# Patient Record
Sex: MALE | Race: WHITE | NOT HISPANIC OR LATINO | Employment: FULL TIME | URBAN - METROPOLITAN AREA
[De-identification: names, ages, dates, MRNs, and addresses within clinical notes are randomized per-mention and may not be internally consistent; named-entity substitution may affect disease eponyms.]

---

## 2017-12-26 ENCOUNTER — HOSPITAL ENCOUNTER (INPATIENT)
Facility: HOSPITAL | Age: 57
LOS: 2 days | Discharge: HOME | DRG: 247 | End: 2017-12-28
Attending: EMERGENCY MEDICINE | Admitting: INTERNAL MEDICINE
Payer: COMMERCIAL

## 2017-12-26 ENCOUNTER — APPOINTMENT (INPATIENT)
Dept: NON INVASIVE DIAGNOSTICS | Facility: HOSPITAL | Age: 57
DRG: 247 | End: 2017-12-26
Attending: INTERNAL MEDICINE
Payer: COMMERCIAL

## 2017-12-26 ENCOUNTER — APPOINTMENT (EMERGENCY)
Dept: RADIOLOGY | Facility: HOSPITAL | Age: 57
DRG: 247 | End: 2017-12-26
Payer: COMMERCIAL

## 2017-12-26 DIAGNOSIS — R73.9 HYPERGLYCEMIA: ICD-10-CM

## 2017-12-26 DIAGNOSIS — I21.4 NSTEMI (NON-ST ELEVATED MYOCARDIAL INFARCTION) (HCC): Primary | ICD-10-CM

## 2017-12-26 DIAGNOSIS — E11.9 NEW ONSET TYPE 2 DIABETES MELLITUS (HCC): ICD-10-CM

## 2017-12-26 PROBLEM — E66.01 MORBID OBESITY (HCC): Status: ACTIVE | Noted: 2017-12-26

## 2017-12-26 PROBLEM — R07.9 CHEST PAIN: Status: ACTIVE | Noted: 2017-12-26

## 2017-12-26 LAB
ALBUMIN SERPL BCP-MCNC: 3.4 G/DL (ref 3.5–5)
ALP SERPL-CCNC: 163 U/L (ref 46–116)
ALT SERPL W P-5'-P-CCNC: 36 U/L (ref 12–78)
ANION GAP SERPL CALCULATED.3IONS-SCNC: 8 MMOL/L (ref 4–13)
APTT PPP: 50 SECONDS (ref 23–35)
AST SERPL W P-5'-P-CCNC: 21 U/L (ref 5–45)
ATRIAL RATE: 87 BPM
ATRIAL RATE: 90 BPM
BASOPHILS # BLD AUTO: 0.05 THOUSANDS/ΜL (ref 0–0.1)
BASOPHILS NFR BLD AUTO: 1 % (ref 0–1)
BILIRUB SERPL-MCNC: 0.2 MG/DL (ref 0.2–1)
BUN SERPL-MCNC: 15 MG/DL (ref 5–25)
CALCIUM SERPL-MCNC: 8.3 MG/DL (ref 8.3–10.1)
CHLORIDE SERPL-SCNC: 107 MMOL/L (ref 100–108)
CO2 SERPL-SCNC: 29 MMOL/L (ref 21–32)
CREAT SERPL-MCNC: 0.93 MG/DL (ref 0.6–1.3)
EOSINOPHIL # BLD AUTO: 0.38 THOUSAND/ΜL (ref 0–0.61)
EOSINOPHIL NFR BLD AUTO: 4 % (ref 0–6)
ERYTHROCYTE [DISTWIDTH] IN BLOOD BY AUTOMATED COUNT: 13 % (ref 11.6–15.1)
EST. AVERAGE GLUCOSE BLD GHB EST-MCNC: 220 MG/DL
GFR SERPL CREATININE-BSD FRML MDRD: 91 ML/MIN/1.73SQ M
GLUCOSE SERPL-MCNC: 165 MG/DL (ref 65–140)
GLUCOSE SERPL-MCNC: 175 MG/DL (ref 65–140)
GLUCOSE SERPL-MCNC: 195 MG/DL (ref 65–140)
GLUCOSE SERPL-MCNC: 297 MG/DL (ref 65–140)
GLUCOSE SERPL-MCNC: 306 MG/DL (ref 65–140)
HBA1C MFR BLD: 9.3 % (ref 4.2–6.3)
HCT VFR BLD AUTO: 44.4 % (ref 36.5–49.3)
HGB BLD-MCNC: 14.7 G/DL (ref 12–17)
KCT BLD-ACNC: 189 SEC (ref 89–137)
LYMPHOCYTES # BLD AUTO: 2.01 THOUSANDS/ΜL (ref 0.6–4.47)
LYMPHOCYTES NFR BLD AUTO: 21 % (ref 14–44)
MCH RBC QN AUTO: 28.2 PG (ref 26.8–34.3)
MCHC RBC AUTO-ENTMCNC: 33.1 G/DL (ref 31.4–37.4)
MCV RBC AUTO: 85 FL (ref 82–98)
MONOCYTES # BLD AUTO: 0.91 THOUSAND/ΜL (ref 0.17–1.22)
MONOCYTES NFR BLD AUTO: 10 % (ref 4–12)
NEUTROPHILS # BLD AUTO: 6.03 THOUSANDS/ΜL (ref 1.85–7.62)
NEUTS SEG NFR BLD AUTO: 64 % (ref 43–75)
NT-PROBNP SERPL-MCNC: 624 PG/ML
P AXIS: 64 DEGREES
P AXIS: 88 DEGREES
PLATELET # BLD AUTO: 283 THOUSANDS/UL (ref 149–390)
PMV BLD AUTO: 9.8 FL (ref 8.9–12.7)
POTASSIUM SERPL-SCNC: 4.4 MMOL/L (ref 3.5–5.3)
PR INTERVAL: 186 MS
PR INTERVAL: 190 MS
PROT SERPL-MCNC: 7.5 G/DL (ref 6.4–8.2)
QRS AXIS: 21 DEGREES
QRS AXIS: 31 DEGREES
QRSD INTERVAL: 106 MS
QRSD INTERVAL: 108 MS
QT INTERVAL: 380 MS
QT INTERVAL: 392 MS
QTC INTERVAL: 464 MS
QTC INTERVAL: 471 MS
RBC # BLD AUTO: 5.22 MILLION/UL (ref 3.88–5.62)
SODIUM SERPL-SCNC: 144 MMOL/L (ref 136–145)
SPECIMEN SOURCE: ABNORMAL
T WAVE AXIS: 69 DEGREES
T WAVE AXIS: 77 DEGREES
TROPONIN I SERPL-MCNC: 0.29 NG/ML
TROPONIN I SERPL-MCNC: 10.17 NG/ML
VENTRICULAR RATE: 87 BPM
VENTRICULAR RATE: 90 BPM
WBC # BLD AUTO: 9.38 THOUSAND/UL (ref 4.31–10.16)

## 2017-12-26 PROCEDURE — 80053 COMPREHEN METABOLIC PANEL: CPT | Performed by: EMERGENCY MEDICINE

## 2017-12-26 PROCEDURE — C1894 INTRO/SHEATH, NON-LASER: HCPCS | Performed by: INTERNAL MEDICINE

## 2017-12-26 PROCEDURE — C1725 CATH, TRANSLUMIN NON-LASER: HCPCS | Performed by: INTERNAL MEDICINE

## 2017-12-26 PROCEDURE — 027034Z DILATION OF CORONARY ARTERY, ONE ARTERY WITH DRUG-ELUTING INTRALUMINAL DEVICE, PERCUTANEOUS APPROACH: ICD-10-PCS | Performed by: INTERNAL MEDICINE

## 2017-12-26 PROCEDURE — 84484 ASSAY OF TROPONIN QUANT: CPT | Performed by: EMERGENCY MEDICINE

## 2017-12-26 PROCEDURE — 93454 CORONARY ARTERY ANGIO S&I: CPT | Performed by: INTERNAL MEDICINE

## 2017-12-26 PROCEDURE — B2111ZZ FLUOROSCOPY OF MULTIPLE CORONARY ARTERIES USING LOW OSMOLAR CONTRAST: ICD-10-PCS | Performed by: INTERNAL MEDICINE

## 2017-12-26 PROCEDURE — 82948 REAGENT STRIP/BLOOD GLUCOSE: CPT

## 2017-12-26 PROCEDURE — 99153 MOD SED SAME PHYS/QHP EA: CPT | Performed by: INTERNAL MEDICINE

## 2017-12-26 PROCEDURE — 84484 ASSAY OF TROPONIN QUANT: CPT | Performed by: INTERNAL MEDICINE

## 2017-12-26 PROCEDURE — 85025 COMPLETE CBC W/AUTO DIFF WBC: CPT | Performed by: EMERGENCY MEDICINE

## 2017-12-26 PROCEDURE — 71020 HB CHEST X-RAY 2VW FRONTAL&LATL: CPT

## 2017-12-26 PROCEDURE — C9600 PERC DRUG-EL COR STENT SING: HCPCS | Performed by: INTERNAL MEDICINE

## 2017-12-26 PROCEDURE — C1874 STENT, COATED/COV W/DEL SYS: HCPCS

## 2017-12-26 PROCEDURE — 93005 ELECTROCARDIOGRAM TRACING: CPT | Performed by: EMERGENCY MEDICINE

## 2017-12-26 PROCEDURE — 36415 COLL VENOUS BLD VENIPUNCTURE: CPT

## 2017-12-26 PROCEDURE — C1769 GUIDE WIRE: HCPCS | Performed by: INTERNAL MEDICINE

## 2017-12-26 PROCEDURE — C1887 CATHETER, GUIDING: HCPCS | Performed by: INTERNAL MEDICINE

## 2017-12-26 PROCEDURE — 93005 ELECTROCARDIOGRAM TRACING: CPT

## 2017-12-26 PROCEDURE — 85347 COAGULATION TIME ACTIVATED: CPT

## 2017-12-26 PROCEDURE — 99285 EMERGENCY DEPT VISIT HI MDM: CPT

## 2017-12-26 PROCEDURE — 83880 ASSAY OF NATRIURETIC PEPTIDE: CPT | Performed by: INTERNAL MEDICINE

## 2017-12-26 PROCEDURE — 85730 THROMBOPLASTIN TIME PARTIAL: CPT | Performed by: EMERGENCY MEDICINE

## 2017-12-26 PROCEDURE — 83036 HEMOGLOBIN GLYCOSYLATED A1C: CPT | Performed by: INTERNAL MEDICINE

## 2017-12-26 PROCEDURE — 99152 MOD SED SAME PHYS/QHP 5/>YRS: CPT | Performed by: INTERNAL MEDICINE

## 2017-12-26 RX ORDER — HEPARIN SODIUM 1000 [USP'U]/ML
4000 INJECTION, SOLUTION INTRAVENOUS; SUBCUTANEOUS ONCE
Status: COMPLETED | OUTPATIENT
Start: 2017-12-26 | End: 2017-12-26

## 2017-12-26 RX ORDER — ATORVASTATIN CALCIUM 40 MG/1
40 TABLET, FILM COATED ORAL EVERY EVENING
Status: DISCONTINUED | OUTPATIENT
Start: 2017-12-26 | End: 2017-12-28 | Stop reason: HOSPADM

## 2017-12-26 RX ORDER — ONDANSETRON 2 MG/ML
4 INJECTION INTRAMUSCULAR; INTRAVENOUS EVERY 6 HOURS PRN
Status: DISCONTINUED | OUTPATIENT
Start: 2017-12-26 | End: 2017-12-28 | Stop reason: HOSPADM

## 2017-12-26 RX ORDER — HEPARIN SODIUM 1000 [USP'U]/ML
4000 INJECTION, SOLUTION INTRAVENOUS; SUBCUTANEOUS AS NEEDED
Status: DISCONTINUED | OUTPATIENT
Start: 2017-12-26 | End: 2017-12-27

## 2017-12-26 RX ORDER — MORPHINE SULFATE 4 MG/ML
4 INJECTION, SOLUTION INTRAMUSCULAR; INTRAVENOUS EVERY 4 HOURS PRN
Status: DISCONTINUED | OUTPATIENT
Start: 2017-12-26 | End: 2017-12-28 | Stop reason: HOSPADM

## 2017-12-26 RX ORDER — METOPROLOL TARTRATE 5 MG/5ML
INJECTION INTRAVENOUS CODE/TRAUMA/SEDATION MEDICATION
Status: COMPLETED | OUTPATIENT
Start: 2017-12-26 | End: 2017-12-26

## 2017-12-26 RX ORDER — CLOPIDOGREL BISULFATE 75 MG/1
600 TABLET ORAL ONCE
Status: COMPLETED | OUTPATIENT
Start: 2017-12-26 | End: 2017-12-26

## 2017-12-26 RX ORDER — SODIUM CHLORIDE 9 MG/ML
100 INJECTION, SOLUTION INTRAVENOUS CONTINUOUS
Status: DISPENSED | OUTPATIENT
Start: 2017-12-26 | End: 2017-12-27

## 2017-12-26 RX ORDER — ASPIRIN 81 MG/1
324 TABLET, CHEWABLE ORAL DAILY
Status: DISCONTINUED | OUTPATIENT
Start: 2017-12-27 | End: 2017-12-28 | Stop reason: HOSPADM

## 2017-12-26 RX ORDER — CLOPIDOGREL BISULFATE 75 MG/1
75 TABLET ORAL DAILY
Status: DISCONTINUED | OUTPATIENT
Start: 2017-12-27 | End: 2017-12-28 | Stop reason: HOSPADM

## 2017-12-26 RX ORDER — ASPIRIN 81 MG/1
162 TABLET, CHEWABLE ORAL DAILY
Status: DISCONTINUED | OUTPATIENT
Start: 2017-12-26 | End: 2017-12-26

## 2017-12-26 RX ORDER — LIDOCAINE HYDROCHLORIDE 10 MG/ML
INJECTION, SOLUTION INFILTRATION; PERINEURAL CODE/TRAUMA/SEDATION MEDICATION
Status: COMPLETED | OUTPATIENT
Start: 2017-12-26 | End: 2017-12-26

## 2017-12-26 RX ORDER — DIPHENOXYLATE HYDROCHLORIDE AND ATROPINE SULFATE 2.5; .025 MG/1; MG/1
1 TABLET ORAL DAILY
COMMUNITY
End: 2018-04-04 | Stop reason: ALTCHOICE

## 2017-12-26 RX ORDER — HEPARIN SODIUM 1000 [USP'U]/ML
INJECTION, SOLUTION INTRAVENOUS; SUBCUTANEOUS CODE/TRAUMA/SEDATION MEDICATION
Status: COMPLETED | OUTPATIENT
Start: 2017-12-26 | End: 2017-12-26

## 2017-12-26 RX ORDER — VERAPAMIL HYDROCHLORIDE 2.5 MG/ML
INJECTION, SOLUTION INTRAVENOUS CODE/TRAUMA/SEDATION MEDICATION
Status: COMPLETED | OUTPATIENT
Start: 2017-12-26 | End: 2017-12-26

## 2017-12-26 RX ORDER — NITROGLYCERIN 20 MG/100ML
INJECTION INTRAVENOUS CODE/TRAUMA/SEDATION MEDICATION
Status: COMPLETED | OUTPATIENT
Start: 2017-12-26 | End: 2017-12-26

## 2017-12-26 RX ORDER — HEPARIN SODIUM 10000 [USP'U]/100ML
3-20 INJECTION, SOLUTION INTRAVENOUS
Status: DISCONTINUED | OUTPATIENT
Start: 2017-12-26 | End: 2017-12-27

## 2017-12-26 RX ORDER — ACETAMINOPHEN 325 MG/1
650 TABLET ORAL EVERY 4 HOURS PRN
Status: DISCONTINUED | OUTPATIENT
Start: 2017-12-26 | End: 2017-12-28 | Stop reason: HOSPADM

## 2017-12-26 RX ORDER — HEPARIN SODIUM 1000 [USP'U]/ML
2000 INJECTION, SOLUTION INTRAVENOUS; SUBCUTANEOUS AS NEEDED
Status: DISCONTINUED | OUTPATIENT
Start: 2017-12-26 | End: 2017-12-27

## 2017-12-26 RX ORDER — MIDAZOLAM HYDROCHLORIDE 1 MG/ML
INJECTION INTRAMUSCULAR; INTRAVENOUS CODE/TRAUMA/SEDATION MEDICATION
Status: COMPLETED | OUTPATIENT
Start: 2017-12-26 | End: 2017-12-26

## 2017-12-26 RX ORDER — FENTANYL CITRATE 50 UG/ML
INJECTION, SOLUTION INTRAMUSCULAR; INTRAVENOUS CODE/TRAUMA/SEDATION MEDICATION
Status: COMPLETED | OUTPATIENT
Start: 2017-12-26 | End: 2017-12-26

## 2017-12-26 RX ADMIN — IOHEXOL 150 ML: 350 INJECTION, SOLUTION INTRAVENOUS at 13:42

## 2017-12-26 RX ADMIN — HEPARIN SODIUM 5000 UNITS: 1000 INJECTION INTRAVENOUS; SUBCUTANEOUS at 13:30

## 2017-12-26 RX ADMIN — SODIUM CHLORIDE 500 ML: 0.9 INJECTION, SOLUTION INTRAVENOUS at 09:07

## 2017-12-26 RX ADMIN — HEPARIN SODIUM AND DEXTROSE 11.1 UNITS/KG/HR: 10000; 5 INJECTION INTRAVENOUS at 08:59

## 2017-12-26 RX ADMIN — NITROGLYCERIN 1 INCH: 20 OINTMENT TOPICAL at 07:28

## 2017-12-26 RX ADMIN — INSULIN LISPRO 4 UNITS: 100 INJECTION, SOLUTION INTRAVENOUS; SUBCUTANEOUS at 17:37

## 2017-12-26 RX ADMIN — MIDAZOLAM HYDROCHLORIDE 2 MG: 1 INJECTION, SOLUTION INTRAMUSCULAR; INTRAVENOUS at 13:02

## 2017-12-26 RX ADMIN — ATORVASTATIN CALCIUM 40 MG: 40 TABLET, FILM COATED ORAL at 17:34

## 2017-12-26 RX ADMIN — NITROGLYCERIN 200 MCG: 20 INJECTION INTRAVENOUS at 13:07

## 2017-12-26 RX ADMIN — INSULIN LISPRO 1 UNITS: 100 INJECTION, SOLUTION INTRAVENOUS; SUBCUTANEOUS at 21:35

## 2017-12-26 RX ADMIN — ACETAMINOPHEN 650 MG: 325 TABLET, FILM COATED ORAL at 18:06

## 2017-12-26 RX ADMIN — HEPARIN SODIUM 4000 UNITS: 1000 INJECTION, SOLUTION INTRAVENOUS; SUBCUTANEOUS at 08:59

## 2017-12-26 RX ADMIN — METOPROLOL TARTRATE 5 MG: 1 INJECTION, SOLUTION INTRAVENOUS at 13:10

## 2017-12-26 RX ADMIN — CLOPIDOGREL BISULFATE 600 MG: 75 TABLET ORAL at 11:51

## 2017-12-26 RX ADMIN — LIDOCAINE HYDROCHLORIDE 1 ML: 10 INJECTION, SOLUTION INFILTRATION; PERINEURAL at 13:05

## 2017-12-26 RX ADMIN — FENTANYL CITRATE 50 MCG: 50 INJECTION INTRAMUSCULAR; INTRAVENOUS at 13:02

## 2017-12-26 RX ADMIN — VERAPAMIL HYDROCHLORIDE 2.5 MG: 2.5 INJECTION, SOLUTION INTRAVENOUS at 13:07

## 2017-12-26 RX ADMIN — HEPARIN SODIUM 2000 UNITS: 1000 INJECTION, SOLUTION INTRAVENOUS; SUBCUTANEOUS at 11:53

## 2017-12-26 RX ADMIN — METOPROLOL TARTRATE 25 MG: 25 TABLET ORAL at 07:28

## 2017-12-26 RX ADMIN — HEPARIN SODIUM 4000 UNITS: 1000 INJECTION INTRAVENOUS; SUBCUTANEOUS at 13:07

## 2017-12-26 RX ADMIN — NITROGLYCERIN 200 MCG: 20 INJECTION INTRAVENOUS at 13:40

## 2017-12-26 RX ADMIN — SODIUM CHLORIDE 100 ML/HR: 0.9 INJECTION, SOLUTION INTRAVENOUS at 14:09

## 2017-12-26 RX ADMIN — ASPIRIN 81 MG 162 MG: 81 TABLET ORAL at 07:38

## 2017-12-26 RX ADMIN — METOPROLOL TARTRATE 25 MG: 25 TABLET ORAL at 20:08

## 2017-12-26 NOTE — H&P
History and Physical - [facility] Internal Medicine    Patient Information: Denisa Griffin 62 y o  male MRN: [id number]  Unit/Bed#: -01 Encounter: [id number]  Admitting Physician: Sergei Landaverde MD  PCP: No primary care provider on file  Date of Admission:  12/26/17    Assessment/Plan:    Hospital Problem List:     Principal Problem:    NSTEMI (non-ST elevated myocardial infarction) [facility])  Active Problems:    Chest pain    New onset type 2 diabetes mellitus ([facility] )    Morbid obesity ([facility] )      Plan for the Primary Problem(s):  · Non ST elevation MI likely type 1  Agree with admission on monitored bed  Serial cardiac enzymes will be obtained  Will get echocardiogram   Cardiology evaluation will be done  Patient will be maintained on aspirin Plavix beta-blocker high-intensity statin therapy  Heparin GTT will be continued  Further ischemic workup per Cardiology  · New onset type 2 diabetes  Follow up on hemoglobin A1c  Continue with sliding scale insulin  Nutritional consultation will be obtained  Diabetic teaching will be done  ·   Morbid obesity  Lifestyle modification  ·       VTE Prophylaxis: Heparin Drip  / reason for no mechanical VTE prophylaxis foot ulcers   Code Status: FULL CODE  POLST: POLST form is not discussed and not completed at this time  Anticipated Length of Stay:  Patient will be admitted on an Inpatient basis with an anticipated length of stay of  > 2 midnights  Justification for Hospital Stay:  Cardiology evaluation and follow-up    Total Time for Visit, including Counseling / Coordination of Care: 45 minutes  Greater than 50% of this total time spent on direct patient counseling and coordination of care  Chief Complaint:   Chest pain    History of Present Illness:    Denisa Griffin is a 62 y o  male who presents with chest pain was started at 5:00 a m  and woke the patient from sleep    Patient describes the pain as substernal radiating to his jaw a moderate to severe intensity  It was not associated with diaphoresis, palpitations, lightheadedness or shortness of breath  He denies any similar symptoms in the past   He took a few baby aspirins and the pain had improved by the time he drove himself to the emergency room  The patient has a strong family history of coronary artery disease and his father had his 1st MI in the 45s and his grandfather in the 46s  He however has not seen a primary care physician for many years  He was also found to have elevated blood sugars in the emergency room  The patient states that he has ulceration on the left foot however denies any tingling or numbness of bilateral lower extremity  Review of Systems:    Review of Systems   Constitutional: Negative  HENT: Negative  Cardiovascular: Positive for chest pain  Negative for palpitations  Gastrointestinal: Negative  Endocrine: Negative  Genitourinary: Negative  Musculoskeletal: Negative  Allergic/Immunologic: Negative  Neurological: Negative  Hematological: Negative  Psychiatric/Behavioral: Negative  Past Medical and Surgical History:     History reviewed  No pertinent past medical history  Past Surgical History:   Procedure Laterality Date    EYE SURGERY      WISDOM TOOTH EXTRACTION         Meds/Allergies:    Prior to Admission medications    Medication Sig Start Date End Date Taking? Authorizing Provider   multivitamin (THERAGRAN) TABS Take 1 tablet by mouth daily   Yes Historical Provider, MD     I have reviewed home medications with patient personally      Allergies: No Known Allergies    Social History:     Marital Status: /Civil Union     Substance Use History:   History   Alcohol Use No     History   Smoking Status    Never Smoker   Smokeless Tobacco    Never Used     History   Drug use: Unknown       Family History:    non-contributory    Physical Exam:     Vitals:   Blood Pressure: 148/83 (12/26/17 1031)  Pulse: 71 (12/26/17 1031)  Temperature: 97 7 °F (36 5 °C) (12/26/17 1031)  Temp Source: Oral (12/26/17 1031)  Respirations: 18 (12/26/17 1031)  Height: 5' 11" (180 3 cm) (12/26/17 1031)  Weight - Scale: (!) 151 kg (333 lb 1 8 oz) (12/26/17 1031)  SpO2: 95 % (12/26/17 1031)    Physical Exam   Constitutional: He is oriented to person, place, and time  No distress  HENT:   Head: Normocephalic and atraumatic  Eyes: Conjunctivae are normal  Pupils are equal, round, and reactive to light  Neck: Normal range of motion  No JVD present  Cardiovascular: Normal rate and regular rhythm  Pulmonary/Chest: Effort normal and breath sounds normal    Abdominal: Soft  Bowel sounds are normal    Musculoskeletal: He exhibits edema  He exhibits no deformity  Neurological: He is alert and oriented to person, place, and time  Skin: Skin is warm  He is not diaphoretic  Ulceration medial aspect of left foot  Palpabledorsalis pedis pulses bilaterally   Psychiatric: He has a normal mood and affect  Vitals reviewed  Additional Data:     Lab Results: I have personally reviewed pertinent reports  Results from last 7 days  Lab Units 12/26/17  0640   WBC Thousand/uL 9 38   HEMOGLOBIN g/dL 14 7   HEMATOCRIT % 44 4   PLATELETS Thousands/uL 283   NEUTROS PCT % 64   LYMPHS PCT % 21   MONOS PCT % 10   EOS PCT % 4       Results from last 7 days  Lab Units 12/26/17  0640   SODIUM mmol/L 144   POTASSIUM mmol/L 4 4   CHLORIDE mmol/L 107   CO2 mmol/L 29   BUN mg/dL 15   CREATININE mg/dL 0 93   CALCIUM mg/dL 8 3   TOTAL PROTEIN g/dL 7 5   BILIRUBIN TOTAL mg/dL 0 20   ALK PHOS U/L 163*   ALT U/L 36   AST U/L 21   GLUCOSE RANDOM mg/dL 297*           Imaging: I have personally reviewed pertinent reports  X-ray Chest 2 Views    Result Date: 12/26/2017  Narrative: CHEST INDICATION:  Chest pain COMPARISON:  None VIEWS:  Frontal and lateral projections IMAGES:  2 FINDINGS:     Cardiomediastinal silhouette appears unremarkable   The lungs are clear   No pneumothorax or pleural effusion  Visualized osseous structures appear within normal limits for the patient's age  Impression: No active pulmonary disease  Workstation performed: [id number]       EKG, Pathology, and Other Studies Reviewed on Admission:   · EKG:Reviewed    Allscripts / Epic Records Reviewed: Yes     ** Please Note: This note has been constructed using a voice recognition system   **

## 2017-12-26 NOTE — ED PROCEDURE NOTE
PROCEDURE  CriticalCare Time  Performed by: Rina Springer  Authorized by: Rina Springer     Critical care provider statement:     Critical care time (minutes):  35    Critical care start time:  12/26/2017 8:15 AM    Critical care end time:  12/26/2017 8:50 AM    Critical care time was exclusive of:  Separately billable procedures and treating other patients and teaching time    Critical care was time spent personally by me on the following activities:  Examination of patient, evaluation of patient's response to treatment, ordering and review of laboratory studies, ordering and review of radiographic studies and re-evaluation of patient's condition    I assumed direction of critical care for this patient from another provider in my specialty: no

## 2017-12-26 NOTE — ED PROCEDURE NOTE
PROCEDURE  ECG 12 Lead Documentation  Date/Time: 12/26/2017 8:34 AM  Performed by: Cleopatra Garcia  Authorized by: Margaret ATKINSON     Indications / Diagnosis:  Cp  ECG reviewed by me, the ED Provider: yes    Patient location:  ED and bedside  Previous ECG:     Previous ECG:  Unavailable  Interpretation:     Interpretation: non-specific    Rate:     ECG rate:  87    ECG rate assessment: normal    Rhythm:     Rhythm: sinus rhythm    Ectopy:     Ectopy: none    QRS:     QRS axis:  Normal    QRS intervals:   Wide  Conduction:     Conduction: abnormal      Abnormal conduction: non-specific intraventricular conduction delay    ST segments:     ST segments:  Normal  T waves:     T waves: flattening      Flattening:  I and aVL  Q waves:     Q waves:  V1 and V2  Comments:      No ecg signs of ischemia/ injury / r heart strain

## 2017-12-26 NOTE — PROGRESS NOTES
Cardiac cath performed via the R radial artery    Chronic mid RCA stenosis with R to R collaterals    Acute critical mid L Cx stenosis 95%, culprit lesion    Successful PCI with placement of a 4 0 x 18 mm THALIA

## 2017-12-26 NOTE — PLAN OF CARE
CARDIOVASCULAR - ADULT     Maintains optimal cardiac output and hemodynamic stability Progressing     Absence of cardiac dysrhythmias or at baseline rhythm Progressing        METABOLIC, FLUID AND ELECTROLYTES - ADULT     Electrolytes maintained within normal limits Progressing     Fluid balance maintained Progressing     Glucose maintained within target range Progressing        SKIN/TISSUE INTEGRITY - ADULT     Skin integrity remains intact Progressing     Incision(s), wounds(s) or drain site(s) healing without S/S of infection Progressing     Oral mucous membranes remain intact Progressing

## 2017-12-26 NOTE — ED PROVIDER NOTES
History  Chief Complaint   Patient presents with    Chest Pain     Pt c/o mid sternal chest pain that radiates towards his jaw  Pt states that the pain started around 5am  Pt denies pain/numbness down extremities  62 YR MALE HAS NOT SEEN A DOCTOR IN SEVERAL YRS-- C/O 2 EPISODES OF ANTERIRO GRADUAL ONSET OF CHEST PRESSURE- FIRST ONE 2 DAYS AGO  WHIL;E PUSHING CART AT LOWES- L;AST ONE AT REST AT 5 AM TODAY - AGAIN GRADUAL ONSET ANTERIRO CHEST PRESSURE- NON RADIATING -- WITH NO OTHER SYMPOTMS-- NO ABRUPT ONSET OF RIPPING/TEARING TYEP PAIN -- NO OTHER COMPS- CURRENLTY CP 1/10         History provided by:  Patient   used: No        Prior to Admission Medications   Prescriptions Last Dose Informant Patient Reported? Taking?   multivitamin (THERAGRAN) TABS 12/25/2017 at Unknown time  Yes Yes   Sig: Take 1 tablet by mouth daily      Facility-Administered Medications: None       History reviewed  No pertinent past medical history  Past Surgical History:   Procedure Laterality Date    EYE SURGERY      WISDOM TOOTH EXTRACTION         History reviewed  No pertinent family history  I have reviewed and agree with the history as documented  Social History   Substance Use Topics    Smoking status: Never Smoker    Smokeless tobacco: Never Used    Alcohol use No        Review of Systems   Constitutional: Negative  HENT: Negative  Eyes: Negative  Respiratory: Negative  Cardiovascular: Positive for chest pain and leg swelling  Negative for palpitations  Gastrointestinal: Negative  Endocrine: Negative  Genitourinary: Negative  Musculoskeletal: Negative  Skin: Negative  Allergic/Immunologic: Negative  Neurological: Negative  Hematological: Negative  Psychiatric/Behavioral: Negative          Physical Exam  ED Triage Vitals   Temperature Pulse Respirations Blood Pressure SpO2   12/26/17 0732 12/26/17 0622 12/26/17 0622 12/26/17 0622 12/26/17 0622   98 9 °F (37 2 °C) 89 20 (!) 199/113 97 %      Temp src Heart Rate Source Patient Position - Orthostatic VS BP Location FiO2 (%)   -- -- 12/26/17 0622 12/26/17 0622 --     Lying Right arm       Pain Score       12/26/17 0622       3           Orthostatic Vital Signs  Vitals:    12/26/17 0622 12/26/17 0730   BP: (!) 199/113    Pulse: 89 82   Patient Position - Orthostatic VS: Lying        Physical Exam   Constitutional: He is oriented to person, place, and time  He appears well-developed and well-nourished  No distress  AVSS- HTNSIVE- BUT DECREASING - PULSE OX 97 % ON RA- INTEPRETATION IS NORMAL- NO INTERVENTION    HENT:   Head: Normocephalic and atraumatic  Eyes: Conjunctivae and EOM are normal  Pupils are equal, round, and reactive to light  Right eye exhibits no discharge  Left eye exhibits no discharge  No scleral icterus  MM PINK   Neck: Normal range of motion  Neck supple  No JVD present  No tracheal deviation present  No thyromegaly present  Cardiovascular: Normal rate, regular rhythm, normal heart sounds and intact distal pulses  Exam reveals no gallop and no friction rub  No murmur heard  Pulmonary/Chest: Effort normal and breath sounds normal  No stridor  No respiratory distress  He has no wheezes  He has no rales  He exhibits no tenderness  Abdominal: Soft  Bowel sounds are normal  He exhibits no distension and no mass  There is no tenderness  There is no rebound and no guarding  No hernia  Musculoskeletal: He exhibits edema  He exhibits no tenderness or deformity  1 PLUS BLE PRETIBIAL EDEMA- NO ASSYM/ TENDENRESS/ ERYTHEMA/- EQUAL BILATERAL RADIAL/DP    Lymphadenopathy:     He has no cervical adenopathy  Neurological: He is alert and oriented to person, place, and time  No cranial nerve deficit or sensory deficit  He exhibits normal muscle tone  Coordination normal    Skin: Skin is warm  Capillary refill takes less than 2 seconds  No rash noted  He is not diaphoretic  No erythema  No pallor  Psychiatric: He has a normal mood and affect  His behavior is normal  Judgment and thought content normal    Nursing note and vitals reviewed  ED Medications  Medications   aspirin chewable tablet 162 mg (162 mg Oral Given 12/26/17 0738)   nitroglycerin (NITRO-BID) 2 % TD ointment 1 inch (1 inch Topical Given 12/26/17 0728)   metoprolol tartrate (LOPRESSOR) tablet 25 mg (25 mg Oral Given 12/26/17 0728)       Diagnostic Studies  Results Reviewed     Procedure Component Value Units Date/Time    Troponin I [[id number]]  (Abnormal) Collected:  12/26/17 0640    Lab Status:  Final result Specimen:  Blood from Arm, Left Updated:  12/26/17 0707     Troponin I 0 29 (H) ng/mL     Narrative:         [employer] Chemistry analyzer 99% cutoff is > 0 04 ng/mL in network labs    o cTnI 99% cutoff is useful only when applied to patients in the clinical setting of myocardial ischemia  o cTnI 99% cutoff should be interpreted in the context of clinical history, ECG findings and possibly cardiac imaging to establish correct diagnosis  o cTnI 99% cutoff may be suggestive but clearly not indicative of a coronary event without the clinical setting of myocardial ischemia  Comprehensive metabolic panel [[id number]]  (Abnormal) Collected:  12/26/17 0640    Lab Status:  Final result Specimen:  Blood from Arm, Left Updated:  12/26/17 0706     Sodium 144 mmol/L      Potassium 4 4 mmol/L      Chloride 107 mmol/L      CO2 29 mmol/L      Anion Gap 8 mmol/L      BUN 15 mg/dL      Creatinine 0 93 mg/dL      Glucose 297 (H) mg/dL      Calcium 8 3 mg/dL      AST 21 U/L      ALT 36 U/L      Alkaline Phosphatase 163 (H) U/L      Total Protein 7 5 g/dL      Albumin 3 4 (L) g/dL      Total Bilirubin 0 20 mg/dL      eGFR 91 ml/min/1 73sq m     Narrative:         National Kidney Disease Education Program recommendations are as follows:  GFR calculation is accurate only with a steady state creatinine  Chronic Kidney disease less than 60 ml/min/1 73 sq  meters  Kidney failure less than 15 ml/min/1 73 sq  meters  CBC and differential [[id number]]  (Normal) Collected:  12/26/17 0640    Lab Status:  Final result Specimen:  Blood from Arm, Left Updated:  12/26/17 0648     WBC 9 38 Thousand/uL      RBC 5 22 Million/uL      Hemoglobin 14 7 g/dL      Hematocrit 44 4 %      MCV 85 fL      MCH 28 2 pg      MCHC 33 1 g/dL      RDW 13 0 %      MPV 9 8 fL      Platelets 455 Thousands/uL      Neutrophils Relative 64 %      Lymphocytes Relative 21 %      Monocytes Relative 10 %      Eosinophils Relative 4 %      Basophils Relative 1 %      Neutrophils Absolute 6 03 Thousands/µL      Lymphocytes Absolute 2 01 Thousands/µL      Monocytes Absolute 0 91 Thousand/µL      Eosinophils Absolute 0 38 Thousand/µL      Basophils Absolute 0 05 Thousands/µL                  X-ray chest 2 views    (Results Pending)              Procedures  Procedures       Phone Contacts  ED Phone Contact    ED Course  ED Course as of Dec 26 0911 Tue Dec 26, 2017   5602 Cxr pa/lat- normal mediastinum- no evidence of free/sq air - no infiltrate/ ptx/ pulm edema/ pleural effusions    0910 - er md note- pt - re-evaluated- pain free spb down -- aware of pos trop/ elevated bs - and need to admit--                                 [facility]  CritCare Time    Disposition  Final diagnoses:   None     ED Disposition     None      Follow-up Information    None       Patient's Medications   Discharge Prescriptions    No medications on file     No discharge procedures on file      ED Provider  Electronically Signed by           Yana Foley MD  12/27/17 0520

## 2017-12-26 NOTE — CONSULTS
Consultation - Cardiology   Rachna Lawler 62 y o  male MRN: [id number]  Unit/Bed#: -01 Encounter: [id number]    Consult to cardiology  Consult performed by: Natasha Huizar ordered by: Elif Lee          History of Present Illness   Physician Requesting Consult: Verito Beatty MD  Reason for Consult / Principal Problem: Chest pain    HPI: Rachna Lawler is a 62y o  year old male with no significant past medical history presented to [facility] with chest pain  Patient woke up this morning with 5/10 CP radiating to jaw  Took 2 ASA with relief of symptoms after 20 minutes  Came to [facility] ED - troponin slightly elevated, ECG with non-specific changes  Never had cardiac symptoms in the past, but has had increase in dyspnea recently  Has a strong family history of CAD in his parents at a young age  Review of Systems   Constitution: Negative for chills, diaphoresis, fever and malaise/fatigue  HENT: Negative  Eyes: Negative  Cardiovascular: Positive for chest pain  Negative for dyspnea on exertion, leg swelling and palpitations  Respiratory: Negative for cough, shortness of breath, snoring and wheezing  Endocrine: Negative  Hematologic/Lymphatic: Negative  Skin: Negative for rash  Musculoskeletal: Negative  Gastrointestinal: Negative for abdominal pain, constipation and diarrhea  Genitourinary: Negative for bladder incontinence, dysuria and frequency  Neurological: Negative for dizziness and light-headedness  Psychiatric/Behavioral: Negative  All other systems reviewed and are negative  Historical Information   History reviewed  No pertinent past medical history    Past Surgical History:   Procedure Laterality Date    EYE SURGERY      WISDOM TOOTH EXTRACTION       History   Alcohol Use No     History   Drug use: Unknown     History   Smoking Status    Never Smoker   Smokeless Tobacco    Never Used     Family History: Strong family history of CAD  Meds/Allergies     [START ON 12/27/2017] aspirin 324 mg Oral Daily   atorvastatin 40 mg Oral QPM   clopidogrel 600 mg Oral Once   And      [START ON 12/27/2017] clopidogrel 75 mg Oral Daily   insulin lispro 1-6 Units Subcutaneous TID AC   insulin lispro 1-6 Units Subcutaneous HS   metoprolol tartrate 25 mg Oral Q12H [facility]       heparin (porcine) 3-20 Units/kg/hr (Order-Specific) Last Rate: 11 1 Units/kg/hr (12/26/17 0859)     Prescriptions Prior to Admission   Medication    multivitamin (THERAGRAN) TABS       No Known Allergies    Objective   Vitals: Blood pressure 148/83, pulse 71, temperature 97 7 °F (36 5 °C), temperature source Oral, resp  rate 18, height 5' 11" (1 803 m), weight (!) 151 kg (333 lb 1 8 oz), SpO2 95 %  , Body mass index is 46 46 kg/m² , Orthostatic Blood Pressures    Flowsheet Row Most Recent Value   Blood Pressure  148/83 filed at 12/26/2017 1031   Patient Position - Orthostatic VS  Lying filed at 12/26/2017 5398        Systolic (82TUS), NTE:113 , Min:136 , ADP:976     Diastolic (70LLX), OUT:62, Min:73, Max:113    No intake or output data in the 24 hours ending 12/26/17 1111    Weight (last 2 days)     Date/Time   Weight    12/26/17 1031  (!)  151 (333 12)    12/26/17 0622  (!)  154 (338 41)              Invasive Devices     Peripheral Intravenous Line            Peripheral IV 12/26/17 Left Antecubital less than 1 day                  Physical Exam   Constitutional: He is oriented to person, place, and time  He appears well-developed and well-nourished  HENT:   Head: Normocephalic and atraumatic  Neck: No JVD present  Cardiovascular: Normal rate, regular rhythm and normal heart sounds  Exam reveals no gallop and no friction rub  No murmur heard  Pulmonary/Chest: Effort normal and breath sounds normal  He has no wheezes  He has no rales  Abdominal: Soft  Bowel sounds are normal  He exhibits no distension  There is no tenderness     Musculoskeletal: He exhibits no edema  Neurological: He is alert and oriented to person, place, and time  He has normal reflexes  Skin: Skin is warm and dry  No rash noted  No erythema  Psychiatric: He has a normal mood and affect  Laboratory Results:    Results from last 7 days  Lab Units 12/26/17  0640   TROPONIN I ng/mL 0 29*       CBC with diff:   Results from last 7 days  Lab Units 12/26/17  0640   WBC Thousand/uL 9 38   HEMOGLOBIN g/dL 14 7   HEMATOCRIT % 44 4   MCV fL 85   PLATELETS Thousands/uL 283   MCH pg 28 2   MCHC g/dL 33 1   RDW % 13 0   MPV fL 9 8         CMP:  Results from last 7 days  Lab Units 12/26/17  0640   SODIUM mmol/L 144   POTASSIUM mmol/L 4 4   CHLORIDE mmol/L 107   CO2 mmol/L 29   ANION GAP mmol/L 8   BUN mg/dL 15   CREATININE mg/dL 0 93   GLUCOSE RANDOM mg/dL 297*   CALCIUM mg/dL 8 3   AST U/L 21   ALT U/L 36   ALK PHOS U/L 163*   TOTAL PROTEIN g/dL 7 5   ALBUMIN g/dL 3 4*   BILIRUBIN TOTAL mg/dL 0 20   EGFR ml/min/1 73sq m 91         BMP:  Results from last 7 days  Lab Units 12/26/17  0640   SODIUM mmol/L 144   POTASSIUM mmol/L 4 4   CHLORIDE mmol/L 107   CO2 mmol/L 29   BUN mg/dL 15   CREATININE mg/dL 0 93   GLUCOSE RANDOM mg/dL 297*   CALCIUM mg/dL 8 3       Imaging:   X-ray Chest 2 Views  Result Date: 12/26/2017  Impression: No active pulmonary disease  EKG reviewed personally: [id number]  Telemetry reviewed personally: NSR    Assessment:  Principal Problem:    NSTEMI (non-ST elevated myocardial infarction) [facility])  Active Problems:    Chest pain    New onset type 2 diabetes mellitus ([facility] )    Morbid obesity (HCC)      Impression:  1  NSTEMI - given troponin elevation, ECG abnormalities, good clinical story, and strong family history, will proceed with coronary angiography  2  ? DM type II   3  HTN    Plan:  1  Continue ASA, b-blockers, statin, heparin gtt  2  Plan on cath later today

## 2017-12-27 ENCOUNTER — APPOINTMENT (INPATIENT)
Dept: NON INVASIVE DIAGNOSTICS | Facility: HOSPITAL | Age: 57
DRG: 247 | End: 2017-12-27
Payer: COMMERCIAL

## 2017-12-27 PROBLEM — I25.10 CAD (CORONARY ARTERY DISEASE): Status: ACTIVE | Noted: 2017-12-27

## 2017-12-27 PROBLEM — R07.9 CHEST PAIN: Status: RESOLVED | Noted: 2017-12-26 | Resolved: 2017-12-27

## 2017-12-27 LAB
ANION GAP SERPL CALCULATED.3IONS-SCNC: 9 MMOL/L (ref 4–13)
BUN SERPL-MCNC: 16 MG/DL (ref 5–25)
CALCIUM SERPL-MCNC: 8.5 MG/DL (ref 8.3–10.1)
CHLORIDE SERPL-SCNC: 105 MMOL/L (ref 100–108)
CHOLEST SERPL-MCNC: 164 MG/DL (ref 50–200)
CO2 SERPL-SCNC: 25 MMOL/L (ref 21–32)
CREAT SERPL-MCNC: 0.79 MG/DL (ref 0.6–1.3)
ERYTHROCYTE [DISTWIDTH] IN BLOOD BY AUTOMATED COUNT: 13.4 % (ref 11.6–15.1)
GFR SERPL CREATININE-BSD FRML MDRD: 100 ML/MIN/1.73SQ M
GLUCOSE SERPL-MCNC: 177 MG/DL (ref 65–140)
GLUCOSE SERPL-MCNC: 182 MG/DL (ref 65–140)
GLUCOSE SERPL-MCNC: 192 MG/DL (ref 65–140)
GLUCOSE SERPL-MCNC: 193 MG/DL (ref 65–140)
GLUCOSE SERPL-MCNC: 232 MG/DL (ref 65–140)
HCT VFR BLD AUTO: 40.9 % (ref 36.5–49.3)
HDLC SERPL-MCNC: 35 MG/DL (ref 40–60)
HGB BLD-MCNC: 13.2 G/DL (ref 12–17)
LDLC SERPL CALC-MCNC: 100 MG/DL (ref 0–100)
MCH RBC QN AUTO: 27.8 PG (ref 26.8–34.3)
MCHC RBC AUTO-ENTMCNC: 32.3 G/DL (ref 31.4–37.4)
MCV RBC AUTO: 86 FL (ref 82–98)
PLATELET # BLD AUTO: 257 THOUSANDS/UL (ref 149–390)
PMV BLD AUTO: 9.8 FL (ref 8.9–12.7)
POTASSIUM SERPL-SCNC: 3.6 MMOL/L (ref 3.5–5.3)
RBC # BLD AUTO: 4.74 MILLION/UL (ref 3.88–5.62)
SODIUM SERPL-SCNC: 139 MMOL/L (ref 136–145)
TRIGL SERPL-MCNC: 144 MG/DL
WBC # BLD AUTO: 9.83 THOUSAND/UL (ref 4.31–10.16)

## 2017-12-27 PROCEDURE — 80048 BASIC METABOLIC PNL TOTAL CA: CPT | Performed by: INTERNAL MEDICINE

## 2017-12-27 PROCEDURE — 85027 COMPLETE CBC AUTOMATED: CPT | Performed by: INTERNAL MEDICINE

## 2017-12-27 PROCEDURE — 93306 TTE W/DOPPLER COMPLETE: CPT

## 2017-12-27 PROCEDURE — 80061 LIPID PANEL: CPT | Performed by: INTERNAL MEDICINE

## 2017-12-27 PROCEDURE — 82948 REAGENT STRIP/BLOOD GLUCOSE: CPT

## 2017-12-27 RX ORDER — POTASSIUM CHLORIDE 20 MEQ/1
20 TABLET, EXTENDED RELEASE ORAL ONCE
Status: COMPLETED | OUTPATIENT
Start: 2017-12-27 | End: 2017-12-27

## 2017-12-27 RX ORDER — INSULIN GLARGINE 100 [IU]/ML
20 INJECTION, SOLUTION SUBCUTANEOUS
Status: DISCONTINUED | OUTPATIENT
Start: 2017-12-27 | End: 2017-12-28 | Stop reason: HOSPADM

## 2017-12-27 RX ORDER — LISINOPRIL 5 MG/1
5 TABLET ORAL DAILY
Status: DISCONTINUED | OUTPATIENT
Start: 2017-12-28 | End: 2017-12-28 | Stop reason: HOSPADM

## 2017-12-27 RX ORDER — METOPROLOL SUCCINATE 50 MG/1
50 TABLET, EXTENDED RELEASE ORAL DAILY
Status: DISCONTINUED | OUTPATIENT
Start: 2017-12-28 | End: 2017-12-28 | Stop reason: HOSPADM

## 2017-12-27 RX ADMIN — INSULIN LISPRO 1 UNITS: 100 INJECTION, SOLUTION INTRAVENOUS; SUBCUTANEOUS at 21:49

## 2017-12-27 RX ADMIN — ASPIRIN 81 MG 324 MG: 81 TABLET ORAL at 09:39

## 2017-12-27 RX ADMIN — INSULIN LISPRO 2 UNITS: 100 INJECTION, SOLUTION INTRAVENOUS; SUBCUTANEOUS at 09:42

## 2017-12-27 RX ADMIN — INSULIN LISPRO 1 UNITS: 100 INJECTION, SOLUTION INTRAVENOUS; SUBCUTANEOUS at 17:23

## 2017-12-27 RX ADMIN — ATORVASTATIN CALCIUM 40 MG: 40 TABLET, FILM COATED ORAL at 17:22

## 2017-12-27 RX ADMIN — METOPROLOL TARTRATE 25 MG: 25 TABLET ORAL at 21:48

## 2017-12-27 RX ADMIN — INSULIN LISPRO 2 UNITS: 100 INJECTION, SOLUTION INTRAVENOUS; SUBCUTANEOUS at 12:29

## 2017-12-27 RX ADMIN — INSULIN GLARGINE 20 UNITS: 100 INJECTION, SOLUTION SUBCUTANEOUS at 21:49

## 2017-12-27 RX ADMIN — CLOPIDOGREL BISULFATE 75 MG: 75 TABLET ORAL at 09:39

## 2017-12-27 RX ADMIN — POTASSIUM CHLORIDE 20 MEQ: 1500 TABLET, EXTENDED RELEASE ORAL at 14:36

## 2017-12-27 RX ADMIN — METOPROLOL TARTRATE 25 MG: 25 TABLET ORAL at 09:39

## 2017-12-27 RX ADMIN — PERFLUTREN 0.8 ML/MIN: 6.52 INJECTION, SUSPENSION INTRAVENOUS at 10:43

## 2017-12-27 NOTE — PROGRESS NOTES
Progress Note - Emily Park 1960, 62 y o  male MRN: [id number]  Unit/Bed#: -01 Encounter: [id number]  Primary Care Provider: No primary care provider on file  Date and time admitted to hospital: 12/26/2017  6:14 AM    * NSTEMI (non-ST elevated myocardial infarction) [facility])   Assessment & Plan    · Tylpe I secondary to lesion   · Catheterization and successful PCI with placement of a 4 0 x 18 mm THALIA (mid left circumflex)  · No events on telemetry, continue to monitor  · ECHO revealed an EF of 45% and no significant valvular disease  · Continue ASA, Plavix, statin  · Metoprolol tartrate changed to metoprolol succinate 50 mg daily per Cardiology  · Zestril 5 mg daily, first dose 12/28, per Cardiology  CAD (coronary artery disease)   Assessment & Plan    · NSTEMI type I secondary to lesion  · Catheterization on 12/26/2017 revealed chronic mid RCA stenosis with R to R collaterals, acute critical mid L circumflex stenosis 95%  · Successful PCI with placement of a 4 0 x 18 mm THALIA (mid left circumflex)  · Started on ASA, Plavix, beta blocker, ACE, statin (see above)  · Cariology follow up 1 week   · Schedule appointment with PCP  [facility]   Assessment & Plan    · Patient to be seen by nutrition  · A1C 9 3 on admission  · Blood glucose 192 this morning  · Continue sliding scale insulin  · Add Lantus 20 units at bedtime  Morbid obesity ([facility] )   Assessment & Plan    · Continue dietary and lifestyle modifications  VTE Pharmacologic Prophylaxis:   Pharmacologic: Enoxaparin (Lovenox)  Mechanical: Mechanical VTE prophylaxis in place  Patient Centered Rounds: I have performed bedside rounds with nursing staff today  Discussions with Specialists or Other Care Team Provider: Cardiology  Education and Discussions with Family / Patient: Patient's questions were addressed and answered  Time Spent for Care: 30 minutes    More than 50% of total time spent on counseling and coordination of care as described above  Current Length of Stay: 1 day(s)  Current Patient Status: Inpatient   Certification Statement: The patient will continue to require additional inpatient hospital stay due to continued telemetry monitoring  Discharge Plan: Monitor on telemetry for one more night, plan for discharge tomorrow  Code Status: Level 1 - Full Code    Subjective:   Patient has had no episodes of chest pain or SOB since yesterday, he denies any lightheadedness, dizziness, or palpitations  Right radial cath site has healed well, patient denies pain or bruising  After a healing ulcer was noticed on exam, patient reports the wound has been there for months  He denies any numbness or tingling in his lower extremities  Objective:   Vitals:   Temp (24hrs), Av °F (36 7 °C), Min:97 6 °F (36 4 °C), Max:98 6 °F (37 °C)    HR:  [61-77] 77  Resp:  [18] 18  BP: (119-161)/(70-88) 161/86  SpO2:  [96 %-97 %] 96 %  Body mass index is 46 46 kg/m²  Input and Output Summary (last 24 hours): Intake/Output Summary (Last 24 hours) at 17 1427  Last data filed at 17 2300   Gross per 24 hour   Intake                0 ml   Output                0 ml   Net                0 ml       Physical Exam:     Physical Exam   Constitutional: He appears well-developed and well-nourished  He is active  No distress  Cardiovascular: Normal rate, regular rhythm, normal heart sounds and intact distal pulses  No murmur heard  Pulmonary/Chest: Effort normal and breath sounds normal  No respiratory distress  Abdominal: Soft  Bowel sounds are normal    Neurological: No sensory deficit  No sensory deficit noted on exam   Denies peripheral neuropathy  Skin: Skin is warm and dry  He is not diaphoretic  No pallor  Healing ulcer noted above left medial malleolus  Nursing note and vitals reviewed        Additional Data:   Labs:    Results from last 7 days  Lab Units 12/27/17  0606 12/26/17  0640   WBC Thousand/uL 9 83 9 38   HEMOGLOBIN g/dL 13 2 14 7   HEMATOCRIT % 40 9 44 4   PLATELETS Thousands/uL 257 283   NEUTROS PCT %  --  64   LYMPHS PCT %  --  21   MONOS PCT %  --  10   EOS PCT %  --  4       Results from last 7 days  Lab Units 12/27/17  0606 12/26/17  0640   SODIUM mmol/L 139 144   POTASSIUM mmol/L 3 6 4 4   CHLORIDE mmol/L 105 107   CO2 mmol/L 25 29   BUN mg/dL 16 15   CREATININE mg/dL 0 79 0 93   CALCIUM mg/dL 8 5 8 3   TOTAL PROTEIN g/dL  --  7 5   BILIRUBIN TOTAL mg/dL  --  0 20   ALK PHOS U/L  --  163*   ALT U/L  --  36   AST U/L  --  21   GLUCOSE RANDOM mg/dL 232* 297*           * I Have Reviewed All Lab Data Listed Above  * Additional Pertinent Lab Tests Reviewed: Gui Alcantara Admission Reviewed    Imaging:    Imaging Reports Reviewed Today Include: Echo SUMMARY:  Technically difficult study      LEFT VENTRICLE:  Systolic function was mildly reduced  Ejection fraction was estimated to be 45 %  There was severe hypokinesis of the basal inferoseptal and basal-mid inferior wall(s)  Doppler parameters were consistent with abnormal left ventricular relaxation (grade 1 diastolic dysfunction)      MITRAL VALVE:  There was mild annular calcification      TRICUSPID VALVE:  There was trace regurgitation      HISTORY: PRIOR HISTORY: Diabetes, Obesity     PROCEDURE: The procedure was performed at the bedside  This was a routine study  The transthoracic approach was used  The study included complete 2D imaging, M-mode, complete spectral Doppler, and color Doppler  The heart rate was 73 bpm,  at the start of the study  Images were obtained from the parasternal, apical, subcostal, and suprasternal notch acoustic windows  Intravenous contrast ( 0 8 mL Definity in NSS) was administered to opacify the left ventricle  Echocardiographic views were limited due to poor acoustic window availability and decreased penetration   This was a technically difficult study      LEFT VENTRICLE: Size was normal  Systolic function was mildly reduced  Ejection fraction was estimated to be 45 %  There was severe hypokinesis of the basal inferoseptal and basal-mid inferior wall(s)  Wall thickness was normal  DOPPLER:  Doppler parameters were consistent with abnormal left ventricular relaxation (grade 1 diastolic dysfunction)      RIGHT VENTRICLE: The size was normal  Systolic function was normal  Wall thickness was normal      LEFT ATRIUM: Size was normal      RIGHT ATRIUM: Size was normal      MITRAL VALVE: There was mild annular calcification  Valve structure was normal  There was normal leaflet separation  DOPPLER: The transmitral velocity was within the normal range  There was no evidence for stenosis  There was no  regurgitation      AORTIC VALVE: The valve was probably trileaflet  Leaflets exhibited mildly increased thickness and normal cuspal separation  DOPPLER: Transaortic velocity was within the normal range  There was no evidence for stenosis  There was no  regurgitation      TRICUSPID VALVE: The valve structure was normal  There was normal leaflet separation  DOPPLER: The transtricuspid velocity was within the normal range  There was no evidence for stenosis  There was trace regurgitation      PULMONIC VALVE: Leaflets exhibited normal thickness, no calcification, and normal cuspal separation  DOPPLER: The transpulmonic velocity was within the normal range  There was no regurgitation      PERICARDIUM: There was no pericardial effusion   The pericardium was normal in appearance      AORTA: The root exhibited normal size      SYSTEMIC VEINS: IVC: The inferior vena cava was not well visualized    Cultures:   Blood Culture: No results found for: BLOODCX  Urine Culture: No results found for: URINECX  Sputum Culture: No components found for: SPUTUMCX  Wound Culture: No results found for: WOUNDCULT    Last 24 Hours Medication List:     aspirin 324 mg Oral Daily   atorvastatin 40 mg Oral QPM   clopidogrel 75 mg Oral Daily   insulin glargine 20 Units Subcutaneous HS   insulin lispro 1-6 Units Subcutaneous TID AC   insulin lispro 1-6 Units Subcutaneous HS   [START ON 12/28/2017] lisinopril 5 mg Oral Daily   [START ON 12/28/2017] metoprolol succinate 50 mg Oral Daily   metoprolol tartrate 25 mg Oral Once   potassium chloride 20 mEq Oral Once        Today, Patient Was Seen By: Kalpesh Quinteros PA-C    ** Please Note: Dragon 360 Dictation voice to text software may have been used in the creation of this document   **

## 2017-12-27 NOTE — PROGRESS NOTES
[facility] 62 [facility]  [facility] MRN: [id number]    Unit/Bed#: -01 Encounter: [id number]    Assessment and plan  1  Non ST-elevation myocardial infarction type 1  2  Coronary artery disease, THALIA to circ,  RCA  3  Ischemic cardiomyopathy ejection fraction 45%  4  Hyperlipidemia  5  Diabetes  6  Morbid obesity    Recommendations:  Overall he is doing well following his left heart catheterization  Anginal symptoms have completely resolved  He has been out of bed walking in the flower with no difficulty  No events on telemetry  Continue aspirin, statin, Plavix, change metoprolol to long-acting 50 mg daily, add low-dose ACE inhibitor for LV dysfunction  Watch 1 more night on telemetry  Plan discharge tomorrow      Subjective:    No significant events overnight  Denies chest pain or shortness of breath, right radial cath site has healed well no hematoma    ROS    Objective:   Vitals: Blood pressure 161/86, pulse 77, temperature 98 6 °F (37 °C), temperature source Oral, resp  rate 18, height 5' 11" (1 803 m), weight (!) 151 kg (333 lb 1 8 oz), SpO2 96 %  , Body mass index is 46 46 kg/m² , Orthostatic Blood Pressures    Flowsheet Row Most Recent Value   Blood Pressure  161/86 filed at 12/27/2017 0759   Patient Position - Orthostatic VS  Sitting filed at 12/27/2017 9173         Systolic (67ZGE), JNA:184 , Min:119 , VHA:235     Diastolic (32CZK), UKM:00, Min:70, Max:89      Intake/Output Summary (Last 24 hours) at 12/27/17 1348  Last data filed at 12/26/17 2300   Gross per 24 hour   Intake                0 ml   Output                0 ml   Net                0 ml     Weight (last 2 days)     Date/Time   Weight    12/26/17 1031  (!)  151 (333 12)    12/26/17 0622  (!)  154 (338 41)                Telemetry Review: No significant arrhythmias seen on telemetry review  EKG personally reviewed by Kya Nuñez DO       Physical Exam   Constitutional: He is oriented to person, place, and time  He appears well-nourished  No distress  HENT:   Head: Normocephalic and atraumatic  Eyes: Conjunctivae are normal  Pupils are equal, round, and reactive to light  Neck: Neck supple  Cardiovascular: Normal rate and regular rhythm  No murmur heard  Pulmonary/Chest: Effort normal and breath sounds normal  No respiratory distress  He has no wheezes  He has no rales  Abdominal: Soft  Bowel sounds are normal  He exhibits no distension  There is no tenderness  There is no rebound  Musculoskeletal: Normal range of motion  He exhibits no edema  Neurological: He is alert and oriented to person, place, and time  No cranial nerve deficit  Skin: Skin is warm and dry  No erythema           Laboratory Results:    Results from last 7 days  Lab Units 12/26/17  1037 12/26/17  0640   TROPONIN I ng/mL 10 17* 0 29*       CBC with diff:   Results from last 7 days  Lab Units 12/27/17  0606 12/26/17  0640   WBC Thousand/uL 9 83 9 38   HEMOGLOBIN g/dL 13 2 14 7   HEMATOCRIT % 40 9 44 4   MCV fL 86 85   PLATELETS Thousands/uL 257 283   MCH pg 27 8 28 2   MCHC g/dL 32 3 33 1   RDW % 13 4 13 0   MPV fL 9 8 9 8         CMP:  Results from last 7 days  Lab Units 12/27/17  0606 12/26/17  0640   SODIUM mmol/L 139 144   POTASSIUM mmol/L 3 6 4 4   CHLORIDE mmol/L 105 107   CO2 mmol/L 25 29   ANION GAP mmol/L 9 8   BUN mg/dL 16 15   CREATININE mg/dL 0 79 0 93   GLUCOSE RANDOM mg/dL 232* 297*   CALCIUM mg/dL 8 5 8 3   AST U/L  --  21   ALT U/L  --  36   ALK PHOS U/L  --  163*   TOTAL PROTEIN g/dL  --  7 5   ALBUMIN g/dL  --  3 4*   BILIRUBIN TOTAL mg/dL  --  0 20   EGFR ml/min/1 73sq m 100 91         BMP:  Results from last 7 days  Lab Units 12/27/17  0606 12/26/17  0640   SODIUM mmol/L 139 144   POTASSIUM mmol/L 3 6 4 4   CHLORIDE mmol/L 105 107   CO2 mmol/L 25 29   BUN mg/dL 16 15   CREATININE mg/dL 0 79 0 93   GLUCOSE RANDOM mg/dL 232* 297*   CALCIUM mg/dL 8 5 8 3       BNP: No results for input(s): BNP in the last 72 hours  Magnesium:       Coags:   Results from last 7 days  Lab Units 17  1037   PTT seconds 50*       TSH:       Invalid input(s): TSH     Hemoglobin A1C   Results from last 7 days  Lab Units 17  1141   HEMOGLOBIN A1C % 9 3*       Lipid Profile:   Results from last 7 days  Lab Units 17  0606   CHOLESTEROL mg/dL 164   TRIGLYCERIDES mg/dL 144   HDL mg/dL 35*       Cardiac testing:   Results for orders placed during the hospital encounter of 17   Echo complete with contrast if indicated    Narrative [address] [place] [address], [address] [place] [address]  [phone]    Transthoracic Echocardiogram  2D, M-mode, Doppler, and Color Doppler    Study date:  27-Dec-2017    Patient: Nathan Tai  MR number: [id number]  Account number: [de-identified]  : 1960  Age: 62 years  Gender: Male  Status: Inpatient  Location: Bedside  Height: 71 in  Weight: 333 lb  BP: 119/ 71 mmHg    Indications: MI    Diagnoses: I40 9 - Acute myocarditis, unspecified    Sonographer:  DIVYA Benavides  Referring Physician:  Javy Moss MD  Group:  [facility]  Interpreting Physician:  Annette Denton DO    SUMMARY    SUMMARY:  Technically difficult study  LEFT VENTRICLE:  Systolic function was mildly reduced  Ejection fraction was estimated to be 45 %  There was severe hypokinesis of the basal inferoseptal and basal-mid inferior wall(s)  Doppler parameters were consistent with abnormal left ventricular relaxation (grade 1 diastolic dysfunction)  MITRAL VALVE:  There was mild annular calcification  TRICUSPID VALVE:  There was trace regurgitation  HISTORY: PRIOR HISTORY: Diabetes, Obesity    PROCEDURE: The procedure was performed at the bedside  This was a routine study  The transthoracic approach was used  The study included complete 2D imaging, M-mode, complete spectral Doppler, and color Doppler  The heart rate was 73 bpm,  at the start of the study  Images were obtained from the parasternal, apical, subcostal, and suprasternal notch acoustic windows  Intravenous contrast ( 0 8 mL Definity in NSS) was administered to opacify the left ventricle  Echocardiographic views were limited due to poor acoustic window availability and decreased penetration  This was a technically difficult study  LEFT VENTRICLE: Size was normal  Systolic function was mildly reduced  Ejection fraction was estimated to be 45 %  There was severe hypokinesis of the basal inferoseptal and basal-mid inferior wall(s)  Wall thickness was normal  DOPPLER:  Doppler parameters were consistent with abnormal left ventricular relaxation (grade 1 diastolic dysfunction)  RIGHT VENTRICLE: The size was normal  Systolic function was normal  Wall thickness was normal     LEFT ATRIUM: Size was normal     RIGHT ATRIUM: Size was normal     MITRAL VALVE: There was mild annular calcification  Valve structure was normal  There was normal leaflet separation  DOPPLER: The transmitral velocity was within the normal range  There was no evidence for stenosis  There was no  regurgitation  AORTIC VALVE: The valve was probably trileaflet  Leaflets exhibited mildly increased thickness and normal cuspal separation  DOPPLER: Transaortic velocity was within the normal range  There was no evidence for stenosis  There was no  regurgitation  TRICUSPID VALVE: The valve structure was normal  There was normal leaflet separation  DOPPLER: The transtricuspid velocity was within the normal range  There was no evidence for stenosis  There was trace regurgitation  PULMONIC VALVE: Leaflets exhibited normal thickness, no calcification, and normal cuspal separation  DOPPLER: The transpulmonic velocity was within the normal range  There was no regurgitation  PERICARDIUM: There was no pericardial effusion  The pericardium was normal in appearance  AORTA: The root exhibited normal size      SYSTEMIC VEINS: IVC: The inferior vena cava was not well visualized  SYSTEM MEASUREMENT TABLES    2D  %FS: 30 15 %  AV Diam: 3 9 cm  EDV(Teich): 131 24 ml  EF(Teich): 57 05 %  ESV(Teich): 56 37 ml  IVSd: 1 2 cm  LA Area: 14 47 cm2  LA Diam: 4 85 cm  LVEDV MOD A4C: 159 75 ml  LVEF MOD A4C: 35 13 %  LVESV MOD A4C: 103 62 ml  LVIDd: 5 23 cm  LVIDs: 3 65 cm  LVLd A4C: 9 36 cm  LVLs A4C: 8 54 cm  LVPWd: 1 08 cm  RA Area: 14 93 cm2  RVIDd: 3 4 cm  SV MOD A4C: 56 12 ml  SV(Teich): 74 87 ml    MM  TAPSE: 2 48 cm    PW  E': 0 06 m/s  E/E': 16 23  MV A Sulaiman: 0 79 m/s  MV Dec Upton: 5 08 m/s2  MV DecT: 186 23 ms  MV E Sulaiman: 0 95 m/s  MV E/A Ratio: 1 2  MV PHT: 54 01 ms  MVA By PHT: 4 07 cm2    [facility]redited [facility]    Prepared and electronically signed by    Anastasia Torrez DO  Signed 27-Dec-2017 13:12:45       No results found for this or any previous visit  No results found for this or any previous visit  No results found for this or any previous visit      Meds/Allergies   all current active meds have been reviewed  Prescriptions Prior to Admission   Medication    multivitamin (THERAGRAN) TABS         heparin (porcine) 3-20 Units/kg/hr (Order-Specific) Last Rate: Stopped (12/26/17 1250)     Assessment:  Principal Problem:    NSTEMI (non-ST elevated myocardial infarction) [facility])  Active Problems:    New onset type 2 diabetes mellitus ([facility] )    Morbid obesity ([facility] )    CAD (coronary artery disease)

## 2017-12-27 NOTE — ASSESSMENT & PLAN NOTE
· Tylpe I secondary to lesion   · Catheterization and successful PCI with placement of a 4 0 x 18 mm THALIA (mid left circumflex)  · No events on telemetry, continue to monitor  · ECHO revealed an EF of 45% and no significant valvular disease  · Continue ASA, Plavix, statin  · Metoprolol tartrate changed to metoprolol succinate 50 mg daily per Cardiology  · Zestril 5 mg daily, first dose 12/28, per Cardiology

## 2017-12-27 NOTE — CASE MANAGEMENT
Initial Clinical Review    Admission: Date/Time/Statement: 12/26/17 @ 0839     Orders Placed This Encounter   Procedures    Inpatient Admission (expected length of stay for this patient is greater than two midnights)     Standing Status:   Standing     Number of Occurrences:   1     Order Specific Question:   Admitting Physician     Answer:   Collette Yun [[id number]]     Order Specific Question:   Level of Care     Answer:   Med Surg [[id number]]     Order Specific Question:   Estimated length of stay     Answer:   More than 2 Midnights     Order Specific Question:   Certification     Answer:   I certify that inpatient services are medically necessary for this patient for a duration of greater than two midnights  See H&P and MD Progress Notes for additional information about the patient's course of treatment  ED: Date/Time/Mode of Arrival:   ED Arrival Information     Expected Arrival Acuity Means of Arrival Escorted By Service Admission Type    - 12/26/2017 06:05 Urgent Walk-In Family Member General Medicine Urgent    Arrival Complaint    Chest pain,jaw pain          Chief Complaint:   Chief Complaint   Patient presents with    Chest Pain     Pt c/o mid sternal chest pain that radiates towards his jaw  Pt states that the pain started around 5am  Pt denies pain/numbness down extremities  History of Illness: 62 y o  male who presents with chest pain was started at 5:00 a m  and woke the patient from sleep  Patient describes the pain as substernal radiating to his jaw a moderate to severe intensity  It was not associated with diaphoresis, palpitations, lightheadedness or shortness of breath  He denies any similar symptoms in the past   He took a few baby aspirins and the pain had improved by the time he drove himself to the emergency room  The patient has a strong family history of coronary artery disease and his father had his 1st MI in the 45s and his grandfather in the 46s    He however has not seen a primary care physician for many years  He was also found to have elevated blood sugars in the emergency room  The patient states that he has ulceration on the left foot however denies any tingling or numbness of bilateral lower extremity  ED Vital Signs:   ED Triage Vitals   Temperature Pulse Respirations Blood Pressure SpO2   12/26/17 0732 12/26/17 0622 12/26/17 0622 12/26/17 0622 12/26/17 0622   98 9 °F (37 2 °C) 89 20 (!) 199/113 97 %      Temp Source Heart Rate Source Patient Position - Orthostatic VS BP Location FiO2 (%)   12/26/17 1031 -- 12/26/17 0622 12/26/17 0622 --   Oral  Lying Right arm       Pain Score       12/26/17 0622       3        Wt Readings from Last 1 Encounters:   12/26/17 (!) 151 kg (333 lb 1 8 oz)       Abnormal Labs/Diagnostic Test Results: Alk Phos 163, Glucose 297, Troponin 0 29    EKG: Normal sinus rhythm, Incomplete right bundle branch block    ED Treatment:   Medication Administration from 12/26/2017 0605 to 12/26/2017 8992       Date/Time Order Dose Route Action Action by Comments     12/26/2017 0728 nitroglycerin (NITRO-BID) 2 % TD ointment 1 inch 1 inch Topical Given Katie Rubin RN      12/26/2017 0728 metoprolol tartrate (LOPRESSOR) tablet 25 mg 25 mg Oral Given Katie Rubin RN      12/26/2017 6298 aspirin chewable tablet 162 mg 162 mg Oral Given Katie Rubin RN      12/26/2017 2846 heparin (ACS LOW)   Intravenous Not Given Katie Rubin RN      12/26/2017 9224 sodium chloride 0 9 % bolus 500 mL 500 mL Intravenous [place] [id number] Katie Rubin[address]      12/26/2017 0859 heparin (porcine) injection 4,000 Units 4,000 Units Intravenous Given Katie Rubin RN      12/26/2017 0859 heparin (porcine) 25,000 units in 250 mL infusion (premix) 11 1 Units/kg/hr Intravenous New Bag Katie Rubin RN         Physical Exam   Constitutional: He is oriented to person, place, and time  No distress  Cardiovascular: Normal rate and regular rhythm      Pulmonary/Chest: Effort normal and breath sounds normal    Musculoskeletal: He exhibits edema  He exhibits no deformity  Skin: Skin is warm  He is not diaphoretic  Ulceration medial aspect of left foot  Palpabledorsalis pedis pulses bilaterally     Past Medical/Surgical History: Active Ambulatory Problems     Diagnosis Date Noted    No Active Ambulatory Problems     Resolved Ambulatory Problems     Diagnosis Date Noted    No Resolved Ambulatory Problems     No Additional Past Medical History       Admitting Diagnosis: Chest pain [R07 9]  Hyperglycemia [R73 9]  NSTEMI (non-ST elevated myocardial infarction) ([place] [address] ) [I21 4]    Age/Sex: 62 y o  male    Assessment/Plan:     Hospital Problem List:      Principal Problem:    NSTEMI (non-ST elevated myocardial infarction) ([place] [address] )  Active Problems:    Chest pain    New onset type 2 diabetes mellitus ([place] [address] )    Morbid obesity ([place] [address] )        Plan for the Primary Problem(s):  · Non ST elevation MI likely type 1  Agree with admission on monitored bed  Serial cardiac enzymes will be obtained  Will get echocardiogram   Cardiology evaluation will be done  Patient will be maintained on aspirin Plavix beta-blocker high-intensity statin therapy  Heparin GTT will be continued  Further ischemic workup per Cardiology  · New onset type 2 diabetes  Follow up on hemoglobin A1c  Continue with sliding scale insulin  Nutritional consultation will be obtained  Diabetic teaching will be done  ·   Morbid obesity  Lifestyle modification  ·        VTE Prophylaxis: Heparin Drip  / reason for no mechanical VTE prophylaxis foot ulcers   Code Status: FULL CODE  POLST: POLST form is not discussed and not completed at this time      Anticipated Length of Stay:  Patient will be admitted on an Inpatient basis with an anticipated length of stay of  > 2 midnights     Justification for Hospital Stay:  Cardiology evaluation and follow-up    Nicki Crenshaw MD Physician Signed Cardiology  Progress Notes Date of Service: 12/26/2017  1:54 PM         []Hide copied text  []Ronnver for attribution information  Cardiac cath performed via the R radial artery     Chronic mid RCA stenosis with R to R collaterals     Acute critical mid L Cx stenosis 95%, culprit lesion     Successful PCI with placement of a 4 0 x 18 mm THALIA              Admission Orders:   Inpatient/Tele  Continuous Cardiac Monitoring  Serial Cardiac Enzymes q6h x 3  Consult Cardiology r/e NSTEMI   Bilateral Sequential Compression Device  Post Cardiac Cath Care per Protocol   SSI  NSS @ 100  IV Heparin Drip    Scheduled Meds:   [START ON 12/27/2017] aspirin 324 mg Oral Daily   atorvastatin 40 mg Oral QPM   [START ON 12/27/2017] clopidogrel 75 mg Oral Daily   insulin lispro 1-6 Units Subcutaneous TID AC   insulin lispro 1-6 Units Subcutaneous HS   metoprolol tartrate 25 mg Oral Q12H [facility]

## 2017-12-27 NOTE — NUTRITION
12/27/17 1527   Assessment   Timepoint Initial  (Consult: "new DM", BMI 46 46, A1C 9 3)   Labs   List Completed Labs Other (Comment)  (POC BG x 24 165-306, , A1C 9 3; meds include atorvastatin, insulin)   Feeding Route   PO Independent   Adequacy of Intake   Nutrition Modality PO  (Cardiac TLC 2 3 g Na)   Intake Meals %   Estimated Calorie Intake %   Estimated Protein Intake  %   Estimated Fluid Intake %   Estimated calorie intake compared to estimated need Pt reports good appetite and po intake  Predict he is meeting estimated needs for wt loss  Nutrition Prognosis   Nutrition Concerns Blood sugar control   Comorbid Concerns Other (Comment)  (NSTEMI, new onset T2DM, morbid obesity)   Nutrition Precautions None   Nutrition Considerations Other (Comment)  (T2DM diet education provided  Discussed dietary sources of carbs,serving sizes,appropriate diabetic meal plans,simple vs  refined carbs,importance of fiber/protein w/ meals  Also briefly discussed heart healthy education  Handouts provided )   PES Statement   Problem Behavioral-Environmental   Knowledge and Beliefs (1) Food- and nutrition-related knowledge deficit NB-1 1   Related to Other (comment)  (lack of previous education r/t new onset DM and NSTEMI)   As evidenced by: Per patient interview   Patient Nutrition Goals   Goal comprehend education   Goal Status initiated   Timeframe to complete goal by next f/u   Recommendations/Interventions   Summary Consulted for new onset DM  T2DM diet education provided along w/ brief review of Heart Healthy diet concepts  Pt was very receptive and asked appropriate questions  T2DM, Heart Healthy handouts provided along w/ OP MNT brochure  Encouraged pt to request for RD to return should any ?s on education arise prior to d/c      Interventions Education initiated/completed   Nutrition Recommendations Other (specify)  (Consider consistent carbohydrate level 3 diet to facilitate better BG control during stay )   Nutrition Complexity Risk   Nutrition complexity level Low risk   Nutrition review: 12/29/17  (?s on DM/heart healthy diet education)   Follow up date 01/06/18

## 2017-12-27 NOTE — ASSESSMENT & PLAN NOTE
· NSTEMI type I secondary to lesion  · Catheterization on 12/26/2017 revealed chronic mid RCA stenosis with R to R collaterals, acute critical mid L circumflex stenosis 95%  · Successful PCI with placement of a 4 0 x 18 mm THALIA (mid left circumflex)  · Started on ASA, Plavix, beta blocker, ACE, statin (see above)  · Cariology follow up 1 week   · Schedule appointment with PCP

## 2017-12-27 NOTE — ASSESSMENT & PLAN NOTE
· Patient to be seen by nutrition  · A1C 9 3 on admission  · Blood glucose 192 this morning  · Continue sliding scale insulin  · Add Lantus 20 units at bedtime

## 2017-12-28 VITALS
WEIGHT: 315 LBS | OXYGEN SATURATION: 97 % | RESPIRATION RATE: 18 BRPM | HEART RATE: 63 BPM | HEIGHT: 71 IN | SYSTOLIC BLOOD PRESSURE: 130 MMHG | BODY MASS INDEX: 44.1 KG/M2 | DIASTOLIC BLOOD PRESSURE: 80 MMHG | TEMPERATURE: 98.1 F

## 2017-12-28 PROBLEM — I21.4 NSTEMI (NON-ST ELEVATED MYOCARDIAL INFARCTION) (HCC): Status: RESOLVED | Noted: 2017-12-26 | Resolved: 2017-12-28

## 2017-12-28 LAB
ANION GAP SERPL CALCULATED.3IONS-SCNC: 8 MMOL/L (ref 4–13)
BUN SERPL-MCNC: 17 MG/DL (ref 5–25)
CALCIUM SERPL-MCNC: 8.5 MG/DL (ref 8.3–10.1)
CHLORIDE SERPL-SCNC: 105 MMOL/L (ref 100–108)
CO2 SERPL-SCNC: 27 MMOL/L (ref 21–32)
CREAT SERPL-MCNC: 0.79 MG/DL (ref 0.6–1.3)
GFR SERPL CREATININE-BSD FRML MDRD: 100 ML/MIN/1.73SQ M
GLUCOSE SERPL-MCNC: 156 MG/DL (ref 65–140)
GLUCOSE SERPL-MCNC: 195 MG/DL (ref 65–140)
GLUCOSE SERPL-MCNC: 201 MG/DL (ref 65–140)
GLUCOSE SERPL-MCNC: 209 MG/DL (ref 65–140)
GLUCOSE SERPL-MCNC: 212 MG/DL (ref 65–140)
POTASSIUM SERPL-SCNC: 3.7 MMOL/L (ref 3.5–5.3)
SODIUM SERPL-SCNC: 140 MMOL/L (ref 136–145)

## 2017-12-28 PROCEDURE — 90686 IIV4 VACC NO PRSV 0.5 ML IM: CPT | Performed by: INTERNAL MEDICINE

## 2017-12-28 PROCEDURE — 82948 REAGENT STRIP/BLOOD GLUCOSE: CPT

## 2017-12-28 PROCEDURE — 80048 BASIC METABOLIC PNL TOTAL CA: CPT | Performed by: INTERNAL MEDICINE

## 2017-12-28 RX ORDER — METFORMIN HYDROCHLORIDE EXTENDED-RELEASE TABLETS 1000 MG/1
1000 TABLET, FILM COATED, EXTENDED RELEASE ORAL
Qty: 30 TABLET | Refills: 0 | Status: CANCELLED | OUTPATIENT
Start: 2017-12-28

## 2017-12-28 RX ORDER — LISINOPRIL 5 MG/1
5 TABLET ORAL DAILY
Qty: 30 TABLET | Refills: 0 | Status: SHIPPED | OUTPATIENT
Start: 2017-12-29 | End: 2018-04-04 | Stop reason: SDUPTHER

## 2017-12-28 RX ORDER — METOPROLOL SUCCINATE 50 MG/1
50 TABLET, EXTENDED RELEASE ORAL DAILY
Qty: 30 TABLET | Refills: 0 | Status: SHIPPED | OUTPATIENT
Start: 2017-12-29 | End: 2019-01-04 | Stop reason: SDUPTHER

## 2017-12-28 RX ORDER — CLOPIDOGREL BISULFATE 75 MG/1
75 TABLET ORAL DAILY
Qty: 30 TABLET | Refills: 0 | Status: SHIPPED | OUTPATIENT
Start: 2017-12-29 | End: 2019-01-04 | Stop reason: SDUPTHER

## 2017-12-28 RX ORDER — ATORVASTATIN CALCIUM 40 MG/1
40 TABLET, FILM COATED ORAL EVERY EVENING
Qty: 30 TABLET | Refills: 0 | Status: SHIPPED | OUTPATIENT
Start: 2017-12-28 | End: 2019-01-04 | Stop reason: SDUPTHER

## 2017-12-28 RX ADMIN — ASPIRIN 81 MG 324 MG: 81 TABLET ORAL at 08:29

## 2017-12-28 RX ADMIN — INSULIN LISPRO 2 UNITS: 100 INJECTION, SOLUTION INTRAVENOUS; SUBCUTANEOUS at 08:25

## 2017-12-28 RX ADMIN — CLOPIDOGREL BISULFATE 75 MG: 75 TABLET ORAL at 08:29

## 2017-12-28 RX ADMIN — LISINOPRIL 5 MG: 5 TABLET ORAL at 08:29

## 2017-12-28 RX ADMIN — ENOXAPARIN SODIUM 40 MG: 40 INJECTION SUBCUTANEOUS at 08:29

## 2017-12-28 RX ADMIN — METOPROLOL SUCCINATE 50 MG: 50 TABLET, EXTENDED RELEASE ORAL at 08:29

## 2017-12-28 RX ADMIN — ATORVASTATIN CALCIUM 40 MG: 40 TABLET, FILM COATED ORAL at 16:28

## 2017-12-28 RX ADMIN — INSULIN LISPRO 2 UNITS: 100 INJECTION, SOLUTION INTRAVENOUS; SUBCUTANEOUS at 13:23

## 2017-12-28 RX ADMIN — INFLUENZA VIRUS VACCINE 0.5 ML: 15; 15; 15; 15 SUSPENSION INTRAMUSCULAR at 16:26

## 2017-12-28 NOTE — DISCHARGE SUMMARY
Discharge- Willa Cordero 1960, 62 y o  male MRN: [id number]  Unit/Bed#: -01 Encounter: [id number]  Primary Care Provider: No primary care provider on file  Date and time admitted to hospital: 12/26/2017  6:14 AM    * NSTEMI (non-ST elevated myocardial infarction) [facility])   Assessment & Plan    · Tylpe I secondary to lesion   · Catheterization and successful PCI with placement of a 4 0 x 18 mm THALIA (mid left circumflex)  · Telemetry reviewed, no events   · ECHO revealed an EF of 45% and no significant valvular disease  · ASA 81 mg daily   · Plavix 75 mg daily   · Lipitor 40 mg daily  · Metoprolol succinate 50 mg daily   · lisinopril 5 mg daily  · Patient instructed he is not to stop this medications unless told by a physician  · Schedule an appointment with PCP         CAD (coronary artery disease)   Assessment & Plan    · NSTEMI type I secondary to lesion  · Catheterization on 12/26/2017 revealed chronic mid RCA stenosis with R to R collaterals, acute critical mid L circumflex stenosis 95%  · Successful PCI with placement of a 4 0 x 18 mm THALIA (mid left circumflex)  · Started on ASA, Plavix, beta blocker, ACE, statin (see above)  · Cariology follow up 1 week  · Schedule appointment with PCP  New onset type 2 diabetes mellitus ([facility] [address] )   Assessment & Plan    · A1C 9 3 on admission  · Metformin 500 mg PO BID x 1 week (to start 48 hours after cath), then 1,000 mg PO BID thereafter  · Patient educated about potential side effects of this medication  · Given script for glucometer, 120 test strips and 120 lancets  · Instructed to check is blood sugar twice daily and create a log to bring to his PCP  · Patient seen by Nutrition, educated about diabetic and heart healthy diet  · Stressed the importance of diet and lifestyle modifications and that without these changes he is likely to require additional medications to better control his blood sugars  · Schedule appointment with PCP          Morbid obesity ([address] )   Assessment & Plan    · Diet and lifestyle modifications  Consultations During Hospital Stay:  · Cardiology, nutrition    Procedures Performed:   · Cardiac catheterization SUMMARY  CORONARY CIRCULATION:  Left main: Normal   LAD: The vessel was medium to large sized  Angiography showed mild atherosclerosis  Circumflex: The vessel was very large sized  Mid circumflex: There was a discrete 90 % stenosis  The lesion was irregularly contoured, hazy, and without evidence of thrombus  There was TIMUR grade 3 flow through the vessel (brisk flow)  This is a likely culprit for the patient's  clinical presentation  It appears amenable to percutaneous intervention  RCA: The vessel was medium sized  The mid RCA occlusion appears to be chronic as there is R to R and L to R collaterals seen  Mid RCA: There was a 100 % stenosis  Distal RCA: There was a discrete 80 % stenosis  1ST LESION INTERVENTIONS:  A successful stent procedure was performed on the 90 % lesion in the mid circumflex  Following intervention there was an excellent angiographic appearance with a 0 % residual stenosis  This was not a bifurcation lesion  This was an ACC/AHA type A "low risk" lesion for intervention  There was no evidence of the transient no-reflow phenomenon  The residual lesion was discrete and demonstrated no evidence of thrombus  There  was TIMUR 3 flow before the procedure and TIMUR 3 flow after the procedure  A Resolute Integrity Rx 4 0 x 18 drug-eluting stent was placed across the lesion and deployed at a maximum inflation pressure of 12 courtney  Significant Findings / Test Results:   Cardiac echo:  LEFT VENTRICLE: Systolic function was mildly reduced  Ejection fraction was estimated to be 45 %  Doppler parameters were consistent with abnormal left ventricular relaxation (grade 1 diastolic dysfunction)  MITRAL VALVE: Mild annular calcification      CXR: no active disease    · Hemoglobin A1C 9 3     Incidental Findings:   · Elevated blood glucose   · A1C 9 3     Test Results Pending at Discharge (will require follow up): · None  Outpatient Tests Requested:  · None  Complications:  None  Reason for Admission: NSTEMI workup, cardiac catheterization  Hospital Course:     Toshia Serrano is a 62 y o  male patient who originally presented to the hospital on 12/26/2017 due to chest pain that woke him from sleep around 5 am  There were no associated symptoms such as SOB, diaphoresis, dizziness or palpitations  ECG performed in ED showed non-specific changes  Patient was started on heparin gtt, aspirin, Plavix, beta-blocker and statin  Cardiology was consulted  Serial cardiac enzymes were obtained, he was found to have elevated troponin levels  Noted elevated blood glucose and A1C of 9 3, started on sliding scale insulin  Cardiac catheterization was performed the same day, interventions include successful placement of a THALIA in the left circumflex secondary to 95% stenosis  Recommended to see Cardiology, if patient experiences anginal symptoms, for nuclear stress test given RCA appeared to be chronically occluded  Patient did well following cardiac cath and was continued on aspirin, Plavix, beta-blocker and statin  Cardiac echo revealed EF of 45%, low-dose ACE inhibitor for LV dysfunction was initiated  Patient was continuously monitored on telemetry throughout hospital course, no events were noted  Blood sugars continued to be elevated throughout course, Lantus was added while inpatient  He is aware he will be sent home on new prescription for Metformin  He was advised to make diet and lifestyle modifications to better control his blood sugar  He was instructed to check blood sugars twice daily and follow up with PCP for further diabetic medication adjustments  May require other oral agents in the future  Patient was seen by Nutrition for diabetic education       Please see above list of diagnoses and related plan for additional information  Condition at Discharge: good     Discharge Day Visit / Exam:     Subjective:  Patient feeling well  Denies chest pain, SOB, dizziness or palpitations  Vitals: Blood Pressure: 130/80 (12/28/17 0742)  Pulse: 63 (12/28/17 0742)  Temperature: 98 1 °F (36 7 °C) (12/28/17 0742)  Temp Source: Oral (12/28/17 0742)  Respirations: 18 (12/28/17 0742)  Height: 5' 11" (180 3 cm) (12/27/17 1525)  Weight - Scale: (!) 151 kg (333 lb 1 8 oz) (12/26/17 1031)  SpO2: 97 % (12/28/17 0742)  Exam:   Physical Exam   Constitutional: Vital signs are normal  He appears well-developed and well-nourished  He is active  No distress  Cardiovascular: Normal rate, regular rhythm, normal heart sounds and intact distal pulses  Pulmonary/Chest: Effort normal and breath sounds normal    Abdominal: Soft  Bowel sounds are normal    Skin: Skin is warm and dry  He is not diaphoretic  Dressing over radial cath site dry and intact  Healing ulcer noted above left medial malleolus  Vitals reviewed  Discussion with Family: Phone conversation with patient's wife  Discharge instructions/Information to patient and family:   See after visit summary for information provided to patient and family  Provisions for Follow-Up Care:  See after visit summary for information related to follow-up care and any pertinent home health orders  Disposition:     Home    For Discharges to [facility] SNF:   · Not Applicable to this Patient - Not Applicable to this Patient    Planned Readmission: No      Discharge Statement:  I spent 45 minutes discharging the patient  This time was spent on the day of discharge  I had direct contact with the patient on the day of discharge  Greater than 50% of the total time was spent examining patient, answering all patient questions, arranging and discussing plan of care with patient as well as directly providing post-discharge instructions    Additional time then spent on discharge activities  Discharge Medications:  See after visit summary for reconciled discharge medications provided to patient and family        ** Please Note: This note has been constructed using a voice recognition system **

## 2017-12-28 NOTE — ASSESSMENT & PLAN NOTE
· Tylpe I secondary to lesion   · Catheterization and successful PCI with placement of a 4 0 x 18 mm THALIA (mid left circumflex)  · Telemetry reviewed, no events   · ECHO revealed an EF of 45% and no significant valvular disease     · ASA 81 mg daily   · Plavix 75 mg daily   · Lipitor 20 mg daily  · Metoprolol succinate 50 mg daily   · Zestril 5 mg daily  · Patient instructed he is not to stop this medications unless told by a medical professional  · Schedule an appointment with PCP

## 2017-12-28 NOTE — PROGRESS NOTES
General Cardiology   Progress Note -  Team One   Conrad Tate 62 y o  male MRN: [id number]    Unit/Bed#: -01 Encounter: [id number]      Subjective: pt seen for follow up, no events overnight  S/p THALIA to L cx on 12/26  He feels well today  He has been ambulating the halls without chest pain or SOB  tolerating PO meds  Anxious to go home  Review of Systems   Constitution: Negative for decreased appetite, fever and weakness  HENT: Negative for congestion  Tolerating PO diet   Cardiovascular: Negative for chest pain, dyspnea on exertion, irregular heartbeat, leg swelling, near-syncope, orthopnea and syncope  Respiratory: Negative for cough, shortness of breath and wheezing  Musculoskeletal: Negative for back pain  Gastrointestinal: Negative for abdominal pain, nausea and vomiting  Genitourinary: Negative for dysuria  Neurological: Negative for dizziness and light-headedness  Psychiatric/Behavioral: Negative for altered mental status  All other systems reviewed and are negative  Objective:   Vitals: Blood pressure 130/80, pulse 63, temperature 98 1 °F (36 7 °C), temperature source Oral, resp  rate 18, height 5' 11" (1 803 m), weight (!) 151 kg (333 lb 1 8 oz), SpO2 97 %  ,     Body mass index is 46 46 kg/m²  ,     Systolic (05LGQ), VFB:253 , Min:130 , UKN:476     Diastolic (86TVM), MHH:36, Min:74, Max:88    Vitals:    12/27/17 1500 12/27/17 1900 12/27/17 2237 12/28/17 0742   BP: 142/88 149/77 154/74 130/80   Pulse: 72 74 79 63   Patient Position - Orthostatic VS: Sitting Lying Lying Lying       Intake/Output Summary (Last 24 hours) at 12/28/17 1204  Last data filed at 12/28/17 1159   Gross per 24 hour   Intake             1200 ml   Output                0 ml   Net             1200 ml     Weight (last 2 days)     Date/Time   Weight    12/26/17 1031  (!)  151 (333 12)    12/26/17 0622  (!)  154 (338 41)            Telemetry Review: No significant arrhythmias seen on telemetry review  Sinus rhythm, no significant events    Physical Exam   Constitutional: He is oriented to person, place, and time  No distress  Pt sitting up in bed in NAD   HENT:   Head: Normocephalic and atraumatic  Neck: No JVD present  Cardiovascular: Normal rate, regular rhythm, S1 normal and S2 normal     No murmur heard  Pulses:       Radial pulses are 2+ on the right side  No LE edema  Right radial cath site WNL   Pulmonary/Chest: Effort normal and breath sounds normal  No respiratory distress  He has no wheezes  He has no rales  LS CTA; on RA   Abdominal: Soft  obese   Musculoskeletal: He exhibits no edema  Neurological: He is alert and oriented to person, place, and time  Skin: Skin is warm and dry  He is not diaphoretic  Psychiatric: He has a normal mood and affect  His behavior is normal    Nursing note and vitals reviewed        LABORATORY RESULTS    Results from last 7 days  Lab Units 12/26/17  1037 12/26/17  0640   TROPONIN I ng/mL 10 17* 0 29*     CBC with diff:   Results from last 7 days  Lab Units 12/27/17  0606 12/26/17  0640   WBC Thousand/uL 9 83 9 38   HEMOGLOBIN g/dL 13 2 14 7   HEMATOCRIT % 40 9 44 4   MCV fL 86 85   PLATELETS Thousands/uL 257 283   MCH pg 27 8 28 2   MCHC g/dL 32 3 33 1   RDW % 13 4 13 0   MPV fL 9 8 9 8     CMP:  Results from last 7 days  Lab Units 12/28/17  0535 12/27/17  0606 12/26/17  0640   SODIUM mmol/L 140 139 144   POTASSIUM mmol/L 3 7 3 6 4 4   CHLORIDE mmol/L 105 105 107   CO2 mmol/L 27 25 29   ANION GAP mmol/L 8 9 8   BUN mg/dL 17 16 15   CREATININE mg/dL 0 79 0 79 0 93   GLUCOSE RANDOM mg/dL 201* 232* 297*   CALCIUM mg/dL 8 5 8 5 8 3   AST U/L  --   --  21   ALT U/L  --   --  36   ALK PHOS U/L  --   --  163*   TOTAL PROTEIN g/dL  --   --  7 5   ALBUMIN g/dL  --   --  3 4*   BILIRUBIN TOTAL mg/dL  --   --  0 20   EGFR ml/min/1 73sq m 100 100 91       BMP:  Results from last 7 days  Lab Units 12/28/17  0535 12/27/17  0606 12/26/17  0640   SODIUM mmol/L 140 139 144   POTASSIUM mmol/L 3 7 3 6 4 4   CHLORIDE mmol/L 105 105 107   CO2 mmol/L 27 25 29   BUN mg/dL 17 16 15   CREATININE mg/dL 0 79 0 79 0 93   GLUCOSE RANDOM mg/dL 201* 232* 297*   CALCIUM mg/dL 8 5 8 5 8 3       Results from last 7 days  Lab Units 17  1141   NT-PRO BNP pg/mL 624*         Results from last 7 days  Lab Units 17  1141   HEMOGLOBIN A1C % 9 3*     Lipid Profile:   Lab Results   Component Value Date    CHOL 164 2017     Lab Results   Component Value Date    HDL 35 (L) 2017     Lab Results   Component Value Date    LDLCALC 100 2017     Lab Results   Component Value Date    TRIG 144 2017     Cardiac testing:   Results for orders placed during the hospital encounter of 17   Echo complete with contrast if indicated    Narrative [facility], [address] [place] [address]  [phone]    Transthoracic Echocardiogram  2D, M-mode, Doppler, and Color Doppler    Study date:  27-Dec-2017    Patient: Socrates Davis [facility]  MR number: [id number]  Account number: [de-identified]  : 1960  Age: 62 years  Gender: Male  Status: Inpatient  Location: Bedside  Height: 71 in  Weight: 333 lb  BP: 119/ 71 mmHg    Indications: MI    Diagnoses: I40 9 - Acute myocarditis, unspecified    Sonographer:  DIVYA Leong  Referring Physician:  Jannet Melendez MD  Group:  [facility]  Interpreting Physician:  Noe Walker DO    SUMMARY    SUMMARY:  Technically difficult study  LEFT VENTRICLE:  Systolic function was mildly reduced  Ejection fraction was estimated to be 45 %  There was severe hypokinesis of the basal inferoseptal and basal-mid inferior wall(s)  Doppler parameters were consistent with abnormal left ventricular relaxation (grade 1 diastolic dysfunction)  MITRAL VALVE:  There was mild annular calcification  TRICUSPID VALVE:  There was trace regurgitation      HISTORY: PRIOR HISTORY: Diabetes, Obesity    PROCEDURE: The procedure was performed at the bedside  This was a routine study  The transthoracic approach was used  The study included complete 2D imaging, M-mode, complete spectral Doppler, and color Doppler  The heart rate was 73 bpm,  at the start of the study  Images were obtained from the parasternal, apical, subcostal, and suprasternal notch acoustic windows  Intravenous contrast ( 0 8 mL Definity in NSS) was administered to opacify the left ventricle  Echocardiographic views were limited due to poor acoustic window availability and decreased penetration  This was a technically difficult study  LEFT VENTRICLE: Size was normal  Systolic function was mildly reduced  Ejection fraction was estimated to be 45 %  There was severe hypokinesis of the basal inferoseptal and basal-mid inferior wall(s)  Wall thickness was normal  DOPPLER:  Doppler parameters were consistent with abnormal left ventricular relaxation (grade 1 diastolic dysfunction)  RIGHT VENTRICLE: The size was normal  Systolic function was normal  Wall thickness was normal     LEFT ATRIUM: Size was normal     RIGHT ATRIUM: Size was normal     MITRAL VALVE: There was mild annular calcification  Valve structure was normal  There was normal leaflet separation  DOPPLER: The transmitral velocity was within the normal range  There was no evidence for stenosis  There was no  regurgitation  AORTIC VALVE: The valve was probably trileaflet  Leaflets exhibited mildly increased thickness and normal cuspal separation  DOPPLER: Transaortic velocity was within the normal range  There was no evidence for stenosis  There was no  regurgitation  TRICUSPID VALVE: The valve structure was normal  There was normal leaflet separation  DOPPLER: The transtricuspid velocity was within the normal range  There was no evidence for stenosis  There was trace regurgitation      PULMONIC VALVE: Leaflets exhibited normal thickness, no calcification, and normal cuspal separation  DOPPLER: The transpulmonic velocity was within the normal range  There was no regurgitation  PERICARDIUM: There was no pericardial effusion  The pericardium was normal in appearance  AORTA: The root exhibited normal size  SYSTEMIC VEINS: IVC: The inferior vena cava was not well visualized      SYSTEM MEASUREMENT TABLES    2D  %FS: 30 15 %  AV Diam: 3 9 cm  EDV(Teich): 131 24 ml  EF(Teich): 57 05 %  ESV(Teich): 56 37 ml  IVSd: 1 2 cm  LA Area: 14 47 cm2  LA Diam: 4 85 cm  LVEDV MOD A4C: 159 75 ml  LVEF MOD A4C: 35 13 %  LVESV MOD A4C: 103 62 ml  LVIDd: 5 23 cm  LVIDs: 3 65 cm  LVLd A4C: 9 36 cm  LVLs A4C: 8 54 cm  LVPWd: 1 08 cm  RA Area: 14 93 cm2  RVIDd: 3 4 cm  SV MOD A4C: 56 12 ml  SV(Teich): 74 87 ml    MM  TAPSE: 2 48 cm    PW  E': 0 06 m/s  E/E': 16 23  MV A Sulaiman: 0 79 m/s  MV Dec [place]: 5 08 m/s2  MV DecT: 186 23 ms  MV E Sulaiman: 0 95 m/s  MV E/A Ratio: 1 2  MV PHT: 54 01 ms  MVA By PHT: 4 07 cm2    [employer] Accredited Echocardiography Laboratory    Prepared and electronically signed by    Arabella Franklin DO  Signed 27-Dec-2017 13:12:45       Meds/Allergies   current meds:   Current Facility-Administered Medications   Medication Dose Route Frequency    acetaminophen (TYLENOL) tablet 650 mg  650 mg Oral Q4H PRN    aspirin chewable tablet 324 mg  324 mg Oral Daily    atorvastatin (LIPITOR) tablet 40 mg  40 mg Oral QPM    clopidogrel (PLAVIX) tablet 75 mg  75 mg Oral Daily    enoxaparin (LOVENOX) subcutaneous injection 40 mg  40 mg Subcutaneous Q24H ISABELLE    insulin glargine (LANTUS) subcutaneous injection 20 Units  20 Units Subcutaneous HS    insulin lispro (HumaLOG) 100 units/mL subcutaneous injection 1-6 Units  1-6 Units Subcutaneous TID AC    insulin lispro (HumaLOG) 100 units/mL subcutaneous injection 1-6 Units  1-6 Units Subcutaneous HS    lisinopril (ZESTRIL) tablet 5 mg  5 mg Oral Daily    metoprolol succinate (TOPROL-XL) 24 hr tablet 50 mg  50 mg Oral Daily    morphine (PF) 4 mg/mL injection 4 mg  4 mg Intravenous Q4H PRN    ondansetron (ZOFRAN) injection 4 mg  4 mg Intravenous Q6H PRN     Prescriptions Prior to Admission   Medication    multivitamin (THERAGRAN) TABS     Assessment:  Principal Problem:    NSTEMI (non-ST elevated myocardial infarction) [facility])  Active Problems:    New onset type 2 diabetes mellitus ([address] )    Morbid obesity (HCC)    CAD (coronary artery disease)    Assessment/ Plan    1  Non ST-elevation myocardial infarction type 1  2  Coronary artery disease, THALIA to circ,  RCA  3  Ischemic cardiomyopathy ejection fraction 45%  4  Hyperlipidemia  5  Diabetes  6  Morbid obesity    Plan:   Pt feeling well today; no anginal symptoms, able to ambulate halls  No events on telemetry  Continue his ASA/Plavix, metoprolol succinate 50mg daily  Tolerating low dose ACE for his ischemic cardiomyopathy, LVEF 45% on echo  He remains euvolemic on exam  Right radial cath site without complication  Stable from cardiac standpoint for discharge home today  Reviewed medications and instructions with patient  He will follow up with Dr Veronica Page on discharge  Counseling / Coordination of Care  Total floor / unit time spent today 25 minutes  Greater than 50% of total time was spent with the patient and / or family counseling and / or coordination of care  ** Please Note: Dragon 360 Dictation voice to text software may have been used in the creation of this document   **

## 2017-12-28 NOTE — DISCHARGE INSTRUCTIONS
1  Please see the post cardiac catheterization/ angioseal closure device instructions  No heavy lifting, greater than 10 lbs  or strenuous  activity for 48 hrs  See the post cardiac catheterization/ angioseal closure device instructions  2 Remove band aid tomorrow  Shower and wash area- wrist gently with soap and water- beginning tomorrow  Rinse and pat dry  Apply new water seal band aid  Repeat this process for 5 days  No powders, creams lotions or antibiotic ointments  for 5 days  No tub baths, hot tubs or swimming for 5 days  3 No driving for 3 days    4  Do not stop aspirin or plavix (clopidigrel) for any reason without a cardiologists consent, or the stent could block up and cause a heart attack

## 2017-12-28 NOTE — ASSESSMENT & PLAN NOTE
· A1C 9 3 on admission  · Metformin 500 mg PO daily x 1 week (to start 48 hours after cath), then 1,000 mg PO daily thereafter  · Patient educated about potential side effects of this medication  · Given script for glucometer, 120 test strips and 120 lancets  · Instructed to check is blood sugar twice daily and create a log to bring to his PCP  · Patient seen by Nutrition, educated about diabetic and heart healthy diet  · Stressed the importance of diet and lifestyle modifications and that without these changes he is likely to require additional medications to better control his blood sugars  · Schedule appointment with PCP

## 2017-12-29 NOTE — CASE MANAGEMENT
Notification of Discharge  This is a Notification of Discharge from our facility [address]  Please be advised that this patient has been discharge from our facility  Below you will find the admission and discharge date and time including the patients disposition  PRESENTATION DATE: 12/26/2017  6:14 AM  IP ADMISSION DATE: 12/26/17 0839  DISCHARGE DATE: 12/28/2017  5:13 PM  DISPOSITION: [address] [place] [address] in the [facility] by [facility] for 2017  Network Utilization Review Department  Phone: [phone]; Fax [phone]  ATTENTION: The Network Utilization Review Department is now centralized for our 7 Facilities  Make a note that we have a new phone and fax numbers for our Department  Please call with any questions or concerns to [phone] and carefully follow the prompts so that you are directed to the right person  All voicemails are confidential  Fax any determinations, approvals, denials, and requests for initial or continue stay review clinical to [phone]  Due to HIGH CALL volume, it would be easier if you could please send faxed requests to expedite your requests and in part, help us provide discharge notifications faster

## 2018-01-04 ENCOUNTER — ALLSCRIPTS OFFICE VISIT (OUTPATIENT)
Dept: OTHER | Facility: OTHER | Age: 58
End: 2018-01-04

## 2018-01-04 DIAGNOSIS — R35.1 NOCTURIA: ICD-10-CM

## 2018-01-22 VITALS
DIASTOLIC BLOOD PRESSURE: 82 MMHG | HEART RATE: 84 BPM | HEIGHT: 71 IN | BODY MASS INDEX: 44.1 KG/M2 | TEMPERATURE: 98 F | WEIGHT: 315 LBS | SYSTOLIC BLOOD PRESSURE: 140 MMHG

## 2018-01-23 ENCOUNTER — ALLSCRIPTS OFFICE VISIT (OUTPATIENT)
Dept: OTHER | Facility: OTHER | Age: 58
End: 2018-01-23
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
## 2018-01-24 NOTE — PROGRESS NOTES
Assessment    1  NSTEMI, initial episode of care (410 71) (I21 4)   2  Diabetes mellitus type 2 with complications (121 87) (N69 2)   3  Benign hypertension (401 1) (I10)   4  Hyperlipidemia (272 4) (E78 5)    Plan  Benign hypertension    · (1) COMPREHENSIVE METABOLIC PANEL; Status:Active; Requested for:26Mar2018; Perform:[facility]; FVB:[id number];PEAHGDU; For:Benign hypertension; Ordered By:Ede Rene;   · (1) MICROALBUMIN CREATININE RATIO, RANDOM URINE; Status:Active; Requested  for:26Mar2018; Perform:[facility]; FDI:[id number];LQRZJHQ; For:Benign hypertension; Ordered By:Oriana Rene;  CAD (coronary artery disease), NSTEMI, initial episode of care    · From  Lisinopril 5 MG Oral Tablet take one tablet by mouth daily To Lisinopril  10 MG Oral Tablet Take 1 tablet daily   Rx By: Marci Live; Dispense: 0 Days ; #:1 X 90 Tablet Bottle; Refill: 3; For: CAD (coronary artery disease), NSTEMI, initial episode of care; JAMIE = N; Record  Diabetes mellitus type 2 with complications    · Jardiance 10 MG Oral Tablet; Take one tablet by mouth in am daily   Rx By: Marci Live; Dispense: 90 Days ; #:90 Tablet; Refill: 1; For: Diabetes mellitus type 2 with complications; JAMIE = N; Record   · (1) HEMOGLOBIN A1C; Status:Active; Requested for:26Mar2018; Perform:[facility]; NUO:[id number];QXMNKSV; For:Diabetes mellitus type 2 with complications; Ordered By:Ede Rene;   · *1 - [facility] DIABETES SELF MANAGEMENT TRAINING OUTPATIENT Co-Management  *   Status: Hold For - Scheduling  Requested for: 21KGF4725   Ordered;  For: Diabetes mellitus type 2 with complications; Ordered By: Marci Live Performed:  Due: 49XXF2477  requires instruction : Yes  Needs requiring Individual DSMT? : No  Self-Management Education/Trainng : Living Well with Diabetes Education      Program  Care Summary provided  : Yes   · *1 - [facility] MEDICAL NUTRITION THERAPY FOR DIABETES Co-Management  * Status:  Need Information - Financial Authorization  Requested for: 23OPG7520   Ordered; For: Diabetes mellitus type 2 with complications; Ordered By: Gena Organ Performed:  Due: 63PMN2555  Care Summary provided  : Yes   · Follow-up visit in 3 months Evaluation and Treatment  Follow-up  Status: Complete   Done: 34NKY4843   Ordered; For: Diabetes mellitus type 2 with complications; Ordered By: Gena Organ Performed:  Due: 29CWD0626; Last Updated By: Allyson Avery; 1/23/2018 8:37:05 AM    Discussion/Summary  Discussion Summary:   1  Type 2 diabetes: This is a new diagnosis of diabetes since hospitalization  His blood sugars appear to be improving as his diet and exercise improves  Discussed the benefits, risks and side effects of adding Jardiance to his diabetic medication regimen in addition to metformin  He will start Jardiance 10 mg daily and continue checking his blood sugars twice daily at alternating times  He will for the record to the office in 2 weeks for review and adjustment, if necessary  Referred to diabetic education and medical nutrition therapy to learn more about his diabetes and diet  Reviewed recognition and proper treatment of hypoglycemic episodes  Check hemoglobin A1c prior to next visit  2  Hypertension: His blood pressure is elevated in the office today  I have asked him to increase his lisinopril from 5 mg to 10 mg  He will check his blood pressure a few times during the week and send in a record with his blood sugars  Check comprehensive metabolic panel and urine microalbumin prior to next visit  3  Hyperlipidemia: Continue atorvastatin  Counseling Documentation With Imm: The patient was counseled regarding diagnostic results, instructions for management, risk factor reductions, patient and family education, impressions, risks and benefits of treatment options, importance of compliance with treatment     Medication SE Review and Pt Understands Tx: Possible side effects of new medications were reviewed with the patient/guardian today  The treatment plan was reviewed with the patient/guardian  The patient/guardian understands and agrees with the treatment plan      Chief Complaint  Chief Complaint Free Text Note Form: HFU      History of Present Illness  HPI: 59-year-old male presents in the office today for follow-up of type 2 diabetes, hypertension and hyperlipidemia following hospitalization for NSTEMI and stent placement in December 2017  He states he has been diagnosed with type 2 diabetes since his hospitalization  He is currently checking his blood sugars 2 times daily  He denies any recent episodes of hypoglycemia, polydipsia, polyuria or polyphagia  His most recent hemoglobin A1c from December 26, 2017 was 9 3  He is currently taking metformin 1000 mg twice daily  By his report, he obtained a diabetic eye exam approximately 10 days ago  He denies any history of retinopathy  He has no complaints about his feet and does not follow podiatry for regular diabetic foot care  For his hypertension, he is taking lisinopril 5 mg daily and metoprolol ER 50 mg daily  His hyperlipidemia is treated with atorvastatin 40 mg daily  Review of Systems  Endo Adult ROS Male Established v2 - [facility]:   Constitutional/General: no recent weight gain, no recent weight loss, no poor energy/fatigue, no increased energy level, no insomnia/sleep problems, no fever and no feeling weak  Heart: high blood pressure, chest pain/tightness and Poor Circulation and swelling of Ankles, but no rapid/racing heart rate and no palpitations  Genitourinary - Urinary no frequent urination, no excess urination and no urinating during the night  Eyes: no blurred vision, no double vision, no bulging eyes, no gritty/scratchy eyes and no excessive tearing  Mouth / Throat: no hoarseness and no difficulty swallowing  Neck: no lumps, no swollen glands, no neck pain, no neck stiffness and no enlarged thyroid  Respiratory: no wheezing, no asthma and no persistent cough  Musculoskeletal: no muscle aches/pain, no joint aches/pain and no muscle weakness  Skin & Hair: no dry skin, no acne, the hair texture was not oily, no hair loss, no excessive hair growth and Sores that wont heal    Gastrointestinal: no constipation, no diarrhea, no waking at night to drink and no stomach ache  Neurological: no blackouts, no weakness and no tremors  Genital: no testicular pain and no testicular lumps/bumps/mass  Endocrine: no feeling hot frequently, no feeling cold frequently, no shifts between feeling hot and cold, no cold hands or feet, no excessive sweating, no thyroid problems, no blood sugar problems, no excessive thirst, no excessive hunger, no change in shoe size, no nausea or vomiting and no shaky hands  ROS Reviewed:   ROS reviewed  Active Problems    1  CAD (coronary artery disease) (414 00) (I25 10)   2  Diabetes mellitus type 2 with complications (127 07) (K27 1)   3  Nocturia (788 43) (R35 1)   4  NSTEMI, initial episode of care (410 71) (I21 4)    Past Medical History  Active Problems And Past Medical History Reviewed: The active problems and past medical history were reviewed and updated today  Surgical History  Surgical History Reviewed: The surgical history was reviewed and updated today  Family History  Mother    1  Family history of diabetes mellitus (V18 0) (Z83 3)  Father    2  Family history of cardiac disorder (V17 49) (Z82 49)  Family History Reviewed: The family history was reviewed and updated today  Social History    · Never smoker  Social History Reviewed: The social history was reviewed and updated today  Current Meds   1  Aspirin 81 MG TABS; Take one tablet daily; Therapy: (Recorded:02Jan2018) to Recorded   2  Atorvastatin Calcium 40 MG Oral Tablet; Take one tablet by mouth every evening    Requested for: 32YEJ6411; Last Rx:18Jan2018 Ordered   3   Clopidogrel Bisulfate 75 MG Oral Tablet; take one tablet by mouth daily  Requested for:   15APF5946; Last Rx:18Jan2018 Ordered   4  Lisinopril 5 MG Oral Tablet; take one tablet by mouth daily  Requested for: 39UTO7592; Last Rx:18Jan2018 Ordered   5  MetFORMIN HCl - 1000 MG Oral Tablet; TAKE ONE TABLET BY MOUTH 2 TIMES DAILY    Requested for: 98YNR0208; Last Rx:18Jan2018 Ordered   6  Metoprolol Succinate ER 50 MG Oral Tablet Extended Release 24 Hour; take one tablet   by mouth daily  Requested for: 12SGU4875; Last Rx:18Jan2018 Ordered   7  Multi-Vitamins Oral Tablet; Therapy: (([phone]) to Recorded   8  OneTouch UltraSoft Lancets Miscellaneous; Test blood sugars twice daily or as directed   Dx Code E11 9; Therapy: 63LRB4324 to (Last Rx:18Jan2018)  Requested for: 60NSA7032 Ordered  Medication List Reviewed: The medication list was reviewed and updated today  Allergies    1  No Known Drug Allergies    Vitals  Vital Signs    Recorded: 30FGT2371 07:51AM   Heart Rate 78   Systolic 433   Diastolic 90   Height 5 ft 11 in   Weight 320 lb 0 96 oz   BMI Calculated 44 64   BSA Calculated 2 57     Physical Exam    Constitutional   General appearance: No acute distress, well appearing and well nourished  Obese  Eyes   Conjunctiva and lids: No swelling, erythema, or discharge  Pupils: Equal, round and reactive to light  The sclera are anicteric  Extraocular movements are intact  Ears, Nose, Mouth, and Throat   External inspection of ears, nose and lips: Normal     Oropharynx: Normal with no erythema, edema, exudate or lesions  Exam of Head: The head is atraumatic and normocephalic  Neck: The neck is supple  The thyroid is normal in size with no palpable nodules  Pulmonary   Respiratory effort: No increased work of breathing or signs of respiratory distress  Auscultation of lungs: Clear to auscultation bilaterally with normal chest expansion      Cardiovascular   Auscultation of heart: Normal rate and rhythm with no murmurs, gallops or rubs  Examination of extremities for edema and/or varicosities: Abnormal   Trace pedal edema  Examination of pulses: Dorsalis pedal pulses are +2 and equal bilaterally  Examination of Carotids: No bruits  Abdomen   Abdomen: Abdomen is soft, non-tender with normal bowel sounds  Liver and spleen: No hepatomegaly or splenomegaly  Lymphatic   Palpation of lymph nodes: No supraclavicular or suboccipital lymphadenopathy  Musculoskeletal   Gait and station: Normal     Digits and nails: Normal without clubbing or cyanosis  Inspection/palpation of joints, bones, and muscles: Muscle bulk and tone is normal     Skin   Skin and subcutaneous tissue: Abnormal   Dry Skin/small discoloration with healing wound to medial left ankle  Neurologic   Cranial nerves: Cranial nerves 2-12 intact  Reflexes: 2+ and symmetric  Sensation: No sensory loss  Motor Strength: Strength is 5/5 bilaterally  Psychiatric   Orientation to person, place and time: Normal     Mood and affect: Affect and attention span are normal       Socks and shoes removed, Right Foot Findings: normal foot, no swelling, no erythema  The right toes were normal and had full range of motion  The sensory exam showed normal vibratory sensation at the level of the toes on the right  Normal tactile sensation with monofilament testing throughout the right foot  Socks and shoes removed, Left Foot Findings: normal foot, no swelling, no erythema  The left toes were normal and had full range of motion  The sensory exam showed normal vibratory sensation at the level of the toes on the left  Normal tactile sensation with monofilament testing throughout the left foot  Pulses:   2+ in the dorsalis pedis on the right  Pulses:   2+ in the dorsalis pedis on the left  Results/Data  Diagnostic Studies Reviewed:   Diagnostic Review Progress notes and lab work in [employer]        Future Appointments    Date/Time Provider Specialty Site   02/07/2018 03:20 PM BOGDAN Jones  Cardiology [facility] CARDIOLOGY [facility]   66/72/6410 35:01 AM BOGDAN Aviles   Family Medicine [address] [place] [address]     Signatures   Electronically signed by : Yuni Bowen [facility]; Jan 23 2018  8:39AM EST                       (Author)    Electronically signed by : BOGDAN Berumen ; Jan 23 2018 10:42AM EST

## 2018-01-30 ENCOUNTER — OFFICE VISIT (OUTPATIENT)
Dept: DIABETES SERVICES | Facility: CLINIC | Age: 58
End: 2018-01-30
Payer: COMMERCIAL

## 2018-01-30 DIAGNOSIS — E11.8 TYPE 2 DIABETES MELLITUS WITH COMPLICATION, WITHOUT LONG-TERM CURRENT USE OF INSULIN (HCC): Primary | ICD-10-CM

## 2018-01-30 PROCEDURE — 98960 EDU&TRN PT SELF-MGMT NQHP 1: CPT | Performed by: DIETITIAN, REGISTERED

## 2018-01-30 RX ORDER — BLOOD-GLUCOSE METER
EACH MISCELLANEOUS
Refills: 0 | COMMUNITY
Start: 2017-12-28

## 2018-01-30 RX ORDER — LANCETS
EACH MISCELLANEOUS
COMMUNITY
Start: 2018-01-18

## 2018-01-30 NOTE — PROGRESS NOTES
Type 2 Diabetes Class Assessment    HPI: Met with Cisco Aguilera for DSME Initial visit  Ilene Nguyen has Type 2 Diabetes  Diabetes Assessment  Visit Type: Initial visit  Present at Session: patient   Medical Diagnosis/ICD 10: U74 6 T3RL with Complications  Special Learning Needs: No  Barriers to Learning: no barriers    How do you learn best? Reading, video, interactive  What are you most interested in learning about regards to your diabetes? Healthy eating, reducing the risk of complications, BG monitoring  How do you feel about making lifestyle changes at this time? Already started to making changes  How would you rate your current knowledge of diabetes? good  How confident are you that will be able to take better control of your diabetes?: excellent    How long have you had diabetes? 1 month  Have you had diabetes education in the past?: No  Do you have any family members with diabetes?: Yes - Mother  Do you monitor your blood sugar? yes  Type of blood sugar monitor: One Touch Ultra 2  How old is your meter?: New  How often do you test your blood sugars?:2 times a day, staggering them at different times  Do you keep a written record of your blood sugars? Yes   Blood sugar log with patient today and reviewed by educator?: Yes   Blood Sugar ranges:    Fastin-166   Before meals: 111-143   2 hours after meals: n/a  Any financial concerns pertaining to your diabetes supplies, medication or care?: No  Have you ever experienced hypoglycemia?:  No  Have you ever been hospitalized or gone to the ER due to your blood sugars?: No  How do you treat low blood sugars?: 1/ cup OJ and repeat if needed   How do you treat high blood sugars?  None   Do you wear a Diabetes I D ?: no  Where do you dispose of your sharps (needles,lancetes)?: Clorox container     Ht Readings from Last 1 Encounters:   18 5' 11" (1 803 m)     Wt Readings from Last 1 Encounters:   18 (!) 147 kg (324 lb)     Weight Change: Yes 25 pound planned weight loss in the past month (following 1200 calorie diet)    Diet Assessment    Do you follow any special diet presently?: Yes - 1200 calorie diet  Who cooks at home?:  patient and spouse  Who does the grocery shopping?: patient and spouse   How frequently do you eat out?: Rarely    Activity Assessment    Exercise: Walking 2 miles most days of the week (started in September 2017)  Lifestyle/Social Assessment    Racial/ethnic group:                                       Primary Language: English  Marital Status:   Education Level: Some College (No Degree)  Work status: Full Time  Type of job and hours: 7:00AM-5:00PM  Who lives in your household?: spouse and children  Who is you primary support person(s): spouse   Describe your quality of life currently?: excellent  Any concerns for your safety?: No  Any Yarsani or cultural practices that may affect your diabetes care: No  Do you have a decrease or loss of hearing?: No  Do you have a decrease or loss of vision?: No  When was the last time you had an ophthalmology exam?: 1/13/2018  When was the last time you had dental exam?: scheduled for 2/9/2018  Do you check your feet for cracks, sores, debris?: Yes  When was the last time you had podiatry or foot exam?: 1/23/2018  Last flu shot?: 12/28/2018  Pneumonia shot?: No      Lab Results   Component Value Date    HGBA1C 9 3 (H) 12/26/2017     Lab Results   Component Value Date    CHOL 164 12/27/2017    HDL 35 (L) 12/27/2017    LDLCALC 100 12/27/2017    TRIG 144 12/27/2017     No results found for: Chance Honorio      The patient's history was reviewed and updated as appropriate: allergies, current medications  Intervention    Diabetes Overview :   Ciara Raya was instructed on basic concepts of diabetes, including identifying role of diabetes self management, A1c and blood sugar targets  Ciara Raya has good understanding of material covered  Taking Medications:  Instructed patient on action, side effects, efficacy, prescribed dosage and appropriate timing and frequency of administration of his diabetes medication  Candi Haider has good understanding of material covered  Monitoring Blood Sugars  Instructions for Meter Teaching- Patient instructed in the following:  Site selection and skin preparation, Loading strips and lancet device, meter activation, obtaining blood sample, test strip and lancet disposal and recording log book entries  Patient has good understanding of material covered and was able to test their own blood sugar in office today  Comments: Patient has been using a One Touch meter (obtained through his insurance company) and demonstrated use, blood sugar in office 110 mg/dl  at  5:30PM         Testing frequency: Candi Haider is testing his BG 2 times a day and is stagger the times (i e  Breakfast and lunch or dinner and bedtime) per MD request     Goal Blood Sugars:   Premeal , even better <110  2hr after a meal <180, even better <140  A1C <7%, even better <6 5%  Hypoglycemia: Instructed patient on definition/risk of hypoglycemia, treatment, causes/symptoms, when to notify provider of lows, prevention of hypoglycemia  Comments: Rachel Hugo understanding of hypoglycemia concepts      Physical Activity: Discussed benefits of physical activity to optimize blood glucose control, encouraged activity at patient is physically able  Always consult a physician prior to starting an exercise program   Comments: Rachel Hugo understanding of hypoglycemia concepts        Diabetes Education Record  Candi Haider received the following handouts: 15/15 Rule, Symptoms of hyper/hypoglycemia, BG goal numbers       Patient response to instruction    Comprehensionexcellent  Motivationexcellent  Expected Complianceexcellent  Response to Teachback: 100%, demonstrated understanding    Begin Time: 4:30PM  End Time: 5:40PM  Referring Provider: SABINA Rene    Thank you for referring your patient to [place], it was a pleasure working with them today  Please feel free to call with any questions or concerns      Leena Cortés  [address] [facility], [address] [facility] [address]

## 2018-01-31 PROBLEM — E11.8 TYPE 2 DIABETES MELLITUS WITH COMPLICATION, WITHOUT LONG-TERM CURRENT USE OF INSULIN (HCC): Status: ACTIVE | Noted: 2018-01-31

## 2018-01-31 NOTE — PATIENT INSTRUCTIONS
Class Assessment AVS    You are scheduled to attend Living Well with Diabetes Classes starting: February at [facility]  Please bring a copy of your blood sugar log and pen with you to class  Testing frequency: Check BG 2 times a day and stagger the times daily per your MD request   Submit BG log to MD in two weeks  Goal Blood Sugars:   Premeal , even better <110  2hr after a meal <180, even better <140  A1C <7%, even better <6 5%  Thank you for coming to the [facility] for education today  Please feel free to call with any questions or concerns      [facility]  [address] [place] [address],  [address] [facility] [phone]

## 2018-02-05 ENCOUNTER — OFFICE VISIT (OUTPATIENT)
Dept: DIABETES SERVICES | Facility: CLINIC | Age: 58
End: 2018-02-05
Payer: COMMERCIAL

## 2018-02-05 ENCOUNTER — TELEPHONE (OUTPATIENT)
Dept: FAMILY MEDICINE CLINIC | Facility: CLINIC | Age: 58
End: 2018-02-05

## 2018-02-05 DIAGNOSIS — E11.8 TYPE 2 DIABETES MELLITUS WITH COMPLICATION, WITHOUT LONG-TERM CURRENT USE OF INSULIN (HCC): Primary | ICD-10-CM

## 2018-02-05 PROCEDURE — 98961 EDU&TRN PT SLF-MGMT NQHP 2-4: CPT | Performed by: DIETITIAN, REGISTERED

## 2018-02-05 RX ORDER — ZINC GLUCONATE 13.3 MG
1 LOZENGE ORAL DAILY
COMMUNITY
End: 2018-02-07 | Stop reason: SDUPTHER

## 2018-02-05 NOTE — TELEPHONE ENCOUNTER
Wife called stating that patient was given the wrong lancets  He needs Delica test strips  He was given one touch ultra which is not what his machine is  Can a news script be sent to [facility]?   Please call and advise

## 2018-02-06 NOTE — PROGRESS NOTES
Living Well with Diabetes Group Class #1    Quinton Muñoz attended the Living Well with Diabetes Group Class #1  Topics Covered in class today include: What is diabetes; Types of Diabetes; How Diabetes is diagnosed; Management skills; the role of exercise in blood sugar managements, Home glucose monitoring, and target ranges  The patient's history was reviewed and updated as appropriate: allergies, current medications  Lab Results   Component Value Date    HGBA1C 9 3 (H) 12/26/2017       Diabetes Education Record    Angel Hirsch was provided Living Well with Diabetes Class #1 book, BG log book and A1C magnet      Patient response to instruction    Comprehensiongood  Motivationgood  Expected Compliancegood    Begin Time: 6:00 PM  End Time: 7:30 PM   Referring Provider: SABINA Rene    Thank you for referring your patient to [facility], it was a pleasure working with them today  Please feel free to call with any questions or concerns      Ibis Bolden  [address] [place] [address],  [address] [facility] [id number]

## 2018-02-07 ENCOUNTER — TELEPHONE (OUTPATIENT)
Dept: DIABETES SERVICES | Facility: CLINIC | Age: 58
End: 2018-02-07

## 2018-02-07 ENCOUNTER — OFFICE VISIT (OUTPATIENT)
Dept: CARDIOLOGY CLINIC | Facility: CLINIC | Age: 58
End: 2018-02-07
Payer: COMMERCIAL

## 2018-02-07 VITALS
BODY MASS INDEX: 44.1 KG/M2 | WEIGHT: 315 LBS | HEART RATE: 74 BPM | HEIGHT: 71 IN | SYSTOLIC BLOOD PRESSURE: 140 MMHG | OXYGEN SATURATION: 97 % | DIASTOLIC BLOOD PRESSURE: 80 MMHG

## 2018-02-07 DIAGNOSIS — I25.10 CORONARY ARTERY DISEASE INVOLVING NATIVE CORONARY ARTERY OF NATIVE HEART WITHOUT ANGINA PECTORIS: Primary | ICD-10-CM

## 2018-02-07 PROCEDURE — 99214 OFFICE O/P EST MOD 30 MIN: CPT | Performed by: INTERNAL MEDICINE

## 2018-02-07 NOTE — PROGRESS NOTES
Cardiology   [facility] Petsch 62 y o  male MRN: [id number]        Impression:  1  CAD s/p NSTEMI 12/17 - Had PCI of LCx, has residual  of RCA  Currently exercising and has met with a dietician  2  Hypertension - borderline control  3  Dylipidemia - on statin  Recommendations:  1  Continue current medications  2  Follow up in 6 months  HPI: Maikel Peace is a 62y o  year old male admitted to [place] [address] with NSTEMI Type I - cath demonstrated 95% LCx and chronic mid RCA stenosis - Underwent PCI of LCx  Since discharge, has been feeling well  No chest pain, shortness of breath, or palpitations  Review of Systems   Constitutional: Negative  HENT: Negative  Eyes: Negative  Respiratory: Negative for chest tightness and shortness of breath  Cardiovascular: Negative for chest pain, palpitations and leg swelling  Gastrointestinal: Negative  Endocrine: Negative  Genitourinary: Negative  Musculoskeletal: Negative  Skin: Negative  Allergic/Immunologic: Negative  Neurological: Negative  Hematological: Negative  Psychiatric/Behavioral: Negative  All other systems reviewed and are negative  No past medical history on file    Past Surgical History:   Procedure Laterality Date    EYE SURGERY      WISDOM TOOTH EXTRACTION       History   Alcohol Use No     Comment: 1-2 drinks a year     History   Drug Use No     History   Smoking Status    Never Smoker   Smokeless Tobacco    Never Used     Family History   Problem Relation Age of Onset    Diabetes Mother     Heart disease Father     Heart disease Paternal Grandfather        Allergies:  No Known Allergies    Medications:     Current Outpatient Prescriptions:     aspirin 81 MG tablet, Take 1 tablet by mouth daily, Disp: , Rfl: 0    atorvastatin (LIPITOR) 40 mg tablet, Take 1 tablet by mouth every evening, Disp: 30 tablet, Rfl: 0    Blood Glucose Monitoring Suppl (ONE TOUCH ULTRA 2) w/Device KIT, Use as directed, Disp: , Rfl: 0    clopidogrel (PLAVIX) 75 mg tablet, Take 1 tablet by mouth daily, Disp: 30 tablet, Rfl: 0    Empagliflozin (JARDIANCE) 10 MG TABS, Take by mouth, Disp: , Rfl:     Lancets (ONETOUCH ULTRASOFT) lancets, , Disp: , Rfl:     lisinopril (ZESTRIL) 5 mg tablet, Take 1 tablet by mouth daily (Patient taking differently: Take 5 mg by mouth 2 (two) times a day  ), Disp: 30 tablet, Rfl: 0    metFORMIN (GLUCOPHAGE) 1000 MG tablet, Take 0 5 tablets by mouth 2 (two) times a day with meals x 1 week, then 1 tablet by mouth two times daily thereafter  (Patient taking differently: Take 1,000 mg by mouth 2 (two) times a day with meals x 1 week, then 1 tablet by mouth two times daily thereafter  ), Disp: 60 tablet, Rfl: 0    metoprolol succinate (TOPROL-XL) 50 mg 24 hr tablet, Take 1 tablet by mouth daily, Disp: 30 tablet, Rfl: 0    multivitamin (THERAGRAN) TABS, Take 1 tablet by mouth daily, Disp: , Rfl:     ONE TOUCH ULTRA TEST test strip, TEST SUGAR BEFORE BREAKFAST AND DINNER, Disp: , Rfl: 0      Vitals:    02/07/18 1524   BP: 140/80   Pulse: 74   SpO2: 97%     Weight (last 2 days)     Date/Time   Weight    02/07/18 1524  (!)  143 (315 2)            Physical Exam   Constitutional: He is oriented to person, place, and time  He appears well-developed  HENT:   Head: Normocephalic  Eyes: EOM are normal    Neck: Normal range of motion  Cardiovascular: Normal rate, regular rhythm and normal heart sounds  Exam reveals no gallop and no friction rub  No murmur heard  Pulmonary/Chest: Effort normal and breath sounds normal  No respiratory distress  He has no wheezes  He has no rales  Abdominal: Soft  Musculoskeletal: Normal range of motion  Neurological: He is alert and oriented to person, place, and time  Skin: Skin is warm and dry  Psychiatric: He has a normal mood and affect           Laboratory Studies:  CMP:  Lab Results   Component Value Date     12/28/2017    K 3 7 12/28/2017  12/28/2017    CO2 27 12/28/2017    ANIONGAP 8 12/28/2017    BUN 17 12/28/2017    CREATININE 0 79 12/28/2017    GLUCOSE 201 (H) 12/28/2017    AST 21 12/26/2017    ALT 36 12/26/2017    BILITOT 0 20 12/26/2017    EGFR 100 12/28/2017       Lipid Profile:   Lab Results   Component Value Date    CHOL 164 12/27/2017     Lab Results   Component Value Date    HDL 35 (L) 12/27/2017     Lab Results   Component Value Date    LDLCALC 100 12/27/2017     Lab Results   Component Value Date    TRIG 144 12/27/2017       Cardiac testing:     Results for orders placed during the hospital encounter of 12/26/17   Echo complete with contrast if indicated       Study date:  27-Dec-2017    SUMMARY    SUMMARY:  Technically difficult study  LEFT VENTRICLE:  Systolic function was mildly reduced  Ejection fraction was estimated to be 45 %  There was severe hypokinesis of the basal inferoseptal and basal-mid inferior wall(s)  Doppler parameters were consistent with abnormal left ventricular relaxation (grade 1 diastolic dysfunction)  MITRAL VALVE:  There was mild annular calcification  TRICUSPID VALVE:  There was trace regurgitation  Results for orders placed during the hospital encounter of 12/26/17   Cardiac catheterization         SUMMARY    CORONARY CIRCULATION:  Left main: Normal   LAD: The vessel was medium to large sized  Angiography showed mild atherosclerosis  Circumflex: The vessel was very large sized  Mid circumflex: There was a discrete 90 % stenosis  The lesion was irregularly contoured, hazy, and without evidence of thrombus  There was TIMUR grade 3 flow through the vessel (brisk flow)  This is a likely culprit for the patient's  clinical presentation  It appears amenable to percutaneous intervention  RCA: The vessel was medium sized  The mid RCA occlusion appears to be chronic as there is R to R and L to R collaterals seen  Mid RCA: There was a 100 % stenosis    Distal RCA: There was a discrete 80 % stenosis  1ST LESION INTERVENTIONS:  A successful stent procedure was performed on the 90 % lesion in the mid circumflex  Following intervention there was an excellent angiographic appearance with a 0 % residual stenosis  This was not a bifurcation lesion  This was an ACC/AHA type A "low risk" lesion for intervention  There was no evidence of the transient no-reflow phenomenon  The residual lesion was discrete and demonstrated no evidence of thrombus  There  was TIMUR 3 flow before the procedure and TIMUR 3 flow after the procedure  A Resolute Integrity Rx 4 0 x 18 drug-eluting stent was placed across the lesion and deployed at a maximum inflation pressure of 12 courtney

## 2018-02-08 ENCOUNTER — TELEPHONE (OUTPATIENT)
Dept: INTERNAL MEDICINE CLINIC | Facility: CLINIC | Age: 58
End: 2018-02-08

## 2018-02-08 NOTE — TELEPHONE ENCOUNTER
Could you please call patient and tell him he should take medications as prescribed, and don't worry about the multivitamin  It must have been a mistake  Thanks

## 2018-02-08 NOTE — TELEPHONE ENCOUNTER
----- Message from Jaime Wolf sent at 2/7/2018  5:00 PM EST -----  Regarding: Visit Follow-Up Question  Contact: [phone]  Dr Fatimah Bailey,  I see on my after visit summary you have asked me to stop taking Theragran-M Advanced 50  Plus tabs  I do not recall ever taking these unless they go by another name  Can you recheck  (new system glitch) I will continue with meds as directed until I hear from you    Thank you,  Keke De Jesus

## 2018-02-09 NOTE — TELEPHONE ENCOUNTER
Called and spoke with patient wife regard the meds, explain that theragan are the multivatimins that he must to continue take his medicines according to the Dr marie

## 2018-02-12 ENCOUNTER — OFFICE VISIT (OUTPATIENT)
Dept: DIABETES SERVICES | Facility: CLINIC | Age: 58
End: 2018-02-12
Payer: COMMERCIAL

## 2018-02-12 DIAGNOSIS — E11.8 TYPE 2 DIABETES MELLITUS WITH COMPLICATION, WITHOUT LONG-TERM CURRENT USE OF INSULIN (HCC): Primary | ICD-10-CM

## 2018-02-12 PROCEDURE — 98962 EDU&TRN PT SLF-MGMT NQHP 5-8: CPT | Performed by: DIETITIAN, REGISTERED

## 2018-02-12 NOTE — Clinical Note
Patient submitted BG logs on 2/6/2018 for you to review  I faxed them to you on 2/6 and gave a copy to Chad Soto to hand to you (2/13)  FYI: pt is trying to communicate with you through My Chart

## 2018-02-13 NOTE — PROGRESS NOTES
Living Well with Diabetes Group Class #3    Quinton Muñoz attended the Living Well with Diabetes Group Class #3  During class, Gray Mehran was instructed on the following topics: Oral and injectable medications, short term complications of diabetes, long term complications of diabetes, prevention of complications, foot care, sick day management, stress management, and traveling with diabetes  Gray Laurent will follow up with class #4  Will call with any questions or concerns prior to next session  The patient's history was reviewed and updated as appropriate: allergies, current medications  Lab Results   Component Value Date    HGBA1C 9 3 (H) 12/26/2017       Diabetes Education Record  Gray Mehran was provided Living Well with Diabetes Class #3 book      Patient response to instruction    Comprehensiongood  Motivationgood  Expected Compliancegood    Begin Time: 6:00PM   End Time: 7:30PM  Referring Provider: SABINA Rene    Thank you for referring your patient to [facility], it was a pleasure working with them today  Please feel free to call with any questions or concerns      Nigel Double  [address] [facility],  [address] [address]

## 2018-02-14 ENCOUNTER — OFFICE VISIT (OUTPATIENT)
Dept: DIABETES SERVICES | Facility: CLINIC | Age: 58
End: 2018-02-14
Payer: COMMERCIAL

## 2018-02-14 DIAGNOSIS — E11.8 TYPE 2 DIABETES MELLITUS WITH COMPLICATION, WITHOUT LONG-TERM CURRENT USE OF INSULIN (HCC): Primary | ICD-10-CM

## 2018-02-14 PROCEDURE — 98961 EDU&TRN PT SLF-MGMT NQHP 2-4: CPT | Performed by: DIETITIAN, REGISTERED

## 2018-02-14 NOTE — PROGRESS NOTES
Jesus Welch! I hope all is well! I looked over Mr  Staci Mckeon blood sugar log today and he appears to be relatively well controlled  If he is trying to get in touch with me via My Chart, I don't believe I've seen it   though I will admit I don't have the best handle on it yet    Thanks for your help!     Jenny Adan

## 2018-02-15 DIAGNOSIS — E11.8 TYPE 2 DIABETES MELLITUS WITH COMPLICATION, UNSPECIFIED LONG TERM INSULIN USE STATUS: Primary | ICD-10-CM

## 2018-02-15 NOTE — PROGRESS NOTES
Living Well with Diabetes Group Class #2    Quinton Muñoz attended the Living Well with Diabetes Group Class #2  During class, Mac Phalen was instructed on the following topics: Macronutrients, Carbohydrate sources, What one serving of carbohydrate equals, effects of diet on blood glucose levels, effect of carbohydrates on blood glucose levels, basics of meal planning: balance, portions, meal times, measurements, reading food labels to determine carbohydrates  Aldo Anaismonica participated in group activities of reading labels together and completing the meal planning activity  Mac Phalen will follow up with class #3  Will call with any questions or concerns prior to next session  The patient's history was reviewed and updated as appropriate: allergies, current medications  Lab Results   Component Value Date    HGBA1C 9 3 (H) 12/26/2017       Diabetes Education Record  Aldo Phalen was provided Living Well with Diabetes Class #2 book      Patient response to instruction    Comprehensionvery good  Motivationgood  Expected Compliancegood    Begin Time: 6pm   End Time: 8pm  Referring Provider: BARTOLOME    Thank you for referring your patient to [facility], it was a pleasure working with them today  Please feel free to call with any questions or concerns      Oleg Caban  [address] [facility] [facility],  [address] [facility] [address]

## 2018-02-16 NOTE — TELEPHONE ENCOUNTER
From: Toshia Serrano  Sent: 2/15/2018 7:57 PM EST  Subject: Medication Renewal Request    Toshia Serrano would like a refill of the following medications:      Other - Jardiance 10mg    Preferred pharmacy: [id number]

## 2018-02-21 ENCOUNTER — TELEPHONE (OUTPATIENT)
Dept: ENDOCRINOLOGY | Facility: CLINIC | Age: 58
End: 2018-02-21

## 2018-02-21 ENCOUNTER — OFFICE VISIT (OUTPATIENT)
Dept: DIABETES SERVICES | Facility: CLINIC | Age: 58
End: 2018-02-21
Payer: COMMERCIAL

## 2018-02-21 DIAGNOSIS — E11.8 TYPE 2 DIABETES MELLITUS WITH COMPLICATION, WITHOUT LONG-TERM CURRENT USE OF INSULIN (HCC): Primary | ICD-10-CM

## 2018-02-21 PROCEDURE — 98962 EDU&TRN PT SLF-MGMT NQHP 5-8: CPT | Performed by: DIETITIAN, REGISTERED

## 2018-02-21 NOTE — TELEPHONE ENCOUNTER
Pharmacy advised that Germain Landau is no longer covered by insurance, PA was denied    Will cover [place] or Massimo Elaine

## 2018-02-21 NOTE — TELEPHONE ENCOUNTER
Ok       If he was on Jardiance 10mg daily then we can try Farxiga 5 mg daily  Thanks for your help!     Randall Carranza

## 2018-02-21 NOTE — TELEPHONE ENCOUNTER
Spoke to patient's wife, told her that David Lan is no longer covered by insurance and that Prior Blair Beatty was denied and that he will need to try [place] or [place]  Wife stated that he does not want to try the other meds because the risks are too high   Especially with the heart     Would like to know what you suggest?

## 2018-02-22 NOTE — PROGRESS NOTES
Living Well with Diabetes Group Class #4    Quinton Muñoz attended the Living Well with Diabetes Group Class #4  During class, Carlene Kamara was instructed on the following topics: Types of cholesterol, dietary sources of cholesterol, types of fat, types of fiber, reading food labels- in depth, healthy choices when dining out  Carlene Kamara participated in group activities of reading labels together and completed the dining out activity  Carlenetrisha Kamara will follow up with class #5 or additional DSMT/MNT as desired  Will call with any questions or concerns prior to next session  The patient's history was reviewed and updated as appropriate: allergies, current medications  Lab Results   Component Value Date    HGBA1C 9 3 (H) 12/26/2017       Diabetes Education Record  Carlene Kamara was provided Living Well with Diabetes Class #4 book      Patient response to instruction    Comprehensiongood  Motivationgood  Expected Compliancegood    Begin Time: 6pm  End Time: 8pm  Referring Provider: JOYA    Thank you for referring your patient to [facility], it was a pleasure working with them today  Please feel free to call with any questions or concerns      Laurence Zapien  [address] [facility], [address] [facility] [address]

## 2018-03-02 ENCOUNTER — TELEPHONE (OUTPATIENT)
Dept: ENDOCRINOLOGY | Facility: CLINIC | Age: 58
End: 2018-03-02

## 2018-03-08 ENCOUNTER — TELEPHONE (OUTPATIENT)
Dept: FAMILY MEDICINE CLINIC | Facility: CLINIC | Age: 58
End: 2018-03-08

## 2018-03-08 DIAGNOSIS — I10 ESSENTIAL HYPERTENSION: Primary | ICD-10-CM

## 2018-03-08 RX ORDER — LISINOPRIL 10 MG/1
10 TABLET ORAL DAILY
Qty: 90 TABLET | Refills: 1 | Status: SHIPPED | OUTPATIENT
Start: 2018-03-08 | End: 2018-04-04 | Stop reason: SDUPTHER

## 2018-03-26 ENCOUNTER — LAB (OUTPATIENT)
Dept: LAB | Facility: CLINIC | Age: 58
End: 2018-03-26
Payer: COMMERCIAL

## 2018-03-26 DIAGNOSIS — R35.1 NOCTURIA: ICD-10-CM

## 2018-03-26 DIAGNOSIS — I10 ESSENTIAL (PRIMARY) HYPERTENSION: ICD-10-CM

## 2018-03-26 DIAGNOSIS — E11.8 TYPE 2 DIABETES MELLITUS WITH COMPLICATIONS (HCC): ICD-10-CM

## 2018-03-26 LAB
ALBUMIN SERPL BCP-MCNC: 3.8 G/DL (ref 3.5–5)
ALP SERPL-CCNC: 110 U/L (ref 46–116)
ALT SERPL W P-5'-P-CCNC: 33 U/L (ref 12–78)
ANION GAP SERPL CALCULATED.3IONS-SCNC: 8 MMOL/L (ref 4–13)
AST SERPL W P-5'-P-CCNC: 19 U/L (ref 5–45)
BILIRUB SERPL-MCNC: 0.59 MG/DL (ref 0.2–1)
BUN SERPL-MCNC: 23 MG/DL (ref 5–25)
CALCIUM SERPL-MCNC: 8.8 MG/DL (ref 8.3–10.1)
CHLORIDE SERPL-SCNC: 107 MMOL/L (ref 100–108)
CO2 SERPL-SCNC: 27 MMOL/L (ref 21–32)
CREAT SERPL-MCNC: 0.87 MG/DL (ref 0.6–1.3)
CREAT UR-MCNC: 252 MG/DL
EST. AVERAGE GLUCOSE BLD GHB EST-MCNC: 137 MG/DL
GFR SERPL CREATININE-BSD FRML MDRD: 96 ML/MIN/1.73SQ M
GLUCOSE P FAST SERPL-MCNC: 105 MG/DL (ref 65–99)
HBA1C MFR BLD: 6.4 % (ref 4.2–6.3)
MICROALBUMIN UR-MCNC: 11.9 MG/L (ref 0–20)
MICROALBUMIN/CREAT 24H UR: 5 MG/G CREATININE (ref 0–30)
POTASSIUM SERPL-SCNC: 3.6 MMOL/L (ref 3.5–5.3)
PROT SERPL-MCNC: 7.8 G/DL (ref 6.4–8.2)
PSA SERPL-MCNC: 0.8 NG/ML (ref 0–4)
SODIUM SERPL-SCNC: 142 MMOL/L (ref 136–145)

## 2018-03-26 PROCEDURE — 82043 UR ALBUMIN QUANTITATIVE: CPT

## 2018-03-26 PROCEDURE — 82570 ASSAY OF URINE CREATININE: CPT

## 2018-03-26 PROCEDURE — 3061F NEG MICROALBUMINURIA REV: CPT | Performed by: FAMILY MEDICINE

## 2018-03-26 PROCEDURE — 80053 COMPREHEN METABOLIC PANEL: CPT

## 2018-03-26 PROCEDURE — 36415 COLL VENOUS BLD VENIPUNCTURE: CPT

## 2018-03-26 PROCEDURE — G0103 PSA SCREENING: HCPCS

## 2018-03-26 PROCEDURE — 83036 HEMOGLOBIN GLYCOSYLATED A1C: CPT

## 2018-03-27 ENCOUNTER — TELEPHONE (OUTPATIENT)
Dept: FAMILY MEDICINE CLINIC | Facility: CLINIC | Age: 58
End: 2018-03-27

## 2018-03-27 ENCOUNTER — TELEPHONE (OUTPATIENT)
Dept: ENDOCRINOLOGY | Facility: CLINIC | Age: 58
End: 2018-03-27

## 2018-03-27 NOTE — TELEPHONE ENCOUNTER
----- Message from Shauna Willis sent at 3/26/2018  8:13 PM EDT -----  Please call the patient regarding results  Hemoglobin A1c is greatly improved to 6 4  Urine microabumin is normal   Follow up with Dr Gustabo Goltz scheduled for 4/4/2018

## 2018-03-27 NOTE — TELEPHONE ENCOUNTER
----- Message from Marthe Krabbe, MD sent at 6/14/6842  7:02 AM EDT -----  bw much improved with   A1C down to 6 4  Continue current meds

## 2018-03-27 NOTE — PROGRESS NOTES
Please call the patient regarding results  Hemoglobin A1c is greatly improved to 6 4  Urine microabumin is normal   Follow up with Dr Master Mcdowell scheduled for 4/4/2018

## 2018-03-29 DIAGNOSIS — E11.65 TYPE 2 DIABETES MELLITUS WITH HYPERGLYCEMIA, WITHOUT LONG-TERM CURRENT USE OF INSULIN (HCC): Primary | ICD-10-CM

## 2018-04-04 ENCOUNTER — OFFICE VISIT (OUTPATIENT)
Dept: ENDOCRINOLOGY | Facility: CLINIC | Age: 58
End: 2018-04-04
Payer: COMMERCIAL

## 2018-04-04 VITALS
WEIGHT: 303 LBS | HEART RATE: 56 BPM | BODY MASS INDEX: 42.42 KG/M2 | SYSTOLIC BLOOD PRESSURE: 138 MMHG | DIASTOLIC BLOOD PRESSURE: 88 MMHG | HEIGHT: 71 IN

## 2018-04-04 DIAGNOSIS — E66.01 MORBID OBESITY (HCC): ICD-10-CM

## 2018-04-04 DIAGNOSIS — E11.8 TYPE 2 DIABETES MELLITUS WITH COMPLICATION, WITHOUT LONG-TERM CURRENT USE OF INSULIN (HCC): Primary | ICD-10-CM

## 2018-04-04 DIAGNOSIS — I10 ESSENTIAL (PRIMARY) HYPERTENSION: ICD-10-CM

## 2018-04-04 DIAGNOSIS — I10 ESSENTIAL HYPERTENSION: ICD-10-CM

## 2018-04-04 PROCEDURE — 4010F ACE/ARB THERAPY RXD/TAKEN: CPT | Performed by: FAMILY MEDICINE

## 2018-04-04 PROCEDURE — 99213 OFFICE O/P EST LOW 20 MIN: CPT | Performed by: INTERNAL MEDICINE

## 2018-04-04 RX ORDER — LISINOPRIL 20 MG/1
20 TABLET ORAL DAILY
Qty: 90 TABLET | Refills: 3 | Status: SHIPPED | OUTPATIENT
Start: 2018-04-04 | End: 2019-01-26 | Stop reason: SDUPTHER

## 2018-04-04 NOTE — PROGRESS NOTES
Cecille Gaitan 62 y o  male MRN: [id number]    Encounter: [id number]      Assessment/Plan     Assessment: This is a 62y o -year-old male with diabetes with hyperglycemia, hypertension and hyperlipidemia  Plan:  1  Type 2 diabetes has improved significantly  Continue metformin  2   Hypertension-the blood pressure is elevated  Increase lisinopril to 20 mg per day  Check BMP in 10 days  3   Hyperlipidemia-continue statin    CC: Diabetes    History of Present Illness     HPI:  26-year-old male presents for follow-up of type 2 diabetes  He was diagnosed in early January and since then, he has made significant lifestyle changes which has led to 30 lb weight loss  His blood sugars have improved significantly  He denies any hypoglycemia  For the hypertension, he is on lisinopril  He denies any headaches  For the hyperlipidemia, he is on a statin  He denies any myalgia  Review of Systems   Constitutional: Negative for chills and fever  Respiratory: Negative for shortness of breath  Cardiovascular: Negative for chest pain  Gastrointestinal: Negative for constipation, diarrhea, nausea and vomiting  Endocrine: Negative for polydipsia and polyuria  Neurological: Negative for dizziness and numbness  All other systems reviewed and are negative  Historical Information   History reviewed  No pertinent past medical history    Past Surgical History:   Procedure Laterality Date    EYE SURGERY      WISDOM TOOTH EXTRACTION       Social History   History   Alcohol Use No     Comment: 1-2 drinks a year     History   Drug Use No     History   Smoking Status    Never Smoker   Smokeless Tobacco    Never Used     Family History:   Family History   Problem Relation Age of Onset    Diabetes Mother     Heart disease Father     Heart disease Paternal Grandfather        Meds/Allergies   Current Outpatient Prescriptions   Medication Sig Dispense Refill    aspirin 81 MG tablet Take 1 tablet by mouth daily  0    atorvastatin (LIPITOR) 40 mg tablet Take 1 tablet by mouth every evening 30 tablet 0    Blood Glucose Monitoring Suppl (ONE TOUCH ULTRA 2) w/Device KIT Use as directed  0    clopidogrel (PLAVIX) 75 mg tablet Take 1 tablet by mouth daily 30 tablet 0    Empagliflozin 10 MG TABS Take 1 tablet (10 mg total) by mouth every morning 90 tablet 1    Lancets (ONETOUCH ULTRASOFT) lancets       lisinopril (ZESTRIL) 10 mg tablet Take 1 tablet (10 mg total) by mouth daily 90 tablet 1    metFORMIN (GLUCOPHAGE) 1000 MG tablet Take 0 5 tablets by mouth 2 (two) times a day with meals x 1 week, then 1 tablet by mouth two times daily thereafter  (Patient taking differently: Take 1,000 mg by mouth 2 (two) times a day with meals x 1 week, then 1 tablet by mouth two times daily thereafter  ) 60 tablet 0    metoprolol succinate (TOPROL-XL) 50 mg 24 hr tablet Take 1 tablet by mouth daily 30 tablet 0    ONE TOUCH ULTRA TEST test strip TEST SUGAR BEFORE BREAKFAST AND DINNER  0     No current facility-administered medications for this visit  No Known Allergies    Objective   Vitals: Blood pressure 138/88, pulse 56, height 5' 11" (1 803 m), weight (!) 137 kg (303 lb)  Physical Exam   Constitutional: He is oriented to person, place, and time  He appears well-developed and well-nourished  No distress  HENT:   Head: Normocephalic and atraumatic  Mouth/Throat: Oropharynx is clear and moist and mucous membranes are normal  No oropharyngeal exudate  Eyes: Conjunctivae, EOM and lids are normal  Right eye exhibits no discharge  Left eye exhibits no discharge  No scleral icterus  Neck: Neck supple  No thyromegaly present  Cardiovascular: Normal rate, regular rhythm and normal heart sounds  Exam reveals no gallop and no friction rub  No murmur heard  Pulmonary/Chest: Effort normal and breath sounds normal  No respiratory distress  He has no wheezes  Abdominal: Soft   Bowel sounds are normal  He exhibits no distension  There is no tenderness  Musculoskeletal: Normal range of motion  He exhibits no edema, tenderness or deformity  Lymphadenopathy:        Head (right side): No occipital adenopathy present  Head (left side): No occipital adenopathy present  Right: No supraclavicular adenopathy present  Left: No supraclavicular adenopathy present  Neurological: He is alert and oriented to person, place, and time  No cranial nerve deficit  Coordination normal    Skin: Skin is warm and intact  No rash noted  He is not diaphoretic  No erythema  Psychiatric: He has a normal mood and affect  Vitals reviewed  The history was obtained from the review of the chart, patient      Lab Results:   Lab Results   Component Value Date/Time    Hemoglobin A1C 6 4 (H) 03/26/2018 07:03 AM    Hemoglobin A1C 9 3 (H) 12/26/2017 11:41 AM    WBC 9 83 12/27/2017 06:06 AM    WBC 9 38 12/26/2017 06:40 AM    Hemoglobin 13 2 12/27/2017 06:06 AM    Hemoglobin 14 7 12/26/2017 06:40 AM    Hematocrit 40 9 12/27/2017 06:06 AM    Hematocrit 44 4 12/26/2017 06:40 AM    MCV 86 12/27/2017 06:06 AM    MCV 85 12/26/2017 06:40 AM    Platelets 323 90/16/2152 06:06 AM    Platelets 692 77/82/3337 06:40 AM    BUN 23 03/26/2018 07:03 AM    BUN 17 12/28/2017 05:35 AM    BUN 16 12/27/2017 06:06 AM    Sodium 142 03/26/2018 07:03 AM    Sodium 140 12/28/2017 05:35 AM    Sodium 139 12/27/2017 06:06 AM    Potassium 3 6 03/26/2018 07:03 AM    Potassium 3 7 12/28/2017 05:35 AM    Potassium 3 6 12/27/2017 06:06 AM    Chloride 107 03/26/2018 07:03 AM    Chloride 105 12/28/2017 05:35 AM    Chloride 105 12/27/2017 06:06 AM    CO2 27 03/26/2018 07:03 AM    CO2 27 12/28/2017 05:35 AM    CO2 25 12/27/2017 06:06 AM    Creatinine 0 87 03/26/2018 07:03 AM    Creatinine 0 79 12/28/2017 05:35 AM    Creatinine 0 79 12/27/2017 06:06 AM    AST 19 03/26/2018 07:03 AM    AST 21 12/26/2017 06:40 AM    ALT 33 03/26/2018 07:03 AM    ALT 36 12/26/2017 06:40 AM Albumin 3 8 03/26/2018 07:03 AM    Albumin 3 4 (L) 12/26/2017 06:40 AM    Cholesterol 164 12/27/2017 06:06 AM    HDL, Direct 35 (L) 12/27/2017 06:06 AM    Triglycerides 144 12/27/2017 06:06 AM           Imaging Studies: I have personally reviewed pertinent reports  Portions of the record may have been created with voice recognition software  Occasional wrong word or "sound a like" substitutions may have occurred due to the inherent limitations of voice recognition software  Read the chart carefully and recognize, using context, where substitutions have occurred

## 2018-04-12 ENCOUNTER — TELEPHONE (OUTPATIENT)
Dept: FAMILY MEDICINE CLINIC | Facility: CLINIC | Age: 58
End: 2018-04-12

## 2018-04-12 NOTE — TELEPHONE ENCOUNTER
Juliann from [facility] called to see if she should be filling patietns 5 mg of lisinopril    Patient had gotten a 20 mg refill from specialist   Please call and advise if the 5 mg should be filled or discontinued

## 2018-04-12 NOTE — TELEPHONE ENCOUNTER
I would fill out what ever dose was prescribed by his specialist   She may disc continue order for lisinopril 5 milligram

## 2018-04-16 ENCOUNTER — LAB (OUTPATIENT)
Dept: LAB | Facility: CLINIC | Age: 58
End: 2018-04-16
Payer: COMMERCIAL

## 2018-04-16 DIAGNOSIS — I10 ESSENTIAL (PRIMARY) HYPERTENSION: ICD-10-CM

## 2018-04-16 LAB
ANION GAP SERPL CALCULATED.3IONS-SCNC: 6 MMOL/L (ref 4–13)
BUN SERPL-MCNC: 24 MG/DL (ref 5–25)
CALCIUM SERPL-MCNC: 8.7 MG/DL
CHLORIDE SERPL-SCNC: 107 MMOL/L (ref 100–108)
CO2 SERPL-SCNC: 28 MMOL/L (ref 21–32)
CREAT SERPL-MCNC: 0.83 MG/DL (ref 0.6–1.3)
GFR SERPL CREATININE-BSD FRML MDRD: 98 ML/MIN/1.73SQ M
GLUCOSE P FAST SERPL-MCNC: 112 MG/DL (ref 65–99)
POTASSIUM SERPL-SCNC: 4 MMOL/L (ref 3.5–5.3)
SODIUM SERPL-SCNC: 141 MMOL/L (ref 136–145)

## 2018-04-16 PROCEDURE — 36415 COLL VENOUS BLD VENIPUNCTURE: CPT

## 2018-04-16 PROCEDURE — 80048 BASIC METABOLIC PNL TOTAL CA: CPT

## 2018-04-16 NOTE — PROGRESS NOTES
Please call the patient regarding his abnormal result  Blood sugar is elevated  Otherwise, normal testing  Sent to my chart

## 2018-04-18 ENCOUNTER — TELEPHONE (OUTPATIENT)
Dept: ENDOCRINOLOGY | Facility: CLINIC | Age: 58
End: 2018-04-18

## 2018-04-18 NOTE — TELEPHONE ENCOUNTER
----- Message from Latanya Britt MD sent at 4/16/2018  2:29 PM EDT -----  Please call the patient regarding his abnormal result  Blood sugar is elevated  Otherwise, normal testing  Sent to my chart

## 2018-07-05 ENCOUNTER — OFFICE VISIT (OUTPATIENT)
Dept: FAMILY MEDICINE CLINIC | Facility: CLINIC | Age: 58
End: 2018-07-05
Payer: COMMERCIAL

## 2018-07-05 VITALS
HEIGHT: 71 IN | BODY MASS INDEX: 39.84 KG/M2 | RESPIRATION RATE: 16 BRPM | TEMPERATURE: 97.4 F | DIASTOLIC BLOOD PRESSURE: 90 MMHG | WEIGHT: 284.6 LBS | SYSTOLIC BLOOD PRESSURE: 142 MMHG | HEART RATE: 56 BPM

## 2018-07-05 DIAGNOSIS — E11.8 TYPE 2 DIABETES MELLITUS WITH COMPLICATION, WITHOUT LONG-TERM CURRENT USE OF INSULIN (HCC): Primary | ICD-10-CM

## 2018-07-05 DIAGNOSIS — E66.01 MORBID OBESITY (HCC): ICD-10-CM

## 2018-07-05 DIAGNOSIS — I25.10 CORONARY ARTERY DISEASE INVOLVING NATIVE HEART WITHOUT ANGINA PECTORIS, UNSPECIFIED VESSEL OR LESION TYPE: ICD-10-CM

## 2018-07-05 PROCEDURE — 99213 OFFICE O/P EST LOW 20 MIN: CPT | Performed by: FAMILY MEDICINE

## 2018-07-05 PROCEDURE — 3008F BODY MASS INDEX DOCD: CPT | Performed by: FAMILY MEDICINE

## 2018-07-05 NOTE — ASSESSMENT & PLAN NOTE
Lab Results   Component Value Date    HGBA1C 6 4 (H) 03/26/2018       No results for input(s): POCGLU in the last 72 hours  Blood Sugar Average: Last 72 hrs:   diabetes  Patient has made significant improvements in A1c  He will have follow-up blood work in October prior to seeing endocrinologist   If A1c is improved he may consider starting weaning down on his metformin as per endocrinology orders

## 2018-07-05 NOTE — ASSESSMENT & PLAN NOTE
Obesity  Patient has been labile to lose approximately 45 lb weight with diet and exercise  He will continue current regimen of activities is to continue losing weight    We will see patient back in 6 months

## 2018-07-05 NOTE — PROGRESS NOTES
FAMILY PRACTICE OFFICE VISIT       NAME: Katie Gaitan  AGE: 62 y o  SEX: male       : 1960        MRN: [id number]    DATE: 2018  TIME: 9:36 AM    Assessment and Plan     Problem List Items Addressed This Visit     Morbid obesity ([address] )     Obesity  Patient has been labile to lose approximately 45 lb weight with diet and exercise  He will continue current regimen of activities is to continue losing weight  We will see patient back in 6 months         CAD (coronary artery disease)     Coronary artery disease  Patient is on Plavix status post stent coronary artery 2017  He will follow up with his cardiologist in 2018  Currently denies any angina or shortness of breath         Type 2 diabetes mellitus with complication, without long-term current use of insulin ([facility]) - Primary     Lab Results   Component Value Date    HGBA1C 6 4 (H) 2018       No results for input(s): POCGLU in the last 72 hours  Blood Sugar Average: Last 72 hrs:   diabetes  Patient has made significant improvements in A1c  He will have follow-up blood work in October prior to seeing endocrinologist   If A1c is improved he may consider starting weaning down on his metformin as per endocrinology orders  There are no Patient Instructions on file for this visit  Chief Complaint     Chief Complaint   Patient presents with    Follow-up     Pt is here for 6 month follow up        History of Present Illness     Patient has lost approximately 45 lb with diet and exercise  His A1c is down to 6 4  He has been walking 5 miles several days a week  He has been compliant with staying off his soda  He is consistent with taking his daily medications  Overall he is feeling much better since his cardiac stent placement 2017  Patient still sees his endocrinologist and cardiologist on a regular basis        Review of Systems   Review of Systems   Constitutional: Negative      HENT: Negative  Respiratory: Negative  Cardiovascular: Negative  Gastrointestinal: Negative  Genitourinary: Negative  Musculoskeletal: Negative  Skin: Negative  Neurological: Negative  Psychiatric/Behavioral: Negative  Active Problem List     Patient Active Problem List   Diagnosis    New onset type 2 diabetes mellitus ([facility] )    Morbid obesity ([facility] )    CAD (coronary artery disease)    Type 2 diabetes mellitus with complication, without long-term current use of insulin ([place] [id number] )       Past Medical History:  History reviewed  No pertinent past medical history  Past Surgical History:  Past Surgical History:   Procedure Laterality Date    EYE SURGERY      WISDOM TOOTH EXTRACTION         Family History:  Family History   Problem Relation Age of Onset    Diabetes Mother     Heart disease Father     Heart disease Paternal Grandfather        Social History:  Social History     Social History    Marital status: /Civil Union     Spouse name: N/A    Number of children: N/A    Years of education: N/A     Occupational History    physically demanding job      Social History Main Topics    Smoking status: Never Smoker    Smokeless tobacco: Never Used    Alcohol use No      Comment: 1-2 drinks a year    Drug use: No    Sexual activity: Not on file     Other Topics Concern    Not on file     Social History Narrative    Stress at work           Objective     Vitals:    07/05/18 0803   BP: 142/90   Pulse: 56   Resp: 16   Temp: (!) 97 4 °F (36 3 °C)     Wt Readings from Last 3 Encounters:   07/05/18 129 kg (284 lb 9 6 oz)   04/04/18 (!) 137 kg (303 lb)   02/07/18 (!) 143 kg (315 lb 3 2 oz)       Physical Exam   Constitutional: No distress  Neck:   No carotid bruits   Cardiovascular:   Regular rate and rhythm with no murmurs   Pulmonary/Chest:   Lungs are clear to auscultation without wheezes,rales, or rhonchi   Abdominal:   Abdomen is soft, nontender with positive bowel sounds   There is no rebound or guarding  No masses palpated   Musculoskeletal: He exhibits no edema  Pertinent Laboratory/Diagnostic Studies:  Lab Results   Component Value Date    GLUCOSE 201 (H) 12/28/2017    BUN 24 04/16/2018    CREATININE 0 83 04/16/2018    CALCIUM 8 7 04/16/2018     04/16/2018    K 4 0 04/16/2018    CO2 28 04/16/2018     04/16/2018     Lab Results   Component Value Date    ALT 33 03/26/2018    AST 19 03/26/2018    ALKPHOS 110 03/26/2018    BILITOT 0 59 03/26/2018       Lab Results   Component Value Date    WBC 9 83 12/27/2017    HGB 13 2 12/27/2017    HCT 40 9 12/27/2017    MCV 86 12/27/2017     12/27/2017       No results found for: TSH    Lab Results   Component Value Date    CHOL 164 12/27/2017     Lab Results   Component Value Date    TRIG 144 12/27/2017     Lab Results   Component Value Date    HDL 35 (L) 12/27/2017     Lab Results   Component Value Date    LDLCALC 100 12/27/2017     Lab Results   Component Value Date    HGBA1C 6 4 (H) 03/26/2018       Results for orders placed or performed in visit on 52/93/74   Basic metabolic panel Lab Collect   Result Value Ref Range    Sodium 141 136 - 145 mmol/L    Potassium 4 0 3 5 - 5 3 mmol/L    Chloride 107 100 - 108 mmol/L    CO2 28 21 - 32 mmol/L    Anion Gap 6 4 - 13 mmol/L    BUN 24 5 - 25 mg/dL    Creatinine 0 83 0 60 - 1 30 mg/dL    Glucose, Fasting 112 (H) 65 - 99 mg/dL    Calcium 8 7 mg/dL    eGFR 98 ml/min/1 73sq m       No orders of the defined types were placed in this encounter        ALLERGIES:  No Known Allergies    Current Medications     Current Outpatient Prescriptions   Medication Sig Dispense Refill    aspirin 81 MG tablet Take 1 tablet by mouth daily  0    atorvastatin (LIPITOR) 40 mg tablet Take 1 tablet by mouth every evening 30 tablet 0    Blood Glucose Monitoring Suppl (ONE TOUCH ULTRA 2) w/Device KIT Use as directed  0    clopidogrel (PLAVIX) 75 mg tablet Take 1 tablet by mouth daily 30 tablet 0    Lancets (ONETOUCH ULTRASOFT) lancets       lisinopril (ZESTRIL) 20 mg tablet Take 1 tablet (20 mg total) by mouth daily 90 tablet 3    metFORMIN (GLUCOPHAGE) 1000 MG tablet Take 0 5 tablets by mouth 2 (two) times a day with meals x 1 week, then 1 tablet by mouth two times daily thereafter  (Patient taking differently: Take 1,000 mg by mouth 2 (two) times a day with meals x 1 week, then 1 tablet by mouth two times daily thereafter  ) 60 tablet 0    metoprolol succinate (TOPROL-XL) 50 mg 24 hr tablet Take 1 tablet by mouth daily 30 tablet 0    ONE TOUCH ULTRA TEST test strip TEST SUGAR BEFORE BREAKFAST AND DINNER  0     No current facility-administered medications for this visit            Health Maintenance     Health Maintenance   Topic Date Due    HIV SCREENING  1960    Hepatitis C Screening  1960    Depression Screening PHQ-9  1960    CRC Screening: Colonoscopy  1960    PNEUMOCOCCAL POLYSACCHARIDE VACCINE AGE 2-64 HIGH RISK  09/18/1962    Diabetic Foot Exam  09/18/1970    DM Eye Exam  09/18/1970    DTaP,Tdap,and Td Vaccines (1 - Tdap) 09/18/1981    INFLUENZA VACCINE  06/01/2018    HEMOGLOBIN A1C  09/26/2018    URINE MICROALBUMIN  03/26/2019     Immunization History   Administered Date(s) Administered    Influenza Quadrivalent Preservative Free 3 years and older IM 37/26/8067       Nixon Andrews MD

## 2018-07-05 NOTE — ASSESSMENT & PLAN NOTE
Coronary artery disease  Patient is on Plavix status post stent coronary artery December 2017  He will follow up with his cardiologist in August 2018    Currently denies any angina or shortness of breath

## 2018-08-30 ENCOUNTER — OFFICE VISIT (OUTPATIENT)
Dept: CARDIOLOGY CLINIC | Facility: CLINIC | Age: 58
End: 2018-08-30
Payer: COMMERCIAL

## 2018-08-30 VITALS
SYSTOLIC BLOOD PRESSURE: 130 MMHG | HEIGHT: 71 IN | DIASTOLIC BLOOD PRESSURE: 80 MMHG | WEIGHT: 280.7 LBS | OXYGEN SATURATION: 98 % | HEART RATE: 57 BPM | BODY MASS INDEX: 39.3 KG/M2

## 2018-08-30 DIAGNOSIS — I25.10 CORONARY ARTERY DISEASE INVOLVING NATIVE HEART WITHOUT ANGINA PECTORIS, UNSPECIFIED VESSEL OR LESION TYPE: Primary | ICD-10-CM

## 2018-08-30 DIAGNOSIS — I10 ESSENTIAL HYPERTENSION: ICD-10-CM

## 2018-08-30 DIAGNOSIS — E78.5 DYSLIPIDEMIA: ICD-10-CM

## 2018-08-30 PROCEDURE — 99214 OFFICE O/P EST MOD 30 MIN: CPT | Performed by: INTERNAL MEDICINE

## 2018-08-30 NOTE — PROGRESS NOTES
Cardiology   Isatu Reinaldo Gaitan 62 y o  male MRN: [id number]        Impression:  1  CAD s/p NSTEMI 12/17 - Had PCI of LCx, has residual  of RCA  Currently exercising and has met with a dietician  2  Hypertension - good control  3  Dylipidemia - on statin       Recommendations:  1  Continue current medications  2  Check fasting lipid panel and complete metabolic profile  3  Follow up in 6 months  HPI: Raphael Rudd is a 62y o  year old male with NSTEMI Type I - cath demonstrated 95% LCx and chronic mid RCA stenosis - Underwent PCI of LCx  Has been feeling well  No chest pain, shortness of breath, or palpitations  Lost 50 lbs over the past 9 months  Exercising on a regular basis  Review of Systems   Constitutional: Negative  HENT: Negative  Eyes: Negative  Respiratory: Negative for chest tightness and shortness of breath  Cardiovascular: Negative for chest pain, palpitations and leg swelling  Gastrointestinal: Negative  Endocrine: Negative  Genitourinary: Negative  Musculoskeletal: Negative  Skin: Negative  Allergic/Immunologic: Negative  Neurological: Negative  Hematological: Negative  Psychiatric/Behavioral: Negative  All other systems reviewed and are negative  No past medical history on file    Past Surgical History:   Procedure Laterality Date    EYE SURGERY      WISDOM TOOTH EXTRACTION       History   Alcohol Use No     Comment: 1-2 drinks a year     History   Drug Use No     History   Smoking Status    Never Smoker   Smokeless Tobacco    Never Used     Family History   Problem Relation Age of Onset    Diabetes Mother     Heart disease Father     Heart disease Paternal Grandfather        Allergies:  No Known Allergies    Medications:     Current Outpatient Prescriptions:     aspirin 81 MG tablet, Take 1 tablet by mouth daily, Disp: , Rfl: 0    atorvastatin (LIPITOR) 40 mg tablet, Take 1 tablet by mouth every evening, Disp: 30 tablet, Rfl: 0    Blood Glucose Monitoring Suppl (ONE TOUCH ULTRA 2) w/Device KIT, Use as directed, Disp: , Rfl: 0    clopidogrel (PLAVIX) 75 mg tablet, Take 1 tablet by mouth daily, Disp: 30 tablet, Rfl: 0    Lancets (ONETOUCH ULTRASOFT) lancets, , Disp: , Rfl:     lisinopril (ZESTRIL) 20 mg tablet, Take 1 tablet (20 mg total) by mouth daily, Disp: 90 tablet, Rfl: 3    metFORMIN (GLUCOPHAGE) 1000 MG tablet, Take 0 5 tablets by mouth 2 (two) times a day with meals x 1 week, then 1 tablet by mouth two times daily thereafter  (Patient taking differently: Take 1,000 mg by mouth 2 (two) times a day with meals x 1 week, then 1 tablet by mouth two times daily thereafter  ), Disp: 60 tablet, Rfl: 0    metoprolol succinate (TOPROL-XL) 50 mg 24 hr tablet, Take 1 tablet by mouth daily, Disp: 30 tablet, Rfl: 0    ONE TOUCH ULTRA TEST test strip, TEST SUGAR BEFORE BREAKFAST AND DINNER, Disp: , Rfl: 0      Wt Readings from Last 3 Encounters:   08/30/18 127 kg (280 lb 11 2 oz)   07/05/18 129 kg (284 lb 9 6 oz)   04/04/18 (!) 137 kg (303 lb)     Temp Readings from Last 3 Encounters:   07/05/18 (!) 97 4 °F (36 3 °C) (Tympanic)   01/04/18 98 °F (36 7 °C)   12/28/17 98 1 °F (36 7 °C) (Oral)     BP Readings from Last 3 Encounters:   08/30/18 130/80   07/05/18 142/90   04/04/18 138/88     Pulse Readings from Last 3 Encounters:   08/30/18 57   07/05/18 56   04/04/18 56         Physical Exam   Constitutional: He is oriented to person, place, and time  He appears well-developed  HENT:   Head: Atraumatic  Eyes: EOM are normal    Neck: Normal range of motion  Cardiovascular: Normal rate, regular rhythm and normal heart sounds  Exam reveals no gallop and no friction rub  No murmur heard  Pulmonary/Chest: Effort normal and breath sounds normal  No respiratory distress  He has no wheezes  He has no rales  Abdominal: Soft  Musculoskeletal: Normal range of motion     Neurological: He is alert and oriented to person, place, and time  Skin: Skin is warm and dry  Psychiatric: He has a normal mood and affect  Laboratory Studies:  CMP:  Lab Results   Component Value Date     2018    K 4 0 2018     2018    CO2 28 2018    BUN 24 2018    CREATININE 0 83 2018    AST 19 2018    ALT 33 2018    EGFR 98 2018       Lipid Profile:   No results found for: CHOL  Lab Results   Component Value Date    HDL 35 (L) 2017     Lab Results   Component Value Date    LDLCALC 100 2017     Lab Results   Component Value Date    TRIG 144 2017       Cardiac testing:     Results for orders placed during the hospital encounter of 17   Echo complete with contrast if indicated    Narrative [place] [address], [address] [facility]  [phone]    Transthoracic Echocardiogram  2D, M-mode, Doppler, and Color Doppler    Study date:  27-Dec-2017    Patient: Tiffanie Landa  MR number: [id number]  Account number: [de-identified]  : 1960  Age: 62 years  Gender: Male  Status: Inpatient  Location: Bedside  Height: 71 in  Weight: 333 lb  BP: 119/ 71 mmHg    Indications: MI    Diagnoses: I40 9 - Acute myocarditis, unspecified    Sonographer:  DIVYA Carrillo  Referring Physician:  Halie Oliver MD  Group:  [facility]  Interpreting Physician:  Khurram Baugh DO    SUMMARY    SUMMARY:  Technically difficult study  LEFT VENTRICLE:  Systolic function was mildly reduced  Ejection fraction was estimated to be 45 %  There was severe hypokinesis of the basal inferoseptal and basal-mid inferior wall(s)  Doppler parameters were consistent with abnormal left ventricular relaxation (grade 1 diastolic dysfunction)  MITRAL VALVE:  There was mild annular calcification  TRICUSPID VALVE:  There was trace regurgitation  HISTORY: PRIOR HISTORY: Diabetes, Obesity    PROCEDURE: The procedure was performed at the bedside   This was a routine study  The transthoracic approach was used  The study included complete 2D imaging, M-mode, complete spectral Doppler, and color Doppler  The heart rate was 73 bpm,  at the start of the study  Images were obtained from the parasternal, apical, subcostal, and suprasternal notch acoustic windows  Intravenous contrast ( 0 8 mL Definity in NSS) was administered to opacify the left ventricle  Echocardiographic views were limited due to poor acoustic window availability and decreased penetration  This was a technically difficult study  LEFT VENTRICLE: Size was normal  Systolic function was mildly reduced  Ejection fraction was estimated to be 45 %  There was severe hypokinesis of the basal inferoseptal and basal-mid inferior wall(s)  Wall thickness was normal  DOPPLER:  Doppler parameters were consistent with abnormal left ventricular relaxation (grade 1 diastolic dysfunction)  RIGHT VENTRICLE: The size was normal  Systolic function was normal  Wall thickness was normal     LEFT ATRIUM: Size was normal     RIGHT ATRIUM: Size was normal     MITRAL VALVE: There was mild annular calcification  Valve structure was normal  There was normal leaflet separation  DOPPLER: The transmitral velocity was within the normal range  There was no evidence for stenosis  There was no  regurgitation  AORTIC VALVE: The valve was probably trileaflet  Leaflets exhibited mildly increased thickness and normal cuspal separation  DOPPLER: Transaortic velocity was within the normal range  There was no evidence for stenosis  There was no  regurgitation  TRICUSPID VALVE: The valve structure was normal  There was normal leaflet separation  DOPPLER: The transtricuspid velocity was within the normal range  There was no evidence for stenosis  There was trace regurgitation  PULMONIC VALVE: Leaflets exhibited normal thickness, no calcification, and normal cuspal separation   DOPPLER: The transpulmonic velocity was within the normal range  There was no regurgitation  PERICARDIUM: There was no pericardial effusion  The pericardium was normal in appearance  AORTA: The root exhibited normal size  SYSTEMIC VEINS: IVC: The inferior vena cava was not well visualized  SYSTEM MEASUREMENT TABLES    2D  %FS: 30 15 %  AV Diam: 3 9 cm  EDV(Teich): 131 24 ml  EF(Teich): 57 05 %  ESV(Teich): 56 37 ml  IVSd: 1 2 cm  LA Area: 14 47 cm2  LA Diam: 4 85 cm  LVEDV MOD A4C: 159 75 ml  LVEF MOD A4C: 35 13 %  LVESV MOD A4C: 103 62 ml  LVIDd: 5 23 cm  LVIDs: 3 65 cm  LVLd A4C: 9 36 cm  LVLs A4C: 8 54 cm  LVPWd: 1 08 cm  RA Area: 14 93 cm2  RVIDd: 3 4 cm  SV MOD A4C: 56 12 ml  SV(Teich): 74 87 ml    MM  TAPSE: 2 48 cm    PW  E': 0 06 m/s  E/E': 16 23  MV A Sulaiman: 0 79 m/s  MV Dec [place]: 5 08 m/s2  MV DecT: 186 23 ms  MV E Sulaiman: 0 95 m/s  MV E/A Ratio: 1 2  MV PHT: 54 01 ms  MVA By PHT: 4 07 cm2    [employer] Accredited Echocardiography Laboratory    Prepared and electronically signed by    DO Cruz Moreira 27-Dec-2017 13:12:45       No results found for this or any previous visit  Results for orders placed during the hospital encounter of 17   Cardiac catheterization    [address]  [phone]    [facility]    Invasive Cardiovascular Lab Complete Report    Patient: Samir Slade  MR number: [id number]  Account number: [de-identified]  Study date: 2017  Gender: Male  : 1960  Height: 70 9 in  Weight: 332 2 lb  BSA: 2 62 m squared    Allergies: NO KNOWN ALLERGIES    Diagnostic Cardiologist:  Alan Frances MD  Interventional Cardiologist:  Alan Farnces MD    SUMMARY    CORONARY CIRCULATION:  Left main: Normal   LAD: The vessel was medium to large sized  Angiography showed mild atherosclerosis  Circumflex: The vessel was very large sized  Mid circumflex: There was a discrete 90 % stenosis   The lesion was irregularly contoured, hazy, and without evidence of thrombus  There was TIMUR grade 3 flow through the vessel (brisk flow)  This is a likely culprit for the patient's  clinical presentation  It appears amenable to percutaneous intervention  RCA: The vessel was medium sized  The mid RCA occlusion appears to be chronic as there is R to R and L to R collaterals seen  Mid RCA: There was a 100 % stenosis  Distal RCA: There was a discrete 80 % stenosis  1ST LESION INTERVENTIONS:  A successful stent procedure was performed on the 90 % lesion in the mid circumflex  Following intervention there was an excellent angiographic appearance with a 0 % residual stenosis  This was not a bifurcation lesion  This was an ACC/AHA type A "low risk" lesion for intervention  There was no evidence of the transient no-reflow phenomenon  The residual lesion was discrete and demonstrated no evidence of thrombus  There  was TIMUR 3 flow before the procedure and TIMUR 3 flow after the procedure  A Resolute Integrity Rx 4 0 x 18 drug-eluting stent was placed across the lesion and deployed at a maximum inflation pressure of 12 courtney  INDICATIONS:  Possible CAD: myocardial infarction without ST elevation (NSTEMI)  RECOMMENDATIONS:  The patient should continue with the present medications including ASA and Plavix  Consider nuclear stress test if anginal symptoms to evaluation for RCA ischemia  PCI of RCA possible but appears to be a chronic occlusion  INDICATIONS:  --  Possible CAD: myocardial infarction without ST elevation (NSTEMI)  PROCEDURES PERFORMED    --  Right coronary angiography  --  Left coronary angiography  --  Inpatient  --  Coronary Catheterization (w/o LHC)  --  Mod Sedation Same Physician Initial 15min  --  Mod Sedation Same Physician Add 15min  --  Mod Sedation Same Physician Add 15min  --  Mod Sedation Same Physician Add 15min  --  Coronary Drug Eluting Stent w/PTCA  --  Intervention on mid circumflex: stent      PROCEDURE: The risks and alternatives of the procedures and conscious sedation were explained to the patient and informed consent was obtained  The patient was brought to the cath lab and placed on the table  The planned puncture sites  were prepped and draped in the usual sterile fashion  --  Right radial artery access  After performing an Dane's test to verify adequate ulnar artery supply to the hand, the radial site was prepped  The puncture site was infiltrated with local anesthetic  The vessel was accessed using the  modified Seldinger technique, a wire was advanced into the vessel, and a sheath was advanced over the wire into the vessel  --  Right coronary artery angiography  A catheter was advanced over a guidewire into the aorta and positioned in the right coronary artery ostium under fluoroscopic guidance  Angiography was performed  --  Left coronary artery angiography  A catheter was advanced over a guidewire into the aorta and positioned in the left coronary artery ostium under fluoroscopic guidance  Angiography was performed  --  Inpatient  --  Coronary Catheterization (w/o [facility])  --  Mod Sedation Same Physician Initial 15min  --  Mod Sedation Same Physician Add 15min  --  Mod Sedation Same Physician Add 15min  --  Mod Sedation Same Physician Add 15min  LESION INTERVENTION: A successful stent procedure was performed on the 90 % lesion in the mid circumflex  Following intervention there was an excellent angiographic appearance with a 0 % residual stenosis  This was not a bifurcation  lesion  This was an ACC/AHA type A "low risk" lesion for intervention  There was no evidence of the transient no-reflow phenomenon  The residual lesion was discrete and demonstrated no evidence of thrombus  There was TIMUR 3 flow before the  procedure and TIMUR 3 flow after the procedure  --  Vessel setup was performed  A Launcher 6Fr Ebu 3 5 guiding catheter was used to cannulate the vessel      --  Vessel setup was performed  A Runthrough NS 180cm wire was used to cross the lesion  --  Balloon angioplasty was performed, using a Trek Rx 2 5 x 15mm balloon, with 2 inflations and a maximum inflation pressure of 14 courtney  --  A Resolute Integrity Rx 4 0 x 18 drug-eluting stent was placed across the lesion and deployed at a maximum inflation pressure of 12 courtney  --  Balloon angioplasty was performed, with 1 inflations and a maximum inflation pressure of 12 courtney  INTERVENTIONS:  --  Coronary Drug Eluting Stent w/PTCA  PROCEDURE COMPLETION: The patient tolerated the procedure well and was discharged from the cath lab  TIMING: Test started at 13:01  Test concluded at 13:45  MEDICATIONS GIVEN: Metoprolol (Lopressor, Toprol), 5 mg, IV, at 13:09  Fentanyl  (1OOmcg/2 ml), 50 mcg, IV, at 13:01  Versed (2mg/2ml), 2 mg, IV, at 13:01  1% Lidocaine, 1 ml, subcutaneously, at 13:05  Verapamil (5mg/2ml), 2 5 mg, IV, at 13:06  Heparin 1000 units/ml, 4,000 units, IV, at 13:06  Nitroglycerin  (200mcg/ml), 200 mcg, at 13:06  Heparin 1000 units/ml, 5,000 units, IV, at 13:30  Nitroglycerine (200mcg/ml), 200 mcg, at 13:40  CONTRAST GIVEN: 150 ml Omnipaque (350 mg I /ml)  RADIATION EXPOSURE: Fluoroscopy time: 13 22 min  CORONARY VESSELS:   --  Left main: Normal   --  LAD: The vessel was medium to large sized  Angiography showed mild atherosclerosis  --  1st diagonal: The vessel was small sized  --  2nd diagonal: The vessel was small sized  --  3rd diagonal: The vessel was small sized  --  Circumflex: The vessel was very large sized  --  Mid circumflex: There was a discrete 90 % stenosis  The lesion was irregularly contoured, hazy, and without evidence of thrombus  There was TIMUR grade 3 flow through the vessel (brisk flow)  This is a likely culprit for the patient's  clinical presentation  It appears amenable to percutaneous intervention  --  1st obtuse marginal: The vessel was small sized  --  RCA: The vessel was medium sized   The mid RCA occlusion appears to be chronic as there is R to R and L to R collaterals seen  --  Mid RCA: There was a 100 % stenosis  --  Distal RCA: There was a discrete 80 % stenosis  RECOMMENDATIONS  The patient should continue with the present medications including ASA and Plavix  Consider nuclear stress test if anginal symptoms to evaluation for RCA ischemia  PCI of RCA possible but appears to be a chronic occlusion  Prepared and signed by    Son Love MD  Signed 2017 14:15:24    Study diagram    Angiographic findings  Native coronary lesions:  ·Mid circumflex: Lesion 1: discrete, 90 % stenosis  ·Mid RCA: Lesion 1: 100 % stenosis  ·Distal RCA: Lesion 1: discrete, 80 % stenosis  Intervention results  Native coronary lesions:  ·Successful stent of the 90 % stenosis in mid circumflex  Appearance excellent with 0 % residual stenosis  Stent: Resolute Integrity Rx 4 0 x 18 drug-eluting      Hemodynamic tables    Pressures:  Baseline  Pressures:  - HR: 72  Pressures:  - Rhythm:  Pressures:  -- Aortic Pressure (S/D/M): 149/97/121    Outputs:  Baseline  Outputs:  -- CALCULATIONS: [age]  Outputs:  -- CALCULATIONS: Body Surface Area: 2 62  Outputs:  -- CALCULATIONS: Height in cm: 180 00  Outputs:  -- CALCULATIONS: Sex: Male  Outputs:  -- CALCULATIONS: Weight in k 00

## 2018-08-30 NOTE — PATIENT INSTRUCTIONS
Recommendations:  1  Continue current medications  2  Check fasting lipid panel and complete metabolic profile  3  Follow up in 6 months

## 2018-10-04 ENCOUNTER — TELEPHONE (OUTPATIENT)
Dept: ENDOCRINOLOGY | Facility: CLINIC | Age: 58
End: 2018-10-04

## 2018-10-04 ENCOUNTER — TRANSCRIBE ORDERS (OUTPATIENT)
Dept: LAB | Facility: CLINIC | Age: 58
End: 2018-10-04

## 2018-10-04 ENCOUNTER — LAB (OUTPATIENT)
Dept: LAB | Facility: CLINIC | Age: 58
End: 2018-10-04
Payer: COMMERCIAL

## 2018-10-04 DIAGNOSIS — E78.5 DYSLIPIDEMIA: ICD-10-CM

## 2018-10-04 DIAGNOSIS — E11.8 TYPE 2 DIABETES MELLITUS WITH COMPLICATION, WITHOUT LONG-TERM CURRENT USE OF INSULIN (HCC): ICD-10-CM

## 2018-10-04 LAB
ALBUMIN SERPL BCP-MCNC: 3.2 G/DL (ref 3.5–5)
ALP SERPL-CCNC: 96 U/L (ref 46–116)
ALT SERPL W P-5'-P-CCNC: 26 U/L (ref 12–78)
ANION GAP SERPL CALCULATED.3IONS-SCNC: 5 MMOL/L (ref 4–13)
AST SERPL W P-5'-P-CCNC: 17 U/L (ref 5–45)
BILIRUB SERPL-MCNC: 0.72 MG/DL (ref 0.2–1)
BUN SERPL-MCNC: 22 MG/DL (ref 5–25)
CALCIUM SERPL-MCNC: 8.4 MG/DL (ref 8.3–10.1)
CHLORIDE SERPL-SCNC: 108 MMOL/L (ref 100–108)
CHOLEST SERPL-MCNC: 102 MG/DL (ref 50–200)
CO2 SERPL-SCNC: 26 MMOL/L (ref 21–32)
CREAT SERPL-MCNC: 1.04 MG/DL (ref 0.6–1.3)
CREAT UR-MCNC: 89.2 MG/DL
EST. AVERAGE GLUCOSE BLD GHB EST-MCNC: 120 MG/DL
GFR SERPL CREATININE-BSD FRML MDRD: 79 ML/MIN/1.73SQ M
GLUCOSE P FAST SERPL-MCNC: 106 MG/DL (ref 65–99)
HBA1C MFR BLD: 5.8 % (ref 4.2–6.3)
HDLC SERPL-MCNC: 38 MG/DL (ref 40–60)
LDLC SERPL CALC-MCNC: 52 MG/DL (ref 0–100)
MICROALBUMIN UR-MCNC: <5 MG/L (ref 0–20)
MICROALBUMIN/CREAT 24H UR: <6 MG/G CREATININE (ref 0–30)
POTASSIUM SERPL-SCNC: 4.6 MMOL/L (ref 3.5–5.3)
PROT SERPL-MCNC: 6.8 G/DL (ref 6.4–8.2)
SODIUM SERPL-SCNC: 139 MMOL/L (ref 136–145)
T4 FREE SERPL-MCNC: 1.16 NG/DL (ref 0.76–1.46)
TRIGL SERPL-MCNC: 60 MG/DL
TSH SERPL DL<=0.05 MIU/L-ACNC: 2.27 UIU/ML (ref 0.36–3.74)

## 2018-10-04 PROCEDURE — 84439 ASSAY OF FREE THYROXINE: CPT

## 2018-10-04 PROCEDURE — 80053 COMPREHEN METABOLIC PANEL: CPT

## 2018-10-04 PROCEDURE — 83036 HEMOGLOBIN GLYCOSYLATED A1C: CPT

## 2018-10-04 PROCEDURE — 80061 LIPID PANEL: CPT

## 2018-10-04 PROCEDURE — 36415 COLL VENOUS BLD VENIPUNCTURE: CPT

## 2018-10-04 PROCEDURE — 84443 ASSAY THYROID STIM HORMONE: CPT

## 2018-10-04 PROCEDURE — 82570 ASSAY OF URINE CREATININE: CPT

## 2018-10-04 PROCEDURE — 82043 UR ALBUMIN QUANTITATIVE: CPT

## 2018-10-04 NOTE — TELEPHONE ENCOUNTER
----- Message from Nayla Medrano MD sent at 10/4/2018  2:00 PM EDT -----  Hemoglobin A1c has improved  I am concerned about low blood sugar  Please send blood sugar log    The rest of the laboratory testing is normal     Sent to my chart

## 2018-10-04 NOTE — PROGRESS NOTES
Hemoglobin A1c has improved  I am concerned about low blood sugar  Please send blood sugar log    The rest of the laboratory testing is normal     Sent to my chart

## 2018-10-16 ENCOUNTER — OFFICE VISIT (OUTPATIENT)
Dept: ENDOCRINOLOGY | Facility: CLINIC | Age: 58
End: 2018-10-16
Payer: COMMERCIAL

## 2018-10-16 VITALS
HEART RATE: 98 BPM | WEIGHT: 284 LBS | DIASTOLIC BLOOD PRESSURE: 102 MMHG | SYSTOLIC BLOOD PRESSURE: 148 MMHG | BODY MASS INDEX: 39.76 KG/M2 | HEIGHT: 71 IN

## 2018-10-16 DIAGNOSIS — E11.8 TYPE 2 DIABETES MELLITUS WITH COMPLICATION, WITHOUT LONG-TERM CURRENT USE OF INSULIN (HCC): Primary | ICD-10-CM

## 2018-10-16 DIAGNOSIS — E66.01 MORBID OBESITY (HCC): ICD-10-CM

## 2018-10-16 DIAGNOSIS — I10 ESSENTIAL HYPERTENSION: ICD-10-CM

## 2018-10-16 DIAGNOSIS — E78.5 DYSLIPIDEMIA: ICD-10-CM

## 2018-10-16 PROCEDURE — 99213 OFFICE O/P EST LOW 20 MIN: CPT | Performed by: INTERNAL MEDICINE

## 2018-10-16 NOTE — PROGRESS NOTES
Stephon Gaitan 62 y o  male MRN: [id number]    Encounter: [id number]      Assessment/Plan     Assessment: This is a 62y o -year-old male with diabetes with hyperglycemia, hypertension and hyperlipidemia  Plan:  1  Type 2 diabetes-this has improved significantly  Since his hemoglobin A1c is less than 6%, I have asked him to stop taking metformin  He will continue to monitor his blood sugar over time  Since his diabetes is well controlled, I will plan to see him as needed  He will return to his primary care physician for his healthcare needs  2  Hypertension-repeat blood pressure was significantly better at 126/82  Continue ACE-inhibitor  3  Hyperlipidemia-continue statin  4  Morbid obesity-he is continuing to lose weight  CC: Diabetes    History of Present Illness     HPI:  49-year-old male with type 2 diabetes who is currently on metformin  He has made significant lifestyle modification and has lost 60 lb in the last 10 to 11 months  He still complains of relatively higher blood sugar in the morning  The rest of the day, his blood sugars are in the target range  He denies any hypoglycemia  For the hypertension, he is on an ACE-inhibitor and beta-blocker  He denies any cough  For the hyperlipidemia, he is on a statin  He denies any myalgia  Morbid obesity has improved significantly  Review of Systems    Historical Information   History reviewed  No pertinent past medical history    Past Surgical History:   Procedure Laterality Date    EYE SURGERY      WISDOM TOOTH EXTRACTION       Social History   History   Alcohol Use No     Comment: 1-2 drinks a year     History   Drug Use No     History   Smoking Status    Never Smoker   Smokeless Tobacco    Never Used     Family History:   Family History   Problem Relation Age of Onset    Diabetes Mother     Heart disease Father     Heart disease Paternal Grandfather        Meds/Allergies   Current Outpatient Prescriptions Medication Sig Dispense Refill    aspirin 81 MG tablet Take 1 tablet by mouth daily  0    atorvastatin (LIPITOR) 40 mg tablet Take 1 tablet by mouth every evening 30 tablet 0    Blood Glucose Monitoring Suppl (ONE TOUCH ULTRA 2) w/Device KIT Use as directed  0    clopidogrel (PLAVIX) 75 mg tablet Take 1 tablet by mouth daily 30 tablet 0    Lancets (ONETOUCH ULTRASOFT) lancets       lisinopril (ZESTRIL) 20 mg tablet Take 1 tablet (20 mg total) by mouth daily 90 tablet 3    metFORMIN (GLUCOPHAGE) 1000 MG tablet Take 0 5 tablets by mouth 2 (two) times a day with meals x 1 week, then 1 tablet by mouth two times daily thereafter  (Patient taking differently: Take 1,000 mg by mouth 2 (two) times a day with meals x 1 week, then 1 tablet by mouth two times daily thereafter  ) 60 tablet 0    metoprolol succinate (TOPROL-XL) 50 mg 24 hr tablet Take 1 tablet by mouth daily 30 tablet 0    ONE TOUCH ULTRA TEST test strip TEST SUGAR BEFORE BREAKFAST AND DINNER  0     No current facility-administered medications for this visit  No Known Allergies    Objective   Vitals: Blood pressure (!) 148/102, pulse 98, height 5' 11" (1 803 m), weight 129 kg (284 lb)  Physical Exam    The history was obtained from the review of the chart, patient      Lab Results:   Lab Results   Component Value Date/Time    Hemoglobin A1C 5 8 10/04/2018 07:10 AM    Hemoglobin A1C 6 4 (H) 03/26/2018 07:03 AM    Hemoglobin A1C 9 3 (H) 12/26/2017 11:41 AM    WBC 9 83 12/27/2017 06:06 AM    WBC 9 38 12/26/2017 06:40 AM    Hemoglobin 13 2 12/27/2017 06:06 AM    Hemoglobin 14 7 12/26/2017 06:40 AM    Hematocrit 40 9 12/27/2017 06:06 AM    Hematocrit 44 4 12/26/2017 06:40 AM    MCV 86 12/27/2017 06:06 AM    MCV 85 12/26/2017 06:40 AM    Platelets 027 42/06/3640 06:06 AM    Platelets 617 09/53/7877 06:40 AM    BUN 22 10/04/2018 07:10 AM    BUN 24 04/16/2018 07:06 AM    BUN 23 03/26/2018 07:03 AM    Sodium 139 10/04/2018 07:10 AM    Sodium 141 04/16/2018 07:06 AM    Sodium 142 03/26/2018 07:03 AM    Potassium 4 6 10/04/2018 07:10 AM    Potassium 4 0 04/16/2018 07:06 AM    Potassium 3 6 03/26/2018 07:03 AM    Chloride 108 10/04/2018 07:10 AM    Chloride 107 04/16/2018 07:06 AM    Chloride 107 03/26/2018 07:03 AM    CO2 26 10/04/2018 07:10 AM    CO2 28 04/16/2018 07:06 AM    CO2 27 03/26/2018 07:03 AM    Creatinine 1 04 10/04/2018 07:10 AM    Creatinine 0 83 04/16/2018 07:06 AM    Creatinine 0 87 03/26/2018 07:03 AM    AST 17 10/04/2018 07:10 AM    AST 19 03/26/2018 07:03 AM    AST 21 12/26/2017 06:40 AM    ALT 26 10/04/2018 07:10 AM    ALT 33 03/26/2018 07:03 AM    ALT 36 12/26/2017 06:40 AM    Albumin 3 2 (L) 10/04/2018 07:10 AM    Albumin 3 8 03/26/2018 07:03 AM    Albumin 3 4 (L) 12/26/2017 06:40 AM    HDL, Direct 38 (L) 10/04/2018 07:10 AM    HDL, Direct 35 (L) 12/27/2017 06:06 AM    Triglycerides 60 10/04/2018 07:10 AM    Triglycerides 144 12/27/2017 06:06 AM       Imaging Studies: I have personally reviewed pertinent reports  Portions of the record may have been created with voice recognition software  Occasional wrong word or "sound a like" substitutions may have occurred due to the inherent limitations of voice recognition software  Read the chart carefully and recognize, using context, where substitutions have occurred

## 2018-10-16 NOTE — LETTER
October 16, 0601     [address] [place] [address]    Patient: Juan Blood   YOB: 1960   Date of Visit: 10/16/2018       Dear Dr Abundio Pérez: Thank you for referring Alexx Fisher to me for evaluation  Below are my notes for this consultation  If you have questions, please do not hesitate to call me  I look forward to following your patient along with you  Sincerely,        Alexx Odom MD        CC: No Recipients  Alexx Odom MD  10/16/2018  7:53 AM  Sign at close encounter   Juan Blood 62 y o  male MRN: [id number]    Encounter: [id number]      Assessment/Plan     Assessment: This is a 62y o -year-old male with diabetes with hyperglycemia, hypertension and hyperlipidemia  Plan:  1  Type 2 diabetes-this has improved significantly  Since his hemoglobin A1c is less than 6%, I have asked him to stop taking metformin  He will continue to monitor his blood sugar over time  Since his diabetes is well controlled, I will plan to see him as needed  He will return to his primary care physician for his healthcare needs  2  Hypertension-repeat blood pressure was significantly better at 126/82  Continue ACE-inhibitor  3  Hyperlipidemia-continue statin  4  Morbid obesity-he is continuing to lose weight  CC: Diabetes    History of Present Illness     HPI:  51-year-old male with type 2 diabetes who is currently on metformin  He has made significant lifestyle modification and has lost 60 lb in the last 10 to 11 months  He still complains of relatively higher blood sugar in the morning  The rest of the day, his blood sugars are in the target range  He denies any hypoglycemia  For the hypertension, he is on an ACE-inhibitor and beta-blocker  He denies any cough  For the hyperlipidemia, he is on a statin  He denies any myalgia  Morbid obesity has improved significantly  Review of Systems    Historical Information   History reviewed  No pertinent past medical history  Past Surgical History:   Procedure Laterality Date    EYE SURGERY      WISDOM TOOTH EXTRACTION       Social History   History   Alcohol Use No     Comment: 1-2 drinks a year     History   Drug Use No     History   Smoking Status    Never Smoker   Smokeless Tobacco    Never Used     Family History:   Family History   Problem Relation Age of Onset    Diabetes Mother     Heart disease Father     Heart disease Paternal Grandfather        Meds/Allergies   Current Outpatient Prescriptions   Medication Sig Dispense Refill    aspirin 81 MG tablet Take 1 tablet by mouth daily  0    atorvastatin (LIPITOR) 40 mg tablet Take 1 tablet by mouth every evening 30 tablet 0    Blood Glucose Monitoring Suppl (ONE TOUCH ULTRA 2) w/Device KIT Use as directed  0    clopidogrel (PLAVIX) 75 mg tablet Take 1 tablet by mouth daily 30 tablet 0    Lancets (ONETOUCH ULTRASOFT) lancets       lisinopril (ZESTRIL) 20 mg tablet Take 1 tablet (20 mg total) by mouth daily 90 tablet 3    metFORMIN (GLUCOPHAGE) 1000 MG tablet Take 0 5 tablets by mouth 2 (two) times a day with meals x 1 week, then 1 tablet by mouth two times daily thereafter  (Patient taking differently: Take 1,000 mg by mouth 2 (two) times a day with meals x 1 week, then 1 tablet by mouth two times daily thereafter  ) 60 tablet 0    metoprolol succinate (TOPROL-XL) 50 mg 24 hr tablet Take 1 tablet by mouth daily 30 tablet 0    ONE TOUCH ULTRA TEST test strip TEST SUGAR BEFORE BREAKFAST AND DINNER  0     No current facility-administered medications for this visit  No Known Allergies    Objective   Vitals: Blood pressure (!) 148/102, pulse 98, height 5' 11" (1 803 m), weight 129 kg (284 lb)  Physical Exam    The history was obtained from the review of the chart, patient      Lab Results:   Lab Results   Component Value Date/Time    Hemoglobin A1C 5 8 10/04/2018 07:10 AM    Hemoglobin A1C 6 4 (H) 03/26/2018 07:03 AM    Hemoglobin A1C 9 3 (H) 12/26/2017 11:41 AM    WBC 9 83 12/27/2017 06:06 AM    WBC 9 38 12/26/2017 06:40 AM    Hemoglobin 13 2 12/27/2017 06:06 AM    Hemoglobin 14 7 12/26/2017 06:40 AM    Hematocrit 40 9 12/27/2017 06:06 AM    Hematocrit 44 4 12/26/2017 06:40 AM    MCV 86 12/27/2017 06:06 AM    MCV 85 12/26/2017 06:40 AM    Platelets 820 64/02/2074 06:06 AM    Platelets 028 92/93/7225 06:40 AM    BUN 22 10/04/2018 07:10 AM    BUN 24 04/16/2018 07:06 AM    BUN 23 03/26/2018 07:03 AM    Sodium 139 10/04/2018 07:10 AM    Sodium 141 04/16/2018 07:06 AM    Sodium 142 03/26/2018 07:03 AM    Potassium 4 6 10/04/2018 07:10 AM    Potassium 4 0 04/16/2018 07:06 AM    Potassium 3 6 03/26/2018 07:03 AM    Chloride 108 10/04/2018 07:10 AM    Chloride 107 04/16/2018 07:06 AM    Chloride 107 03/26/2018 07:03 AM    CO2 26 10/04/2018 07:10 AM    CO2 28 04/16/2018 07:06 AM    CO2 27 03/26/2018 07:03 AM    Creatinine 1 04 10/04/2018 07:10 AM    Creatinine 0 83 04/16/2018 07:06 AM    Creatinine 0 87 03/26/2018 07:03 AM    AST 17 10/04/2018 07:10 AM    AST 19 03/26/2018 07:03 AM    AST 21 12/26/2017 06:40 AM    ALT 26 10/04/2018 07:10 AM    ALT 33 03/26/2018 07:03 AM    ALT 36 12/26/2017 06:40 AM    Albumin 3 2 (L) 10/04/2018 07:10 AM    Albumin 3 8 03/26/2018 07:03 AM    Albumin 3 4 (L) 12/26/2017 06:40 AM    HDL, Direct 38 (L) 10/04/2018 07:10 AM    HDL, Direct 35 (L) 12/27/2017 06:06 AM    Triglycerides 60 10/04/2018 07:10 AM    Triglycerides 144 12/27/2017 06:06 AM       Imaging Studies: I have personally reviewed pertinent reports  Portions of the record may have been created with voice recognition software  Occasional wrong word or "sound a like" substitutions may have occurred due to the inherent limitations of voice recognition software  Read the chart carefully and recognize, using context, where substitutions have occurred

## 2019-01-04 DIAGNOSIS — I10 ESSENTIAL HYPERTENSION: Primary | ICD-10-CM

## 2019-01-04 DIAGNOSIS — E78.5 HYPERLIPIDEMIA, UNSPECIFIED HYPERLIPIDEMIA TYPE: ICD-10-CM

## 2019-01-04 RX ORDER — ATORVASTATIN CALCIUM 40 MG/1
TABLET, FILM COATED ORAL
Qty: 90 TABLET | Refills: 1 | Status: SHIPPED | OUTPATIENT
Start: 2019-01-04 | End: 2019-07-31 | Stop reason: SDUPTHER

## 2019-01-04 RX ORDER — METOPROLOL SUCCINATE 50 MG/1
TABLET, EXTENDED RELEASE ORAL
Qty: 90 TABLET | Refills: 1 | Status: SHIPPED | OUTPATIENT
Start: 2019-01-04 | End: 2019-07-31 | Stop reason: SDUPTHER

## 2019-01-04 RX ORDER — CLOPIDOGREL BISULFATE 75 MG/1
TABLET ORAL
Qty: 90 TABLET | Refills: 1 | Status: SHIPPED | OUTPATIENT
Start: 2019-01-04 | End: 2019-07-31

## 2019-01-26 DIAGNOSIS — I10 ESSENTIAL HYPERTENSION: ICD-10-CM

## 2019-01-28 RX ORDER — LISINOPRIL 20 MG/1
20 TABLET ORAL DAILY
Qty: 90 TABLET | Refills: 3 | Status: SHIPPED | OUTPATIENT
Start: 2019-01-28 | End: 2019-07-31 | Stop reason: SDUPTHER

## 2019-06-23 DIAGNOSIS — E78.5 HYPERLIPIDEMIA, UNSPECIFIED HYPERLIPIDEMIA TYPE: ICD-10-CM

## 2019-06-23 DIAGNOSIS — I10 ESSENTIAL HYPERTENSION: ICD-10-CM

## 2019-06-24 RX ORDER — ATORVASTATIN CALCIUM 40 MG/1
TABLET, FILM COATED ORAL
Qty: 90 TABLET | Refills: 1 | OUTPATIENT
Start: 2019-06-24

## 2019-06-24 RX ORDER — CLOPIDOGREL BISULFATE 75 MG/1
TABLET ORAL
Qty: 90 TABLET | Refills: 1 | OUTPATIENT
Start: 2019-06-24

## 2019-06-24 RX ORDER — METOPROLOL SUCCINATE 50 MG/1
TABLET, EXTENDED RELEASE ORAL
Qty: 90 TABLET | Refills: 1 | OUTPATIENT
Start: 2019-06-24

## 2019-07-31 ENCOUNTER — OFFICE VISIT (OUTPATIENT)
Dept: CARDIOLOGY CLINIC | Facility: CLINIC | Age: 59
End: 2019-07-31
Payer: COMMERCIAL

## 2019-07-31 VITALS
WEIGHT: 315 LBS | BODY MASS INDEX: 44.1 KG/M2 | HEART RATE: 72 BPM | SYSTOLIC BLOOD PRESSURE: 174 MMHG | HEIGHT: 71 IN | DIASTOLIC BLOOD PRESSURE: 104 MMHG

## 2019-07-31 DIAGNOSIS — E78.5 DYSLIPIDEMIA: ICD-10-CM

## 2019-07-31 DIAGNOSIS — E78.5 HYPERLIPIDEMIA, UNSPECIFIED HYPERLIPIDEMIA TYPE: ICD-10-CM

## 2019-07-31 DIAGNOSIS — I10 ESSENTIAL HYPERTENSION: ICD-10-CM

## 2019-07-31 DIAGNOSIS — I25.10 CORONARY ARTERY DISEASE INVOLVING NATIVE HEART WITHOUT ANGINA PECTORIS, UNSPECIFIED VESSEL OR LESION TYPE: Primary | ICD-10-CM

## 2019-07-31 PROCEDURE — 93000 ELECTROCARDIOGRAM COMPLETE: CPT | Performed by: INTERNAL MEDICINE

## 2019-07-31 PROCEDURE — 99214 OFFICE O/P EST MOD 30 MIN: CPT | Performed by: INTERNAL MEDICINE

## 2019-07-31 RX ORDER — LISINOPRIL 20 MG/1
20 TABLET ORAL DAILY
Qty: 90 TABLET | Refills: 3 | Status: SHIPPED | OUTPATIENT
Start: 2019-07-31 | End: 2020-08-05

## 2019-07-31 RX ORDER — ATORVASTATIN CALCIUM 40 MG/1
40 TABLET, FILM COATED ORAL EVERY EVENING
Qty: 90 TABLET | Refills: 3 | Status: SHIPPED | OUTPATIENT
Start: 2019-07-31 | End: 2020-07-27

## 2019-07-31 RX ORDER — AMLODIPINE BESYLATE 5 MG/1
5 TABLET ORAL DAILY
Qty: 90 TABLET | Refills: 3 | Status: SHIPPED | OUTPATIENT
Start: 2019-07-31 | End: 2020-07-27

## 2019-07-31 RX ORDER — METOPROLOL SUCCINATE 50 MG/1
50 TABLET, EXTENDED RELEASE ORAL DAILY
Qty: 90 TABLET | Refills: 3 | Status: SHIPPED | OUTPATIENT
Start: 2019-07-31 | End: 2020-07-27

## 2019-07-31 NOTE — PATIENT INSTRUCTIONS
Recommendations:  1  Discontinue clopidogrel  2  Start amlodipine 5mg daily  3  Check fasting lipid panel and complete metabolic profile  4  Follow up in 6 months

## 2019-07-31 NOTE — PROGRESS NOTES
Cardiology   Tiburcio Gaitan 62 y o  male MRN: [id number]        Impression:  1  CAD s/p NSTEMI 12/17 - Had PCI of LCx, has residual  of RCA  2  Hypertension - poor control  3  Dylipidemia - on statin       Recommendations:  1  Discontinue clopidogrel  2  Start amlodipine 5mg daily  3  Check fasting lipid panel and complete metabolic profile  4  Follow up in 6 months          HPI: Zane Leavitt is a 62y o  year old male with NSTEMI Type I - cath demonstrated 95% LCx and chronic mid RCA stenosis - Underwent PCI of LCx   Has been feeling well  No chest pain, shortness of breath, or palpitations  Review of Systems   Constitutional: Negative  HENT: Negative  Eyes: Negative  Respiratory: Negative for chest tightness and shortness of breath  Cardiovascular: Negative for chest pain, palpitations and leg swelling  Gastrointestinal: Negative  Endocrine: Negative  Genitourinary: Negative  Musculoskeletal: Negative  Skin: Negative  Allergic/Immunologic: Negative  Neurological: Negative  Hematological: Negative  Psychiatric/Behavioral: Negative  All other systems reviewed and are negative  History reviewed  No pertinent past medical history    Past Surgical History:   Procedure Laterality Date    EYE SURGERY      WISDOM TOOTH EXTRACTION       Social History     Substance and Sexual Activity   Alcohol Use No    Comment: 1-2 drinks a year     Social History     Substance and Sexual Activity   Drug Use No     Social History     Tobacco Use   Smoking Status Never Smoker   Smokeless Tobacco Never Used     Family History   Problem Relation Age of Onset    Diabetes Mother     Heart disease Father     Heart disease Paternal Grandfather        Allergies:  No Known Allergies    Medications:     Current Outpatient Medications:     aspirin 81 MG tablet, Take 1 tablet by mouth daily, Disp: , Rfl: 0    atorvastatin (LIPITOR) 40 mg tablet, TAKE 1 TABLET EVERY EVENING, Disp: 90 tablet, Rfl: 1    Blood Glucose Monitoring Suppl (ONE TOUCH ULTRA 2) w/Device KIT, Use as directed, Disp: , Rfl: 0    clopidogrel (PLAVIX) 75 mg tablet, TAKE 1 TABLET DAILY, Disp: 90 tablet, Rfl: 1    Lancets (ONETOUCH ULTRASOFT) lancets, , Disp: , Rfl:     lisinopril (ZESTRIL) 20 mg tablet, TAKE 1 TABLET (20 MG TOTAL) BY MOUTH DAILY, Disp: 90 tablet, Rfl: 3    metoprolol succinate (TOPROL-XL) 50 mg 24 hr tablet, TAKE 1 TABLET DAILY, Disp: 90 tablet, Rfl: 1    ONE TOUCH ULTRA TEST test strip, TEST SUGAR BEFORE BREAKFAST AND DINNER, Disp: , Rfl: 0      Wt Readings from Last 3 Encounters:   07/31/19 (!) 152 kg (335 lb)   10/16/18 129 kg (284 lb)   08/30/18 127 kg (280 lb 11 2 oz)     Temp Readings from Last 3 Encounters:   07/05/18 (!) 97 4 °F (36 3 °C) (Tympanic)   01/04/18 98 °F (36 7 °C)   12/28/17 98 1 °F (36 7 °C) (Oral)     BP Readings from Last 3 Encounters:   07/31/19 (!) 174/104   10/16/18 (!) 148/102   08/30/18 130/80     Pulse Readings from Last 3 Encounters:   07/31/19 72   10/16/18 98   08/30/18 57         Physical Exam   Constitutional: He is oriented to person, place, and time  He appears well-developed  HENT:   Head: Atraumatic  Eyes: EOM are normal    Neck: Normal range of motion  Cardiovascular: Normal rate, regular rhythm and normal heart sounds  Exam reveals no gallop and no friction rub  No murmur heard  Pulmonary/Chest: Effort normal and breath sounds normal  No respiratory distress  Abdominal: Soft  Musculoskeletal: Normal range of motion  Neurological: He is alert and oriented to person, place, and time  Skin: Skin is warm and dry  Psychiatric: He has a normal mood and affect           Laboratory Studies:  CMP:  Lab Results   Component Value Date    K 4 6 10/04/2018     10/04/2018    CO2 26 10/04/2018    BUN 22 10/04/2018    CREATININE 1 04 10/04/2018    AST 17 10/04/2018    ALT 26 10/04/2018    EGFR 79 10/04/2018       Lipid Profile:   No results found for: CHOL  Lab Results   Component Value Date    HDL 38 (L) 10/04/2018     Lab Results   Component Value Date    LDLCALC 52 10/04/2018     Lab Results   Component Value Date    TRIG 60 10/04/2018       Cardiac testing:   EKG reviewed personally: NSR 71 IRBBB  Results for orders placed during the hospital encounter of 17   Echo complete with contrast if indicated    Narrative [address] [facility]  [phone]    Transthoracic Echocardiogram  2D, M-mode, Doppler, and Color Doppler    Study date:  27-Dec-2017    Patient: Joel Valera  MR number: [id number]  Account number: [de-identified]  : 1960  Age: 62 years  Gender: Male  Status: Inpatient  Location: Bedside  Height: 71 in  Weight: 333 lb  BP: 119/ 71 mmHg    Indications: MI    Diagnoses: I40 9 - Acute myocarditis, unspecified    Sonographer:  DIVYA Figueredo  Referring Physician:  Romelia Elliott MD  Group:  [facility]  Interpreting Physician:  Gena Lind DO    SUMMARY    SUMMARY:  Technically difficult study  LEFT VENTRICLE:  Systolic function was mildly reduced  Ejection fraction was estimated to be 45 %  There was severe hypokinesis of the basal inferoseptal and basal-mid inferior wall(s)  Doppler parameters were consistent with abnormal left ventricular relaxation (grade 1 diastolic dysfunction)  MITRAL VALVE:  There was mild annular calcification  TRICUSPID VALVE:  There was trace regurgitation  HISTORY: PRIOR HISTORY: Diabetes, Obesity    PROCEDURE: The procedure was performed at the bedside  This was a routine study  The transthoracic approach was used  The study included complete 2D imaging, M-mode, complete spectral Doppler, and color Doppler  The heart rate was 73 bpm,  at the start of the study  Images were obtained from the parasternal, apical, subcostal, and suprasternal notch acoustic windows   Intravenous contrast ( 0 8 mL Definity in NSS) was administered to opacify the left ventricle  Echocardiographic views were limited due to poor acoustic window availability and decreased penetration  This was a technically difficult study  LEFT VENTRICLE: Size was normal  Systolic function was mildly reduced  Ejection fraction was estimated to be 45 %  There was severe hypokinesis of the basal inferoseptal and basal-mid inferior wall(s)  Wall thickness was normal  DOPPLER:  Doppler parameters were consistent with abnormal left ventricular relaxation (grade 1 diastolic dysfunction)  RIGHT VENTRICLE: The size was normal  Systolic function was normal  Wall thickness was normal     LEFT ATRIUM: Size was normal     RIGHT ATRIUM: Size was normal     MITRAL VALVE: There was mild annular calcification  Valve structure was normal  There was normal leaflet separation  DOPPLER: The transmitral velocity was within the normal range  There was no evidence for stenosis  There was no  regurgitation  AORTIC VALVE: The valve was probably trileaflet  Leaflets exhibited mildly increased thickness and normal cuspal separation  DOPPLER: Transaortic velocity was within the normal range  There was no evidence for stenosis  There was no  regurgitation  TRICUSPID VALVE: The valve structure was normal  There was normal leaflet separation  DOPPLER: The transtricuspid velocity was within the normal range  There was no evidence for stenosis  There was trace regurgitation  PULMONIC VALVE: Leaflets exhibited normal thickness, no calcification, and normal cuspal separation  DOPPLER: The transpulmonic velocity was within the normal range  There was no regurgitation  PERICARDIUM: There was no pericardial effusion  The pericardium was normal in appearance  AORTA: The root exhibited normal size  SYSTEMIC VEINS: IVC: The inferior vena cava was not well visualized      SYSTEM MEASUREMENT TABLES    2D  %FS: 30 15 %  AV Diam: 3 9 cm  EDV(Teich): 131 24 ml  EF(Teich): 57 05 %  ESV(Teich): 56 37 ml  IVSd: 1 2 cm  LA Area: 14 47 cm2  LA Diam: 4 85 cm  LVEDV MOD A4C: 159 75 ml  LVEF MOD A4C: 35 13 %  LVESV MOD A4C: 103 62 ml  LVIDd: 5 23 cm  LVIDs: 3 65 cm  LVLd A4C: 9 36 cm  LVLs A4C: 8 54 cm  LVPWd: 1 08 cm  RA Area: 14 93 cm2  RVIDd: 3 4 cm  SV MOD A4C: 56 12 ml  SV(Teich): 74 87 ml    MM  TAPSE: 2 48 cm    PW  E': 0 06 m/s  E/E': 16 23  MV A Sulaiman: 0 79 m/s  MV Dec Rush: 5 08 m/s2  MV DecT: 186 23 ms  MV E Sulaiman: 0 95 m/s  MV E/A Ratio: 1 2  MV PHT: 54 01 ms  MVA By PHT: 4 07 cm2    [facility]    Prepared and electronically signed by    Natali Tyson DO  Signed 27-Dec-2017 13:12:45       No results found for this or any previous visit  Results for orders placed during the hospital encounter of 17   Cardiac catheterization    Narrative [facility] [address], [address] [facility]  [phone]    [facility]    Invasive Cardiovascular Lab Complete Report    Patient: Andreas Duckworth  MR number: [id number]  Account number: [de-identified]  Study date: 2017  Gender: Male  : 1960  Height: 70 9 in  Weight: 332 2 lb  BSA: 2 62 m squared    Allergies: NO KNOWN ALLERGIES    Diagnostic Cardiologist:  Parish Hendricks MD  Interventional Cardiologist:  Parish Hendricks MD    SUMMARY    CORONARY CIRCULATION:  Left main: Normal   LAD: The vessel was medium to large sized  Angiography showed mild atherosclerosis  Circumflex: The vessel was very large sized  Mid circumflex: There was a discrete 90 % stenosis  The lesion was irregularly contoured, hazy, and without evidence of thrombus  There was TIMUR grade 3 flow through the vessel (brisk flow)  This is a likely culprit for the patient's  clinical presentation  It appears amenable to percutaneous intervention  RCA: The vessel was medium sized  The mid RCA occlusion appears to be chronic as there is R to R and L to R collaterals seen    Mid RCA: There was a 100 % stenosis  Distal RCA: There was a discrete 80 % stenosis  1ST LESION INTERVENTIONS:  A successful stent procedure was performed on the 90 % lesion in the mid circumflex  Following intervention there was an excellent angiographic appearance with a 0 % residual stenosis  This was not a bifurcation lesion  This was an ACC/AHA type A "low risk" lesion for intervention  There was no evidence of the transient no-reflow phenomenon  The residual lesion was discrete and demonstrated no evidence of thrombus  There  was TIMUR 3 flow before the procedure and TIMUR 3 flow after the procedure  A Resolute Integrity Rx 4 0 x 18 drug-eluting stent was placed across the lesion and deployed at a maximum inflation pressure of 12 courtney  INDICATIONS:  Possible CAD: myocardial infarction without ST elevation (NSTEMI)  RECOMMENDATIONS:  The patient should continue with the present medications including ASA and Plavix  Consider nuclear stress test if anginal symptoms to evaluation for RCA ischemia  PCI of RCA possible but appears to be a chronic occlusion  INDICATIONS:  --  Possible CAD: myocardial infarction without ST elevation (NSTEMI)  PROCEDURES PERFORMED    --  Right coronary angiography  --  Left coronary angiography  --  Inpatient  --  Coronary Catheterization (w/o LHC)  --  Mod Sedation Same Physician Initial 15min  --  Mod Sedation Same Physician Add 15min  --  Mod Sedation Same Physician Add 15min  --  Mod Sedation Same Physician Add 15min  --  Coronary Drug Eluting Stent w/PTCA  --  Intervention on mid circumflex: stent  PROCEDURE: The risks and alternatives of the procedures and conscious sedation were explained to the patient and informed consent was obtained  The patient was brought to the cath lab and placed on the table  The planned puncture sites  were prepped and draped in the usual sterile fashion  --  Right radial artery access   After performing an Dane's test to verify adequate ulnar artery supply to the hand, the radial site was prepped  The puncture site was infiltrated with local anesthetic  The vessel was accessed using the  modified Seldinger technique, a wire was advanced into the vessel, and a sheath was advanced over the wire into the vessel  --  Right coronary artery angiography  A catheter was advanced over a guidewire into the aorta and positioned in the right coronary artery ostium under fluoroscopic guidance  Angiography was performed  --  Left coronary artery angiography  A catheter was advanced over a guidewire into the aorta and positioned in the left coronary artery ostium under fluoroscopic guidance  Angiography was performed  --  Inpatient  --  Coronary Catheterization (w/o [facility])  --  Mod Sedation Same Physician Initial 15min  --  Mod Sedation Same Physician Add 15min  --  Mod Sedation Same Physician Add 15min  --  Mod Sedation Same Physician Add 15min  LESION INTERVENTION: A successful stent procedure was performed on the 90 % lesion in the mid circumflex  Following intervention there was an excellent angiographic appearance with a 0 % residual stenosis  This was not a bifurcation  lesion  This was an ACC/AHA type A "low risk" lesion for intervention  There was no evidence of the transient no-reflow phenomenon  The residual lesion was discrete and demonstrated no evidence of thrombus  There was TIMUR 3 flow before the  procedure and TIMUR 3 flow after the procedure  --  Vessel setup was performed  A Launcher 6Fr Ebu 3 5 guiding catheter was used to cannulate the vessel  --  Vessel setup was performed  A Runthrough NS 180cm wire was used to cross the lesion  --  Balloon angioplasty was performed, using a Trek Rx 2 5 x 15mm balloon, with 2 inflations and a maximum inflation pressure of 14 courtney      --  A Resolute Integrity Rx 4 0 x 18 drug-eluting stent was placed across the lesion and deployed at a maximum inflation pressure of 12 courtney     --  Balloon angioplasty was performed, with 1 inflations and a maximum inflation pressure of 12 courtney  INTERVENTIONS:  --  Coronary Drug Eluting Stent w/PTCA  PROCEDURE COMPLETION: The patient tolerated the procedure well and was discharged from the cath lab  TIMING: Test started at 13:01  Test concluded at 13:45  MEDICATIONS GIVEN: Metoprolol (Lopressor, Toprol), 5 mg, IV, at 13:09  Fentanyl  (1OOmcg/2 ml), 50 mcg, IV, at 13:01  Versed (2mg/2ml), 2 mg, IV, at 13:01  1% Lidocaine, 1 ml, subcutaneously, at 13:05  Verapamil (5mg/2ml), 2 5 mg, IV, at 13:06  Heparin 1000 units/ml, 4,000 units, IV, at 13:06  Nitroglycerin  (200mcg/ml), 200 mcg, at 13:06  Heparin 1000 units/ml, 5,000 units, IV, at 13:30  Nitroglycerine (200mcg/ml), 200 mcg, at 13:40  CONTRAST GIVEN: 150 ml Omnipaque (350 mg I /ml)  RADIATION EXPOSURE: Fluoroscopy time: 13 22 min  CORONARY VESSELS:   --  Left main: Normal   --  LAD: The vessel was medium to large sized  Angiography showed mild atherosclerosis  --  1st diagonal: The vessel was small sized  --  2nd diagonal: The vessel was small sized  --  3rd diagonal: The vessel was small sized  --  Circumflex: The vessel was very large sized  --  Mid circumflex: There was a discrete 90 % stenosis  The lesion was irregularly contoured, hazy, and without evidence of thrombus  There was TIMUR grade 3 flow through the vessel (brisk flow)  This is a likely culprit for the patient's  clinical presentation  It appears amenable to percutaneous intervention  --  1st obtuse marginal: The vessel was small sized  --  RCA: The vessel was medium sized  The mid RCA occlusion appears to be chronic as there is R to R and L to R collaterals seen  --  Mid RCA: There was a 100 % stenosis  --  Distal RCA: There was a discrete 80 % stenosis  RECOMMENDATIONS  The patient should continue with the present medications including ASA and Plavix    Consider nuclear stress test if anginal symptoms to evaluation for RCA ischemia  PCI of RCA possible but appears to be a chronic occlusion  Prepared and signed by    Alan Frances MD  Signed 2017 14:15:24    Study diagram    Angiographic findings  Native coronary lesions:  ·Mid circumflex: Lesion 1: discrete, 90 % stenosis  ·Mid RCA: Lesion 1: 100 % stenosis  ·Distal RCA: Lesion 1: discrete, 80 % stenosis  Intervention results  Native coronary lesions:  ·Successful stent of the 90 % stenosis in mid circumflex  Appearance excellent with 0 % residual stenosis  Stent: Resolute Integrity Rx 4 0 x 18 drug-eluting      Hemodynamic tables    Pressures:  Baseline  Pressures:  - HR: 72  Pressures:  - Rhythm:  Pressures:  -- Aortic Pressure (S/D/M): 149/97/121    Outputs:  Baseline  Outputs:  -- CALCULATIONS: [age]  Outputs:  -- CALCULATIONS: Body Surface Area: 2 62  Outputs:  -- CALCULATIONS: Height in cm: 180 00  Outputs:  -- CALCULATIONS: Sex: Male  Outputs:  -- CALCULATIONS: Weight in k 00

## 2020-06-05 ENCOUNTER — TELEMEDICINE (OUTPATIENT)
Dept: CARDIOLOGY CLINIC | Facility: CLINIC | Age: 60
End: 2020-06-05
Payer: COMMERCIAL

## 2020-06-05 VITALS — HEIGHT: 72 IN | WEIGHT: 292 LBS | BODY MASS INDEX: 39.55 KG/M2

## 2020-06-05 DIAGNOSIS — E78.5 DYSLIPIDEMIA: ICD-10-CM

## 2020-06-05 DIAGNOSIS — I25.10 CORONARY ARTERY DISEASE INVOLVING NATIVE HEART WITHOUT ANGINA PECTORIS, UNSPECIFIED VESSEL OR LESION TYPE: Primary | ICD-10-CM

## 2020-06-05 DIAGNOSIS — I10 ESSENTIAL HYPERTENSION: ICD-10-CM

## 2020-06-05 PROCEDURE — 99214 OFFICE O/P EST MOD 30 MIN: CPT | Performed by: INTERNAL MEDICINE

## 2020-07-27 DIAGNOSIS — E78.5 HYPERLIPIDEMIA, UNSPECIFIED HYPERLIPIDEMIA TYPE: ICD-10-CM

## 2020-07-27 DIAGNOSIS — I10 ESSENTIAL HYPERTENSION: ICD-10-CM

## 2020-07-27 RX ORDER — METOPROLOL SUCCINATE 50 MG/1
TABLET, EXTENDED RELEASE ORAL
Qty: 90 TABLET | Refills: 3 | Status: SHIPPED | OUTPATIENT
Start: 2020-07-27 | End: 2021-05-09

## 2020-07-27 RX ORDER — ATORVASTATIN CALCIUM 40 MG/1
TABLET, FILM COATED ORAL
Qty: 90 TABLET | Refills: 3 | Status: SHIPPED | OUTPATIENT
Start: 2020-07-27 | End: 2021-05-09

## 2020-07-27 RX ORDER — AMLODIPINE BESYLATE 5 MG/1
TABLET ORAL
Qty: 90 TABLET | Refills: 3 | Status: SHIPPED | OUTPATIENT
Start: 2020-07-27 | End: 2021-05-09

## 2020-08-05 DIAGNOSIS — I10 ESSENTIAL HYPERTENSION: ICD-10-CM

## 2020-08-05 RX ORDER — LISINOPRIL 20 MG/1
TABLET ORAL
Qty: 90 TABLET | Refills: 3 | Status: SHIPPED | OUTPATIENT
Start: 2020-08-05 | End: 2021-02-19

## 2021-02-19 ENCOUNTER — TELEMEDICINE (OUTPATIENT)
Dept: CARDIOLOGY CLINIC | Facility: CLINIC | Age: 61
End: 2021-02-19
Payer: COMMERCIAL

## 2021-02-19 VITALS — WEIGHT: 292 LBS | BODY MASS INDEX: 39.6 KG/M2

## 2021-02-19 DIAGNOSIS — I25.10 CORONARY ARTERY DISEASE INVOLVING NATIVE HEART WITHOUT ANGINA PECTORIS, UNSPECIFIED VESSEL OR LESION TYPE: ICD-10-CM

## 2021-02-19 DIAGNOSIS — E78.5 DYSLIPIDEMIA: ICD-10-CM

## 2021-02-19 DIAGNOSIS — I10 ESSENTIAL HYPERTENSION: Primary | ICD-10-CM

## 2021-02-19 PROCEDURE — 99214 OFFICE O/P EST MOD 30 MIN: CPT | Performed by: INTERNAL MEDICINE

## 2021-02-19 RX ORDER — OLMESARTAN MEDOXOMIL 40 MG/1
40 TABLET ORAL DAILY
Qty: 90 TABLET | Refills: 3 | Status: SHIPPED | OUTPATIENT
Start: 2021-02-19 | End: 2022-03-14

## 2021-02-19 NOTE — PROGRESS NOTES
Virtual Regular Visit      Impression:  1  CAD s/p NSTEMI 12/17 - Had PCI of LCx, has residual  of RCA  2  Hypertension - not adequate control  3  Dylipidemia - on statin       Recommendations:  1  Discontinue lisinopril and start olmesartan 40mg daily  2  Continue remainder of medications  3  Check fasting lipid panel and complete metabolic profile  4  Follow up in 6 months  Problem List Items Addressed This Visit        Cardiovascular and Mediastinum    CAD (coronary artery disease)    Essential hypertension - Primary    Relevant Medications    olmesartan (BENICAR) 40 mg tablet       Other    Dyslipidemia    Relevant Orders    Lipid Panel with Direct LDL reflex    Comprehensive metabolic panel               Reason for visit is   Chief Complaint   Patient presents with    Virtual Brief Visit     no cardiac complaints        Encounter provider Jose Shane MD    Provider located at [address] [place] [address]      Recent Visits  No visits were found meeting these conditions  Showing recent visits within past 7 days and meeting all other requirements     Today's Visits  Date Type Provider Dept   02/19/21 Telemedicine Aliyah Peter today's visits and meeting all other requirements     Future Appointments  No visits were found meeting these conditions  Showing future appointments within next 150 days and meeting all other requirements        The patient was identified by name and date of birth  Allillie Villegaser was informed that this is a telemedicine visit and that the visit is being conducted through [employer] and patient was informed that this is a secure, HIPAA-compliant platform  He agrees to proceed     My office door was closed  No one else was in the room  He acknowledged consent and understanding of privacy and security of the video platform   The patient has agreed to participate and understands they can discontinue the visit at any time  Patient is aware this is a billable service  Subjective  Gael Menon is a 61 y o  male with NSTEMI Type I - cath demonstrated 95% LCx and chronic mid RCA stenosis - Underwent PCI of LCx   Has been feeling well  No chest pain, shortness of breath, or palpitations   /70 mmHg  History reviewed  No pertinent past medical history  Past Surgical History:   Procedure Laterality Date    EYE SURGERY      WISDOM TOOTH EXTRACTION         Current Outpatient Medications   Medication Sig Dispense Refill    amLODIPine (NORVASC) 5 mg tablet TAKE 1 TABLET BY MOUTH EVERY DAY 90 tablet 3    aspirin 81 MG tablet Take 1 tablet by mouth daily  0    atorvastatin (LIPITOR) 40 mg tablet TAKE 1 TABLET BY MOUTH EVERY DAY IN THE EVENING 90 tablet 3    Blood Glucose Monitoring Suppl (ONE TOUCH ULTRA 2) w/Device KIT Use as directed  0    Lancets (ONETOUCH ULTRASOFT) lancets       metoprolol succinate (TOPROL-XL) 50 mg 24 hr tablet TAKE 1 TABLET BY MOUTH EVERY DAY 90 tablet 3    ONE TOUCH ULTRA TEST test strip TEST SUGAR BEFORE BREAKFAST AND DINNER  0    olmesartan (BENICAR) 40 mg tablet Take 1 tablet (40 mg total) by mouth daily 90 tablet 3     No current facility-administered medications for this visit  No Known Allergies    Review of Systems   Constitutional: Negative  HENT: Negative  Eyes: Negative  Respiratory: Negative for chest tightness and shortness of breath  Cardiovascular: Negative for chest pain, palpitations and leg swelling  Gastrointestinal: Negative  Endocrine: Negative  Genitourinary: Negative  Musculoskeletal: Negative  Skin: Negative  Allergic/Immunologic: Negative  Neurological: Negative  Hematological: Negative  Psychiatric/Behavioral: Negative  All other systems reviewed and are negative        Video Exam    Vitals:    02/19/21 0836   Weight: 132 kg (292 lb) Physical Exam  HENT:      Head: Atraumatic  Mouth/Throat:      Mouth: Mucous membranes are moist    Eyes:      Extraocular Movements: Extraocular movements intact  Neck:      Musculoskeletal: Normal range of motion  Pulmonary:      Effort: Pulmonary effort is normal    Abdominal:      Tenderness: There is no abdominal tenderness  Musculoskeletal: Normal range of motion  Skin:     Findings: No rash  Neurological:      General: No focal deficit present  Mental Status: He is alert and oriented to person, place, and time  Psychiatric:         Mood and Affect: Mood normal          Behavior: Behavior normal           I spent 15 minutes directly with the patient during this visit      [address] [place], [place] [address]monica Butcher acknowledges that he has consented to an online visit or consultation  He understands that the online visit is based solely on information provided by him, and that, in the absence of a face-to-face physical evaluation by the physician, the diagnosis he receives is both limited and provisional in terms of accuracy and completeness  This is not intended to replace a full medical face-to-face evaluation by the physician  Penny Baez understands and accepts these terms

## 2021-02-19 NOTE — PATIENT INSTRUCTIONS
Recommendations:  1  Discontinue lisinopril and start olmesartan 40mg daily  2  Continue remainder of medications  3  Check fasting lipid panel and complete metabolic profile  4  Follow up in 6 months

## 2021-03-30 DIAGNOSIS — Z23 ENCOUNTER FOR IMMUNIZATION: ICD-10-CM

## 2021-05-08 DIAGNOSIS — E78.5 HYPERLIPIDEMIA, UNSPECIFIED HYPERLIPIDEMIA TYPE: ICD-10-CM

## 2021-05-08 DIAGNOSIS — I10 ESSENTIAL HYPERTENSION: ICD-10-CM

## 2021-05-09 RX ORDER — METOPROLOL SUCCINATE 50 MG/1
TABLET, EXTENDED RELEASE ORAL
Qty: 90 TABLET | Refills: 24 | Status: SHIPPED | OUTPATIENT
Start: 2021-05-09 | End: 2022-08-03

## 2021-05-09 RX ORDER — AMLODIPINE BESYLATE 5 MG/1
TABLET ORAL
Qty: 90 TABLET | Refills: 24 | Status: SHIPPED | OUTPATIENT
Start: 2021-05-09 | End: 2022-08-03

## 2021-05-09 RX ORDER — ATORVASTATIN CALCIUM 40 MG/1
TABLET, FILM COATED ORAL
Qty: 90 TABLET | Refills: 24 | Status: SHIPPED | OUTPATIENT
Start: 2021-05-09 | End: 2022-08-03

## 2021-08-18 NOTE — PATIENT INSTRUCTIONS
10% - bad control"> 10% - bad control,Hemoglobin A1c (HbA1c) greater than 10% indicating poor diabetic control,Haemoglobin A1c greater than 10% indicating poor diabetic control">   Diabetes Mellitus Type 2 in Adults, Ambulatory Care   GENERAL INFORMATION:   Diabetes mellitus type 2  is a disease that affects how your body uses glucose (sugar)  Insulin helps move sugar out of the blood so it can be used for energy  Normally, when the blood sugar level increases, the pancreas makes more insulin  Type 2 diabetes develops because either the body cannot make enough insulin, or it cannot use the insulin correctly  After many years, your pancreas may stop making insulin  Common symptoms include the following:   · More hunger or thirst than usual     · Frequent urination     · Weight loss without trying     · Blurred vision  Seek immediate care for the following symptoms:   · Severe abdominal pain, or pain that spreads to your back  You may also be vomiting  · Trouble staying awake or focusing    · Shaking or sweating    · Blurred or double vision    · Breath has a fruity, sweet smell    · Breathing is deep and labored, or rapid and shallow    · Heartbeat is fast and weak  Treatment for diabetes mellitus type 2  includes keeping your blood sugar at a normal level  You must eat the right foods, and exercise regularly  You may also need medicine if you cannot control your blood sugar level with nutrition and exercise  Manage diabetes mellitus type 2:   · Check your blood sugar level  You will be taught how to check a small drop of blood in a glucose monitor  Ask your healthcare provider when and how often to check during the day  Ask your healthcare provider what your blood sugar levels should be when you check them  · Keep track of carbohydrates (sugar and starchy foods)  Your blood sugar level can get too high if you eat too many carbohydrates   Your dietitian will help you plan meals and snacks that have the Addended by: Rosalinda Ramos on: 8/18/2021 08:49 AM     Modules accepted: Level of Service right amount of carbohydrates  · Eat low-fat foods  Some examples are skinless chicken and low-fat milk  · Eat less sodium (salt)  Some examples of high-sodium foods to limit are soy sauce, potato chips, and soup  Do not add salt to food you cook  Limit your use of table salt  · Eat high-fiber foods  Foods that are a good source of fiber include vegetables, whole grain bread, and beans  · Limit alcohol  Alcohol affects your blood sugar level and can make it harder to manage your diabetes  Women should limit alcohol to 1 drink a day  Men should limit alcohol to 2 drinks a day  A drink of alcohol is 12 ounces of beer, 5 ounces of wine, or 1½ ounces of liquor  · Get regular exercise  Exercise can help keep your blood sugar level steady, decrease your risk of heart disease, and help you lose weight  Exercise for at least 30 minutes, 5 days a week  Include muscle strengthening activities 2 days each week  Work with your healthcare provider to create an exercise plan  · Check your feet each day  for injuries or open sores  Ask your healthcare provider for activities you can do if you have an open sore  · Quit smoking  If you smoke, it is never too late to quit  Smoking can worsen the problems that may occur with diabetes  Ask your healthcare provider for information about how to stop smoking if you are having trouble quitting  · Ask about your weight:  Ask healthcare providers if you need to lose weight, and how much to lose  Ask them to help you with a weight loss program  Even a 10 to 15 pound weight loss can help you manage your blood sugar level  · Carry medical alert identification  Wear medical alert jewelry or carry a card that says you have diabetes  Ask your healthcare provider where to get these items  · Ask about vaccines  Diabetes puts you at risk of serious illness if you get the flu, pneumonia, or hepatitis   Ask your healthcare provider if you should get a flu, pneumonia, or hepatitis B vaccine, and when to get the vaccine  Follow up with your healthcare provider as directed:  Write down your questions so you remember to ask them during your visits  CARE AGREEMENT:   You have the right to help plan your care  Learn about your health condition and how it may be treated  Discuss treatment options with your caregivers to decide what care you want to receive  You always have the right to refuse treatment  The above information is an  only  It is not intended as medical advice for individual conditions or treatments  Talk to your doctor, nurse or pharmacist before following any medical regimen to see if it is safe and effective for you  © 2014 [address] is for End User's use only and may not be sold, redistributed or otherwise used for commercial purposes  All illustrations and images included in CareNotes® are the copyrighted property of A D A M [employer] Inc  or [employer]

## 2022-03-14 DIAGNOSIS — I10 ESSENTIAL HYPERTENSION: ICD-10-CM

## 2022-03-14 RX ORDER — OLMESARTAN MEDOXOMIL 40 MG/1
TABLET ORAL
Qty: 90 TABLET | Refills: 3 | Status: SHIPPED | OUTPATIENT
Start: 2022-03-14

## 2022-08-03 DIAGNOSIS — E78.5 HYPERLIPIDEMIA, UNSPECIFIED HYPERLIPIDEMIA TYPE: ICD-10-CM

## 2022-08-03 DIAGNOSIS — I10 ESSENTIAL HYPERTENSION: ICD-10-CM

## 2022-08-03 RX ORDER — AMLODIPINE BESYLATE 5 MG/1
TABLET ORAL
Qty: 90 TABLET | Refills: 24 | Status: SHIPPED | OUTPATIENT
Start: 2022-08-03

## 2022-08-03 RX ORDER — ATORVASTATIN CALCIUM 40 MG/1
TABLET, FILM COATED ORAL
Qty: 90 TABLET | Refills: 24 | Status: SHIPPED | OUTPATIENT
Start: 2022-08-03

## 2022-08-03 RX ORDER — METOPROLOL SUCCINATE 50 MG/1
TABLET, EXTENDED RELEASE ORAL
Qty: 90 TABLET | Refills: 24 | Status: SHIPPED | OUTPATIENT
Start: 2022-08-03

## 2023-10-30 ENCOUNTER — TELEPHONE (OUTPATIENT)
Dept: CARDIOLOGY CLINIC | Facility: CLINIC | Age: 63
End: 2023-10-30

## 2023-12-12 ENCOUNTER — OFFICE VISIT (OUTPATIENT)
Dept: CARDIOLOGY CLINIC | Facility: CLINIC | Age: 63
End: 2023-12-12
Payer: COMMERCIAL

## 2023-12-12 VITALS
WEIGHT: 280.6 LBS | HEART RATE: 103 BPM | BODY MASS INDEX: 38.06 KG/M2 | SYSTOLIC BLOOD PRESSURE: 158 MMHG | DIASTOLIC BLOOD PRESSURE: 96 MMHG | OXYGEN SATURATION: 98 %

## 2023-12-12 DIAGNOSIS — E78.5 HYPERLIPIDEMIA, UNSPECIFIED HYPERLIPIDEMIA TYPE: ICD-10-CM

## 2023-12-12 DIAGNOSIS — E78.5 DYSLIPIDEMIA: ICD-10-CM

## 2023-12-12 DIAGNOSIS — I10 ESSENTIAL HYPERTENSION: ICD-10-CM

## 2023-12-12 DIAGNOSIS — I51.9 LEFT VENTRICULAR SYSTOLIC DYSFUNCTION WITHOUT HEART FAILURE: ICD-10-CM

## 2023-12-12 DIAGNOSIS — I10 HYPERTENSION, UNSPECIFIED TYPE: ICD-10-CM

## 2023-12-12 DIAGNOSIS — I25.10 CORONARY ARTERY DISEASE INVOLVING NATIVE HEART WITHOUT ANGINA PECTORIS, UNSPECIFIED VESSEL OR LESION TYPE: Primary | ICD-10-CM

## 2023-12-12 DIAGNOSIS — E11.8 TYPE 2 DIABETES MELLITUS WITH COMPLICATION, WITHOUT LONG-TERM CURRENT USE OF INSULIN (HCC): ICD-10-CM

## 2023-12-12 PROCEDURE — 93000 ELECTROCARDIOGRAM COMPLETE: CPT | Performed by: INTERNAL MEDICINE

## 2023-12-12 PROCEDURE — 99214 OFFICE O/P EST MOD 30 MIN: CPT | Performed by: INTERNAL MEDICINE

## 2023-12-12 RX ORDER — ATORVASTATIN CALCIUM 40 MG/1
40 TABLET, FILM COATED ORAL EVERY EVENING
Qty: 90 TABLET | Refills: 3 | Status: SHIPPED | OUTPATIENT
Start: 2023-12-12

## 2023-12-12 RX ORDER — METOPROLOL SUCCINATE 50 MG/1
50 TABLET, EXTENDED RELEASE ORAL DAILY
Qty: 90 TABLET | Refills: 3 | Status: SHIPPED | OUTPATIENT
Start: 2023-12-12

## 2023-12-12 RX ORDER — AMLODIPINE BESYLATE 5 MG/1
5 TABLET ORAL DAILY
Qty: 90 TABLET | Refills: 3 | Status: SHIPPED | OUTPATIENT
Start: 2023-12-12

## 2023-12-12 NOTE — PROGRESS NOTES
[facility]    CHIEF COMPLAINT:   Chief Complaint   Patient presents with    Constipation     Since he ran out of medication last week has had issue with constipation       HPI:  Bipin Peralta is a 61 y.o. male with a past medical history of hypertension, dyslipidemia, coronary artery disease, NSTEMI (12/2017). Follows with Dr. Christopher Ordaz. He had appointment at the end of January 2024 but he ran out of his medication so he requested to be seen earlier by another provider. Last appointment was telemedicine visit on 2/19/2021. Lisinopril was discontinued and olmesartan 40 mg restarted. He has not been back to the office for follow-up since then due to personal reasons. His wife was diagnosed with breast cancer in 2021 and she has had a double mastectomy. She also had liver metastasis and a partial liver resection. He does not recall taking olmesartan for at least 1-2 years. He takes amlodipine, metoprolol succinate, atorvastatin, and aspirin. He ran out of his medications last Tuesday. He does report some constipation since then but otherwise he has no acute complaints. He works remotely for [employer] and travels frequently. He walks about 2 miles every morning. He denies any exertional chest pain, palpitations, or dyspnea. He denies any fever, chills, fatigue, visual changes, lightheadedness, syncope, orthopnea, PND, nausea, vomiting, leg claudication. He does have some trace lower extremity edema. The following portions of the patient's history were reviewed and updated as appropriate: allergies, current medications, past family history, past medical history, past social history, past surgical history, and problem list.    SINCE LAST OV I REVIEWED WITH THE PATIENT THE INTERIM LABS, TEST RESULTS, CONSULTANT(S) NOTES AND PERFORMED AN INTERIM REVIEW OF HISTORY    History reviewed. No pertinent past medical history.     Past Surgical History:   Procedure Laterality Date    EYE SURGERY      WISDOM TOOTH EXTRACTION         Social History     Socioeconomic History    Marital status: /Civil Union     Spouse name: Not on file    Number of children: Not on file    Years of education: Not on file    Highest education level: Not on file   Occupational History    Occupation: physically demanding job   Tobacco Use    Smoking status: Never    Smokeless tobacco: Never   Substance and Sexual Activity    Alcohol use: No     Comment: 1-2 drinks a year    Drug use: No    Sexual activity: Not on file   Other Topics Concern    Not on file   Social History Narrative    Stress at work     Social Determinants of Health     Financial Resource Strain: Not on file   Food Insecurity: Not on file   Transportation Needs: Not on file   Physical Activity: Not on file   Stress: Not on file   Social Connections: Not on file   Intimate Partner Violence: Not on file   Housing Stability: Not on file       Family History   Problem Relation Age of Onset    Diabetes Mother     Heart disease Father     Heart disease Paternal Grandfather        No Known Allergies    Current Outpatient Medications   Medication Sig Dispense Refill    amLODIPine (NORVASC) 5 mg tablet TAKE 1 TABLET BY MOUTH EVERY DAY 90 tablet 24    aspirin 81 MG tablet Take 1 tablet by mouth daily  0    atorvastatin (LIPITOR) 40 mg tablet TAKE 1 TABLET BY MOUTH EVERY DAY IN THE EVENING 90 tablet 24    metoprolol succinate (TOPROL-XL) 50 mg 24 hr tablet TAKE 1 TABLET BY MOUTH EVERY DAY 90 tablet 24    Blood Glucose Monitoring Suppl (ONE TOUCH ULTRA 2) w/Device KIT Use as directed  0    Lancets (ONETOUCH ULTRASOFT) lancets       olmesartan (BENICAR) 40 mg tablet TAKE 1 TABLET BY MOUTH EVERY DAY 90 tablet 3    ONE TOUCH ULTRA TEST test strip TEST SUGAR BEFORE BREAKFAST AND DINNER  0     No current facility-administered medications for this visit.        /96 (BP Location: Left arm)   Pulse 103   Wt 127 kg (280 lb 9.6 oz)   SpO2 98%   BMI 38.06 kg/m²     Review of Systems   All other systems reviewed and are negative. Physical Exam  Vitals reviewed. Constitutional:       General: He is not in acute distress. Appearance: He is well-developed. He is obese. He is not toxic-appearing. HENT:      Head: Normocephalic and atraumatic. Eyes:      General: No scleral icterus. Extraocular Movements: Extraocular movements intact. Conjunctiva/sclera: Conjunctivae normal.   Neck:      Vascular: No carotid bruit. Cardiovascular:      Rate and Rhythm: Normal rate and regular rhythm. Pulses: Normal pulses. Heart sounds: Normal heart sounds. No murmur heard. No gallop. Pulmonary:      Effort: Pulmonary effort is normal. No respiratory distress. Breath sounds: Normal breath sounds. No wheezing or rales. Abdominal:      General: Bowel sounds are normal. There is no distension. Palpations: Abdomen is soft. Tenderness: There is no abdominal tenderness. There is no guarding. Musculoskeletal:         General: No swelling. Right lower leg: Edema (trace) present. Left lower leg: Edema (trace) present. Skin:     General: Skin is warm and dry. Capillary Refill: Capillary refill takes less than 2 seconds. Coloration: Skin is not jaundiced or pale. Neurological:      Mental Status: He is alert.    Psychiatric:         Mood and Affect: Mood normal.         Behavior: Behavior normal.          Lab Results   Component Value Date    K 4.6 10/04/2018     10/04/2018    CO2 26 10/04/2018    BUN 22 10/04/2018    CREATININE 1.04 10/04/2018    CALCIUM 8.4 10/04/2018    ALT 26 10/04/2018    AST 17 10/04/2018       Lab Results   Component Value Date    HDL 38 (L) 10/04/2018    LDLCALC 52 10/04/2018    TRIG 60 10/04/2018       Lab Results   Component Value Date    WBC 9.83 12/27/2017    HGB 13.2 12/27/2017    HCT 40.9 12/27/2017     12/27/2017       Lab Results   Component Value Date     10/04/2018    HGBA1C 5.8 10/04/2018       No results found for: "TSH"    Cardiac studies:   Results for orders placed or performed in visit on 12/12/23   POCT ECG    Impression    Sinus tachycardia  Anteroseptal infarct, age undetermined      TTE - 12/27/2017:  LEFT VENTRICLE:  Systolic function was mildly reduced. Ejection fraction was estimated to be 45 %. There was severe hypokinesis of the basal inferoseptal and basal-mid inferior wall(s). Doppler parameters were consistent with abnormal left ventricular relaxation (grade 1 diastolic dysfunction). MITRAL VALVE:  There was mild annular calcification. TRICUSPID VALVE:  There was trace regurgitation. Cardiac cath - 12/26/2017:  Left main: Normal.  LAD: The vessel was medium to large sized. Angiography showed mild atherosclerosis. Circumflex: The vessel was very large sized. Mid circumflex: There was a discrete 90 % stenosis. The lesion was irregularly contoured, hazy, and without evidence of thrombus. There was TIMUR grade 3 flow through the vessel (brisk flow). This is a likely culprit for the patient's  clinical presentation. It appears amenable to percutaneous intervention. RCA: The vessel was medium sized. The mid RCA occlusion appears to be chronic as there is R to R and L to R collaterals seen. Mid RCA: There was a 100 % stenosis. Distal RCA: There was a discrete 80 % stenosis. 1ST LESION INTERVENTIONS: A successful stent procedure was performed on the 90 % lesion in the mid circumflex. Following intervention there was an excellent angiographic appearance with a 0 % residual stenosis. This was not a bifurcation lesion. This was an ACC/AHA type A "low risk" lesion for intervention. There was no evidence of the transient no-reflow phenomenon. The residual lesion was discrete and demonstrated no evidence of thrombus. There  was TIMUR 3 flow before the procedure and TIMUR 3 flow after the procedure.   A Resolute Integrity Rx 4.0 x 18 drug-eluting stent was placed across the lesion and deployed at a maximum inflation pressure of 12 courtney. ASSESSMENT AND PLAN:  Deloris Christiansen was seen today for constipation. Diagnoses and all orders for this visit:    Coronary artery disease involving native heart without angina pectoris, unspecified vessel or lesion type: History of non-STEMI in December 2017. Cath demonstrated 95% left circumflex lesion and chronic mid RCA occlusion with collaterals. He had PCI of his left circumflex.    -No anginal complaints  -BP goal <130/80 - resume antihypertensives  -Repeat A1c and lipid panel to assess if at goal  -Continue aspirin, statin, BB  -F/u with Dr. Myers Plan in 6 months  -     Comprehensive metabolic panel  -     Lipid Panel with Direct LDL reflex  -     HEMOGLOBIN A1C W/ EAG ESTIMATION  -     atorvastatin (LIPITOR) 40 mg tablet; Take 1 tablet (40 mg total) by mouth every evening    Hypertension, unspecified type  -     POCT ECG  Essential hypertension: BP elevated today while off all antihypertensive medications. New prescriptions were sent to the pharmacy. Resume meds ASAP. Moderate intensity physical activity and weight loss. Low sodium diet was also discussed. -     amLODIPine (NORVASC) 5 mg tablet; Take 1 tablet (5 mg total) by mouth daily  -     metoprolol succinate (TOPROL-XL) 50 mg 24 hr tablet; Take 1 tablet (50 mg total) by mouth daily    Dyslipidemia  Hyperlipidemia, unspecified hyperlipidemia type:  -Goal LDL <70  -Controlled per last lipid panel from 2018  -Check repeat lipid panel  -Continue atorvastatin 40 mg    Type 2 diabetes mellitus with complication, without long-term current use of insulin ([address]): History of diabetes mellitus hemoglobin A1c was 9.3% at the time of his NSTEMI in 2017. As of 10/2018 his A1c was 5.8%. It has been diet controlled. I do recommend he follow-up with his primary care provider for foot and eye exams. We will repeat a hemoglobin A1c.     Left ventricular systolic dysfunction without heart failure: Echocardiogram at the time of his NSTEMI in 2017 demonstrated a mildly reduced ejection fraction of 45% and wall motion abnormalities. He takes metoprolol succinate 50 mg daily. He is prescribed olmesartan but he has not been taking this. He has no signs/symptoms of congestive heart failure. He does have some trace lower extremity edema on exam.  We will repeat an echocardiogram to check his LV function. For now, continue metoprolol succinate 50 mg daily. If his blood pressure remains elevated and renal function allows, add ARB. -     Echo complete w/ contrast if indicated;  Future    Ryland Tomas MD

## 2023-12-12 NOTE — PATIENT INSTRUCTIONS
COMMENTS:   10 days 40w 4d weeks adjusted gestational age. Delivered via  on 23.    PLAN:  - Provide developmentally appropriate care and screenings   You were seen today in the Cardiology office for follow up. Prescriptions for your cardiac medications were sent to the pharmacy. I do recommend continuing moderate intensity physical activity. Try to follow a low sodium diet. Please see the DASH diet handout. 1) Have blood work prior to your next appointment  2) Echocardiogram  3) Follow up with Dr. Pako Sheridan in 6 months     Thank you for choosing [address] [place] [address].

## 2024-01-03 ENCOUNTER — HOSPITAL ENCOUNTER (OUTPATIENT)
Dept: NON INVASIVE DIAGNOSTICS | Facility: CLINIC | Age: 64
Discharge: HOME | End: 2024-01-03
Payer: COMMERCIAL

## 2024-01-03 VITALS
BODY MASS INDEX: 37.93 KG/M2 | DIASTOLIC BLOOD PRESSURE: 96 MMHG | WEIGHT: 280 LBS | HEART RATE: 80 BPM | HEIGHT: 72 IN | SYSTOLIC BLOOD PRESSURE: 158 MMHG

## 2024-01-03 DIAGNOSIS — I51.9 LEFT VENTRICULAR SYSTOLIC DYSFUNCTION WITHOUT HEART FAILURE: ICD-10-CM

## 2024-01-03 LAB
AORTIC ROOT: 3.8 CM
APICAL FOUR CHAMBER EJECTION FRACTION: 56 %
ASCENDING AORTA: 3.9 CM
E WAVE DECELERATION TIME: 241 MS
E/A RATIO: 0.69
FRACTIONAL SHORTENING: 22 (ref 28–44)
INTERVENTRICULAR SEPTUM IN DIASTOLE (PARASTERNAL SHORT AXIS VIEW): 1.5 CM
INTERVENTRICULAR SEPTUM: 1.5 CM (ref 0.6–1.1)
LAAS-AP2: 23.3 CM2
LAAS-AP4: 24 CM2
LEFT ATRIUM SIZE: 5 CM
LEFT ATRIUM VOLUME (MOD BIPLANE): 82 ML
LEFT ATRIUM VOLUME INDEX (MOD BIPLANE): 33.3 ML/M2
LEFT INTERNAL DIMENSION IN SYSTOLE: 3.2 CM (ref 2.1–4)
LEFT VENTRICULAR INTERNAL DIMENSION IN DIASTOLE: 4.1 CM (ref 3.5–6)
LEFT VENTRICULAR POSTERIOR WALL IN END DIASTOLE: 1.5 CM
LEFT VENTRICULAR STROKE VOLUME: 34 ML
LVSV (TEICH): 34 ML
MV E'TISSUE VEL-SEP: 9 CM/S
MV PEAK A VEL: 1.21 M/S
MV PEAK E VEL: 84 CM/S
MV STENOSIS PRESSURE HALF TIME: 70 MS
MV VALVE AREA P 1/2 METHOD: 3.14
RIGHT ATRIAL 2D VOLUME: 46 ML
RIGHT ATRIUM AREA SYSTOLE A4C: 17.7 CM2
RIGHT VENTRICLE ID DIMENSION: 3.8 CM
SINOTUBULAR JUNCTION: 3.1 CM
SL CV LEFT ATRIUM LENGTH A2C: 5.6 CM
SL CV LV EF: 60
SL CV PED ECHO LEFT VENTRICLE DIASTOLIC VOLUME (MOD BIPLANE) 2D: 74 ML
SL CV PED ECHO LEFT VENTRICLE SYSTOLIC VOLUME (MOD BIPLANE) 2D: 40 ML
SL CV SINUS OF VALSALVA 2D: 3.8 CM
STJ: 3.1 CM
TRICUSPID ANNULAR PLANE SYSTOLIC EXCURSION: 2.2 CM

## 2024-01-03 PROCEDURE — 93306 TTE W/DOPPLER COMPLETE: CPT

## 2024-01-03 PROCEDURE — 93306 TTE W/DOPPLER COMPLETE: CPT | Performed by: INTERNAL MEDICINE

## 2024-01-03 RX ADMIN — PERFLUTREN 0.6 ML/MIN: 6.52 INJECTION, SUSPENSION INTRAVENOUS at 09:40

## 2024-01-15 ENCOUNTER — OFFICE VISIT (OUTPATIENT)
Dept: GASTROENTEROLOGY | Facility: AMBULARY SURGERY CENTER | Age: 64
End: 2024-01-15
Payer: COMMERCIAL

## 2024-01-15 ENCOUNTER — HOSPITAL ENCOUNTER (OUTPATIENT)
Dept: RADIOLOGY | Facility: HOSPITAL | Age: 64
Discharge: HOME | End: 2024-01-15
Payer: COMMERCIAL

## 2024-01-15 VITALS
WEIGHT: 274.6 LBS | BODY MASS INDEX: 37.19 KG/M2 | DIASTOLIC BLOOD PRESSURE: 88 MMHG | HEIGHT: 72 IN | SYSTOLIC BLOOD PRESSURE: 132 MMHG | OXYGEN SATURATION: 98 % | HEART RATE: 86 BPM

## 2024-01-15 DIAGNOSIS — R19.4 CHANGE IN BOWEL HABIT: ICD-10-CM

## 2024-01-15 DIAGNOSIS — R10.13 EPIGASTRIC PAIN: ICD-10-CM

## 2024-01-15 DIAGNOSIS — Z80.0 FAMILY HISTORY OF COLON CANCER: ICD-10-CM

## 2024-01-15 DIAGNOSIS — R19.4 CHANGE IN BOWEL HABIT: Primary | ICD-10-CM

## 2024-01-15 PROCEDURE — 99203 OFFICE O/P NEW LOW 30 MIN: CPT | Performed by: PHYSICIAN ASSISTANT

## 2024-01-15 PROCEDURE — 74018 RADEX ABDOMEN 1 VIEW: CPT

## 2024-01-15 NOTE — PROGRESS NOTES
[facility] - New Patient Office Visit  Maciel Gaitan 63 y.o. male MRN: [id number]  Encounter: [id number]          ASSESSMENT AND PLAN:      1. Change in bowel habit  -Get TSH  -X-ray to assess stool burden  - start psylium husk    2. Epigastric Pain    3. Family history of colon cancer  - sister at age 55  - plan colonoscopy  - This risks of this procedure include but are not limited to; anesthesia complications, injury/perforation of the bowel, infection and bleeding.      ______________________________________________________________________    Chief Complaint: Change in bowel habits    HPI: This is a 63-year-old male with past medical history of type 2 diabetes, morbid obesity, coronary artery disease, hypertension and hyperlipidemia who presents today with change in bowels    Years of issues - 3 weeks  Number of bowel movements weekly - 2-3  Straining - Yes  Sense of incomplete bowel movements - Yes  History of hemorrhoids - NO  History of rectal bleeding - No  Current Opioid use - No  Current medications - No new meds  Rx meds in past - Mag citrate, fiber  Last colonoscopy - none previously    Sister with history of colon cancer.    No family history of inflammatory bowel disease.  No pacemaker or defibrillator implanted.   No chronic kidney disease or solitary kidney.  Not on any supplemental oxygen at night.  Not on any antiplatelet or anticoagulants.      Endoscopy History:  EGD - none previously  Colonoscopy - none previously    REVIEW OF SYSTEMS:    CONSTITUTIONAL: Denies any fever, chills, rigors, and weight loss.  HEENT: Denies hearing loss or visual disturbances.  CARDIOVASCULAR: No chest pain or palpitations.   RESPIRATORY: Denies any cough, hemoptysis, shortness of breath or dyspnea on exertion.  GASTROINTESTINAL: As noted in the History of Present Illness.   GENITOURINARY: No problems with urination. Denies any hematuria or dysuria.  NEUROLOGIC: No dizziness or vertigo,  denies headaches.   MUSCULOSKELETAL: Denies any muscle or joint pain.   SKIN: Denies skin rashes or itching.   ENDOCRINE: Denies excessive thirst. Denies intolerance to heat or cold.  PSYCHOSOCIAL: Denies depression or anxiety. Denies any recent memory loss.       Historical Information   History reviewed. No pertinent past medical history.  Past Surgical History:   Procedure Laterality Date   • EYE SURGERY     • WISDOM TOOTH EXTRACTION       Social History   Social History     Substance and Sexual Activity   Alcohol Use No    Comment: 1-2 drinks a year     Social History     Substance and Sexual Activity   Drug Use No     Social History     Tobacco Use   Smoking Status Never   Smokeless Tobacco Never     Family History   Problem Relation Age of Onset   • Diabetes Mother    • Heart disease Father    • Heart disease Paternal Grandfather        Meds/Allergies       Current Outpatient Medications:   •  amLODIPine (NORVASC) 5 mg tablet  •  aspirin 81 MG tablet  •  atorvastatin (LIPITOR) 40 mg tablet  •  metoprolol succinate (TOPROL-XL) 50 mg 24 hr tablet    No Known Allergies        Objective     Blood pressure 132/88, pulse 86, height 6' (1.829 m), weight 125 kg (274 lb 9.6 oz), SpO2 98%. Body mass index is 37.24 kg/m².        PHYSICAL EXAM:      General Appearance:   Alert, cooperative, no distress, appears stated age   HEENT:   Normocephalic, atraumatic, anicteric.     Neck:  Supple, symmetrical   Lungs:   Clear to auscultation bilaterally; no rales, rhonchi or wheezing; respirations unlabored    Heart::   Regular rate and rhythm; no murmur, rub, or gallop.   Abdomen:   Soft, non-tender, non-distended; normal bowel sounds; no masses, no organomegaly    Genitalia:   Deferred    Rectal:   Deferred    Extremities:  No cyanosis, clubbing or edema    Pulses:  Not assessed   Skin:  No jaundice, rashes, or lesions    Lymph nodes:  Not assessed       Lab Results:   No visits with results within 1 Day(s) from this visit.    Latest known visit with results is:   Hospital Outpatient Visit on 01/03/2024   Component Date Value   • Sinus of Valsalva, 2D 01/03/2024 3.8    • RAA A4C 01/03/2024 17.7    • LA Volume Index (BP) 01/03/2024 33.3    • MV Peak A Sulaiman 01/03/2024 1.21    • MV stenosis pressure 1/2* 01/03/2024 70    • MV Peak E Sulaiman 01/03/2024 84    • E wave deceleration time 01/03/2024 241    • E/A ratio 01/03/2024 0.69    • MV valve area p 1/2 meth* 01/03/2024 3.14    • RA 2D Volume 01/03/2024 46.0    • RVID d 01/03/2024 3.8    • A4C EF 01/03/2024 56    • Left ventricular stroke * 01/03/2024 34.00    • IVSd 01/03/2024 1.50    • Tricuspid annular plane * 01/03/2024 2.20    • Ao root 01/03/2024 3.80    • Ao STJ 01/03/2024 3.10    • LVPWd 01/03/2024 1.50    • LA size 01/03/2024 5    • Asc Ao 01/03/2024 3.9    • STJ 01/03/2024 3.1    • LA volume (BP) 01/03/2024 82    • FS 01/03/2024 22    • LVIDS 01/03/2024 3.20    • IVS 01/03/2024 1.5    • LVIDd 01/03/2024 4.10    • LA length (A2C) 01/03/2024 5.60    • LEFT VENTRICLE SYSTOLIC * 01/03/2024 40    • LV DIASTOLIC VOLUME (MOD* 01/03/2024 74    • Left Atrium Area-systoli* 01/03/2024 24    • Left Atrium Area-systoli* 01/03/2024 23.3    • MV E' Tissue Velocity Se* 01/03/2024 9    • LVSV, 2D 01/03/2024 34    • LV EF 01/03/2024 60          Radiology Results:   Echo complete w/ contrast if indicated    Result Date: 1/3/2024  Narrative: •  Left Ventricle: Left ventricular cavity size is normal. Wall thickness is moderately increased. There is moderate concentric hypertrophy. The left ventricular ejection fraction is 60%. Systolic function is normal. Wall motion is normal. Diastolic function is normal for age. •  Aorta: The aortic root is mildly dilated. The ascending aorta is mildly dilated. The aortic root is 3.80 cm. The ascending aorta is 3.9 cm.       Danis Jenkins PA-C

## 2024-01-15 NOTE — PATIENT INSTRUCTIONS
Scheduled date of colonoscopy (as of today):  02/22/24  Physician performing colonoscopy:  Dr Gonsalves  Location of colonoscopy:  [facility]   Bowel prep reviewed with patient:  Merissa/dulcolax  Instructions reviewed with patient by:  Emma VASQUEZ   Clearances: n/a

## 2024-01-15 NOTE — LETTER
January 15, 2024     Amy Swift MD  [address] [address]    Patient: Maciel Gaitan   YOB: 1960   Date of Visit: 1/15/2024       Dear Dr. Swift:    Thank you for referring Maciel Gaitan to me for evaluation. Below are my notes for this consultation.    If you have questions, please do not hesitate to call me. I look forward to following your patient along with you.         Sincerely,        Danis Jenkins PA-C        CC: No Recipients    Danis Jenkins PA-C  1/15/2024  4:22 PM  Incomplete  [facility] - New Patient Office Visit  Maciel Gaitan 63 y.o. male MRN: [id number]  Encounter: [id number]          ASSESSMENT AND PLAN:      1. Change in bowel habit  -Get TSH  -X-ray to assess stool burden  - start psylium husk    2. Epigastric Pain    3. Family history of colon cancer  - sister at age 55  - plan colonoscopy  - This risks of this procedure include but are not limited to; anesthesia complications, injury/perforation of the bowel, infection and bleeding.      ______________________________________________________________________    Chief Complaint: Change in bowel habits    HPI: This is a 63-year-old male with past medical history of type 2 diabetes, morbid obesity, coronary artery disease, hypertension and hyperlipidemia who presents today with change in bowels    Years of issues - 3 weeks  Number of bowel movements weekly - 2-3  Straining - Yes  Sense of incomplete bowel movements - Yes  History of hemorrhoids - NO  History of rectal bleeding - No  Current Opioid use - No  Current medications - No new meds  Rx meds in past - Mag citrate, fiber  Last colonoscopy - none previously    Sister with history of colon cancer.    No family history of inflammatory bowel disease.  No pacemaker or defibrillator implanted.   No chronic kidney disease or solitary kidney.  Not on any supplemental oxygen at night.  Not on any antiplatelet or  anticoagulants.      Endoscopy History:  EGD - none previously  Colonoscopy - none previously    REVIEW OF SYSTEMS:    CONSTITUTIONAL: Denies any fever, chills, rigors, and weight loss.  HEENT: Denies hearing loss or visual disturbances.  CARDIOVASCULAR: No chest pain or palpitations.   RESPIRATORY: Denies any cough, hemoptysis, shortness of breath or dyspnea on exertion.  GASTROINTESTINAL: As noted in the History of Present Illness.   GENITOURINARY: No problems with urination. Denies any hematuria or dysuria.  NEUROLOGIC: No dizziness or vertigo, denies headaches.   MUSCULOSKELETAL: Denies any muscle or joint pain.   SKIN: Denies skin rashes or itching.   ENDOCRINE: Denies excessive thirst. Denies intolerance to heat or cold.  PSYCHOSOCIAL: Denies depression or anxiety. Denies any recent memory loss.       Historical Information  History reviewed. No pertinent past medical history.  Past Surgical History:   Procedure Laterality Date   • EYE SURGERY     • WISDOM TOOTH EXTRACTION       Social History  Social History     Substance and Sexual Activity   Alcohol Use No    Comment: 1-2 drinks a year     Social History     Substance and Sexual Activity   Drug Use No     Social History     Tobacco Use   Smoking Status Never   Smokeless Tobacco Never     Family History   Problem Relation Age of Onset   • Diabetes Mother    • Heart disease Father    • Heart disease Paternal Grandfather        Meds/Allergies      Current Outpatient Medications:   •  amLODIPine (NORVASC) 5 mg tablet  •  aspirin 81 MG tablet  •  atorvastatin (LIPITOR) 40 mg tablet  •  metoprolol succinate (TOPROL-XL) 50 mg 24 hr tablet    No Known Allergies        Objective    Blood pressure 132/88, pulse 86, height 6' (1.829 m), weight 125 kg (274 lb 9.6 oz), SpO2 98%. Body mass index is 37.24 kg/m².        PHYSICAL EXAM:      General Appearance:   Alert, cooperative, no distress, appears stated age   HEENT:   Normocephalic, atraumatic, anicteric.     Neck:   Supple, symmetrical   Lungs:   Clear to auscultation bilaterally; no rales, rhonchi or wheezing; respirations unlabored    Heart::   Regular rate and rhythm; no murmur, rub, or gallop.   Abdomen:   Soft, non-tender, non-distended; normal bowel sounds; no masses, no organomegaly    Genitalia:   Deferred    Rectal:   Deferred    Extremities:  No cyanosis, clubbing or edema    Pulses:  Not assessed   Skin:  No jaundice, rashes, or lesions    Lymph nodes:  Not assessed       Lab Results:   No visits with results within 1 Day(s) from this visit.   Latest known visit with results is:   Hospital Outpatient Visit on 01/03/2024   Component Date Value   • Sinus of Valsalva, 2D 01/03/2024 3.8    • RAA A4C 01/03/2024 17.7    • LA Volume Index (BP) 01/03/2024 33.3    • MV Peak A Sulaiman 01/03/2024 1.21    • MV stenosis pressure 1/2* 01/03/2024 70    • MV Peak E Sulaiman 01/03/2024 84    • E wave deceleration time 01/03/2024 241    • E/A ratio 01/03/2024 0.69    • MV valve area p 1/2 meth* 01/03/2024 3.14    • RA 2D Volume 01/03/2024 46.0    • RVID d 01/03/2024 3.8    • A4C EF 01/03/2024 56    • Left ventricular stroke * 01/03/2024 34.00    • IVSd 01/03/2024 1.50    • Tricuspid annular plane * 01/03/2024 2.20    • Ao root 01/03/2024 3.80    • Ao STJ 01/03/2024 3.10    • LVPWd 01/03/2024 1.50    • LA size 01/03/2024 5    • Asc Ao 01/03/2024 3.9    • STJ 01/03/2024 3.1    • LA volume (BP) 01/03/2024 82    • FS 01/03/2024 22    • LVIDS 01/03/2024 3.20    • IVS 01/03/2024 1.5    • LVIDd 01/03/2024 4.10    • LA length (A2C) 01/03/2024 5.60    • LEFT VENTRICLE SYSTOLIC * 01/03/2024 40    • LV DIASTOLIC VOLUME (MOD* 01/03/2024 74    • Left Atrium Area-systoli* 01/03/2024 24    • Left Atrium Area-systoli* 01/03/2024 23.3    • MV E' Tissue Velocity Se* 01/03/2024 9    • LVSV, 2D 01/03/2024 34    • LV EF 01/03/2024 60          Radiology Results:   Echo complete w/ contrast if indicated    Result Date: 1/3/2024  Narrative: •  Left Ventricle: Left  ventricular cavity size is normal. Wall thickness is moderately increased. There is moderate concentric hypertrophy. The left ventricular ejection fraction is 60%. Systolic function is normal. Wall motion is normal. Diastolic function is normal for age. •  Aorta: The aortic root is mildly dilated. The ascending aorta is mildly dilated. The aortic root is 3.80 cm. The ascending aorta is 3.9 cm.       Danis Jenkins PA-C  1/15/2024  4:11 PM  Sign when Signing Visit  [facility] - New Patient Office Visit  Maciel Gaitan 63 y.o. male MRN: [id number]  Encounter: [id number]          ASSESSMENT AND PLAN:      1. Change in bowel habit  -Get TSH  -X-ray to assess stool burden    2. Epigastric Pain    ______________________________________________________________________    Chief Complaint: Change in bowel habits    HPI: This is a 63-year-old male with past medical history of type 2 diabetes, morbid obesity, coronary artery disease, hypertension and hyperlipidemia who presents today with change in bowels    Endoscopy History:  EGD -   Colonoscopy -     REVIEW OF SYSTEMS:    CONSTITUTIONAL: Denies any fever, chills, rigors, and weight loss.  HEENT: Denies hearing loss or visual disturbances.  CARDIOVASCULAR: No chest pain or palpitations.   RESPIRATORY: Denies any cough, hemoptysis, shortness of breath or dyspnea on exertion.  GASTROINTESTINAL: As noted in the History of Present Illness.   GENITOURINARY: No problems with urination. Denies any hematuria or dysuria.  NEUROLOGIC: No dizziness or vertigo, denies headaches.   MUSCULOSKELETAL: Denies any muscle or joint pain.   SKIN: Denies skin rashes or itching.   ENDOCRINE: Denies excessive thirst. Denies intolerance to heat or cold.  PSYCHOSOCIAL: Denies depression or anxiety. Denies any recent memory loss.       Historical Information  History reviewed. No pertinent past medical history.  Past Surgical History:    Procedure Laterality Date   • EYE SURGERY     • WISDOM TOOTH EXTRACTION       Social History  Social History     Substance and Sexual Activity   Alcohol Use No    Comment: 1-2 drinks a year     Social History     Substance and Sexual Activity   Drug Use No     Social History     Tobacco Use   Smoking Status Never   Smokeless Tobacco Never     Family History   Problem Relation Age of Onset   • Diabetes Mother    • Heart disease Father    • Heart disease Paternal Grandfather        Meds/Allergies      Current Outpatient Medications:   •  amLODIPine (NORVASC) 5 mg tablet  •  aspirin 81 MG tablet  •  atorvastatin (LIPITOR) 40 mg tablet  •  metoprolol succinate (TOPROL-XL) 50 mg 24 hr tablet    No Known Allergies        Objective    Blood pressure 132/88, pulse 86, height 6' (1.829 m), weight 125 kg (274 lb 9.6 oz), SpO2 98%. Body mass index is 37.24 kg/m².        PHYSICAL EXAM:      General Appearance:   Alert, cooperative, no distress, appears stated age   HEENT:   Normocephalic, atraumatic, anicteric.     Neck:  Supple, symmetrical   Lungs:   Clear to auscultation bilaterally; no rales, rhonchi or wheezing; respirations unlabored    Heart::   Regular rate and rhythm; no murmur, rub, or gallop.   Abdomen:   Soft, non-tender, non-distended; normal bowel sounds; no masses, no organomegaly    Genitalia:   Deferred    Rectal:   Deferred    Extremities:  No cyanosis, clubbing or edema    Pulses:  Not assessed   Skin:  No jaundice, rashes, or lesions    Lymph nodes:  Not assessed       Lab Results:   No visits with results within 1 Day(s) from this visit.   Latest known visit with results is:   Hospital Outpatient Visit on 01/03/2024   Component Date Value   • Sinus of Valsalva, 2D 01/03/2024 3.8    • RAA A4C 01/03/2024 17.7    • LA Volume Index (BP) 01/03/2024 33.3    • MV Peak A Sulaiman 01/03/2024 1.21    • MV stenosis pressure 1/2* 01/03/2024 70    • MV Peak E Sulaiman 01/03/2024 84    • E wave deceleration time 01/03/2024 241     • E/A ratio 01/03/2024 0.69    • MV valve area p 1/2 meth* 01/03/2024 3.14    • RA 2D Volume 01/03/2024 46.0    • RVID d 01/03/2024 3.8    • A4C EF 01/03/2024 56    • Left ventricular stroke * 01/03/2024 34.00    • IVSd 01/03/2024 1.50    • Tricuspid annular plane * 01/03/2024 2.20    • Ao root 01/03/2024 3.80    • Ao STJ 01/03/2024 3.10    • LVPWd 01/03/2024 1.50    • LA size 01/03/2024 5    • Asc Ao 01/03/2024 3.9    • STJ 01/03/2024 3.1    • LA volume (BP) 01/03/2024 82    • FS 01/03/2024 22    • LVIDS 01/03/2024 3.20    • IVS 01/03/2024 1.5    • LVIDd 01/03/2024 4.10    • LA length (A2C) 01/03/2024 5.60    • LEFT VENTRICLE SYSTOLIC * 01/03/2024 40    • LV DIASTOLIC VOLUME (MOD* 01/03/2024 74    • Left Atrium Area-systoli* 01/03/2024 24    • Left Atrium Area-systoli* 01/03/2024 23.3    • MV E' Tissue Velocity Se* 01/03/2024 9    • LVSV, 2D 01/03/2024 34    • LV EF 01/03/2024 60          Radiology Results:   Echo complete w/ contrast if indicated    Result Date: 1/3/2024  Narrative: •  Left Ventricle: Left ventricular cavity size is normal. Wall thickness is moderately increased. There is moderate concentric hypertrophy. The left ventricular ejection fraction is 60%. Systolic function is normal. Wall motion is normal. Diastolic function is normal for age. •  Aorta: The aortic root is mildly dilated. The ascending aorta is mildly dilated. The aortic root is 3.80 cm. The ascending aorta is 3.9 cm.       Danis Jenkins PA-C

## 2024-01-23 ENCOUNTER — APPOINTMENT (OUTPATIENT)
Dept: LAB | Facility: CLINIC | Age: 64
End: 2024-01-23
Payer: COMMERCIAL

## 2024-01-23 DIAGNOSIS — R19.4 CHANGE IN BOWEL HABIT: ICD-10-CM

## 2024-01-23 LAB
ALBUMIN SERPL BCP-MCNC: 3.8 G/DL (ref 3.5–5)
ALP SERPL-CCNC: 118 U/L (ref 34–104)
ALT SERPL W P-5'-P-CCNC: 19 U/L (ref 7–52)
ANION GAP SERPL CALCULATED.3IONS-SCNC: 7 MMOL/L
AST SERPL W P-5'-P-CCNC: 18 U/L (ref 13–39)
BILIRUB SERPL-MCNC: 0.7 MG/DL (ref 0.2–1)
BUN SERPL-MCNC: 22 MG/DL (ref 5–25)
CALCIUM SERPL-MCNC: 9.2 MG/DL (ref 8.4–10.2)
CHLORIDE SERPL-SCNC: 104 MMOL/L (ref 96–108)
CHOLEST SERPL-MCNC: 119 MG/DL
CO2 SERPL-SCNC: 28 MMOL/L (ref 21–32)
CREAT SERPL-MCNC: 1.03 MG/DL (ref 0.6–1.3)
EST. AVERAGE GLUCOSE BLD GHB EST-MCNC: 235 MG/DL
GFR SERPL CREATININE-BSD FRML MDRD: 76 ML/MIN/1.73SQ M
GLUCOSE P FAST SERPL-MCNC: 196 MG/DL (ref 65–99)
HBA1C MFR BLD: 9.8 %
HDLC SERPL-MCNC: 32 MG/DL
LDLC SERPL CALC-MCNC: 63 MG/DL (ref 0–100)
POTASSIUM SERPL-SCNC: 4.9 MMOL/L (ref 3.5–5.3)
PROT SERPL-MCNC: 7.3 G/DL (ref 6.4–8.4)
SODIUM SERPL-SCNC: 139 MMOL/L (ref 135–147)
TRIGL SERPL-MCNC: 119 MG/DL
TSH SERPL DL<=0.05 MIU/L-ACNC: 3 UIU/ML (ref 0.45–4.5)

## 2024-01-23 PROCEDURE — 84443 ASSAY THYROID STIM HORMONE: CPT

## 2024-01-25 ENCOUNTER — OFFICE VISIT (OUTPATIENT)
Dept: CARDIOLOGY CLINIC | Facility: CLINIC | Age: 64
End: 2024-01-25
Payer: COMMERCIAL

## 2024-01-25 VITALS
DIASTOLIC BLOOD PRESSURE: 90 MMHG | BODY MASS INDEX: 36.77 KG/M2 | OXYGEN SATURATION: 97 % | SYSTOLIC BLOOD PRESSURE: 140 MMHG | WEIGHT: 271.5 LBS | HEIGHT: 72 IN | HEART RATE: 78 BPM

## 2024-01-25 DIAGNOSIS — I10 ESSENTIAL HYPERTENSION: ICD-10-CM

## 2024-01-25 DIAGNOSIS — I25.10 CORONARY ARTERY DISEASE INVOLVING NATIVE HEART WITHOUT ANGINA PECTORIS, UNSPECIFIED VESSEL OR LESION TYPE: Primary | ICD-10-CM

## 2024-01-25 DIAGNOSIS — E78.5 DYSLIPIDEMIA: ICD-10-CM

## 2024-01-25 PROCEDURE — 99214 OFFICE O/P EST MOD 30 MIN: CPT | Performed by: INTERNAL MEDICINE

## 2024-01-25 NOTE — H&P (VIEW-ONLY)
Cardiology   Maciel Gaitan 63 y.o. male MRN: [id number]        Impression:  1. CAD s/p NSTEMI 12/17 - Had PCI of LCx, has residual  of RCA.  2. Hypertension - improved control.  3. Dylipidemia - on statin.  4. Obesity - losing weight.       Recommendations:  1. Continue current medications.  2. Follow up in 6 months.         HPI: Maciel Gaitan is a 63 y.o. year old male with NSTEMI Type I - cath demonstrated 95% LCx and chronic mid RCA stenosis - Underwent PCI of LCx.  Has been feeling well. No chest pain, shortness of breath, or palpitations.  Echo 1/24 - EF 60% with mod LVH, no valvular abnormalities.         Review of Systems   Constitutional: Negative.    HENT: Negative.     Eyes: Negative.    Respiratory:  Negative for chest tightness and shortness of breath.    Cardiovascular:  Negative for chest pain, palpitations and leg swelling.   Gastrointestinal: Negative.    Endocrine: Negative.    Genitourinary: Negative.    Musculoskeletal: Negative.    Skin: Negative.    Allergic/Immunologic: Negative.    Neurological: Negative.    Hematological: Negative.    Psychiatric/Behavioral: Negative.     All other systems reviewed and are negative.        No past medical history on file.  Past Surgical History:   Procedure Laterality Date    EYE SURGERY      WISDOM TOOTH EXTRACTION       Social History     Substance and Sexual Activity   Alcohol Use No    Comment: 1-2 drinks a year     Social History     Substance and Sexual Activity   Drug Use No     Social History     Tobacco Use   Smoking Status Never   Smokeless Tobacco Never     Family History   Problem Relation Age of Onset    Diabetes Mother     Heart disease Father     Heart disease Paternal Grandfather        Allergies:  No Known Allergies    Medications:     Current Outpatient Medications:     amLODIPine (NORVASC) 5 mg tablet, Take 1 tablet (5 mg total) by mouth daily, Disp: 90 tablet, Rfl: 3    aspirin 81 MG tablet, Take 1 tablet by mouth daily, Disp: ,  "Rfl: 0    atorvastatin (LIPITOR) 40 mg tablet, Take 1 tablet (40 mg total) by mouth every evening, Disp: 90 tablet, Rfl: 3    metoprolol succinate (TOPROL-XL) 50 mg 24 hr tablet, Take 1 tablet (50 mg total) by mouth daily, Disp: 90 tablet, Rfl: 3      Wt Readings from Last 3 Encounters:   01/25/24 123 kg (271 lb 8 oz)   01/15/24 125 kg (274 lb 9.6 oz)   01/03/24 127 kg (280 lb)     Temp Readings from Last 3 Encounters:   07/05/18 (!) 97.4 °F (36.3 °C) (Tympanic)   01/04/18 98 °F (36.7 °C)   12/28/17 98.1 °F (36.7 °C) (Oral)     BP Readings from Last 3 Encounters:   01/25/24 140/90   01/15/24 132/88   01/03/24 158/96     Pulse Readings from Last 3 Encounters:   01/25/24 78   01/15/24 86   01/03/24 80         Physical Exam  HENT:      Head: Atraumatic.      Mouth/Throat:      Mouth: Mucous membranes are moist.   Eyes:      Extraocular Movements: Extraocular movements intact.   Cardiovascular:      Rate and Rhythm: Normal rate and regular rhythm.      Heart sounds: Normal heart sounds.   Pulmonary:      Effort: Pulmonary effort is normal.      Breath sounds: Normal breath sounds.   Abdominal:      General: Abdomen is flat.   Musculoskeletal:         General: Normal range of motion.      Cervical back: Normal range of motion.   Skin:     General: Skin is warm.   Neurological:      General: No focal deficit present.      Mental Status: He is alert and oriented to person, place, and time.   Psychiatric:         Mood and Affect: Mood normal.         Behavior: Behavior normal.           Laboratory Studies:  CMP:  Lab Results   Component Value Date    K 4.9 01/23/2024     01/23/2024    CO2 28 01/23/2024    BUN 22 01/23/2024    CREATININE 1.03 01/23/2024    AST 18 01/23/2024    ALT 19 01/23/2024    EGFR 76 01/23/2024       Lipid Profile:   No results found for: \"CHOL\"  Lab Results   Component Value Date    HDL 32 (L) 01/23/2024     Lab Results   Component Value Date    LDLCALC 63 01/23/2024     Lab Results   Component " Value Date    TRIG 119 2024       Cardiac testing:   EKG reviewed personally:   Results for orders placed during the hospital encounter of 17    Echo complete with contrast if indicated    [facility]  [address]  [place] [id number] [phone]    Transthoracic Echocardiogram  2D, M-mode, Doppler, and Color Doppler    Study date:  27-Dec-2017    Patient: EVARISTO HAMMER  MR number: [id number]  Account number: [id number]  : 1960  Age: 57 years  Gender: Male  Status: Inpatient  Location: Bedside  Height: 71 in  Weight: 333 lb  BP: 119/ 71 mmHg    Indications: MI    Diagnoses: I40.9 - Acute myocarditis, unspecified    Sonographer:  DIVYA Buckner  Referring Physician:  Nadia Birmingham MD  Group:  [facility]  Interpreting Physician:  Miguel Clark DO    SUMMARY    SUMMARY:  Technically difficult study.    LEFT VENTRICLE:  Systolic function was mildly reduced. Ejection fraction was estimated to be 45 %.  There was severe hypokinesis of the basal inferoseptal and basal-mid inferior wall(s).  Doppler parameters were consistent with abnormal left ventricular relaxation (grade 1 diastolic dysfunction).    MITRAL VALVE:  There was mild annular calcification.    TRICUSPID VALVE:  There was trace regurgitation.    HISTORY: PRIOR HISTORY: Diabetes, Obesity    PROCEDURE: The procedure was performed at the bedside. This was a routine study. The transthoracic approach was used. The study included complete 2D imaging, M-mode, complete spectral Doppler, and color Doppler. The heart rate was 73 bpm,  at the start of the study. Images were obtained from the parasternal, apical, subcostal, and suprasternal notch acoustic windows. Intravenous contrast ( 0.8 mL Definity in NSS) was administered to opacify the left ventricle.  Echocardiographic views were limited due to poor acoustic window availability and decreased penetration. This was a technically  difficult study.    LEFT VENTRICLE: Size was normal. Systolic function was mildly reduced. Ejection fraction was estimated to be 45 %. There was severe hypokinesis of the basal inferoseptal and basal-mid inferior wall(s). Wall thickness was normal. DOPPLER:  Doppler parameters were consistent with abnormal left ventricular relaxation (grade 1 diastolic dysfunction).    RIGHT VENTRICLE: The size was normal. Systolic function was normal. Wall thickness was normal.    LEFT ATRIUM: Size was normal.    RIGHT ATRIUM: Size was normal.    MITRAL VALVE: There was mild annular calcification. Valve structure was normal. There was normal leaflet separation. DOPPLER: The transmitral velocity was within the normal range. There was no evidence for stenosis. There was no  regurgitation.    AORTIC VALVE: The valve was probably trileaflet. Leaflets exhibited mildly increased thickness and normal cuspal separation. DOPPLER: Transaortic velocity was within the normal range. There was no evidence for stenosis. There was no  regurgitation.    TRICUSPID VALVE: The valve structure was normal. There was normal leaflet separation. DOPPLER: The transtricuspid velocity was within the normal range. There was no evidence for stenosis. There was trace regurgitation.    PULMONIC VALVE: Leaflets exhibited normal thickness, no calcification, and normal cuspal separation. DOPPLER: The transpulmonic velocity was within the normal range. There was no regurgitation.    PERICARDIUM: There was no pericardial effusion. The pericardium was normal in appearance.    AORTA: The root exhibited normal size.    SYSTEMIC VEINS: IVC: The inferior vena cava was not well visualized.    SYSTEM MEASUREMENT TABLES    2D  %FS: 30.15 %  AV Diam: 3.9 cm  EDV(Teich): 131.24 ml  EF(Teich): 57.05 %  ESV(Teich): 56.37 ml  IVSd: 1.2 cm  LA Area: 14.47 cm2  LA Diam: 4.85 cm  LVEDV MOD A4C: 159.75 ml  LVEF MOD A4C: 35.13 %  LVESV MOD A4C: 103.62 ml  LVIDd: 5.23 cm  LVIDs: 3.65  cm  LVLd A4C: 9.36 cm  LVLs A4C: 8.54 cm  LVPWd: 1.08 cm  RA Area: 14.93 cm2  RVIDd: 3.4 cm  SV MOD A4C: 56.12 ml  SV(Teich): 74.87 ml    MM  TAPSE: 2.48 cm    PW  E': 0.06 m/s  E/E': 16.23  MV A Sulaiman: 0.79 m/s  MV Dec [place]: 5.08 m/s2  MV DecT: 186.23 ms  MV E Sulaiman: 0.95 m/s  MV E/A Ratio: 1.2  MV PHT: 54.01 ms  MVA By PHT: 4.07 cm2    Inters\[facility] [place] [facility]\"" Commission Accredited Echocardiography Laboratory    Prepared and electronically signed by    Miguel Clark DO  Signed 27-Dec-2017 13:12:45    No results found for this or any previous visit.    Results for orders placed during the hospital encounter of 17    Cardiac catheterization    [place] [id number] [phone]    (Blankline)    Invasive Cardiovascular Lab Complete Report    Patient: EVARISTO HAMMER  MR number: [id number]  Account number: [id number]  Study date: 2017  Gender: Male  : 1960  Height: 70.9 in  Weight: 332.2 lb  BSA: 2.62 m squared    Allergies: NO KNOWN ALLERGIES    Diagnostic Cardiologist:  Ivan Scott MD  Interventional Cardiologist:  Ivan Scott MD    SUMMARY    CORONARY CIRCULATION:  Left main: Normal.  LAD: The vessel was medium to large sized. Angiography showed mild atherosclerosis.  Circumflex: The vessel was very large sized.  Mid circumflex: There was a discrete 90 % stenosis. The lesion was irregularly contoured, hazy, and without evidence of thrombus. There was TIMUR grade 3 flow through the vessel (brisk flow). This is a likely culprit for the patient's  clinical presentation. It appears amenable to percutaneous intervention.  RCA: The vessel was medium sized. The mid RCA occlusion appears to be chronic as there is R to R and L to R collaterals seen.  Mid RCA: There was a 100 % stenosis.  Distal RCA: There was a discrete 80 % stenosis.    1ST LESION INTERVENTIONS:  A successful stent procedure was performed on the 90 % lesion in the mid  "circumflex. Following intervention there was an excellent angiographic appearance with a 0 % residual stenosis.  This was not a bifurcation lesion. This was an ACC/AHA type A \"low risk\" lesion for intervention. There was no evidence of the transient no-reflow phenomenon. The residual lesion was discrete and demonstrated no evidence of thrombus. There  was TIMUR 3 flow before the procedure and TIMUR 3 flow after the procedure.  A Resolute Integrity Rx 4.0 x 18 drug-eluting stent was placed across the lesion and deployed at a maximum inflation pressure of 12 courtney.    INDICATIONS:  Possible CAD: myocardial infarction without ST elevation (NSTEMI).    RECOMMENDATIONS:  The patient should continue with the present medications including ASA and Plavix.  Consider nuclear stress test if anginal symptoms to evaluation for RCA ischemia. PCI of RCA possible but appears to be a chronic occlusion.    INDICATIONS:  --  Possible CAD: myocardial infarction without ST elevation (NSTEMI).    PROCEDURES PERFORMED    --  Right coronary angiography.  --  Left coronary angiography.  --  Inpatient.  --  Coronary Catheterization (w/o LHC).  --  Mod Sedation Same Physician Initial 15min.  --  Mod Sedation Same Physician Add 15min.  --  Mod Sedation Same Physician Add 15min.  --  Mod Sedation Same Physician Add 15min.  --  Coronary Drug Eluting Stent w/PTCA.  --  Intervention on mid circumflex: stent.    PROCEDURE: The risks and alternatives of the procedures and conscious sedation were explained to the patient and informed consent was obtained. The patient was brought to the cath lab and placed on the table. The planned puncture sites  were prepped and draped in the usual sterile fashion.    --  Right radial artery access. After performing an Dane's test to verify adequate ulnar artery supply to the hand, the radial site was prepped. The puncture site was infiltrated with local anesthetic. The vessel was accessed using the  modified Seldinger " "technique, a wire was advanced into the vessel, and a sheath was advanced over the wire into the vessel.    --  Right coronary artery angiography. A catheter was advanced over a guidewire into the aorta and positioned in the right coronary artery ostium under fluoroscopic guidance. Angiography was performed.    --  Left coronary artery angiography. A catheter was advanced over a guidewire into the aorta and positioned in the left coronary artery ostium under fluoroscopic guidance. Angiography was performed.    --  Inpatient.    --  Coronary Catheterization (w/o [facility]).    --  Mod Sedation Same Physician Initial 15min.    --  Mod Sedation Same Physician Add 15min.    --  Mod Sedation Same Physician Add 15min.    --  Mod Sedation Same Physician Add 15min.    LESION INTERVENTION: A successful stent procedure was performed on the 90 % lesion in the mid circumflex. Following intervention there was an excellent angiographic appearance with a 0 % residual stenosis. This was not a bifurcation  lesion. This was an ACC/AHA type A \"low risk\" lesion for intervention. There was no evidence of the transient no-reflow phenomenon. The residual lesion was discrete and demonstrated no evidence of thrombus. There was TIMUR 3 flow before the  procedure and TIMUR 3 flow after the procedure.    --  Vessel setup was performed. A Launcher 6Fr Ebu 3.5 guiding catheter was used to cannulate the vessel.    --  Vessel setup was performed. A Runthrough NS 180cm wire was used to cross the lesion.    --  Balloon angioplasty was performed, using a Trek Rx 2.5 x 15mm balloon, with 2 inflations and a maximum inflation pressure of 14 courtney.    --  A Resolute Integrity Rx 4.0 x 18 drug-eluting stent was placed across the lesion and deployed at a maximum inflation pressure of 12 courtney.    --  Balloon angioplasty was performed, with 1 inflations and a maximum inflation pressure of 12 courtney.    INTERVENTIONS:  --  Coronary Drug Eluting Stent w/PTCA.    PROCEDURE " COMPLETION: The patient tolerated the procedure well and was discharged from the cath lab. TIMING: Test started at 13:01. Test concluded at 13:45. MEDICATIONS GIVEN: Metoprolol (Lopressor, Toprol), 5 mg, IV, at 13:09. Fentanyl  (1OOmcg/2 ml), 50 mcg, IV, at 13:01. Versed (2mg/2ml), 2 mg, IV, at 13:01. 1% Lidocaine, 1 ml, subcutaneously, at 13:05. Verapamil (5mg/2ml), 2.5 mg, IV, at 13:06. Heparin 1000 units/ml, 4,000 units, IV, at 13:06. Nitroglycerin  (200mcg/ml), 200 mcg, at 13:06. Heparin 1000 units/ml, 5,000 units, IV, at 13:30. Nitroglycerine (200mcg/ml), 200 mcg, at 13:40. CONTRAST GIVEN: 150 ml Omnipaque (350 mg I /ml). RADIATION EXPOSURE: Fluoroscopy time: 13.22 min.    CORONARY VESSELS:   --  Left main: Normal.  --  LAD: The vessel was medium to large sized. Angiography showed mild atherosclerosis.  --  1st diagonal: The vessel was small sized.  --  2nd diagonal: The vessel was small sized.  --  3rd diagonal: The vessel was small sized.  --  Circumflex: The vessel was very large sized.  --  Mid circumflex: There was a discrete 90 % stenosis. The lesion was irregularly contoured, hazy, and without evidence of thrombus. There was TIMUR grade 3 flow through the vessel (brisk flow). This is a likely culprit for the patient's  clinical presentation. It appears amenable to percutaneous intervention.  --  1st obtuse marginal: The vessel was small sized.  --  RCA: The vessel was medium sized. The mid RCA occlusion appears to be chronic as there is R to R and L to R collaterals seen.  --  Mid RCA: There was a 100 % stenosis.  --  Distal RCA: There was a discrete 80 % stenosis.    RECOMMENDATIONS  The patient should continue with the present medications including ASA and Plavix.  Consider nuclear stress test if anginal symptoms to evaluation for RCA ischemia. PCI of RCA possible but appears to be a chronic occlusion.    Prepared and signed by    Ivan Scott MD  Signed 12/26/2017 14:15:24    Study  diagram    Angiographic findings  Native coronary lesions:  ·Mid circumflex: Lesion 1: discrete, 90 % stenosis.  ·Mid RCA: Lesion 1: 100 % stenosis.  ·Distal RCA: Lesion 1: discrete, 80 % stenosis.  Intervention results  Native coronary lesions:  ·Successful stent of the 90 % stenosis in mid circumflex. Appearance excellent with 0 % residual stenosis. Stent: Resolute Integrity Rx 4.0 x 18 drug-eluting.    Hemodynamic tables    Pressures:  Baseline  Pressures:  - HR: 72  Pressures:  - Rhythm:  Pressures:  -- Aortic Pressure (S/D/M): 149/97/121    Outputs:  Baseline  Outputs:  -- CALCULATIONS[age]  Outputs:  -- CALCULATIONS: Body Surface Area: 2.62  Outputs:  -- CALCULATIONS: Height in cm: 180.00  Outputs:  -- CALCULATIONS: Sex: Male  Outputs:  -- CALCULATIONS: Weight in k.00    No results found for this or any previous visit.

## 2024-01-25 NOTE — PROGRESS NOTES
Cardiology   Maciel Gaitan 63 y.o. male MRN: [id number]        Impression:  1. CAD s/p NSTEMI 12/17 - Had PCI of LCx, has residual  of RCA.  2. Hypertension - improved control.  3. Dylipidemia - on statin.  4. Obesity - losing weight.       Recommendations:  1. Continue current medications.  2. Follow up in 6 months.         HPI: Maciel Gaitan is a 63 y.o. year old male with NSTEMI Type I - cath demonstrated 95% LCx and chronic mid RCA stenosis - Underwent PCI of LCx.  Has been feeling well. No chest pain, shortness of breath, or palpitations.  Echo 1/24 - EF 60% with mod LVH, no valvular abnormalities.         Review of Systems   Constitutional: Negative.    HENT: Negative.     Eyes: Negative.    Respiratory:  Negative for chest tightness and shortness of breath.    Cardiovascular:  Negative for chest pain, palpitations and leg swelling.   Gastrointestinal: Negative.    Endocrine: Negative.    Genitourinary: Negative.    Musculoskeletal: Negative.    Skin: Negative.    Allergic/Immunologic: Negative.    Neurological: Negative.    Hematological: Negative.    Psychiatric/Behavioral: Negative.     All other systems reviewed and are negative.        No past medical history on file.  Past Surgical History:   Procedure Laterality Date    EYE SURGERY      WISDOM TOOTH EXTRACTION       Social History     Substance and Sexual Activity   Alcohol Use No    Comment: 1-2 drinks a year     Social History     Substance and Sexual Activity   Drug Use No     Social History     Tobacco Use   Smoking Status Never   Smokeless Tobacco Never     Family History   Problem Relation Age of Onset    Diabetes Mother     Heart disease Father     Heart disease Paternal Grandfather        Allergies:  No Known Allergies    Medications:     Current Outpatient Medications:     amLODIPine (NORVASC) 5 mg tablet, Take 1 tablet (5 mg total) by mouth daily, Disp: 90 tablet, Rfl: 3    aspirin 81 MG tablet, Take 1 tablet by mouth daily, Disp: ,  "Rfl: 0    atorvastatin (LIPITOR) 40 mg tablet, Take 1 tablet (40 mg total) by mouth every evening, Disp: 90 tablet, Rfl: 3    metoprolol succinate (TOPROL-XL) 50 mg 24 hr tablet, Take 1 tablet (50 mg total) by mouth daily, Disp: 90 tablet, Rfl: 3      Wt Readings from Last 3 Encounters:   01/25/24 123 kg (271 lb 8 oz)   01/15/24 125 kg (274 lb 9.6 oz)   01/03/24 127 kg (280 lb)     Temp Readings from Last 3 Encounters:   07/05/18 (!) 97.4 °F (36.3 °C) (Tympanic)   01/04/18 98 °F (36.7 °C)   12/28/17 98.1 °F (36.7 °C) (Oral)     BP Readings from Last 3 Encounters:   01/25/24 140/90   01/15/24 132/88   01/03/24 158/96     Pulse Readings from Last 3 Encounters:   01/25/24 78   01/15/24 86   01/03/24 80         Physical Exam  HENT:      Head: Atraumatic.      Mouth/Throat:      Mouth: Mucous membranes are moist.   Eyes:      Extraocular Movements: Extraocular movements intact.   Cardiovascular:      Rate and Rhythm: Normal rate and regular rhythm.      Heart sounds: Normal heart sounds.   Pulmonary:      Effort: Pulmonary effort is normal.      Breath sounds: Normal breath sounds.   Abdominal:      General: Abdomen is flat.   Musculoskeletal:         General: Normal range of motion.      Cervical back: Normal range of motion.   Skin:     General: Skin is warm.   Neurological:      General: No focal deficit present.      Mental Status: He is alert and oriented to person, place, and time.   Psychiatric:         Mood and Affect: Mood normal.         Behavior: Behavior normal.           Laboratory Studies:  CMP:  Lab Results   Component Value Date    K 4.9 01/23/2024     01/23/2024    CO2 28 01/23/2024    BUN 22 01/23/2024    CREATININE 1.03 01/23/2024    AST 18 01/23/2024    ALT 19 01/23/2024    EGFR 76 01/23/2024       Lipid Profile:   No results found for: \"CHOL\"  Lab Results   Component Value Date    HDL 32 (L) 01/23/2024     Lab Results   Component Value Date    LDLCALC 63 01/23/2024     Lab Results   Component " Value Date    TRIG 119 2024       Cardiac testing:   EKG reviewed personally:   Results for orders placed during the hospital encounter of 17    Echo complete with contrast if indicated    [facility]  [address]  [place] [id number] [phone]    Transthoracic Echocardiogram  2D, M-mode, Doppler, and Color Doppler    Study date:  27-Dec-2017    Patient: EVARISTO HAMMER  MR number: [id number]  Account number: [id number]  : 1960  Age: 57 years  Gender: Male  Status: Inpatient  Location: Bedside  Height: 71 in  Weight: 333 lb  BP: 119/ 71 mmHg    Indications: MI    Diagnoses: I40.9 - Acute myocarditis, unspecified    Sonographer:  DVIYA Buckner  Referring Physician:  Nadia Birmingham MD  Group:  [facility]  Interpreting Physician:  Miguel Clark DO    SUMMARY    SUMMARY:  Technically difficult study.    LEFT VENTRICLE:  Systolic function was mildly reduced. Ejection fraction was estimated to be 45 %.  There was severe hypokinesis of the basal inferoseptal and basal-mid inferior wall(s).  Doppler parameters were consistent with abnormal left ventricular relaxation (grade 1 diastolic dysfunction).    MITRAL VALVE:  There was mild annular calcification.    TRICUSPID VALVE:  There was trace regurgitation.    HISTORY: PRIOR HISTORY: Diabetes, Obesity    PROCEDURE: The procedure was performed at the bedside. This was a routine study. The transthoracic approach was used. The study included complete 2D imaging, M-mode, complete spectral Doppler, and color Doppler. The heart rate was 73 bpm,  at the start of the study. Images were obtained from the parasternal, apical, subcostal, and suprasternal notch acoustic windows. Intravenous contrast ( 0.8 mL Definity in NSS) was administered to opacify the left ventricle.  Echocardiographic views were limited due to poor acoustic window availability and decreased penetration. This was a technically  difficult study.    LEFT VENTRICLE: Size was normal. Systolic function was mildly reduced. Ejection fraction was estimated to be 45 %. There was severe hypokinesis of the basal inferoseptal and basal-mid inferior wall(s). Wall thickness was normal. DOPPLER:  Doppler parameters were consistent with abnormal left ventricular relaxation (grade 1 diastolic dysfunction).    RIGHT VENTRICLE: The size was normal. Systolic function was normal. Wall thickness was normal.    LEFT ATRIUM: Size was normal.    RIGHT ATRIUM: Size was normal.    MITRAL VALVE: There was mild annular calcification. Valve structure was normal. There was normal leaflet separation. DOPPLER: The transmitral velocity was within the normal range. There was no evidence for stenosis. There was no  regurgitation.    AORTIC VALVE: The valve was probably trileaflet. Leaflets exhibited mildly increased thickness and normal cuspal separation. DOPPLER: Transaortic velocity was within the normal range. There was no evidence for stenosis. There was no  regurgitation.    TRICUSPID VALVE: The valve structure was normal. There was normal leaflet separation. DOPPLER: The transtricuspid velocity was within the normal range. There was no evidence for stenosis. There was trace regurgitation.    PULMONIC VALVE: Leaflets exhibited normal thickness, no calcification, and normal cuspal separation. DOPPLER: The transpulmonic velocity was within the normal range. There was no regurgitation.    PERICARDIUM: There was no pericardial effusion. The pericardium was normal in appearance.    AORTA: The root exhibited normal size.    SYSTEMIC VEINS: IVC: The inferior vena cava was not well visualized.    SYSTEM MEASUREMENT TABLES    2D  %FS: 30.15 %  AV Diam: 3.9 cm  EDV(Teich): 131.24 ml  EF(Teich): 57.05 %  ESV(Teich): 56.37 ml  IVSd: 1.2 cm  LA Area: 14.47 cm2  LA Diam: 4.85 cm  LVEDV MOD A4C: 159.75 ml  LVEF MOD A4C: 35.13 %  LVESV MOD A4C: 103.62 ml  LVIDd: 5.23 cm  LVIDs: 3.65  cm  LVLd A4C: 9.36 cm  LVLs A4C: 8.54 cm  LVPWd: 1.08 cm  RA Area: 14.93 cm2  RVIDd: 3.4 cm  SV MOD A4C: 56.12 ml  SV(Teich): 74.87 ml    MM  TAPSE: 2.48 cm    PW  E': 0.06 m/s  E/E': 16.23  MV A Sulaiman: 0.79 m/s  MV Dec [place]: 5.08 m/s2  MV DecT: 186.23 ms  MV E Sulaiman: 0.95 m/s  MV E/A Ratio: 1.2  MV PHT: 54.01 ms  MVA By PHT: 4.07 cm2    [facility]    Prepared and electronically signed by    Miguel Clark DO  Signed 27-Dec-2017 13:12:45    No results found for this or any previous visit.    Results for orders placed during the hospital encounter of 17    Cardiac catheterization    [place] [id number] [phone]    (Blankline)    Invasive Cardiovascular Lab Complete Report    Patient: EVARISTO HAMMER  MR number: [id number]  Account number: [id number]  Study date: 2017  Gender: Male  : 1960  Height: 70.9 in  Weight: 332.2 lb  BSA: 2.62 m squared    Allergies: NO KNOWN ALLERGIES    Diagnostic Cardiologist:  Ivan Scott MD  Interventional Cardiologist:  Ivan Scott MD    SUMMARY    CORONARY CIRCULATION:  Left main: Normal.  LAD: The vessel was medium to large sized. Angiography showed mild atherosclerosis.  Circumflex: The vessel was very large sized.  Mid circumflex: There was a discrete 90 % stenosis. The lesion was irregularly contoured, hazy, and without evidence of thrombus. There was TIMUR grade 3 flow through the vessel (brisk flow). This is a likely culprit for the patient's  clinical presentation. It appears amenable to percutaneous intervention.  RCA: The vessel was medium sized. The mid RCA occlusion appears to be chronic as there is R to R and L to R collaterals seen.  Mid RCA: There was a 100 % stenosis.  Distal RCA: There was a discrete 80 % stenosis.    1ST LESION INTERVENTIONS:  A successful stent procedure was performed on the 90 % lesion in the mid  "circumflex. Following intervention there was an excellent angiographic appearance with a 0 % residual stenosis.  This was not a bifurcation lesion. This was an ACC/AHA type A \"low risk\" lesion for intervention. There was no evidence of the transient no-reflow phenomenon. The residual lesion was discrete and demonstrated no evidence of thrombus. There  was TIMUR 3 flow before the procedure and TIMUR 3 flow after the procedure.  A Resolute Integrity Rx 4.0 x 18 drug-eluting stent was placed across the lesion and deployed at a maximum inflation pressure of 12 courtney.    INDICATIONS:  Possible CAD: myocardial infarction without ST elevation (NSTEMI).    RECOMMENDATIONS:  The patient should continue with the present medications including ASA and Plavix.  Consider nuclear stress test if anginal symptoms to evaluation for RCA ischemia. PCI of RCA possible but appears to be a chronic occlusion.    INDICATIONS:  --  Possible CAD: myocardial infarction without ST elevation (NSTEMI).    PROCEDURES PERFORMED    --  Right coronary angiography.  --  Left coronary angiography.  --  Inpatient.  --  Coronary Catheterization (w/o LHC).  --  Mod Sedation Same Physician Initial 15min.  --  Mod Sedation Same Physician Add 15min.  --  Mod Sedation Same Physician Add 15min.  --  Mod Sedation Same Physician Add 15min.  --  Coronary Drug Eluting Stent w/PTCA.  --  Intervention on mid circumflex: stent.    PROCEDURE: The risks and alternatives of the procedures and conscious sedation were explained to the patient and informed consent was obtained. The patient was brought to the cath lab and placed on the table. The planned puncture sites  were prepped and draped in the usual sterile fashion.    --  Right radial artery access. After performing an Dane's test to verify adequate ulnar artery supply to the hand, the radial site was prepped. The puncture site was infiltrated with local anesthetic. The vessel was accessed using the  modified Seldinger " "technique, a wire was advanced into the vessel, and a sheath was advanced over the wire into the vessel.    --  Right coronary artery angiography. A catheter was advanced over a guidewire into the aorta and positioned in the right coronary artery ostium under fluoroscopic guidance. Angiography was performed.    --  Left coronary artery angiography. A catheter was advanced over a guidewire into the aorta and positioned in the left coronary artery ostium under fluoroscopic guidance. Angiography was performed.    --  Inpatient.    --  Coronary Catheterization (w/o [facility]).    --  Mod Sedation Same Physician Initial 15min.    --  Mod Sedation Same Physician Add 15min.    --  Mod Sedation Same Physician Add 15min.    --  Mod Sedation Same Physician Add 15min.    LESION INTERVENTION: A successful stent procedure was performed on the 90 % lesion in the mid circumflex. Following intervention there was an excellent angiographic appearance with a 0 % residual stenosis. This was not a bifurcation  lesion. This was an ACC/AHA type A \"low risk\" lesion for intervention. There was no evidence of the transient no-reflow phenomenon. The residual lesion was discrete and demonstrated no evidence of thrombus. There was TIMUR 3 flow before the  procedure and TIMUR 3 flow after the procedure.    --  Vessel setup was performed. A Launcher 6Fr Ebu 3.5 guiding catheter was used to cannulate the vessel.    --  Vessel setup was performed. A Runthrough NS 180cm wire was used to cross the lesion.    --  Balloon angioplasty was performed, using a Trek Rx 2.5 x 15mm balloon, with 2 inflations and a maximum inflation pressure of 14 courtney.    --  A Resolute Integrity Rx 4.0 x 18 drug-eluting stent was placed across the lesion and deployed at a maximum inflation pressure of 12 courtney.    --  Balloon angioplasty was performed, with 1 inflations and a maximum inflation pressure of 12 courtney.    INTERVENTIONS:  --  Coronary Drug Eluting Stent w/PTCA.    PROCEDURE " COMPLETION: The patient tolerated the procedure well and was discharged from the cath lab. TIMING: Test started at 13:01. Test concluded at 13:45. MEDICATIONS GIVEN: Metoprolol (Lopressor, Toprol), 5 mg, IV, at 13:09. Fentanyl  (1OOmcg/2 ml), 50 mcg, IV, at 13:01. Versed (2mg/2ml), 2 mg, IV, at 13:01. 1% Lidocaine, 1 ml, subcutaneously, at 13:05. Verapamil (5mg/2ml), 2.5 mg, IV, at 13:06. Heparin 1000 units/ml, 4,000 units, IV, at 13:06. Nitroglycerin  (200mcg/ml), 200 mcg, at 13:06. Heparin 1000 units/ml, 5,000 units, IV, at 13:30. Nitroglycerine (200mcg/ml), 200 mcg, at 13:40. CONTRAST GIVEN: 150 ml Omnipaque (350 mg I /ml). RADIATION EXPOSURE: Fluoroscopy time: 13.22 min.    CORONARY VESSELS:   --  Left main: Normal.  --  LAD: The vessel was medium to large sized. Angiography showed mild atherosclerosis.  --  1st diagonal: The vessel was small sized.  --  2nd diagonal: The vessel was small sized.  --  3rd diagonal: The vessel was small sized.  --  Circumflex: The vessel was very large sized.  --  Mid circumflex: There was a discrete 90 % stenosis. The lesion was irregularly contoured, hazy, and without evidence of thrombus. There was TIMUR grade 3 flow through the vessel (brisk flow). This is a likely culprit for the patient's  clinical presentation. It appears amenable to percutaneous intervention.  --  1st obtuse marginal: The vessel was small sized.  --  RCA: The vessel was medium sized. The mid RCA occlusion appears to be chronic as there is R to R and L to R collaterals seen.  --  Mid RCA: There was a 100 % stenosis.  --  Distal RCA: There was a discrete 80 % stenosis.    RECOMMENDATIONS  The patient should continue with the present medications including ASA and Plavix.  Consider nuclear stress test if anginal symptoms to evaluation for RCA ischemia. PCI of RCA possible but appears to be a chronic occlusion.    Prepared and signed by    Ivan Scott MD  Signed 12/26/2017 14:15:24    Study  diagram    Angiographic findings  Native coronary lesions:  ·Mid circumflex: Lesion 1: discrete, 90 % stenosis.  ·Mid RCA: Lesion 1: 100 % stenosis.  ·Distal RCA: Lesion 1: discrete, 80 % stenosis.  Intervention results  Native coronary lesions:  ·Successful stent of the 90 % stenosis in mid circumflex. Appearance excellent with 0 % residual stenosis. Stent: Resolute Integrity Rx 4.0 x 18 drug-eluting.    Hemodynamic tables    Pressures:  Baseline  Pressures:  - HR: 72  Pressures:  - Rhythm:  Pressures:  -- Aortic Pressure (S/D/M): 149/97/121    Outputs:  Baseline  Outputs:  -- CALCULATIONS[age]  Outputs:  -- CALCULATIONS: Body Surface Area: 2.62  Outputs:  -- CALCULATIONS: Height in cm: 180.00  Outputs:  -- CALCULATIONS: Sex: Male  Outputs:  -- CALCULATIONS: Weight in k.00    No results found for this or any previous visit.

## 2024-02-08 ENCOUNTER — ANESTHESIA (OUTPATIENT)
Dept: ANESTHESIOLOGY | Facility: HOSPITAL | Age: 64
End: 2024-02-08

## 2024-02-08 ENCOUNTER — ANESTHESIA EVENT (OUTPATIENT)
Dept: ANESTHESIOLOGY | Facility: HOSPITAL | Age: 64
End: 2024-02-08

## 2024-02-16 ENCOUNTER — ANESTHESIA EVENT (INPATIENT)
Dept: PERIOP | Facility: HOSPITAL | Age: 64
DRG: 474 | End: 2024-02-16
Payer: COMMERCIAL

## 2024-02-16 ENCOUNTER — APPOINTMENT (INPATIENT)
Dept: RADIOLOGY | Facility: HOSPITAL | Age: 64
DRG: 474 | End: 2024-02-16
Payer: COMMERCIAL

## 2024-02-16 ENCOUNTER — APPOINTMENT (EMERGENCY)
Dept: RADIOLOGY | Facility: HOSPITAL | Age: 64
DRG: 474 | End: 2024-02-16
Payer: COMMERCIAL

## 2024-02-16 ENCOUNTER — ANESTHESIA (INPATIENT)
Dept: PERIOP | Facility: HOSPITAL | Age: 64
DRG: 474 | End: 2024-02-16
Payer: COMMERCIAL

## 2024-02-16 ENCOUNTER — HOSPITAL ENCOUNTER (INPATIENT)
Facility: HOSPITAL | Age: 64
DRG: 474 | End: 2024-02-16
Attending: EMERGENCY MEDICINE | Admitting: INTERNAL MEDICINE
Payer: COMMERCIAL

## 2024-02-16 DIAGNOSIS — I96 GANGRENE OF TOE OF RIGHT FOOT (HCC): ICD-10-CM

## 2024-02-16 DIAGNOSIS — E11.9 NEW ONSET TYPE 2 DIABETES MELLITUS (HCC): ICD-10-CM

## 2024-02-16 DIAGNOSIS — M86.671 OTHER CHRONIC OSTEOMYELITIS OF RIGHT FOOT (HCC): Primary | ICD-10-CM

## 2024-02-16 PROBLEM — E78.5 HYPERLIPIDEMIA: Status: ACTIVE | Noted: 2018-01-23

## 2024-02-16 PROBLEM — I21.4 NSTEMI, INITIAL EPISODE OF CARE (HCC): Status: ACTIVE | Noted: 2018-01-02

## 2024-02-16 PROBLEM — E11.8 DIABETES MELLITUS TYPE 2 WITH COMPLICATIONS (HCC): Status: ACTIVE | Noted: 2018-01-02

## 2024-02-16 PROBLEM — I10 BENIGN HYPERTENSION: Status: ACTIVE | Noted: 2018-01-23

## 2024-02-16 PROBLEM — R35.1 NOCTURIA: Status: ACTIVE | Noted: 2018-01-04

## 2024-02-16 LAB
ALBUMIN SERPL BCP-MCNC: 3.4 G/DL (ref 3.5–5)
ALP SERPL-CCNC: 92 U/L (ref 34–104)
ALT SERPL W P-5'-P-CCNC: 14 U/L (ref 7–52)
ANION GAP SERPL CALCULATED.3IONS-SCNC: 9 MMOL/L
APTT PPP: 37 SECONDS (ref 23–37)
AST SERPL W P-5'-P-CCNC: 12 U/L (ref 13–39)
BASOPHILS # BLD AUTO: 0.06 THOUSANDS/ÂΜL (ref 0–0.1)
BASOPHILS NFR BLD AUTO: 1 % (ref 0–1)
BILIRUB SERPL-MCNC: 0.58 MG/DL (ref 0.2–1)
BUN SERPL-MCNC: 25 MG/DL (ref 5–25)
CALCIUM ALBUM COR SERPL-MCNC: 9.2 MG/DL (ref 8.3–10.1)
CALCIUM SERPL-MCNC: 8.7 MG/DL (ref 8.4–10.2)
CHLORIDE SERPL-SCNC: 100 MMOL/L (ref 96–108)
CHOLEST SERPL-MCNC: 112 MG/DL
CO2 SERPL-SCNC: 28 MMOL/L (ref 21–32)
CREAT SERPL-MCNC: 0.98 MG/DL (ref 0.6–1.3)
CRP SERPL QL: 132.6 MG/L
EOSINOPHIL # BLD AUTO: 0.23 THOUSAND/ÂΜL (ref 0–0.61)
EOSINOPHIL NFR BLD AUTO: 2 % (ref 0–6)
ERYTHROCYTE [DISTWIDTH] IN BLOOD BY AUTOMATED COUNT: 12.5 % (ref 11.6–15.1)
ERYTHROCYTE [SEDIMENTATION RATE] IN BLOOD: 108 MM/HOUR (ref 0–19)
GFR SERPL CREATININE-BSD FRML MDRD: 81 ML/MIN/1.73SQ M
GLUCOSE SERPL-MCNC: 185 MG/DL (ref 65–140)
GLUCOSE SERPL-MCNC: 202 MG/DL (ref 65–140)
GLUCOSE SERPL-MCNC: 206 MG/DL (ref 65–140)
GLUCOSE SERPL-MCNC: 236 MG/DL (ref 65–140)
HCT VFR BLD AUTO: 35.3 % (ref 36.5–49.3)
HDLC SERPL-MCNC: 29 MG/DL
HGB BLD-MCNC: 11.3 G/DL (ref 12–17)
IMM GRANULOCYTES # BLD AUTO: 0.05 THOUSAND/UL (ref 0–0.2)
IMM GRANULOCYTES NFR BLD AUTO: 1 % (ref 0–2)
INR PPP: 1.21 (ref 0.84–1.19)
LDLC SERPL CALC-MCNC: 66 MG/DL (ref 0–100)
LYMPHOCYTES # BLD AUTO: 1.37 THOUSANDS/ÂΜL (ref 0.6–4.47)
LYMPHOCYTES NFR BLD AUTO: 13 % (ref 14–44)
MCH RBC QN AUTO: 27.2 PG (ref 26.8–34.3)
MCHC RBC AUTO-ENTMCNC: 32 G/DL (ref 31.4–37.4)
MCV RBC AUTO: 85 FL (ref 82–98)
MONOCYTES # BLD AUTO: 1.2 THOUSAND/ÂΜL (ref 0.17–1.22)
MONOCYTES NFR BLD AUTO: 11 % (ref 4–12)
NEUTROPHILS # BLD AUTO: 8.04 THOUSANDS/ÂΜL (ref 1.85–7.62)
NEUTS SEG NFR BLD AUTO: 72 % (ref 43–75)
NRBC BLD AUTO-RTO: 0 /100 WBCS
PLATELET # BLD AUTO: 407 THOUSANDS/UL (ref 149–390)
PMV BLD AUTO: 8.7 FL (ref 8.9–12.7)
POTASSIUM SERPL-SCNC: 3.4 MMOL/L (ref 3.5–5.3)
PROT SERPL-MCNC: 7.7 G/DL (ref 6.4–8.4)
PROTHROMBIN TIME: 16 SECONDS (ref 11.6–14.5)
RBC # BLD AUTO: 4.16 MILLION/UL (ref 3.88–5.62)
SODIUM SERPL-SCNC: 137 MMOL/L (ref 135–147)
TRIGL SERPL-MCNC: 84 MG/DL
WBC # BLD AUTO: 10.95 THOUSAND/UL (ref 4.31–10.16)

## 2024-02-16 PROCEDURE — 99223 1ST HOSP IP/OBS HIGH 75: CPT | Performed by: INTERNAL MEDICINE

## 2024-02-16 PROCEDURE — 88311 DECALCIFY TISSUE: CPT | Performed by: STUDENT IN AN ORGANIZED HEALTH CARE EDUCATION/TRAINING PROGRAM

## 2024-02-16 PROCEDURE — 85652 RBC SED RATE AUTOMATED: CPT | Performed by: PHYSICIAN ASSISTANT

## 2024-02-16 PROCEDURE — 87205 SMEAR GRAM STAIN: CPT | Performed by: PODIATRIST

## 2024-02-16 PROCEDURE — 85730 THROMBOPLASTIN TIME PARTIAL: CPT | Performed by: PHYSICIAN ASSISTANT

## 2024-02-16 PROCEDURE — 85025 COMPLETE CBC W/AUTO DIFF WBC: CPT | Performed by: PHYSICIAN ASSISTANT

## 2024-02-16 PROCEDURE — 96367 TX/PROPH/DG ADDL SEQ IV INF: CPT

## 2024-02-16 PROCEDURE — 87040 BLOOD CULTURE FOR BACTERIA: CPT | Performed by: PHYSICIAN ASSISTANT

## 2024-02-16 PROCEDURE — 99285 EMERGENCY DEPT VISIT HI MDM: CPT | Performed by: PHYSICIAN ASSISTANT

## 2024-02-16 PROCEDURE — 28810 AMPUTATION TOE & METATARSAL: CPT | Performed by: PODIATRIST

## 2024-02-16 PROCEDURE — 87070 CULTURE OTHR SPECIMN AEROBIC: CPT | Performed by: PODIATRIST

## 2024-02-16 PROCEDURE — 73630 X-RAY EXAM OF FOOT: CPT

## 2024-02-16 PROCEDURE — 99255 IP/OBS CONSLTJ NEW/EST HI 80: CPT | Performed by: PODIATRIST

## 2024-02-16 PROCEDURE — 99284 EMERGENCY DEPT VISIT MOD MDM: CPT

## 2024-02-16 PROCEDURE — 88305 TISSUE EXAM BY PATHOLOGIST: CPT | Performed by: STUDENT IN AN ORGANIZED HEALTH CARE EDUCATION/TRAINING PROGRAM

## 2024-02-16 PROCEDURE — 85610 PROTHROMBIN TIME: CPT | Performed by: PHYSICIAN ASSISTANT

## 2024-02-16 PROCEDURE — 80053 COMPREHEN METABOLIC PANEL: CPT | Performed by: PHYSICIAN ASSISTANT

## 2024-02-16 PROCEDURE — 82948 REAGENT STRIP/BLOOD GLUCOSE: CPT

## 2024-02-16 PROCEDURE — 96365 THER/PROPH/DIAG IV INF INIT: CPT

## 2024-02-16 PROCEDURE — 86140 C-REACTIVE PROTEIN: CPT | Performed by: PHYSICIAN ASSISTANT

## 2024-02-16 PROCEDURE — 0Y6M0Z9 DETACHMENT AT RIGHT FOOT, PARTIAL 1ST RAY, OPEN APPROACH: ICD-10-PCS | Performed by: PODIATRIST

## 2024-02-16 PROCEDURE — 36415 COLL VENOUS BLD VENIPUNCTURE: CPT | Performed by: PHYSICIAN ASSISTANT

## 2024-02-16 PROCEDURE — 80061 LIPID PANEL: CPT

## 2024-02-16 PROCEDURE — 87075 CULTR BACTERIA EXCEPT BLOOD: CPT | Performed by: PODIATRIST

## 2024-02-16 RX ORDER — MAGNESIUM HYDROXIDE 1200 MG/15ML
LIQUID ORAL AS NEEDED
Status: DISCONTINUED | OUTPATIENT
Start: 2024-02-16 | End: 2024-02-16 | Stop reason: HOSPADM

## 2024-02-16 RX ORDER — LIDOCAINE HYDROCHLORIDE 20 MG/ML
INJECTION, SOLUTION EPIDURAL; INFILTRATION; INTRACAUDAL; PERINEURAL AS NEEDED
Status: DISCONTINUED | OUTPATIENT
Start: 2024-02-16 | End: 2024-02-16

## 2024-02-16 RX ORDER — MAGNESIUM HYDROXIDE/ALUMINUM HYDROXICE/SIMETHICONE 120; 1200; 1200 MG/30ML; MG/30ML; MG/30ML
30 SUSPENSION ORAL EVERY 6 HOURS PRN
Status: DISCONTINUED | OUTPATIENT
Start: 2024-02-16 | End: 2024-02-20 | Stop reason: HOSPADM

## 2024-02-16 RX ORDER — GLYCOPYRROLATE 0.2 MG/ML
INJECTION INTRAMUSCULAR; INTRAVENOUS AS NEEDED
Status: DISCONTINUED | OUTPATIENT
Start: 2024-02-16 | End: 2024-02-16

## 2024-02-16 RX ORDER — PROPOFOL 10 MG/ML
INJECTION, EMULSION INTRAVENOUS AS NEEDED
Status: DISCONTINUED | OUTPATIENT
Start: 2024-02-16 | End: 2024-02-16

## 2024-02-16 RX ORDER — HYDROMORPHONE HCL/PF 1 MG/ML
0.5 SYRINGE (ML) INJECTION
Status: DISCONTINUED | OUTPATIENT
Start: 2024-02-16 | End: 2024-02-16 | Stop reason: HOSPADM

## 2024-02-16 RX ORDER — FENTANYL CITRATE/PF 50 MCG/ML
25 SYRINGE (ML) INJECTION
Status: DISCONTINUED | OUTPATIENT
Start: 2024-02-16 | End: 2024-02-16 | Stop reason: HOSPADM

## 2024-02-16 RX ORDER — MIDAZOLAM HYDROCHLORIDE 2 MG/2ML
INJECTION, SOLUTION INTRAMUSCULAR; INTRAVENOUS AS NEEDED
Status: DISCONTINUED | OUTPATIENT
Start: 2024-02-16 | End: 2024-02-16

## 2024-02-16 RX ORDER — ONDANSETRON 2 MG/ML
4 INJECTION INTRAMUSCULAR; INTRAVENOUS EVERY 6 HOURS PRN
Status: DISCONTINUED | OUTPATIENT
Start: 2024-02-16 | End: 2024-02-20 | Stop reason: HOSPADM

## 2024-02-16 RX ORDER — METOPROLOL SUCCINATE 50 MG/1
50 TABLET, EXTENDED RELEASE ORAL DAILY
Status: DISCONTINUED | OUTPATIENT
Start: 2024-02-17 | End: 2024-02-20 | Stop reason: HOSPADM

## 2024-02-16 RX ORDER — SODIUM CHLORIDE, SODIUM LACTATE, POTASSIUM CHLORIDE, CALCIUM CHLORIDE 600; 310; 30; 20 MG/100ML; MG/100ML; MG/100ML; MG/100ML
INJECTION, SOLUTION INTRAVENOUS CONTINUOUS PRN
Status: DISCONTINUED | OUTPATIENT
Start: 2024-02-16 | End: 2024-02-16

## 2024-02-16 RX ORDER — ACETAMINOPHEN 325 MG/1
650 TABLET ORAL EVERY 6 HOURS PRN
Status: DISCONTINUED | OUTPATIENT
Start: 2024-02-16 | End: 2024-02-20 | Stop reason: HOSPADM

## 2024-02-16 RX ORDER — SENNOSIDES 8.6 MG
1 TABLET ORAL DAILY
Status: DISCONTINUED | OUTPATIENT
Start: 2024-02-17 | End: 2024-02-20

## 2024-02-16 RX ORDER — LIDOCAINE HYDROCHLORIDE 10 MG/ML
INJECTION, SOLUTION EPIDURAL; INFILTRATION; INTRACAUDAL; PERINEURAL AS NEEDED
Status: DISCONTINUED | OUTPATIENT
Start: 2024-02-16 | End: 2024-02-16 | Stop reason: HOSPADM

## 2024-02-16 RX ORDER — FENTANYL CITRATE 50 UG/ML
INJECTION, SOLUTION INTRAMUSCULAR; INTRAVENOUS AS NEEDED
Status: DISCONTINUED | OUTPATIENT
Start: 2024-02-16 | End: 2024-02-16

## 2024-02-16 RX ORDER — ONDANSETRON 2 MG/ML
4 INJECTION INTRAMUSCULAR; INTRAVENOUS ONCE AS NEEDED
Status: DISCONTINUED | OUTPATIENT
Start: 2024-02-16 | End: 2024-02-16 | Stop reason: HOSPADM

## 2024-02-16 RX ORDER — ENOXAPARIN SODIUM 100 MG/ML
40 INJECTION SUBCUTANEOUS DAILY
Status: DISCONTINUED | OUTPATIENT
Start: 2024-02-17 | End: 2024-02-16

## 2024-02-16 RX ORDER — CLINDAMYCIN PHOSPHATE 900 MG/50ML
900 INJECTION, SOLUTION INTRAVENOUS EVERY 8 HOURS
Status: DISCONTINUED | OUTPATIENT
Start: 2024-02-16 | End: 2024-02-16

## 2024-02-16 RX ORDER — PROPOFOL 10 MG/ML
INJECTION, EMULSION INTRAVENOUS CONTINUOUS PRN
Status: DISCONTINUED | OUTPATIENT
Start: 2024-02-16 | End: 2024-02-16

## 2024-02-16 RX ORDER — INSULIN LISPRO 100 [IU]/ML
1-5 INJECTION, SOLUTION INTRAVENOUS; SUBCUTANEOUS
Status: DISCONTINUED | OUTPATIENT
Start: 2024-02-17 | End: 2024-02-20 | Stop reason: HOSPADM

## 2024-02-16 RX ORDER — AMLODIPINE BESYLATE 5 MG/1
5 TABLET ORAL DAILY
Status: DISCONTINUED | OUTPATIENT
Start: 2024-02-17 | End: 2024-02-20 | Stop reason: HOSPADM

## 2024-02-16 RX ORDER — ATORVASTATIN CALCIUM 40 MG/1
40 TABLET, FILM COATED ORAL EVERY EVENING
Status: DISCONTINUED | OUTPATIENT
Start: 2024-02-16 | End: 2024-02-20 | Stop reason: HOSPADM

## 2024-02-16 RX ORDER — DIPHENHYDRAMINE HYDROCHLORIDE 50 MG/ML
12.5 INJECTION INTRAMUSCULAR; INTRAVENOUS ONCE AS NEEDED
Status: DISCONTINUED | OUTPATIENT
Start: 2024-02-16 | End: 2024-02-16 | Stop reason: HOSPADM

## 2024-02-16 RX ORDER — ASPIRIN 81 MG/1
81 TABLET, CHEWABLE ORAL DAILY
Status: DISCONTINUED | OUTPATIENT
Start: 2024-02-17 | End: 2024-02-20 | Stop reason: HOSPADM

## 2024-02-16 RX ORDER — INSULIN LISPRO 100 [IU]/ML
1-5 INJECTION, SOLUTION INTRAVENOUS; SUBCUTANEOUS
Status: DISCONTINUED | OUTPATIENT
Start: 2024-02-16 | End: 2024-02-20 | Stop reason: HOSPADM

## 2024-02-16 RX ORDER — BUPIVACAINE HYDROCHLORIDE 2.5 MG/ML
INJECTION, SOLUTION EPIDURAL; INFILTRATION; INTRACAUDAL AS NEEDED
Status: DISCONTINUED | OUTPATIENT
Start: 2024-02-16 | End: 2024-02-16 | Stop reason: HOSPADM

## 2024-02-16 RX ORDER — ENOXAPARIN SODIUM 100 MG/ML
40 INJECTION SUBCUTANEOUS DAILY
Status: DISCONTINUED | OUTPATIENT
Start: 2024-02-17 | End: 2024-02-20 | Stop reason: HOSPADM

## 2024-02-16 RX ORDER — SODIUM CHLORIDE, SODIUM LACTATE, POTASSIUM CHLORIDE, CALCIUM CHLORIDE 600; 310; 30; 20 MG/100ML; MG/100ML; MG/100ML; MG/100ML
100 INJECTION, SOLUTION INTRAVENOUS CONTINUOUS
Status: DISCONTINUED | OUTPATIENT
Start: 2024-02-16 | End: 2024-02-20

## 2024-02-16 RX ADMIN — VANCOMYCIN HYDROCHLORIDE 2000 MG: 5 INJECTION, POWDER, LYOPHILIZED, FOR SOLUTION INTRAVENOUS at 16:44

## 2024-02-16 RX ADMIN — LIDOCAINE HYDROCHLORIDE 100 MG: 20 INJECTION, SOLUTION EPIDURAL; INFILTRATION; INTRACAUDAL; PERINEURAL at 17:27

## 2024-02-16 RX ADMIN — PHENYLEPHRINE HYDROCHLORIDE 40 MCG/MIN: 10 INJECTION INTRAVENOUS at 17:56

## 2024-02-16 RX ADMIN — GLYCOPYRROLATE 0.2 MCG: 0.2 INJECTION INTRAMUSCULAR; INTRAVENOUS at 17:27

## 2024-02-16 RX ADMIN — PROPOFOL 40 MG: 10 INJECTION, EMULSION INTRAVENOUS at 17:27

## 2024-02-16 RX ADMIN — CEFTRIAXONE SODIUM 1000 MG: 10 INJECTION, POWDER, FOR SOLUTION INTRAVENOUS at 16:08

## 2024-02-16 RX ADMIN — SODIUM CHLORIDE, SODIUM LACTATE, POTASSIUM CHLORIDE, AND CALCIUM CHLORIDE 100 ML/HR: .6; .31; .03; .02 INJECTION, SOLUTION INTRAVENOUS at 20:08

## 2024-02-16 RX ADMIN — PIPERACILLIN AND TAZOBACTAM 3.38 G: 36; 4.5 INJECTION, POWDER, FOR SOLUTION INTRAVENOUS at 22:07

## 2024-02-16 RX ADMIN — MIDAZOLAM 2 MG: 1 INJECTION INTRAMUSCULAR; INTRAVENOUS at 17:27

## 2024-02-16 RX ADMIN — PROPOFOL 120 MCG/KG/MIN: 10 INJECTION, EMULSION INTRAVENOUS at 17:27

## 2024-02-16 RX ADMIN — INSULIN LISPRO 2 UNITS: 100 INJECTION, SOLUTION INTRAVENOUS; SUBCUTANEOUS at 22:07

## 2024-02-16 RX ADMIN — VANCOMYCIN HYDROCHLORIDE 2000 MG: 5 INJECTION, POWDER, LYOPHILIZED, FOR SOLUTION INTRAVENOUS at 17:20

## 2024-02-16 RX ADMIN — VANCOMYCIN HYDROCHLORIDE 2000 MG: 5 INJECTION, POWDER, LYOPHILIZED, FOR SOLUTION INTRAVENOUS at 18:30

## 2024-02-16 RX ADMIN — SODIUM CHLORIDE, SODIUM LACTATE, POTASSIUM CHLORIDE, AND CALCIUM CHLORIDE: .6; .31; .03; .02 INJECTION, SOLUTION INTRAVENOUS at 17:20

## 2024-02-16 RX ADMIN — FENTANYL CITRATE 25 MCG: 50 INJECTION INTRAMUSCULAR; INTRAVENOUS at 17:27

## 2024-02-16 RX ADMIN — ATORVASTATIN CALCIUM 40 MG: 40 TABLET, FILM COATED ORAL at 22:07

## 2024-02-16 NOTE — ANESTHESIA PREPROCEDURE EVALUATION
Procedure:  PARTIAL RAY AMPUTATION (Right: Foot)    Relevant Problems   CARDIO   (+) CAD (coronary artery disease)   (+) Essential hypertension      ENDO   (+) New onset type 2 diabetes mellitus (HCC)   (+) Type 2 diabetes mellitus with complication, without long-term current use of insulin (HCC)        Physical Exam    Airway    Mallampati score: II  TM Distance: >3 FB  Neck ROM: full     Dental   No notable dental hx     Cardiovascular  Cardiovascular exam normal    Pulmonary  Pulmonary exam normal     Other Findings      Left Ventricle: Left ventricular cavity size is normal. Wall thickness is moderately increased. There is moderate concentric hypertrophy. The left ventricular ejection fraction is 60%. Systolic function is normal. Wall motion is normal. Diastolic function is normal for age.    Aorta: The aortic root is mildly dilated. The ascending aorta is mildly dilated. The aortic root is 3.80 cm. The ascending aorta is 3.9 cm.         Anesthesia Plan  ASA Score- 3     Anesthesia Type- IV sedation with anesthesia with ASA Monitors.         Additional Monitors:     Airway Plan:            Plan Factors-Exercise tolerance (METS): >4 METS.    Chart reviewed. EKG reviewed. Imaging results reviewed. Existing labs reviewed. Patient summary reviewed.    Patient is not a current smoker. Patient not instructed to abstain from smoking on day of procedure. Patient did not smoke on day of surgery.    Obstructive sleep apnea risk education given perioperatively.        Induction-     Postoperative Plan-     Informed Consent- Anesthetic plan and risks discussed with patient.  I personally reviewed this patient with the CRNA. Discussed and agreed on the Anesthesia Plan with the CRNA..            Sinus tachycardia   Anteroseptal infarct, age undetermined       LAD stent

## 2024-02-16 NOTE — INTERVAL H&P NOTE
H&P reviewed. After examining the patient I find no changes in the patients condition since the H&P had been written.    Vitals:    02/16/24 1705   BP: 142/79   Pulse: 72   Resp: 18   Temp: 97.6 °F (36.4 °C)   SpO2:

## 2024-02-16 NOTE — CONSULTS
HISTORY AND PHYSICAL - PODIATRY  Maciel Gaitan 63 y.o. male MRN: [id number]  Unit/Bed#: OR POOL Encounter: [id number]    ASSESSMENT:  Right hallux OM  Right hallux wet gangrene  Uncontrolled DM2    PLAN:  Independently reviewed all labs, imaging & diagnostic studies available at time of visit.  XR revealed osseous erosions at right distal phalanx ascending to distal metatarsal head  On clinical exam, wet gangrene of right hallux with malodor noted.  Discussed with patient conservative therapy & surgical options including partial ray amputation or tissue biopsy including bone for treatment & management of osteomyelitis, source of infection, and wet gangrene  Discussed benefits & risks, advantages & disadvantages including infection, poor wound healing & limb loss. Final treatment plan involved shared decision making with patient.  Patient is amenable to procedure. Consent signed.  Please NPO until further notice  Plan for add-on procedure on 2/16/24.  Please continue DVT PPx as indicated.    Rec SLIM admission for IV Abx  Rec ESR, CRP, Type & Screen  ABx: Vancomycin (Vancocin) and Ceftriaxone (Rocephin)  Rec Ancef until intra-op Cx result    Wound Care:  Dressing change per Podiatry    Activity:  Right NWB, ok to transfer with Left  Elevation of surgical foot to level of chest with 2 pillows while in bed & chair and Limited weight bearing as much as possible    History of Present Illness   Chief Complaint   Patient presents with    Toe Injury     Pt reports he developed toe blister 2-3 weeks ago, blister became a deep wound that pt believes is now infected. Pt is borderline diabetic.      63 yrs male presents with toe blister which developed into wet gangrene with malodor. Pt reports onset 6 weeks ago. Pt denies following podiatrist.  Podiatry consulted to evaluate right foot.    Pt seen bedside.  Pt reports last meal was 2/15/24, last drink was around 2pm.  Pt amenable to surgical intervention today.    Patient  denies fever, chills, nausea, vomiting, chest pain, shortness of breath  Patient endorses paresthesia    Review of Systems:  All other systems are negative except for HPI.    Allergies as of 02/16/2024    (No Known Allergies)     History reviewed. No pertinent past medical history.  Past Surgical History:   Procedure Laterality Date    EYE SURGERY      WISDOM TOOTH EXTRACTION       Social Determinants of Health     Tobacco Use: Low Risk  (2/16/2024)    Patient History     Smoking Tobacco Use: Never     Smokeless Tobacco Use: Never     Passive Exposure: Not on file   Alcohol Use: Not on file   Financial Resource Strain: Not on file   Food Insecurity: Not on file   Transportation Needs: Not on file   Physical Activity: Not on file   Stress: Not on file   Social Connections: Not on file   Intimate Partner Violence: Not on file   Depression: Not on file   Housing Stability: Not on file   Utilities: Not on file   Health Literacy: Not on file     Family History   Problem Relation Age of Onset    Diabetes Mother     Heart disease Father     Heart disease Paternal Grandfather        Objective     /79   Pulse 72   Temp 97.6 °F (36.4 °C) (Temporal)   Resp 18   SpO2 100%     Lab Results   Component Value Date    HGBA1C 9.8 (H) 01/23/2024      PHYSICAL EXAM:  HENT:      Head: Atraumatic.      Mouth/Throat:      Mouth: Mucous membranes are moist.   Eyes:      Extraocular Movements: Extraocular movements intact.   Cardiovascular:      Rate and Rhythm: Normal rate and regular rhythm.      Heart sounds: Normal heart sounds.   Pulmonary:      Effort: Pulmonary effort is normal.      Breath sounds: Normal breath sounds.   Abdominal:      General: Abdomen is flat.   Musculoskeletal:         General: Normal range of motion.      Cervical back: Normal range of motion.   Skin:     Wet gangrene of right hallux, (+) malodor  Neurological:      General: Light touch sensation diminished, b/l     Mental Status: He is alert and  oriented to person, place, and time.   Psychiatric:         Mood and Affect: Mood normal.         Behavior: Behavior normal.       Current Facility-Administered Medications   Medication Dose Route Frequency Provider Last Rate Last Admin    vancomycin (VANCOCIN) 2,000 mg in sodium chloride 0.9 % 500 mL IVPB  2,000 mg Intravenous Once Anh Leo PA-C 250 mL/hr at 02/16/24 1644 2,000 mg at 02/16/24 1644     No current outpatient medications on file.     XR foot 3+ views RIGHT   ED Interpretation by Anh Leo PA-C (02/16 1611)   No signs of bony destruction        Results from last 7 days   Lab Units 02/16/24  1557   WBC Thousand/uL 10.95*   HEMOGLOBIN g/dL 11.3*   HEMATOCRIT % 35.3*   PLATELETS Thousands/uL 407*   NEUTROS PCT % 72   LYMPHS PCT % 13*   MONOS PCT % 11   EOS PCT % 2     [unfilled], C-reactive protein  Results from last 7 days   Lab Units 02/16/24  1557   POTASSIUM mmol/L 3.4*   CHLORIDE mmol/L 100   CO2 mmol/L 28   BUN mg/dL 25   CREATININE mg/dL 0.98   CALCIUM mg/dL 8.7   ALK PHOS U/L 92   ALT U/L 14   AST U/L 12*     Results from last 7 days   Lab Units 02/16/24  1644   INR  1.21*               Ron Vizcarra DPM  2/16/2024

## 2024-02-16 NOTE — ED PROVIDER NOTES
History  Chief Complaint   Patient presents with    Toe Injury     Pt reports he developed toe blister 2-3 weeks ago, blister became a deep wound that pt believes is now infected. Pt is borderline diabetic.      63-year-old male presents the emergency department with complaints of a wound to the right great toe.  States that approximately 5 to 6 weeks ago he noticed a blister forming under a callus on the medial part of his toe.  States that since then the blistered area has opened up and has been draining some discolored liquid.  States that he does not have a lot of pain in the area.  Does not have any fevers.  Notes some redness extending onto the top of the foot at this time.  Patient states that he has a known history of neuropathy due to previously being diagnosed with diabetes.  States that he lost approximately 60 pounds and his A1c went down to 5.7, leading to discontinuation of his metformin.  Patient states that he has no known history of peripheral vascular disease but does have a history of coronary artery disease with 1 stent.  Notes he has had some wounds to the right lower extremity had not been healing over the past several months.  Has not been on antibiotics for the toe infection previously.      History provided by:  Patient   used: No        Prior to Admission Medications   Prescriptions Last Dose Informant Patient Reported? Taking?   amLODIPine (NORVASC) 5 mg tablet  Self No No   Sig: Take 1 tablet (5 mg total) by mouth daily   aspirin 81 MG tablet  Self No No   Sig: Take 1 tablet by mouth daily   atorvastatin (LIPITOR) 40 mg tablet  Self No No   Sig: Take 1 tablet (40 mg total) by mouth every evening   metoprolol succinate (TOPROL-XL) 50 mg 24 hr tablet  Self No No   Sig: Take 1 tablet (50 mg total) by mouth daily      Facility-Administered Medications: None       History reviewed. No pertinent past medical history.    Past Surgical History:   Procedure Laterality Date     EYE SURGERY      WISDOM TOOTH EXTRACTION         Family History   Problem Relation Age of Onset    Diabetes Mother     Heart disease Father     Heart disease Paternal Grandfather      I have reviewed and agree with the history as documented.    E-Cigarette/Vaping    E-Cigarette Use Never User      E-Cigarette/Vaping Substances     Social History     Tobacco Use    Smoking status: Never    Smokeless tobacco: Never   Vaping Use    Vaping status: Never Used   Substance Use Topics    Alcohol use: No     Comment: 1-2 drinks a year    Drug use: No       Review of Systems   Constitutional:  Negative for activity change, appetite change, chills and fever.   HENT:  Negative for congestion, dental problem, drooling, ear discharge, ear pain, mouth sores, nosebleeds, rhinorrhea, sore throat and trouble swallowing.    Eyes:  Negative for pain, discharge and itching.   Respiratory:  Negative for cough, chest tightness, shortness of breath and wheezing.    Cardiovascular:  Negative for chest pain and palpitations.   Gastrointestinal:  Negative for abdominal pain, blood in stool, constipation, diarrhea, nausea and vomiting.   Endocrine: Negative for cold intolerance and heat intolerance.   Genitourinary:  Negative for decreased urine volume (no urinary incontinence.), difficulty urinating, dysuria, flank pain, frequency and urgency.   Musculoskeletal:  Negative for back pain.   Skin:  Positive for wound. Negative for rash.   Allergic/Immunologic: Negative for food allergies and immunocompromised state.   Neurological:  Negative for dizziness, seizures, syncope, weakness, numbness and headaches.   Psychiatric/Behavioral:  Negative for agitation, behavioral problems and confusion.        Physical Exam  Physical Exam  Vitals and nursing note reviewed.   Constitutional:       General: He is not in acute distress.     Appearance: He is not diaphoretic.   HENT:      Head: Normocephalic and atraumatic.      Right Ear: External ear normal.       Left Ear: External ear normal.      Mouth/Throat:      Pharynx: No oropharyngeal exudate.   Eyes:      Conjunctiva/sclera: Conjunctivae normal.   Neck:      Vascular: No JVD.      Trachea: No tracheal deviation.   Cardiovascular:      Rate and Rhythm: Normal rate and regular rhythm.      Heart sounds: Normal heart sounds. No murmur heard.     No friction rub. No gallop.   Pulmonary:      Effort: Pulmonary effort is normal. No respiratory distress.      Breath sounds: Normal breath sounds. No wheezing or rales.   Chest:      Chest wall: No tenderness.   Musculoskeletal:         General: No tenderness or deformity. Normal range of motion.      Right foot: Swelling present. No deformity, bunion or Charcot foot.      Comments: Right great toe with large wound medially.  The entire toe appears discolored with proximal erythema and swelling of the forefoot.   Lymphadenopathy:      Cervical: No cervical adenopathy.   Skin:     General: Skin is warm and dry.      Findings: No erythema or rash.      Comments: Several scabbed areas on the right lower extremity distally.  Per patient these have been here for quite some time.  No drainage or surrounding erythema.   Neurological:      General: No focal deficit present.      Mental Status: He is alert and oriented to person, place, and time.   Psychiatric:         Mood and Affect: Mood normal.         Behavior: Behavior normal.               Vital Signs  ED Triage Vitals [02/16/24 1456]   Temperature Pulse Respirations Blood Pressure SpO2   98.2 °F (36.8 °C) 82 18 (!) 174/91 97 %      Temp Source Heart Rate Source Patient Position - Orthostatic VS BP Location FiO2 (%)   Oral Monitor Sitting Right arm --      Pain Score       1           Vitals:    02/16/24 1456 02/16/24 1604 02/16/24 1643 02/16/24 1705   BP: (!) 174/91 155/84 (!) 171/84 142/79   Pulse: 82 76 74 72   Patient Position - Orthostatic VS: Sitting Lying           Visual Acuity      ED Medications  Medications    vancomycin (VANCOCIN) 2,000 mg in sodium chloride 0.9 % 500 mL IVPB (2,000 mg Intravenous Bolus 2/16/24 1720)   ceftriaxone (ROCEPHIN) 1 g/50 mL in dextrose IVPB (0 mg Intravenous Stopped 2/16/24 1643)       Diagnostic Studies  Results Reviewed       Procedure Component Value Units Date/Time    Protime-INR [[id number]]  (Abnormal) Collected: 02/16/24 1644    Lab Status: Final result Specimen: Blood from Arm, Right Updated: 02/16/24 1709     Protime 16.0 seconds      INR 1.21    APTT [[id number]]  (Normal) Collected: 02/16/24 1644    Lab Status: Final result Specimen: Blood from Arm, Right Updated: 02/16/24 1709     PTT 37 seconds     Sedimentation rate, automated [[id number]]  (Abnormal) Collected: 02/16/24 1557    Lab Status: Final result Specimen: Blood from Arm, Left Updated: 02/16/24 1650     Sed Rate 108 mm/hour     Comprehensive metabolic panel [[id number]]  (Abnormal) Collected: 02/16/24 1557    Lab Status: Final result Specimen: Blood from Arm, Left Updated: 02/16/24 1628     Sodium 137 mmol/L      Potassium 3.4 mmol/L      Chloride 100 mmol/L      CO2 28 mmol/L      ANION GAP 9 mmol/L      BUN 25 mg/dL      Creatinine 0.98 mg/dL      Glucose 206 mg/dL      Calcium 8.7 mg/dL      Corrected Calcium 9.2 mg/dL      AST 12 U/L      ALT 14 U/L      Alkaline Phosphatase 92 U/L      Total Protein 7.7 g/dL      Albumin 3.4 g/dL      Total Bilirubin 0.58 mg/dL      eGFR 81 ml/min/1.73sq m     Narrative:      National Kidney Disease Foundation guidelines for Chronic Kidney Disease (CKD):     Stage 1 with normal or high GFR (GFR > 90 mL/min/1.73 square meters)    Stage 2 Mild CKD (GFR = 60-89 mL/min/1.73 square meters)    Stage 3A Moderate CKD (GFR = 45-59 mL/min/1.73 square meters)    Stage 3B Moderate CKD (GFR = 30-44 mL/min/1.73 square meters)    Stage 4 Severe CKD (GFR = 15-29 mL/min/1.73 square meters)    Stage 5 End Stage CKD (GFR <15 mL/min/1.73 square meters)  Note: GFR calculation is accurate only with a steady  state creatinine    C-reactive protein [[id number]]  (Abnormal) Collected: 02/16/24 1557    Lab Status: Final result Specimen: Blood from Arm, Left Updated: 02/16/24 1628     .6 mg/L     Blood culture #2 [[id number]] Collected: 02/16/24 1557    Lab Status: In process Specimen: Blood from Arm, Left Updated: 02/16/24 1607    Blood culture #1 [[id number]] Collected: 02/16/24 1558    Lab Status: In process Specimen: Blood from Arm, Right Updated: 02/16/24 1607    CBC and differential [[id number]]  (Abnormal) Collected: 02/16/24 1557    Lab Status: Final result Specimen: Blood from Arm, Left Updated: 02/16/24 1607     WBC 10.95 Thousand/uL      RBC 4.16 Million/uL      Hemoglobin 11.3 g/dL      Hematocrit 35.3 %      MCV 85 fL      MCH 27.2 pg      MCHC 32.0 g/dL      RDW 12.5 %      MPV 8.7 fL      Platelets 407 Thousands/uL      nRBC 0 /100 WBCs      Neutrophils Relative 72 %      Immat GRANS % 1 %      Lymphocytes Relative 13 %      Monocytes Relative 11 %      Eosinophils Relative 2 %      Basophils Relative 1 %      Neutrophils Absolute 8.04 Thousands/µL      Immature Grans Absolute 0.05 Thousand/uL      Lymphocytes Absolute 1.37 Thousands/µL      Monocytes Absolute 1.20 Thousand/µL      Eosinophils Absolute 0.23 Thousand/µL      Basophils Absolute 0.06 Thousands/µL                    XR foot 3+ views RIGHT   ED Interpretation by Anh Leo PA-C (02/16 1611)   No signs of bony destruction      VAS ARTERIAL DUPLEX-LOWER LIMB UNILATERAL    (Results Pending)              Procedures  Procedures         ED Course  ED Course as of 02/16/24 1741 Fri Feb 16, 2024   1526 Message sent to podiatry.  Will be over to evaluate.                                             Medical Decision Making  Differential diagnosis includes but not limited to: Osteomyelitis, gangrene, NSTI, PVD, DM    Problems Addressed:  Gangrene of toe of right foot (HCC): acute illness or injury    Amount and/or Complexity of Data Reviewed  Labs:  ordered. Decision-making details documented in ED Course.  Radiology: ordered and independent interpretation performed. Decision-making details documented in ED Course.    Risk  Decision regarding hospitalization.             Disposition  Final diagnoses:   Gangrene of toe of right foot (HCC)     Time reflects when diagnosis was documented in both MDM as applicable and the Disposition within this note       Time User Action Codes Description Comment    2/16/2024  4:03 PM Ron Vizcarra [M86.671] Other chronic osteomyelitis of right foot (HCC)     2/16/2024  4:24 PM Anh Leo [[id number]] Gangrene of toe of right foot (HCC)           ED Disposition       ED Disposition   Send to OR    Condition   --    Date/Time   Fri Feb 16, 2024  4:55 PM    Comment   --             Follow-up Information    None         Current Discharge Medication List        CONTINUE these medications which have NOT CHANGED    Details   amLODIPine (NORVASC) 5 mg tablet Take 1 tablet (5 mg total) by mouth daily  Qty: 90 tablet, Refills: 3    Associated Diagnoses: Essential hypertension      aspirin 81 MG tablet Take 1 tablet by mouth daily  Refills: 0      atorvastatin (LIPITOR) 40 mg tablet Take 1 tablet (40 mg total) by mouth every evening  Qty: 90 tablet, Refills: 3    Associated Diagnoses: Coronary artery disease involving native heart without angina pectoris, unspecified vessel or lesion type      metoprolol succinate (TOPROL-XL) 50 mg 24 hr tablet Take 1 tablet (50 mg total) by mouth daily  Qty: 90 tablet, Refills: 3    Associated Diagnoses: Essential hypertension             No discharge procedures on file.    PDMP Review       None            ED Provider  Electronically Signed by             Anh Leo PA-C  02/16/24 7588

## 2024-02-16 NOTE — OP NOTE
OPERATIVE REPORT  PATIENT NAME: Maciel Gaitan    :  1960  MRN: [id number]  Pt Location: AN OR ROOM 04    SURGERY DATE: 2024    Surgeons and Role:     * Severo Person DPM - Primary     * Ron Vizcarra DPM - Assisting    Preop Diagnosis:  Other chronic osteomyelitis of right foot (HCC) [M86.671]    Post-Op Diagnosis Codes:     * Other chronic osteomyelitis of right foot (HCC) [M86.671]    Procedure(s):  Right - PARTIAL 1st RAY AMPUTATION. RIGHT FOOT REMOVAL OF ALL NON-VIABLE BONE AND SOFT TISSUE    Specimen(s):  ID Type Source Tests Collected by Time Destination   1 : RIGHT GREAT TOE 1ST METATARSAL AND SESAMOIDS Tissue Toe, Right TISSUE EXAM Severo Person DPM 2024 1744    2 : RIGHT 1ST METATARSAL CLEAN MARGIN Tissue Foot, Right ANAEROBIC CULTURE AND GRAM STAIN, CULTURE, TISSUE AND GRAM STAIN, TISSUE EXAM Severo Person DPM 2024 1802        Estimated Blood Loss:   50 mL    Drains:  * No LDAs found *    Anesthesia Type:   Choice  20 ml of 1% Lidocaine plain and 0.25% Bupivacaine plain in 1:1 fashion as regional block    Operative Indications:  Other chronic osteomyelitis of right foot (HCC) [M86.671]    Operative Findings:  See Procedure & Technique for full details  Gross purulence was noted at level of right first metatarsophalangeal joint and first interspace. Healthy bleeding noted after copious irrigation.   Full resection of non-viable bone and soft tissue achieved.    Complications:   None    Procedure and Technique:     Under mild sedation, the patient was brought into the operating room and place in the supine position. Choice of sedation per anesthesia team was achieved and a universal timeout was performed where all parties are in agreement of correct patient, correct procedure and correct site.     Following intravenous sedation, a regional block was performed consisting of 20 ml of 1% Lidocaine plain and 0.25% Bupivacaine plain.    The lower extremity was  "then prepped and draped in the usual aseptic manner.     Procedure: Partial first ray amputation  Attention was then directed to right lower extremity.  Skin marker was used to outline proposed incision, then scalpel was used to incise the skin. Next, using sharp and blunt dissection, key neurovascular structures were identified & retracted. Hemostasis was managed with electrocautery where appropriate.     Skin incision was made with sterile, sharp scalpel at the toe at the level of the metatarsophalangeal joint revealing gross purulence. All soft tissue attachments to the toe were detached sharply. The toe was handed off of the field. Next, periosteum was bluntly elevated off distal metatarsal head. Following that, sagittal saw was used to resect distal metatarsal head. Hard cortical bone was noted at level of resection. Afterwards, first interspace was blunt dissected revealing gross purulence.  Copious irrigation of the surgical area with normal saline was performed.  Exposed non-vascular soft tissue was resected afterwards.    Routine cultures and tissue samples were taken and sent.    Skin closure was obtained with Nylon.    The lower extremity was then cleansed and dried.   The incision site was dressed with Betadine-soaked adaptic, dry sterile gauze, abdominal pad, gauze wrap, and compressive cotton wrap.    The patient tolerated the procedure and anesthesia well without immediate complications and transferred to PACU .    Dr. Person was present during the entire procedure    SIGNATURE: Ron Vizcarra DPM  DATE: February 16, 2024  TIME: 6:37 PM    Portions of the record may have been created with voice recognition software. Occasional wrong word or \"sound a like\" substitutions may have occurred due to the inherent limitations of voice recognition software. Read the chart carefully and recognize, using context, where substitutions have occurred.               "

## 2024-02-16 NOTE — ED NOTES
Pt off to OR AAOx3 resp even and unlabored and appeared to be in no distress at the moment     José Antonio Abrams RN  02/16/24 6845

## 2024-02-16 NOTE — ANESTHESIA POSTPROCEDURE EVALUATION
Post-Op Assessment Note    CV Status:  Stable  Pain Score: 0    Pain management: adequate       Mental Status:  Alert and awake   Hydration Status:  Stable   PONV Controlled:  None   Airway Patency:  Patent     Post Op Vitals Reviewed: Yes    No anethesia notable event occurred.    Staff: CRNA               BP   120/63   Temp   99.9   Pulse  74   Resp   14   SpO2   99% room air

## 2024-02-17 PROBLEM — M86.9 OSTEOMYELITIS OF GREAT TOE OF RIGHT FOOT (HCC): Status: ACTIVE | Noted: 2024-02-17

## 2024-02-17 LAB
ALBUMIN SERPL BCP-MCNC: 2.8 G/DL (ref 3.5–5)
ALP SERPL-CCNC: 75 U/L (ref 34–104)
ALT SERPL W P-5'-P-CCNC: 12 U/L (ref 7–52)
ANION GAP SERPL CALCULATED.3IONS-SCNC: 6 MMOL/L
AST SERPL W P-5'-P-CCNC: 14 U/L (ref 13–39)
BASOPHILS # BLD AUTO: 0.07 THOUSANDS/ÂΜL (ref 0–0.1)
BASOPHILS NFR BLD AUTO: 1 % (ref 0–1)
BILIRUB SERPL-MCNC: 0.47 MG/DL (ref 0.2–1)
BUN SERPL-MCNC: 18 MG/DL (ref 5–25)
CALCIUM ALBUM COR SERPL-MCNC: 8.8 MG/DL (ref 8.3–10.1)
CALCIUM SERPL-MCNC: 7.8 MG/DL (ref 8.4–10.2)
CHLORIDE SERPL-SCNC: 104 MMOL/L (ref 96–108)
CO2 SERPL-SCNC: 27 MMOL/L (ref 21–32)
CREAT SERPL-MCNC: 0.87 MG/DL (ref 0.6–1.3)
EOSINOPHIL # BLD AUTO: 0.2 THOUSAND/ÂΜL (ref 0–0.61)
EOSINOPHIL NFR BLD AUTO: 2 % (ref 0–6)
ERYTHROCYTE [DISTWIDTH] IN BLOOD BY AUTOMATED COUNT: 12.4 % (ref 11.6–15.1)
GFR SERPL CREATININE-BSD FRML MDRD: 91 ML/MIN/1.73SQ M
GLUCOSE SERPL-MCNC: 160 MG/DL (ref 65–140)
GLUCOSE SERPL-MCNC: 189 MG/DL (ref 65–140)
GLUCOSE SERPL-MCNC: 196 MG/DL (ref 65–140)
GLUCOSE SERPL-MCNC: 217 MG/DL (ref 65–140)
GLUCOSE SERPL-MCNC: 234 MG/DL (ref 65–140)
HCT VFR BLD AUTO: 29.1 % (ref 36.5–49.3)
HGB BLD-MCNC: 9.5 G/DL (ref 12–17)
IMM GRANULOCYTES # BLD AUTO: 0.03 THOUSAND/UL (ref 0–0.2)
IMM GRANULOCYTES NFR BLD AUTO: 0 % (ref 0–2)
LYMPHOCYTES # BLD AUTO: 1.14 THOUSANDS/ÂΜL (ref 0.6–4.47)
LYMPHOCYTES NFR BLD AUTO: 12 % (ref 14–44)
MAGNESIUM SERPL-MCNC: 1.9 MG/DL (ref 1.9–2.7)
MCH RBC QN AUTO: 27.5 PG (ref 26.8–34.3)
MCHC RBC AUTO-ENTMCNC: 32.6 G/DL (ref 31.4–37.4)
MCV RBC AUTO: 84 FL (ref 82–98)
MONOCYTES # BLD AUTO: 1.14 THOUSAND/ÂΜL (ref 0.17–1.22)
MONOCYTES NFR BLD AUTO: 12 % (ref 4–12)
NEUTROPHILS # BLD AUTO: 7.17 THOUSANDS/ÂΜL (ref 1.85–7.62)
NEUTS SEG NFR BLD AUTO: 73 % (ref 43–75)
NRBC BLD AUTO-RTO: 0 /100 WBCS
PHOSPHATE SERPL-MCNC: 3.2 MG/DL (ref 2.3–4.1)
PLATELET # BLD AUTO: 335 THOUSANDS/UL (ref 149–390)
PMV BLD AUTO: 8.9 FL (ref 8.9–12.7)
POTASSIUM SERPL-SCNC: 3.7 MMOL/L (ref 3.5–5.3)
PROT SERPL-MCNC: 6.3 G/DL (ref 6.4–8.4)
RBC # BLD AUTO: 3.46 MILLION/UL (ref 3.88–5.62)
SODIUM SERPL-SCNC: 137 MMOL/L (ref 135–147)
TSH SERPL DL<=0.05 MIU/L-ACNC: 1.35 UIU/ML (ref 0.45–4.5)
VANCOMYCIN SERPL-MCNC: 5.3 UG/ML (ref 10–20)
WBC # BLD AUTO: 9.75 THOUSAND/UL (ref 4.31–10.16)

## 2024-02-17 PROCEDURE — 99232 SBSQ HOSP IP/OBS MODERATE 35: CPT | Performed by: NURSE PRACTITIONER

## 2024-02-17 PROCEDURE — 83735 ASSAY OF MAGNESIUM: CPT

## 2024-02-17 PROCEDURE — 80202 ASSAY OF VANCOMYCIN: CPT | Performed by: INTERNAL MEDICINE

## 2024-02-17 PROCEDURE — 99024 POSTOP FOLLOW-UP VISIT: CPT | Performed by: PODIATRIST

## 2024-02-17 PROCEDURE — 97167 OT EVAL HIGH COMPLEX 60 MIN: CPT

## 2024-02-17 PROCEDURE — 82948 REAGENT STRIP/BLOOD GLUCOSE: CPT

## 2024-02-17 PROCEDURE — 84100 ASSAY OF PHOSPHORUS: CPT

## 2024-02-17 PROCEDURE — 85025 COMPLETE CBC W/AUTO DIFF WBC: CPT

## 2024-02-17 PROCEDURE — 84443 ASSAY THYROID STIM HORMONE: CPT

## 2024-02-17 PROCEDURE — 80053 COMPREHEN METABOLIC PANEL: CPT

## 2024-02-17 RX ORDER — CEFAZOLIN SODIUM 2 G/50ML
2000 SOLUTION INTRAVENOUS EVERY 8 HOURS
Status: DISCONTINUED | OUTPATIENT
Start: 2024-02-17 | End: 2024-02-18

## 2024-02-17 RX ADMIN — AMLODIPINE BESYLATE 5 MG: 5 TABLET ORAL at 08:34

## 2024-02-17 RX ADMIN — ACETAMINOPHEN 650 MG: 325 TABLET, FILM COATED ORAL at 01:22

## 2024-02-17 RX ADMIN — ACETAMINOPHEN 650 MG: 325 TABLET, FILM COATED ORAL at 08:34

## 2024-02-17 RX ADMIN — METOPROLOL SUCCINATE 50 MG: 50 TABLET, EXTENDED RELEASE ORAL at 08:34

## 2024-02-17 RX ADMIN — ACETAMINOPHEN 650 MG: 325 TABLET, FILM COATED ORAL at 20:08

## 2024-02-17 RX ADMIN — PIPERACILLIN AND TAZOBACTAM 3.38 G: 36; 4.5 INJECTION, POWDER, FOR SOLUTION INTRAVENOUS at 05:01

## 2024-02-17 RX ADMIN — CEFAZOLIN SODIUM 2000 MG: 2 SOLUTION INTRAVENOUS at 20:08

## 2024-02-17 RX ADMIN — ASPIRIN 81 MG 81 MG: 81 TABLET ORAL at 08:34

## 2024-02-17 RX ADMIN — INSULIN LISPRO 2 UNITS: 100 INJECTION, SOLUTION INTRAVENOUS; SUBCUTANEOUS at 11:45

## 2024-02-17 RX ADMIN — SENNOSIDES 8.6 MG: 8.6 TABLET, FILM COATED ORAL at 08:34

## 2024-02-17 RX ADMIN — ENOXAPARIN SODIUM 40 MG: 40 INJECTION SUBCUTANEOUS at 08:34

## 2024-02-17 RX ADMIN — INSULIN LISPRO 1 UNITS: 100 INJECTION, SOLUTION INTRAVENOUS; SUBCUTANEOUS at 21:01

## 2024-02-17 RX ADMIN — VANCOMYCIN HYDROCHLORIDE 1500 MG: 1 INJECTION, POWDER, LYOPHILIZED, FOR SOLUTION INTRAVENOUS at 12:50

## 2024-02-17 RX ADMIN — ATORVASTATIN CALCIUM 40 MG: 40 TABLET, FILM COATED ORAL at 17:16

## 2024-02-17 RX ADMIN — INSULIN LISPRO 1 UNITS: 100 INJECTION, SOLUTION INTRAVENOUS; SUBCUTANEOUS at 08:37

## 2024-02-17 RX ADMIN — INSULIN LISPRO 1 UNITS: 100 INJECTION, SOLUTION INTRAVENOUS; SUBCUTANEOUS at 17:17

## 2024-02-17 RX ADMIN — CEFAZOLIN SODIUM 2000 MG: 2 SOLUTION INTRAVENOUS at 11:46

## 2024-02-17 NOTE — OCCUPATIONAL THERAPY NOTE
"    Occupational Therapy Evaluation     Patient Name: Maciel Gaitan  Today's Date: 2/17/2024  Problem List  Principal Problem:    Osteomyelitis of great toe of right foot (HCC)  Active Problems:    Morbid obesity (HCC)    CAD (coronary artery disease)    Essential hypertension    Hyperlipidemia    Diabetes mellitus type 2 with complications (HCC)    Past Medical History  History reviewed. No pertinent past medical history.  Past Surgical History  Past Surgical History:   Procedure Laterality Date    EYE SURGERY      WISDOM TOOTH EXTRACTION          02/17/24 1031   OT Last Visit   OT Visit Date 02/17/24   Note Type   Note type Evaluation   Pain Assessment   Pain Assessment Tool 0-10   Pain Score No Pain   Restrictions/Precautions   Weight Bearing Precautions Per Order Yes   RLE Weight Bearing Per Order (S)  NWB   Braces or Orthoses   (surgical shoe R LE)   Other Precautions Bed Alarm;Chair Alarm;Fall Risk;WBS;Pain   Home Living   Type of Home House  (2Sh with 3STE)   Home Layout Two level;1/2 bath on main level   Bathroom Shower/Tub Walk-in shower   Bathroom Toilet Standard   Bathroom Equipment   (none PTA)   Home Equipment Crutches  (reports they were for a family member previously)   Additional Comments Pt resides with wife in a 2SH.  Wife works   Prior Function   Level of [place] Independent with functional mobility;Independent with ADLs   Lives With Spouse   IADLs Independent with driving;Independent with medication management;Independent with meal prep   Falls in the last 6 months 0   Vocational Full time employment   Lifestyle   Autonomy Pt reports being I with ADLs, IADLs, and functional mobility without use of DME PTA.  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   Subjective   Subjective \"I'm just putting a little weight\"   ADL   Eating Assistance 5  Supervision/Setup   Grooming Assistance 5  Supervision/Setup   UB Bathing Assistance 5  " Supervision/Setup   LB Bathing Assistance 3  Moderate Assistance   UB Dressing Assistance 5  Supervision/Setup   LB Dressing Assistance 1  Total Assistance   LB Dressing Deficit Don/doff R shoe   Toileting Assistance  5  Supervision/Setup   Bed Mobility   Supine to Sit 6  Modified independent   Sit to Supine 6  Modified independent   Transfers   Sit to Stand 4  Minimal assistance   Additional items Assist x 1;Increased time required;Verbal cues;Impulsive   Stand to Sit 4  Minimal assistance   Additional items Increased time required;Impulsive;Verbal cues   Additional Comments Pt educated on WBS sitting EOB and discussed techniques to complete STS while maintaining WBS.  First attempt at standing, completes fully WBing on R LE despite cues.  Returned to sitting and then completes with Harrison while maintaining WBS with tactile and visual cues from therapist and use of rw.  At the end of evaluation, pt left in bed with alarm activated and all needs within reach   Functional Mobility   Functional Mobility 4  Minimal assistance   Additional Comments Use of rw with cues for safety and appropriate stride length.  Able to ambulate within room with CGA   Additional items Rolling walker   Balance   Static Sitting Good   Dynamic Sitting Fair +   Static Standing Fair -   Dynamic Standing Poor +   Ambulatory Poor +   Activity Tolerance   Activity Tolerance Treatment limited secondary to agitation   Nurse Made Aware Pt cleared by RN for OT evaluation and OOB mobility.  RN updated following evaluation   RUE Assessment   RUE Assessment WFL   LUE Assessment   LUE Assessment WFL   Psychosocial   Psychosocial (WDL) X   Patient Behaviors/Mood Irritable;Brightens with approach   Cognition   Arousal/Participation Alert;Responsive   Attention Attends with cues to redirect   Orientation Level Oriented X4   Memory (S)  Decreased recall of precautions   Following Commands Follows one step commands with increased time or repetition   Comments  Pt irritable especially regarding WBS despite max education.  Appears to have dec understanding of risks associated with noncompliance with WBS.  Mildly impulsive, but anticipate this is 2* irritability.  Requires repetition of directions and education during evaluation.  Will continue to monitor   Assessment   Limitation Decreased ADL status;Decreased Safe judgement during ADL;Decreased endurance;Decreased self-care trans;Decreased high-level ADLs   Prognosis Fair   Assessment Pt is a 63 year old male seen for initial OT evaluation s/p admission to [facility] 2/16/24 with OM and wet gangrene of R hallux.  Pt is POD1 s/p partial 1st ray amputation and is NWB in surgical shoe.  Additional active problems include: DM, CAD, essential HTN, HLD, and morbid obesity.  Pt resides with his wife in a 2SH with 3STE.  Pt reports being completely independent with ADLs, IADLs, and functional mobility without use of DME PTA.  Pt is currently demonstrating the following occupational deficits: UB ADLs with set-up, LB bathing with modA, LB dressing with totalA, toileting with Harrison, functional transfers with Harrison, and functional mobility with Harrison with use of rw.  Factors currently limiting pt's independence in these areas include: WBS, decreased safety awareness, functional mobility, limited support in home environment (wife works, D[employer]).  From an OT standpoint, recommend level III rehab resources upon d/c, use of sc (when cleared to shower by surgical team), inc assist from family.  Pt is to continue to benefit from skilled occupational therapy services while in the hospital to maximize functioning and independence with daily activities.  See below for OT goals to be addressed 3-5x/wk.   Goals   Patient Goals to go home   Plan   Treatment Interventions ADL retraining;Functional transfer training;UE strengthening/ROM;Endurance training;Patient/family training;Equipment evaluation/education;Compensatory technique education;Continued  evaluation;Activityengagement   Goal Expiration Date 02/27/24   OT Treatment Day 1   OT Frequency 3-5x/wk   Discharge Recommendation   Rehab Resource Intensity Level, OT III (Minimum Resource Intensity)   Equipment Recommended Shower/Tub chair with back ($)   Additional Comments  recommend inc family assist, FFSU upon d/c   AM-PAC Daily Activity Inpatient   Lower Body Dressing 2   Bathing 2   Toileting 3   Upper Body Dressing 3   Grooming 4   Eating 4   Daily Activity Raw Score 18   Daily Activity Standardized Score (Calc for Raw Score >=11) 38.66   AM-PAC Applied Cognition Inpatient   Following a Speech/Presentation 3   Understanding Ordinary Conversation 3   Taking Medications 3   Remembering Where Things Are Placed or Put Away 3   Remembering List of 4-5 Errands 3   Taking Care of Complicated Tasks 3   Applied Cognition Raw Score 18   Applied Cognition Standardized Score 38.07     Goals:  Pt will complete all self care tasks with Chloe with use of DME as appropriate and while maintaining WBS    Pt will complete functional transfers on/off all surfaces with Chloe with use of DME as appropriate and with good safety, balance, WBS maintenance    Pt will complete household distance functional mobility with Chloe with use of DME as appropriate and with good safety, balance, endurance, and 100% WBS maintenance.    Suzanne Brown, MOT, OTR/L, CSRS, CBIS  [place] License [id number]  [place] License [id number]

## 2024-02-17 NOTE — ASSESSMENT & PLAN NOTE
"Lab Results   Component Value Date    HGBA1C 9.8 (H) 01/23/2024       Recent Labs     02/16/24  1834 02/16/24 2058 02/16/24 2148   POCGLU 185* 202* 236*     States he lost significant amount of weight and his HGA1c was below 6. Used to take Metformin but PCP D/C due to improvement in blood glucose management. However, he states he is not following a \"clean\" diet for now.  SSI and BG checks are ordered  Car/controlled diet in place     Blood Sugar Average: Last 72 hrs:  (P) [id number]    "

## 2024-02-17 NOTE — PLAN OF CARE
Problem: OCCUPATIONAL THERAPY ADULT  Goal: Performs self-care activities at highest level of function for planned discharge setting.  See evaluation for individualized goals.  Description: Treatment Interventions: ADL retraining, Functional transfer training, UE strengthening/ROM, Endurance training, Patient/family training, Equipment evaluation/education, Compensatory technique education, Continued evaluation, Activityengagement  Equipment Recommended: Shower/Tub chair with back ($)       See flowsheet documentation for full assessment, interventions and recommendations.   Note: Limitation: Decreased ADL status, Decreased Safe judgement during ADL, Decreased endurance, Decreased self-care trans, Decreased high-level ADLs  Prognosis: Fair  Assessment: Pt is a 63 year old male seen for initial OT evaluation s/p admission to [facility] 2/16/24 with OM and wet gangrene of R hallux.  Pt is POD1 s/p partial 1st ray amputation and is NWB in surgical shoe.  Additional active problems include: DM, CAD, essential HTN, HLD, and morbid obesity.  Pt resides with his wife in a 2SH with 3STE.  Pt reports being completely independent with ADLs, IADLs, and functional mobility without use of DME PTA.  Pt is currently demonstrating the following occupational deficits: UB ADLs with set-up, LB bathing with modA, LB dressing with totalA, toileting with Harrison, functional transfers with Hrarison, and functional mobility with Harrison with use of rw.  Factors currently limiting pt's independence in these areas include: WBS, decreased safety awareness, functional mobility, limited support in home environment (wife works, [employer]).  From an OT standpoint, recommend level III rehab resources upon d/c, use of sc (when cleared to shower by surgical team), inc assist from family.  Pt is to continue to benefit from skilled occupational therapy services while in the hospital to maximize functioning and independence with daily activities.  See below for OT goals to  be addressed 3-5x/wk.     Rehab Resource Intensity Level, OT: III (Minimum Resource Intensity)

## 2024-02-17 NOTE — PROGRESS NOTES
Maciel Gaitan is a 63 y.o. male who is currently ordered Vancomycin IV with management by the Pharmacy Consult service.  Relevant clinical data and objective / subjective history reviewed.  Vancomycin Assessment:  Indication and Goal AUC/Trough: Bone/joint infection (goal -600, trough >10)  Micro:     Renal Function:  SCr: 0.87 mg/dL  CrCl: 120 mL/min  Renal replacement: Not on dialysis  Days of Therapy: 2  Current Dose: 2 g IV once loading dose   Vancomycin Plan:  New Dosin mg IV every 12 hours  Estimated AUC: 464 mcg*hr/mL  Estimated Trough: 15.1 mcg/mL  Next Level:  at 0600  Renal Function Monitoring: Daily BMP and UOP  Pharmacy will continue to follow closely for s/sx of nephrotoxicity, infusion reactions and appropriateness of therapy.  BMP and CBC will be ordered per protocol. We will continue to follow the patient’s culture results and clinical progress daily.    Vivi Joel

## 2024-02-17 NOTE — ASSESSMENT & PLAN NOTE
Obesity increase length of stay and healing process  Lost 60 pounds with life style changes and exercise. States his PCP encouraged him  Education and encouragement to continue with healthy life style and exercising daily was provided at bedside

## 2024-02-17 NOTE — PROGRESS NOTES
[facility] Podiatry - Progress Note  Patient: Maciel Gaitan 63 y.o. male   MRN: [id number]  PCP: No primary care provider on file.  Unit/Bed#: W -01 Encounter: [id number]  Date Of Visit: 24    ASSESSMENT:    Maciel Gaitan is a 63 y.o. male with:    Postop day 1 status post partial ray resection Right foot      PLAN:    Alginate applied today with dry sterile dressing.  Change .  Continue local wound care, appreciate nursing assistance with dressing changes.  Elevation on green foam wedges or pillows when non-ambulatory.  Rest of care per primary team.    Follow-up clean margin bone biopsy micro and path    Weight bearing status: Weightbearing to heel with surgical shoe.      SUBJECTIVE:     The patient was seen, evaluated, and assessed at bedside today. The patient was awake, alert, and in no acute distress. No acute events overnight. The patient reports doing well no significant pain. Patient denies N/V/F/chills/SOB/CP.      OBJECTIVE:     Vitals:   /74   Pulse 66   Temp 99.3 °F (37.4 °C)   Resp 18   Wt 129 kg (285 lb 4.4 oz)   SpO2 95%   BMI 38.69 kg/m²     Temp (24hrs), Av.2 °F (37.3 °C), Min:97.6 °F (36.4 °C), Max:100.7 °F (38.2 °C)      Physical Exam:     General:  Alert, cooperative, and in no distress.  Lower extremity exam:  Cardiovascular status at baseline.  Neurological status at baseline.  Musculoskeletal status at baseline . No calf tenderness noted.       Incision site is well coapted at this time no signs of necrosis or gapping no active drainage noted to the area significant improvement in cellulitis noted today on examination.    Additional Data:     Labs:    Results from last 7 days   Lab Units 24  0446   WBC Thousand/uL 9.75   HEMOGLOBIN g/dL 9.5*   HEMATOCRIT % 29.1*   PLATELETS Thousands/uL 335   NEUTROS PCT % 73   LYMPHS PCT % 12*   MONOS PCT % 12   EOS PCT % 2     Results from last 7 days   Lab Units 24  0446   POTASSIUM mmol/L  "3.7   CHLORIDE mmol/L 104   CO2 mmol/L 27   BUN mg/dL 18   CREATININE mg/dL 0.87   CALCIUM mg/dL 7.8*   ALK PHOS U/L 75   ALT U/L 12   AST U/L 14     Results from last 7 days   Lab Units 02/16/24  1644   INR  1.21*       * I Have Reviewed All Lab Data Listed Above.    Recent Cultures (last 7 days):     Results from last 7 days   Lab Units 02/16/24  1558 02/16/24  1557   BLOOD CULTURE  Received in Microbiology Lab. Culture in Progress. Received in Microbiology Lab. Culture in Progress.           Imaging: I have personally reviewed pertinent films in PACS  EKG, Pathology, and Other Studies: I have personally reviewed pertinent reports.      ** Please Note: Portions of the record may have been created with voice recognition software. Occasional wrong word or \"sound a like\" substitutions may have occurred due to the inherent limitations of voice recognition software. Read the chart carefully and recognize, using context, where substitutions have occurred. **      "

## 2024-02-17 NOTE — ASSESSMENT & PLAN NOTE
Six weeks ago developed a callus/ blisters. Noted discharge and foul smell and decided to visit the ER for further management  XR  of the right foot revealed osseous erosions at right distal phalanx ascending to distal metatarsal head  On clinical exam, wet gangrene of right hallux with malodor observed by podiatry.   Patient was taken to the OR for partial first ray amputation, right foot removal of all non-viable bone and soft tissue  Ceftriaxone and vancomycin initiated. Continue with zosyn and vancomycin on the floor  Patient reports pain is under control. Acetaminophen and non-pharmacological measures for pain management  Consider wound care consult after first surgical dressing removal  Patient is hemodynamically stable

## 2024-02-17 NOTE — H&P
"[facility]  H&P  Name: Maciel Gaitan 63 y.o. male I MRN: [id number]  Unit/Bed#: W -01 I Date of Admission: 2/16/2024   Date of Service: 2/17/2024 I Hospital Day: 1      Assessment/Plan   * Osteomyelitis of great toe of right foot (HCC)  Assessment & Plan  Six weeks ago developed a callus/ blisters. Noted discharge and foul smell and decided to visit the ER for further management  XR  of the right foot revealed osseous erosions at right distal phalanx ascending to distal metatarsal head  On clinical exam, wet gangrene of right hallux with malodor observed by podiatry.   Patient was taken to the OR for partial first ray amputation, right foot removal of all non-viable bone and soft tissue  Ceftriaxone and vancomycin initiated. Continue with zosyn and vancomycin on the floor  Patient reports pain is under control. Acetaminophen and non-pharmacological measures for pain management  Consider wound care consult after first surgical dressing removal  Patient is hemodynamically stable      Diabetes mellitus type 2 with complications (HCC)  Assessment & Plan  Lab Results   Component Value Date    HGBA1C 9.8 (H) 01/23/2024       Recent Labs     02/16/24  1834 02/16/24  2058 02/16/24  2148   POCGLU 185* 202* 236*   States he lost significant amount of weight and his HGA1c was below 6. Used to take Metformin but PCP D/C due to improvement in blood glucose management. However, he states he is not following a \"clean\" diet for now.  SSI and BG checks are ordered  Car/controlled diet in place     Blood Sugar Average: Last 72 hrs:  (P) 207.6716163800723620      CAD (coronary artery disease)  Assessment & Plan  Stent placement in 2017, follows up with cardiology when needed per patient report  Last echo 1/23/24 showing left ventricular cavity size is normal,wall thickness is moderately increased. There is moderate concentric hypertrophy. EF is 60%, systolic function, wall motion and diastolic " function are normal.   Lipid panel unremarkable except for low HDL  Takes atorvastatin, aspirin, amlodipine and metoprolol daily, continue with home regimen   Stable for now    Essential hypertension  Assessment & Plan  Systolic on admission in the 170's. On admission to the floor 130's  Takes amlodipine and metoprolol daily, continue with regimen  Monitor BP per unit protocol  Monitor kidney function with daily BMP    Hyperlipidemia  Assessment & Plan  Lipid panel obtained during this hospitalization, unremarkable except for low HDL  Continue with home atorvastatin regimen    Morbid obesity (HCC)  Assessment & Plan  Obesity increase length of stay and healing process  Lost 60 pounds with life style changes and exercise. States his PCP encouraged him  Education and encouragement to continue with healthy life style and exercising daily was provided at bedside           VTE Pharmacologic Prophylaxis: VTE Score: 6 High Risk (Score >/= 5) - Pharmacological DVT Prophylaxis Ordered: enoxaparin (Lovenox). Sequential Compression Devices Ordered.  Code Status: Level 1 - Full Code   Discussion with family: Patient declined call to . States wife is aware of surgery and admission.     Anticipated Length of Stay: Patient will be admitted on an observation basis with an anticipated length of stay of less than 2 midnights secondary to toe amputation.    Total Time Spent on Date of Encounter in care of patient: 75 mins. This time was spent on one or more of the following: performing physical exam; counseling and coordination of care; obtaining or reviewing history; documenting in the medical record; reviewing/ordering tests, medications or procedures; communicating with other healthcare professionals and discussing with patient's family/caregivers.    Chief Complaint: wound in right great toe    History of Present Illness:  Maciel Gaitan is a 63 y.o. male with a PMH of hyperlipidemia, diabetes and obesity who  presents with wound in right great toe.Patient states that about six week ago he developed something like a callus with some sort of bluisters around. He says through his life he had blister sand they go on its on. However, this time the site was not healing. He says that two weeks ago he went to work and wore his boots in hot concrete and when he returmned home the wound had drainage and bad smell, he decided to seek for further evaluation. In the ER and X-ray of the right foost was taken and shows osseous erosions at right distal phalanx ascending to distal metatarsal head. Blood work shows glucose of 206, k of 3.4, wbc 10.95 and HDL 29. He was taken to the OR by podiatry for right toe amputation. The patient reports prior surgery wound in great right toe with diachrge and foul smell, he denies chest pain, nausea, vomiting, fever, chills or any other symptoms than the stated above. Review of Systems:  Review of Systems   Constitutional: Negative.    HENT: Negative.     Eyes: Negative.    Respiratory: Negative.     Cardiovascular: Negative.    Gastrointestinal: Negative.    Endocrine: Negative.    Genitourinary: Negative.    Musculoskeletal: Negative.    Skin:  Positive for wound.        Right great toe.   Allergic/Immunologic: Negative.    Neurological: Negative.    Hematological: Negative.    Psychiatric/Behavioral: Negative.         Past Medical and Surgical History:   History reviewed. No pertinent past medical history.    Past Surgical History:   Procedure Laterality Date    EYE SURGERY      WISDOM TOOTH EXTRACTION         Meds/Allergies:  Prior to Admission medications    Medication Sig Start Date End Date Taking? Authorizing Provider   amLODIPine (NORVASC) 5 mg tablet Take 1 tablet (5 mg total) by mouth daily 12/12/23  Yes Bandar Colindres MD   aspirin 81 MG tablet Take 1 tablet by mouth daily 12/28/17  Yes Nadia Birmingham MD   atorvastatin (LIPITOR) 40 mg tablet Take 1 tablet (40 mg total) by mouth every  evening 12/12/23  Yes Bandar Colindres MD   metoprolol succinate (TOPROL-XL) 50 mg 24 hr tablet Take 1 tablet (50 mg total) by mouth daily 12/12/23  Yes Bandar Colindres MD     I have reviewed home medications with patient personally.    Allergies: No Known Allergies    Social History:  Marital Status: /Civil Union   Occupation: construction  Patient Pre-hospital Living Situation: Home  Patient Pre-hospital Level of Mobility: walks  Patient Pre-hospital Diet Restrictions: none  Substance Use History:   Social History     Substance and Sexual Activity   Alcohol Use Not Currently    Comment: 1-2 drinks a year     Social History     Tobacco Use   Smoking Status Never   Smokeless Tobacco Never     Social History     Substance and Sexual Activity   Drug Use No       Family History:  Family History   Problem Relation Age of Onset    Diabetes Mother     Heart disease Father     Heart disease Paternal Grandfather        Physical Exam:     Vitals:   Blood Pressure: 135/76 (02/16/24 2147)  Pulse: 71 (02/16/24 2003)  Temperature: (!) 100.7 °F (38.2 °C) (02/16/24 2147)  Temp Source: Temporal (02/16/24 1705)  Respirations: 20 (02/16/24 2147)  SpO2: 94 % (02/16/24 2003)    Physical Exam  Vitals and nursing note reviewed.   Constitutional:       General: He is not in acute distress.     Appearance: He is well-developed. He is obese.   HENT:      Head: Normocephalic and atraumatic.      Mouth/Throat:      Mouth: Mucous membranes are moist.      Pharynx: Oropharynx is clear.   Eyes:      Conjunctiva/sclera: Conjunctivae normal.      Pupils: Pupils are equal, round, and reactive to light.   Cardiovascular:      Rate and Rhythm: Normal rate and regular rhythm.      Heart sounds: No murmur heard.  Pulmonary:      Effort: Pulmonary effort is normal. No respiratory distress.      Breath sounds: Normal breath sounds.   Abdominal:      General: Bowel sounds are normal.      Palpations: Abdomen is soft.      Tenderness: There is no  abdominal tenderness.   Musculoskeletal:         General: No swelling. Normal range of motion.      Cervical back: Normal range of motion and neck supple.   Skin:     General: Skin is warm and dry.      Capillary Refill: Capillary refill takes less than 2 seconds.      Comments: Unable to assess site due to surgical dressing. Dressing is dry and intact.   Neurological:      General: No focal deficit present.      Mental Status: He is alert and oriented to person, place, and time. Mental status is at baseline.   Psychiatric:         Mood and Affect: Mood normal.         Behavior: Behavior normal.         Thought Content: Thought content normal.         Judgment: Judgment normal.          Additional Data:     Lab Results:  Results from last 7 days   Lab Units 02/16/24  1557   WBC Thousand/uL 10.95*   HEMOGLOBIN g/dL 11.3*   HEMATOCRIT % 35.3*   PLATELETS Thousands/uL 407*   NEUTROS PCT % 72   LYMPHS PCT % 13*   MONOS PCT % 11   EOS PCT % 2     Results from last 7 days   Lab Units 02/16/24  1557   SODIUM mmol/L 137   POTASSIUM mmol/L 3.4*   CHLORIDE mmol/L 100   CO2 mmol/L 28   BUN mg/dL 25   CREATININE mg/dL 0.98   ANION GAP mmol/L 9   CALCIUM mg/dL 8.7   ALBUMIN g/dL 3.4*   TOTAL BILIRUBIN mg/dL 0.58   ALK PHOS U/L 92   ALT U/L 14   AST U/L 12*   GLUCOSE RANDOM mg/dL 206*     Results from last 7 days   Lab Units 02/16/24  1644   INR  1.21*     Results from last 7 days   Lab Units 02/16/24  2148 02/16/24  2058 02/16/24  1834   POC GLUCOSE mg/dl 236* 202* 185*               Lines/Drains:  Invasive Devices       Peripheral Intravenous Line  Duration             Peripheral IV 02/16/24 Right Antecubital <1 day                        Imaging: I have personally reviewed all imaging pertaining this admission.  XR foot 3+ views RIGHT   ED Interpretation by Anh Leo PA-C (02/16 1611)   No signs of bony destruction      VAS ARTERIAL DUPLEX-LOWER LIMB UNILATERAL    (Results Pending)   XR foot right 3+ views    (Results  Pending)       EKG and Other Studies Reviewed on Admission:   EKG: No EKG obtained.    ** Please Note: This note has been constructed using a voice recognition system. **

## 2024-02-17 NOTE — UTILIZATION REVIEW
Initial Clinical Review    Admission: Date/Time/Statement:   Admission Orders (From admission, onward)       Ordered        02/16/24 1700  INPATIENT ADMISSION  Once                          Orders Placed This Encounter   Procedures    INPATIENT ADMISSION     Standing Status:   Standing     Number of Occurrences:   1     Order Specific Question:   Level of Care     Answer:   Med Surg [16]     Order Specific Question:   Estimated length of stay     Answer:   More than 2 Midnights     Order Specific Question:   Certification     Answer:   I certify that inpatient services are medically necessary for this patient for a duration of greater than two midnights. See H&P and MD Progress Notes for additional information about the patient's course of treatment.     ED Arrival Information       Expected   -    Arrival   2/16/2024 14:47    Acuity   Urgent              Means of arrival   Walk-In    Escorted by   Family Member    Service   Hospitalist    Admission type   Emergency              Arrival complaint   FOOT PROBLEM             Chief Complaint   Patient presents with    Toe Injury     Pt reports he developed toe blister 2-3 weeks ago, blister became a deep wound that pt believes is now infected. Pt is borderline diabetic.        Initial Presentation: 63 y.o. male  with hx DM, CAD, HTN , obesity, HLD who presents to ED from home with wound in right great toe that started 6 wksda go as a callous w/ blisters . 2 weeks ago started w/ drainage, foul smell. On exam, Right great toe with large wound medially.  The entire toe appears discolored with proximal erythema and swelling of the forefoot.  Several scabbed areas on the right lower extremity distally.  Per patient these have been here for quite some time.  No drainage or surrounding erythema.   Labs- glucose of 206, K  3.4, wbc 10.95 and HDL 29. Imaging shows osseous erosions at right distal phalanx ascending to distal metatarsal head . Pt  taken to the OR 2/16 by podiatry  for right toe amputation . Pt admitted as Inpatient with osteomyelitis R great toe. Plan- podiatry consult, pain control. IV abx - zosyn and vancomycin post op . Consider wound care consult after first surgical dressing removal . Carb controlled diet . Accucheks. SSI . Monitor kidney function with daily BMP       Podiatry consult- R hallux osteomyelitis, R hallux wet gangrene, uncontrolled DM . On clinical exam, wet gangrene of right hallux with malodor noted.Pt amenable to surgical intervention .PLan - obtain ESR, CRP,. IV abx- vanco and Ceftriaxone . RecommendAncef until intra-op Cx result   NPO,  For OR today . NWB RLE , elevate R ft .Arterial duplex LE ordered .      2/16/24 @ 1740   Right - PARTIAL 1st RAY AMPUTATION. RIGHT FOOT REMOVAL OF ALL NON-VIABLE BONE AND SOFT TISSUE   Anesthesia- IV sedation and 20 ml of 1% Lidocaine plain and 0.25% Bupivacaine plain in 1:1 fashion as regional block .  Operative Findings:  Gross purulence was noted at level of right first metatarsophalangeal joint and first interspace. Healthy bleeding noted after copious irrigation.   Full resection of non-viable bone and soft tissue achieved.   Routine cultures and tissue samples were taken and sent.     Date: 2/17    Day 2:    IV abx switched to Ancef today  from Zosyn  per podiatry rec until cx result .IV Vanco . Temp 100.7 F overnight . WBC 9.75 today .  Pt reports being upset with himself for letting diabetes get out of control as 6 years ago he watched carb, walked 4 miles a day, was 60 pounds less.  Dressing CDI to RLE. Noted scabbed area appear chronic with no signs of infection .  Await arterial duplex LE ordered by podiatry . Surgical shoe RLE .           2/16 R ft       2/16 R ft   ED Triage Vitals [02/16/24 1456]   Temperature Pulse Respirations Blood Pressure SpO2   98.2 °F (36.8 °C) 82 18 (!) 174/91 97 %      Temp Source Heart Rate Source Patient Position - Orthostatic VS BP Location FiO2 (%)   Oral Monitor Sitting Right  arm --      Pain Score       1          Wt Readings from Last 1 Encounters:   02/17/24 129 kg (285 lb 4.4 oz)     Additional Vital Signs:   ate/Time Temp Pulse Resp BP MAP (mmHg) SpO2 O2 Device Cardiac (WDL) Patient Position - Orthostatic VS   02/17/24 0834 -- 66 -- 129/74 -- -- -- -- --   02/17/24 07:32:55 99.3 °F (37.4 °C) 66 18 129/74 92 95 % -- -- --   02/17/24 03:26:44 99.8 °F (37.7 °C) 64 16 128/69 89 93 % -- -- --   02/16/24 21:47:03 100.7 °F (38.2 °C) Abnormal  -- 20 135/76 96 -- -- -- --   02/16/24 21:00:27 -- -- -- 137/79 98 -- -- -- --   02/16/24 20:31:36 -- -- -- 136/78 97 -- -- -- --   02/16/24 20:09:12 -- -- -- 136/77 97 -- -- -- --   02/16/24 2003 -- 71 -- 138/78 98 94 % -- -- --   02/16/24 19:49:18 -- -- -- 145/75 98 -- -- -- --   02/16/24 1931 -- 70 -- 139/79 99 95 % -- -- --   02/16/24 19:18:32 99.5 °F (37.5 °C) 63 -- 133/75 94 96 % -- -- --   02/16/24 19:16:48 99.5 °F (37.5 °C) -- -- 133/75 94 -- -- -- --   02/16/24 1845 99 °F (37.2 °C) 72 18 126/74 95 98 % None (Room air) WDL --   02/16/24 1834 99.9 °F (37.7 °C) 76 18 120/63 85 99 % None (Room air) WDL --   02/16/24 1705 97.6 °F (36.4 °C) 72 18 142/79 -- -- None (Room air) -- --   02/16/24 1643 -- 74 -- 171/84 Abnormal  120 100 % -- -- --   02/16/24 1604 98 °F (36.7 °C) 76 17 155/84 -- 100 % None (Room air) --      Pertinent Labs/Diagnostic Test Results:   XR foot right 3+ views   Final Result by Abisai Byrne MD (02/17 1129)      Interval resection of the first ray distal first metatarsal.      Workstation performed: [id number]         XR foot 3+ views RIGHT   ED Interpretation by Anh Leo PA-C (02/16 1611)   No signs of bony destruction      Final Result by Abisai Byrne MD (02/17 1129)   Soft tissue changes great toe. Questionable osteomyelitis changes distal findings.   No acute osseous abnormality.      Workstation performed: [id number]         VAS ARTERIAL DUPLEX-LOWER LIMB UNILATERAL    (Results Pending)          Results from last 7 days   Lab Units 02/17/24  0446 02/16/24  1557   WBC Thousand/uL 9.75 10.95*   HEMOGLOBIN g/dL 9.5* 11.3*   HEMATOCRIT % 29.1* 35.3*   PLATELETS Thousands/uL 335 407*   NEUTROS ABS Thousands/µL 7.17 8.04*         Results from last 7 days   Lab Units 02/17/24  0446 02/16/24  1557   SODIUM mmol/L 137 137   POTASSIUM mmol/L 3.7 3.4*   CHLORIDE mmol/L 104 100   CO2 mmol/L 27 28   ANION GAP mmol/L 6 9   BUN mg/dL 18 25   CREATININE mg/dL 0.87 0.98   EGFR ml/min/1.73sq m 91 81   CALCIUM mg/dL 7.8* 8.7   MAGNESIUM mg/dL 1.9  --    PHOSPHORUS mg/dL 3.2  --      Results from last 7 days   Lab Units 02/17/24  0446 02/16/24  1557   AST U/L 14 12*   ALT U/L 12 14   ALK PHOS U/L 75 92   TOTAL PROTEIN g/dL 6.3* 7.7   ALBUMIN g/dL 2.8* 3.4*   TOTAL BILIRUBIN mg/dL 0.47 0.58     Results from last 7 days   Lab Units 02/17/24  1048 02/17/24  0726 02/16/24  2148 02/16/24  2058 02/16/24  1834   POC GLUCOSE mg/dl 234* 196* 236* 202* 185*     Results from last 7 days   Lab Units 02/17/24  0446 02/16/24  1557   GLUCOSE RANDOM mg/dL 217* 206*           Results from last 7 days   Lab Units 02/16/24  1644   PROTIME seconds 16.0*   INR  1.21*   PTT seconds 37     Results from last 7 days   Lab Units 02/17/24  0446   TSH 3RD GENERATON uIU/mL 1.352               Results from last 7 days   Lab Units 02/16/24  1557   CRP mg/L 132.6*   SED RATE mm/hour 108*                       Results from last 7 days   Lab Units 02/16/24  1558 02/16/24  1557   BLOOD CULTURE  Received in Microbiology Lab. Culture in Progress. Received in Microbiology Lab. Culture in Progress.             Results from last 7 days   Lab Units 02/17/24  1012   VANCOMYCIN RM ug/mL 5.3*       ED Treatment:   Medication Administration from 02/16/2024 1447 to 02/16/2024 1701         Date/Time Order Dose Route Action     02/16/2024 1644 EST vancomycin (VANCOCIN) 2,000 mg in sodium chloride 0.9 % 500 mL IVPB 2,000 mg Intravenous New Bag     02/16/2024 1643 EST  ceftriaxone (ROCEPHIN) 1 g/50 mL in dextrose IVPB 0 mg Intravenous Stopped     02/16/2024 1608 EST ceftriaxone (ROCEPHIN) 1 g/50 mL in dextrose IVPB 1,000 mg Intravenous New Bag          History reviewed. No pertinent past medical history.  Present on Admission:   CAD (coronary artery disease)   Essential hypertension   Hyperlipidemia   Diabetes mellitus type 2 with complications (HCC)   Morbid obesity (HCC)   Osteomyelitis of great toe of right foot ([facility])      Admitting Diagnosis: Other chronic osteomyelitis of right foot ([facility]) [M86.671]  Gangrene of toe of right foot ([facility]) [I96]  Age/Sex: 63 y.o. male  Admission Orders:  Scheduled Medications:  amLODIPine, 5 mg, Oral, Daily  aspirin, 81 mg, Oral, Daily  atorvastatin, 40 mg, Oral, QPM  cefazolin, 2,000 mg, Intravenous, Q8H  enoxaparin, 40 mg, Subcutaneous, Daily  insulin lispro, 1-5 Units, Subcutaneous, TID AC  insulin lispro, 1-5 Units, Subcutaneous, HS  metoprolol succinate, 50 mg, Oral, Daily  senna, 1 tablet, Oral, Daily  vancomycin, 1,500 mg, Intravenous, Q12H  Start: 02/17/24 1130     piperacillin-tazobactam (ZOSYN) 3.375 g in sodium chloride 0.9 % 100 mL IVPB  Dose: 3.375 g  Freq: Every 6 hours Route: IV  Last Dose: 3.375 g (02/17/24 0501)  Start: 02/16/24 2200 End: 02/17/24 0945   Continuous IV Infusions:  lactated ringers, 100 mL/hr, Intravenous, Continuous      PRN Meds:  acetaminophen, 650 mg, Oral, Q6H PRN x2 2/17   aluminum-magnesium hydroxide-simethicone, 30 mL, Oral, Q6H PRN  ondansetron, 4 mg, Intravenous, Q6H PRN    Level 2 carb diet    NWB RLE   Elevate RLE    SCD   OOB ambulation     IP CONSULT TO PODIATRY  IP CONSULT TO PHARMACY    Network Utilization Review Department  ATTENTION: Please call with any questions or concerns to [phone] and carefully listen to the prompts so that you are directed to the right person. All voicemails are confidential.   For Discharge needs, contact Care Management DC Support Team at [phone] opt. 2  Send all  requests for admission clinical reviews, approved or denied determinations and any other requests to dedicated fax number below belonging to the campus where the patient is receiving treatment. List of dedicated fax numbers for the Facilities:  FACILITY NAME UR FAX NUMBER   ADMISSION DENIALS (Administrative/Medical Necessity) [phone]   DISCHARGE SUPPORT TEAM (NETWORK) [phone]   PARENT CHILD HEALTH (Maternity/NICU/Pediatrics) [phone]   [facility] [phone]   [facility] [phone]   Critical access hospital [phone]   [facility] [phone]   [facility] [phone]   [facility] [phone]   [facility] [phone]   [facility] [phone]   [facility] [phone]   [facility] [phone]   [facility] [phone]   [facility] [phone]

## 2024-02-17 NOTE — ASSESSMENT & PLAN NOTE
Stent placement in 2017, follows up with cardiology when needed per patient report  Last echo 1/23/24 showing left ventricular cavity size is normal,wall thickness is moderately increased. There is moderate concentric hypertrophy. EF is 60%, systolic function, wall motion and diastolic function are normal.   Lipid panel unremarkable except for low HDL  Takes atorvastatin, aspirin, amlodipine and metoprolol daily, continue with home regimen   Stable for now

## 2024-02-17 NOTE — ASSESSMENT & PLAN NOTE
Six weeks ago developed a callus/ blisters. Noted discharge and foul smell and decided to visit the ER for further management  XR  of the right foot revealed osseous erosions at right distal phalanx ascending to distal metatarsal head  Patient was taken to the OR for partial first ray amputation    Patient given Ceftriaxone and vancomycin ED.   Continue with zosyn will switch to Ancef per podiatry recs until culture result   Patient reports pain is under control. Acetaminophen and non-pharmacological measures for pain management  Patient is hemodynamically stable

## 2024-02-17 NOTE — ASSESSMENT & PLAN NOTE
Systolic on admission in the 170's. On admission to the floor 130's  Takes amlodipine and metoprolol daily, continue with regimen  Monitor BP per unit protocol  Monitor kidney function with daily BMP

## 2024-02-17 NOTE — PROGRESS NOTES
"[facility]  Progress Note  Name: Maciel Gaitan I  MRN: [id number]  Unit/Bed#: W -01 I Date of Admission: 2/16/2024   Date of Service: 2/17/2024 I Hospital Day: 1    Assessment/Plan   * Osteomyelitis of great toe of right foot (HCC)  Assessment & Plan  Six weeks ago developed a callus/ blisters. Noted discharge and foul smell and decided to visit the ER for further management  XR  of the right foot revealed osseous erosions at right distal phalanx ascending to distal metatarsal head  Patient was taken to the OR for partial first ray amputation    Patient given Ceftriaxone and vancomycin ED.   Continue with zosyn will switch to Ancef per podiatry recs until culture result   Patient reports pain is under control. Acetaminophen and non-pharmacological measures for pain management  Patient is hemodynamically stable      Diabetes mellitus type 2 with complications (HCC)  Assessment & Plan  Lab Results   Component Value Date    HGBA1C 9.8 (H) 01/23/2024       Recent Labs     02/16/24  1834 02/16/24 2058 02/16/24  2148   POCGLU 185* 202* 236*     States he lost significant amount of weight and his HGA1c was below 6. Used to take Metformin but PCP D/C due to improvement in blood glucose management. However, he states he is not following a \"clean\" diet for now.  SSI and BG checks are ordered  Car/controlled diet in place     Blood Sugar Average: Last 72 hrs:  (P) [id number]      Hyperlipidemia  Assessment & Plan  Lipid panel obtained during this hospitalization, unremarkable except for low HDL  Continue with home atorvastatin regimen    Essential hypertension  Assessment & Plan  Systolic on admission in the 170's. On admission to the floor 130's  Takes amlodipine and metoprolol daily, continue with regimen  Monitor BP per unit protocol  Monitor kidney function with daily BMP    CAD (coronary artery disease)  Assessment & Plan  Stent placement in 2017, follows up with cardiology " when needed per patient report  Last echo 1/23/24 showing left ventricular cavity size is normal,wall thickness is moderately increased. There is moderate concentric hypertrophy. EF is 60%, systolic function, wall motion and diastolic function are normal.   Lipid panel unremarkable except for low HDL  Takes atorvastatin, aspirin, amlodipine and metoprolol daily, continue with home regimen   Stable for now    Morbid obesity (HCC)  Assessment & Plan  Obesity increase length of stay and healing process  Lost 60 pounds with life style changes and exercise. States his PCP encouraged him  Education and encouragement to continue with healthy life style and exercising daily was provided at bedside               VTE Pharmacologic Prophylaxis: VTE Score: 6 High Risk (Score >/= 5) - Pharmacological DVT Prophylaxis Ordered: enoxaparin (Lovenox). Sequential Compression Devices Ordered.    Mobility:   Basic Mobility Inpatient Raw Score: 17  JH-HLM Goal: 5: Stand one or more mins  JH-HLM Achieved: 2: Bed activities/Dependent transfer  HLM Goal NOT achieved. Continue with multidisciplinary rounding and encourage appropriate mobility to improve upon HLM goals.    Patient Centered Rounds: I performed bedside rounds with nursing staff today.   Discussions with Specialists or Other Care Team Provider: podiatry note reviewed     Education and Discussions with Family / Patient: Patient declined call to .     Total Time Spent on Date of Encounter in care of patient: 40 mins. This time was spent on one or more of the following: performing physical exam; counseling and coordination of care; obtaining or reviewing history; documenting in the medical record; reviewing/ordering tests, medications or procedures; communicating with other healthcare professionals and discussing with patient's family/caregivers.    Current Length of Stay: 1 day(s)  Current Patient Status: Inpatient   Certification Statement: The patient will continue  to require additional inpatient hospital stay due to right toe amp with need for iv antibiotics   Discharge Plan: Anticipate discharge in 24-48 hrs to home.    Code Status: Level 1 - Full Code    Subjective:   Patient denies pain, fever, chills, and has no complaints. Patient reporting that he is upset with himself on letting his diabetes get out of control as 6 years ago he watched carb, walked 4 miles a day, was 60 pounds less. Patient reports this is his fault with no excuses. Patient reports he hasn't really seen endocrinology or his pcp since that time but knows he needs to get back on track with his diabetes, exercise, and weight loss.     Objective:     Vitals:   Temp (24hrs), Av.2 °F (37.3 °C), Min:97.6 °F (36.4 °C), Max:100.7 °F (38.2 °C)    Temp:  [97.6 °F (36.4 °C)-100.7 °F (38.2 °C)] 99.3 °F (37.4 °C)  HR:  [63-82] 66  Resp:  [16-20] 18  BP: (120-174)/(63-91) 129/74  SpO2:  [93 %-100 %] 95 %  Body mass index is 38.69 kg/m².     Input and Output Summary (last 24 hours):     Intake/Output Summary (Last 24 hours) at 2024 1132  Last data filed at 2024 0818  Gross per 24 hour   Intake 710 ml   Output 500 ml   Net 210 ml       Physical Exam:   Physical Exam  Vitals and nursing note reviewed.   Constitutional:       General: He is not in acute distress.     Appearance: He is well-developed.   HENT:      Head: Normocephalic and atraumatic.   Eyes:      Conjunctiva/sclera: Conjunctivae normal.   Cardiovascular:      Rate and Rhythm: Normal rate and regular rhythm.      Heart sounds: No murmur heard.  Pulmonary:      Effort: Pulmonary effort is normal. No respiratory distress.      Breath sounds: Normal breath sounds.   Abdominal:      Palpations: Abdomen is soft.      Tenderness: There is no abdominal tenderness.   Musculoskeletal:         General: No swelling.      Cervical back: Neck supple.   Skin:     General: Skin is warm and dry.      Capillary Refill: Capillary refill takes less than 2  seconds.      Comments: Dressing CDI to RLE. Noted scabbed area appear chronic with no signs of infection    Neurological:      Mental Status: He is alert and oriented to person, place, and time.   Psychiatric:         Mood and Affect: Mood normal.          Additional Data:     Labs:  Results from last 7 days   Lab Units 02/17/24  0446   WBC Thousand/uL 9.75   HEMOGLOBIN g/dL 9.5*   HEMATOCRIT % 29.1*   PLATELETS Thousands/uL 335   NEUTROS PCT % 73   LYMPHS PCT % 12*   MONOS PCT % 12   EOS PCT % 2     Results from last 7 days   Lab Units 02/17/24  0446   SODIUM mmol/L 137   POTASSIUM mmol/L 3.7   CHLORIDE mmol/L 104   CO2 mmol/L 27   BUN mg/dL 18   CREATININE mg/dL 0.87   ANION GAP mmol/L 6   CALCIUM mg/dL 7.8*   ALBUMIN g/dL 2.8*   TOTAL BILIRUBIN mg/dL 0.47   ALK PHOS U/L 75   ALT U/L 12   AST U/L 14   GLUCOSE RANDOM mg/dL 217*     Results from last 7 days   Lab Units 02/16/24  1644   INR  1.21*     Results from last 7 days   Lab Units 02/17/24  1048 02/17/24  0726 02/16/24  2148 02/16/24  2058 02/16/24  1834   POC GLUCOSE mg/dl 234* 196* 236* 202* 185*               Lines/Drains:  Invasive Devices       Peripheral Intravenous Line  Duration             Peripheral IV 02/16/24 Right Antecubital <1 day                          Imaging: No pertinent imaging reviewed.    Recent Cultures (last 7 days):   Results from last 7 days   Lab Units 02/16/24  1558 02/16/24  1557   BLOOD CULTURE  Received in Microbiology Lab. Culture in Progress. Received in Microbiology Lab. Culture in Progress.       Last 24 Hours Medication List:   Current Facility-Administered Medications   Medication Dose Route Frequency Provider Last Rate    acetaminophen  650 mg Oral Q6H PRN MITA Chung      aluminum-magnesium hydroxide-simethicone  30 mL Oral Q6H PRN MITA Chung      amLODIPine  5 mg Oral Daily MITA Chung      aspirin  81 mg Oral Daily MITA Chung      atorvastatin  40 mg Oral QPM MITA Chung       cefazolin  2,000 mg Intravenous Q8H MITA Andrade      enoxaparin  40 mg Subcutaneous Daily Ron Vizcarra, DPM      insulin lispro  1-5 Units Subcutaneous TID AC MITA Chung      insulin lispro  1-5 Units Subcutaneous HS MITA Chung      lactated ringers  100 mL/hr Intravenous Continuous Christie Corrales, CRNA 100 mL/hr (02/16/24 2008)    metoprolol succinate  50 mg Oral Daily MITA Chung      ondansetron  4 mg Intravenous Q6H PRN MITA Chung      senna  1 tablet Oral Daily MITA Chung      vancomycin  1,500 mg Intravenous Q12H Jose Deluna MD          Today, Patient Was Seen By: MITA Andrade    **Please Note: This note may have been constructed using a voice recognition system.**

## 2024-02-17 NOTE — ASSESSMENT & PLAN NOTE
Lipid panel obtained during this hospitalization, unremarkable except for low HDL  Continue with home atorvastatin regimen

## 2024-02-17 NOTE — ASSESSMENT & PLAN NOTE
"Lab Results   Component Value Date    HGBA1C 9.8 (H) 01/23/2024       Recent Labs     02/16/24  1834 02/16/24 2058 02/16/24 2148   POCGLU 185* 202* 236*   States he lost significant amount of weight and his HGA1c was below 6. Used to take Metformin but PCP D/C due to improvement in blood glucose management. However, he states he is not following a \"clean\" diet for now.  SSI and BG checks are ordered  Car/controlled diet in place     Blood Sugar Average: Last 72 hrs:  (P) [id number]    "

## 2024-02-18 VITALS
WEIGHT: 275.35 LBS | SYSTOLIC BLOOD PRESSURE: 115 MMHG | BODY MASS INDEX: 37.34 KG/M2 | HEART RATE: 88 BPM | DIASTOLIC BLOOD PRESSURE: 66 MMHG | TEMPERATURE: 99.2 F | OXYGEN SATURATION: 98 % | RESPIRATION RATE: 18 BRPM

## 2024-02-18 PROBLEM — A41.9 SEPSIS (HCC): Status: ACTIVE | Noted: 2024-02-18

## 2024-02-18 LAB
ALBUMIN SERPL BCP-MCNC: 2.9 G/DL (ref 3.5–5)
ALP SERPL-CCNC: 69 U/L (ref 34–104)
ALT SERPL W P-5'-P-CCNC: 15 U/L (ref 7–52)
ANION GAP SERPL CALCULATED.3IONS-SCNC: 8 MMOL/L
AST SERPL W P-5'-P-CCNC: 18 U/L (ref 13–39)
BACTERIA BLD CULT: NORMAL
BACTERIA BLD CULT: NORMAL
BACTERIA SPEC ANAEROBE CULT: NORMAL
BACTERIA TISS AEROBE CULT: NO GROWTH
BILIRUB SERPL-MCNC: 0.46 MG/DL (ref 0.2–1)
BUN SERPL-MCNC: 13 MG/DL (ref 5–25)
CALCIUM ALBUM COR SERPL-MCNC: 8.5 MG/DL (ref 8.3–10.1)
CALCIUM SERPL-MCNC: 7.6 MG/DL (ref 8.4–10.2)
CHLORIDE SERPL-SCNC: 102 MMOL/L (ref 96–108)
CO2 SERPL-SCNC: 23 MMOL/L (ref 21–32)
CREAT SERPL-MCNC: 0.86 MG/DL (ref 0.6–1.3)
ERYTHROCYTE [DISTWIDTH] IN BLOOD BY AUTOMATED COUNT: 12.4 % (ref 11.6–15.1)
GFR SERPL CREATININE-BSD FRML MDRD: 92 ML/MIN/1.73SQ M
GLUCOSE SERPL-MCNC: 151 MG/DL (ref 65–140)
GLUCOSE SERPL-MCNC: 160 MG/DL (ref 65–140)
GLUCOSE SERPL-MCNC: 172 MG/DL (ref 65–140)
GLUCOSE SERPL-MCNC: 175 MG/DL (ref 65–140)
GLUCOSE SERPL-MCNC: 192 MG/DL (ref 65–140)
GRAM STN SPEC: NORMAL
HCT VFR BLD AUTO: 31.5 % (ref 36.5–49.3)
HGB BLD-MCNC: 10.8 G/DL (ref 12–17)
LACTATE SERPL-SCNC: 1.5 MMOL/L (ref 0.5–2)
MCH RBC QN AUTO: 28.1 PG (ref 26.8–34.3)
MCHC RBC AUTO-ENTMCNC: 34.3 G/DL (ref 31.4–37.4)
MCV RBC AUTO: 82 FL (ref 82–98)
PLATELET # BLD AUTO: 381 THOUSANDS/UL (ref 149–390)
PMV BLD AUTO: 8.9 FL (ref 8.9–12.7)
POTASSIUM SERPL-SCNC: 3.3 MMOL/L (ref 3.5–5.3)
PROCALCITONIN SERPL-MCNC: 0.08 NG/ML
PROT SERPL-MCNC: 6.5 G/DL (ref 6.4–8.4)
RBC # BLD AUTO: 3.84 MILLION/UL (ref 3.88–5.62)
SODIUM SERPL-SCNC: 133 MMOL/L (ref 135–147)
VANCOMYCIN SERPL-MCNC: 18.1 UG/ML (ref 10–20)
WBC # BLD AUTO: 6.97 THOUSAND/UL (ref 4.31–10.16)

## 2024-02-18 PROCEDURE — 84145 PROCALCITONIN (PCT): CPT | Performed by: NURSE PRACTITIONER

## 2024-02-18 PROCEDURE — 82948 REAGENT STRIP/BLOOD GLUCOSE: CPT

## 2024-02-18 PROCEDURE — 80053 COMPREHEN METABOLIC PANEL: CPT | Performed by: NURSE PRACTITIONER

## 2024-02-18 PROCEDURE — 83605 ASSAY OF LACTIC ACID: CPT | Performed by: NURSE PRACTITIONER

## 2024-02-18 PROCEDURE — 85027 COMPLETE CBC AUTOMATED: CPT | Performed by: NURSE PRACTITIONER

## 2024-02-18 PROCEDURE — 80202 ASSAY OF VANCOMYCIN: CPT | Performed by: INTERNAL MEDICINE

## 2024-02-18 PROCEDURE — 99233 SBSQ HOSP IP/OBS HIGH 50: CPT | Performed by: NURSE PRACTITIONER

## 2024-02-18 RX ORDER — SODIUM CHLORIDE, SODIUM GLUCONATE, SODIUM ACETATE, POTASSIUM CHLORIDE, MAGNESIUM CHLORIDE, SODIUM PHOSPHATE, DIBASIC, AND POTASSIUM PHOSPHATE .53; .5; .37; .037; .03; .012; .00082 G/100ML; G/100ML; G/100ML; G/100ML; G/100ML; G/100ML; G/100ML
1000 INJECTION, SOLUTION INTRAVENOUS ONCE
Status: COMPLETED | OUTPATIENT
Start: 2024-02-18 | End: 2024-02-18

## 2024-02-18 RX ORDER — POTASSIUM CHLORIDE 20 MEQ/1
40 TABLET, EXTENDED RELEASE ORAL ONCE
Status: COMPLETED | OUTPATIENT
Start: 2024-02-18 | End: 2024-02-18

## 2024-02-18 RX ADMIN — ACETAMINOPHEN 650 MG: 325 TABLET, FILM COATED ORAL at 08:18

## 2024-02-18 RX ADMIN — INSULIN LISPRO 1 UNITS: 100 INJECTION, SOLUTION INTRAVENOUS; SUBCUTANEOUS at 21:05

## 2024-02-18 RX ADMIN — POTASSIUM CHLORIDE 40 MEQ: 1500 TABLET, EXTENDED RELEASE ORAL at 13:45

## 2024-02-18 RX ADMIN — INSULIN LISPRO 1 UNITS: 100 INJECTION, SOLUTION INTRAVENOUS; SUBCUTANEOUS at 17:43

## 2024-02-18 RX ADMIN — ASPIRIN 81 MG 81 MG: 81 TABLET ORAL at 08:18

## 2024-02-18 RX ADMIN — INSULIN LISPRO 1 UNITS: 100 INJECTION, SOLUTION INTRAVENOUS; SUBCUTANEOUS at 08:17

## 2024-02-18 RX ADMIN — VANCOMYCIN HYDROCHLORIDE 1500 MG: 1 INJECTION, POWDER, LYOPHILIZED, FOR SOLUTION INTRAVENOUS at 16:11

## 2024-02-18 RX ADMIN — AMLODIPINE BESYLATE 5 MG: 5 TABLET ORAL at 08:21

## 2024-02-18 RX ADMIN — PIPERACILLIN SODIUM AND TAZOBACTAM SODIUM 4.5 G: 36; 4.5 INJECTION, POWDER, LYOPHILIZED, FOR SOLUTION INTRAVENOUS at 13:49

## 2024-02-18 RX ADMIN — SODIUM CHLORIDE, SODIUM GLUCONATE, SODIUM ACETATE, POTASSIUM CHLORIDE, MAGNESIUM CHLORIDE, SODIUM PHOSPHATE, DIBASIC, AND POTASSIUM PHOSPHATE 1000 ML: .53; .5; .37; .037; .03; .012; .00082 INJECTION, SOLUTION INTRAVENOUS at 12:10

## 2024-02-18 RX ADMIN — ACETAMINOPHEN 650 MG: 325 TABLET, FILM COATED ORAL at 17:56

## 2024-02-18 RX ADMIN — VANCOMYCIN HYDROCHLORIDE 1500 MG: 1 INJECTION, POWDER, LYOPHILIZED, FOR SOLUTION INTRAVENOUS at 00:47

## 2024-02-18 RX ADMIN — ATORVASTATIN CALCIUM 40 MG: 40 TABLET, FILM COATED ORAL at 17:43

## 2024-02-18 RX ADMIN — PIPERACILLIN SODIUM AND TAZOBACTAM SODIUM 4.5 G: 36; 4.5 INJECTION, POWDER, LYOPHILIZED, FOR SOLUTION INTRAVENOUS at 21:05

## 2024-02-18 RX ADMIN — SODIUM CHLORIDE, SODIUM GLUCONATE, SODIUM ACETATE, POTASSIUM CHLORIDE, MAGNESIUM CHLORIDE, SODIUM PHOSPHATE, DIBASIC, AND POTASSIUM PHOSPHATE 1000 ML: .53; .5; .37; .037; .03; .012; .00082 INJECTION, SOLUTION INTRAVENOUS at 08:24

## 2024-02-18 RX ADMIN — ENOXAPARIN SODIUM 40 MG: 40 INJECTION SUBCUTANEOUS at 08:18

## 2024-02-18 RX ADMIN — SODIUM CHLORIDE, SODIUM LACTATE, POTASSIUM CHLORIDE, AND CALCIUM CHLORIDE 100 ML/HR: .6; .31; .03; .02 INJECTION, SOLUTION INTRAVENOUS at 19:11

## 2024-02-18 RX ADMIN — INSULIN LISPRO 1 UNITS: 100 INJECTION, SOLUTION INTRAVENOUS; SUBCUTANEOUS at 12:06

## 2024-02-18 RX ADMIN — SODIUM CHLORIDE, SODIUM GLUCONATE, SODIUM ACETATE, POTASSIUM CHLORIDE, MAGNESIUM CHLORIDE, SODIUM PHOSPHATE, DIBASIC, AND POTASSIUM PHOSPHATE 1000 ML: .53; .5; .37; .037; .03; .012; .00082 INJECTION, SOLUTION INTRAVENOUS at 08:45

## 2024-02-18 RX ADMIN — METOPROLOL SUCCINATE 50 MG: 50 TABLET, EXTENDED RELEASE ORAL at 08:18

## 2024-02-18 RX ADMIN — CEFAZOLIN SODIUM 2000 MG: 2 SOLUTION INTRAVENOUS at 03:03

## 2024-02-18 RX ADMIN — SODIUM CHLORIDE, SODIUM GLUCONATE, SODIUM ACETATE, POTASSIUM CHLORIDE, MAGNESIUM CHLORIDE, SODIUM PHOSPHATE, DIBASIC, AND POTASSIUM PHOSPHATE 1000 ML: .53; .5; .37; .037; .03; .012; .00082 INJECTION, SOLUTION INTRAVENOUS at 08:37

## 2024-02-18 RX ADMIN — SENNOSIDES 8.6 MG: 8.6 TABLET, FILM COATED ORAL at 08:18

## 2024-02-18 NOTE — ASSESSMENT & PLAN NOTE
"Lab Results   Component Value Date    HGBA1C 9.8 (H) 01/23/2024       Recent Labs     02/17/24  0726 02/17/24  1048 02/17/24  1633 02/17/24  2100   POCGLU 196* 234* 160* 189*     States he lost significant amount of weight and his HGA1c was below 6 in 2018. Used to take Metformin but endocrinology D/C due to improvement in blood glucose management. However, he states he is not following a \"clean\" diet and put on weight.  Continue SSI and BG checks   Car/controlled diet in place     Blood Sugar Average: Last 72 hrs:  (P) [id number]    "

## 2024-02-18 NOTE — ASSESSMENT & PLAN NOTE
Six weeks ago developed a callus/ blisters. Noted discharge and foul smell and decided to visit the ER for further management  XR  of the right foot revealed osseous erosions at right distal phalanx ascending to distal metatarsal head  Patient was taken to the OR for partial first ray amputation    Patient given Ceftriaxone and vancomycin ED.   On ancef and vanco now meeting sepsis criteria see note below for changes    Patient reports pain is under control.   Acetaminophen and non-pharmacological measures for pain management  Patient is hemodynamically stable

## 2024-02-18 NOTE — ASSESSMENT & PLAN NOTE
Received sepsis alert from nursing due to fever 102.7 and   Discontinue ancef continued vanocmycin; ordered IV Zosyn  Check procalcitonin, lactic acid, cbc, cmp now  Give IV resuscitation based on sepsis protocol  Monitor fever curve; trend labs  If lactic elevated trend q2h until normal   Trend blood cultures negative for growth at 24 hours   Prn tylenol for fever

## 2024-02-18 NOTE — PLAN OF CARE
Problem: PAIN - ADULT  Goal: Verbalizes/displays adequate comfort level or baseline comfort level  Description: Interventions:  - Encourage patient to monitor pain and request assistance  - Assess pain using appropriate pain scale  - Administer analgesics based on type and severity of pain and evaluate response  - Implement non-pharmacological measures as appropriate and evaluate response  - Consider cultural and social influences on pain and pain management  - Notify physician/advanced practitioner if interventions unsuccessful or patient reports new pain  Outcome: Progressing     Problem: DISCHARGE PLANNING  Goal: Discharge to home or other facility with appropriate resources  Description: INTERVENTIONS:  - Identify barriers to discharge w/patient and caregiver  - Arrange for needed discharge resources and transportation as appropriate  - Identify discharge learning needs (meds, wound care, etc.)  - Arrange for interpretive services to assist at discharge as needed  - Refer to Case Management Department for coordinating discharge planning if the patient needs post-hospital services based on physician/advanced practitioner order or complex needs related to functional status, cognitive ability, or social support system  Outcome: Progressing     Problem: INFECTION - ADULT  Goal: Absence or prevention of progression during hospitalization  Description: INTERVENTIONS:  - Assess and monitor for signs and symptoms of infection  - Monitor lab/diagnostic results  - Monitor all insertion sites, i.e. indwelling lines, tubes, and drains  - Monitor endotracheal if appropriate and nasal secretions for changes in amount and color  - [place] appropriate cooling/warming therapies per order  - Administer medications as ordered  - Instruct and encourage patient and family to use good hand hygiene technique  - Identify and instruct in appropriate isolation precautions for identified infection/condition  Outcome:  Progressing  Goal: Absence of fever/infection during neutropenic period  Description: INTERVENTIONS:  - Monitor WBC    Outcome: Progressing

## 2024-02-18 NOTE — PROGRESS NOTES
Maciel Gaitan is a 63 y.o. male who is currently ordered Vancomycin IV with management by the Pharmacy Consult service.  Relevant clinical data and objective / subjective history reviewed.  Vancomycin Assessment:  Indication and Goal AUC/Trough: Bone/joint infection (goal -600, trough >10)  Micro:     Renal Function:  SCr: 0.87 mg/dL  CrCl: 118 mL/min  Renal replacement: Not on dialysis  Days of Therapy: 3  Current Dose: 1500 mg IV every 12 hours  Vancomycin Plan:  New Dosing: continue current dose  Estimated AUC: 471 mcg*hr/mL  Estimated Trough: 14.3 mcg/mL  Next Level: 2/25 at 0600  Renal Function Monitoring: Daily BMP and UOP  Pharmacy will continue to follow closely for s/sx of nephrotoxicity, infusion reactions and appropriateness of therapy.  BMP and CBC will be ordered per protocol. We will continue to follow the patient’s culture results and clinical progress daily.    Vvii Joel

## 2024-02-18 NOTE — ASSESSMENT & PLAN NOTE
Systolic on admission in the 170's. On admission to the floor 130's  Continue amlodipine and metoprolol daily with recs   Monitor BP per unit protocol  Monitor kidney function with daily BMP

## 2024-02-18 NOTE — PROGRESS NOTES
"[facility]  Progress Note  Name: Maciel Gaitan I  MRN: [id number]  Unit/Bed#: W -01 I Date of Admission: 2/16/2024   Date of Service: 2/18/2024 I Hospital Day: 2    Assessment/Plan   * Osteomyelitis of great toe of right foot (HCC)  Assessment & Plan  Six weeks ago developed a callus/ blisters. Noted discharge and foul smell and decided to visit the ER for further management  XR  of the right foot revealed osseous erosions at right distal phalanx ascending to distal metatarsal head  Patient was taken to the OR for partial first ray amputation    Patient given Ceftriaxone and vancomycin ED.   On ancef and vanco now meeting sepsis criteria see note below for changes    Patient reports pain is under control.   Acetaminophen and non-pharmacological measures for pain management  Patient is hemodynamically stable      Sepsis (HCC)  Assessment & Plan  Received sepsis alert from nursing due to fever 102.7 and   Discontinue ancef continued vanocmycin; ordered IV Zosyn  Check procalcitonin, lactic acid, cbc, cmp now  Give IV resuscitation based on sepsis protocol  Monitor fever curve; trend labs  If lactic elevated trend q2h until normal   Trend blood cultures negative for growth at 24 hours   Prn tylenol for fever     Diabetes mellitus type 2 with complications (HCC)  Assessment & Plan  Lab Results   Component Value Date    HGBA1C 9.8 (H) 01/23/2024       Recent Labs     02/17/24  0726 02/17/24  1048 02/17/24  1633 02/17/24  2100   POCGLU 196* 234* 160* 189*     States he lost significant amount of weight and his HGA1c was below 6 in 2018. Used to take Metformin but endocrinology D/C due to improvement in blood glucose management. However, he states he is not following a \"clean\" diet and put on weight.  Continue SSI and BG checks   Car/controlled diet in place     Blood Sugar Average: Last 72 hrs:  (P) 200.7432746637695887      Hyperlipidemia  Assessment & Plan  Lipid panel obtained " during this hospitalization, unremarkable except for low HDL  Continue with home atorvastatin regimen    Essential hypertension  Assessment & Plan  Systolic on admission in the 170's. On admission to the floor 130's  Continue amlodipine and metoprolol daily with recs   Monitor BP per unit protocol  Monitor kidney function with daily BMP    CAD (coronary artery disease)  Assessment & Plan  Stent placement in 2017, follows up with cardiology when needed per patient report  Last echo 1/23/24 showing left ventricular cavity size is normal,wall thickness is moderately increased. There is moderate concentric hypertrophy. EF is 60%, systolic function, wall motion and diastolic function are normal.   Lipid panel unremarkable except for low HDL  Takes atorvastatin, aspirin, amlodipine and metoprolol daily, continue with home regimen   Stable for now    Morbid obesity (HCC)  Assessment & Plan  Obesity increase length of stay and healing process  Lost 60 pounds with life style changes and exercise. States his PCP encouraged him  Education and encouragement to continue with healthy life style and exercising daily was provided at bedside             VTE Pharmacologic Prophylaxis: VTE Score: 6 High Risk (Score >/= 5) - Pharmacological DVT Prophylaxis Ordered: enoxaparin (Lovenox). Sequential Compression Devices Ordered.    Mobility:   Basic Mobility Inpatient Raw Score: 17  JH-HLM Goal: 5: Stand one or more mins  JH-HLM Achieved: 7: Walk 25 feet or more  HLM Goal achieved. Continue to encourage appropriate mobility.    Patient Centered Rounds: I performed bedside rounds with nursing staff today.   Discussions with Specialists or Other Care Team Provider: Podiatry    Education and Discussions with Family / Patient: Patient declined call to .  Spoke to the patient and ask him twice to place call to the wife she declined stating he already spoke with her.    Total Time Spent on Date of Encounter in care of patient: 55  mins. This time was spent on one or more of the following: performing physical exam; counseling and coordination of care; obtaining or reviewing history; documenting in the medical record; reviewing/ordering tests, medications or procedures; communicating with other healthcare professionals and discussing with patient's family/caregivers.    Current Length of Stay: 2 day(s)  Current Patient Status: Inpatient   Certification Statement: The patient will continue to require additional inpatient hospital stay due to osteomyelitis right great toe pending cultures meeting sepsis criteria with need for further IV therapy and interventions.  Discharge Plan: Anticipate discharge in 48 hrs to home.    Code Status: Level 1 - Full Code    Subjective:   Patient reporting he really did not feel febrile not sure that he believes the equipment.  Denies feeling tired point out to the patient he appears more flushed than yesterday which he did not ask acknowledge.  Patient remains pleasant understands the need for change in antibiotics and IV fluids has no other generalized complaints at this time.    Objective:     Vitals:   Temp (24hrs), Av.9 °F (37.7 °C), Min:98.5 °F (36.9 °C), Max:102.7 °F (39.3 °C)    Temp:  [98.5 °F (36.9 °C)-102.7 °F (39.3 °C)] 102.7 °F (39.3 °C)  HR:  [] 105  Resp:  [16-18] 18  BP: (131-146)/(68-86) 131/86  SpO2:  [91 %-99 %] 98 %  Body mass index is 37.34 kg/m².     Input and Output Summary (last 24 hours):   No intake or output data in the 24 hours ending 24 0984    Physical Exam:   Physical Exam  Vitals and nursing note reviewed.   Constitutional:       General: He is not in acute distress.     Appearance: He is well-developed. He is ill-appearing and diaphoretic.   HENT:      Head: Normocephalic and atraumatic.   Eyes:      Conjunctiva/sclera: Conjunctivae normal.   Cardiovascular:      Rate and Rhythm: Normal rate and regular rhythm.      Heart sounds: No murmur heard.  Pulmonary:       Effort: Pulmonary effort is normal. No respiratory distress.      Breath sounds: Normal breath sounds.   Abdominal:      Palpations: Abdomen is soft.      Tenderness: There is no abdominal tenderness.   Musculoskeletal:         General: No swelling.      Cervical back: Neck supple.   Skin:     General: Skin is warm.      Capillary Refill: Capillary refill takes less than 2 seconds.   Neurological:      Mental Status: He is alert and oriented to person, place, and time.   Psychiatric:         Mood and Affect: Mood normal.          Additional Data:     Labs:  Results from last 7 days   Lab Units 02/18/24  0850 02/17/24  0446   WBC Thousand/uL 6.97 9.75   HEMOGLOBIN g/dL 10.8* 9.5*   HEMATOCRIT % 31.5* 29.1*   PLATELETS Thousands/uL 381 335   NEUTROS PCT %  --  73   LYMPHS PCT %  --  12*   MONOS PCT %  --  12   EOS PCT %  --  2     Results from last 7 days   Lab Units 02/18/24  0850   SODIUM mmol/L 133*   POTASSIUM mmol/L 3.3*   CHLORIDE mmol/L 102   CO2 mmol/L 23   BUN mg/dL 13   CREATININE mg/dL 0.86   ANION GAP mmol/L 8   CALCIUM mg/dL 7.6*   ALBUMIN g/dL 2.9*   TOTAL BILIRUBIN mg/dL 0.46   ALK PHOS U/L 69   ALT U/L 15   AST U/L 18   GLUCOSE RANDOM mg/dL 175*     Results from last 7 days   Lab Units 02/16/24  1644   INR  1.21*     Results from last 7 days   Lab Units 02/18/24  0800 02/17/24  2100 02/17/24  1633 02/17/24  1048 02/17/24  0726 02/16/24  2148 02/16/24  2058 02/16/24  1834   POC GLUCOSE mg/dl 151* 189* 160* 234* 196* 236* 202* 185*         Results from last 7 days   Lab Units 02/18/24  0850   LACTIC ACID mmol/L 1.5       Lines/Drains:  Invasive Devices       Peripheral Intravenous Line  Duration             Peripheral IV 02/16/24 Right Antecubital 1 day                          Imaging: No pertinent imaging reviewed.    Recent Cultures (last 7 days):   Results from last 7 days   Lab Units 02/16/24  1802 02/16/24  1558 02/16/24  1557   BLOOD CULTURE   --  No Growth at 24 hrs. No Growth at 24 hrs.   GRAM  STAIN RESULT  No Polys or Bacteria seen  --   --        Last 24 Hours Medication List:   Current Facility-Administered Medications   Medication Dose Route Frequency Provider Last Rate    acetaminophen  650 mg Oral Q6H PRN MITA Chung      aluminum-magnesium hydroxide-simethicone  30 mL Oral Q6H PRN MITA Chung      amLODIPine  5 mg Oral Daily MITA Chung      aspirin  81 mg Oral Daily MITA Chung      atorvastatin  40 mg Oral QPM MITA Chung      enoxaparin  40 mg Subcutaneous Daily Ron Vizcarra DPM      insulin lispro  1-5 Units Subcutaneous TID AC MITA Chung      insulin lispro  1-5 Units Subcutaneous HS MITA Chung      lactated ringers  100 mL/hr Intravenous Continuous Christie Corrales CRNA 100 mL/hr (02/16/24 2008)    metoprolol succinate  50 mg Oral Daily MITA Chung      multi-electrolyte  1,000 mL Intravenous Once MITA Andrade      Followed by    multi-electrolyte  1,000 mL Intravenous Once MITA Andrade      Followed by    multi-electrolyte  1,000 mL Intravenous Once MITA Andrade      ondansetron  4 mg Intravenous Q6H PRN MITA Chung      piperacillin-tazobactam  4.5 g Intravenous Q6H MITA Andrade      senna  1 tablet Oral Daily MITA Chung      vancomycin  1,500 mg Intravenous Q12H Jose Deluna MD 1,500 mg (02/17/24 1250)        Today, Patient Was Seen By: MITA Andrade    **Please Note: This note may have been constructed using a voice recognition system.**

## 2024-02-19 ENCOUNTER — HOSPITAL ENCOUNTER (INPATIENT)
Dept: VASCULAR ULTRASOUND | Facility: HOSPITAL | Age: 64
Discharge: HOME | DRG: 474 | End: 2024-02-19
Payer: COMMERCIAL

## 2024-02-19 LAB
ANION GAP SERPL CALCULATED.3IONS-SCNC: 8 MMOL/L
BUN SERPL-MCNC: 11 MG/DL (ref 5–25)
CALCIUM SERPL-MCNC: 7.4 MG/DL (ref 8.4–10.2)
CHLORIDE SERPL-SCNC: 102 MMOL/L (ref 96–108)
CO2 SERPL-SCNC: 24 MMOL/L (ref 21–32)
CREAT SERPL-MCNC: 0.84 MG/DL (ref 0.6–1.3)
ERYTHROCYTE [DISTWIDTH] IN BLOOD BY AUTOMATED COUNT: 12.4 % (ref 11.6–15.1)
GFR SERPL CREATININE-BSD FRML MDRD: 93 ML/MIN/1.73SQ M
GLUCOSE SERPL-MCNC: 158 MG/DL (ref 65–140)
GLUCOSE SERPL-MCNC: 161 MG/DL (ref 65–140)
GLUCOSE SERPL-MCNC: 161 MG/DL (ref 65–140)
GLUCOSE SERPL-MCNC: 163 MG/DL (ref 65–140)
GLUCOSE SERPL-MCNC: 178 MG/DL (ref 65–140)
HCT VFR BLD AUTO: 31.6 % (ref 36.5–49.3)
HGB BLD-MCNC: 10.2 G/DL (ref 12–17)
MCH RBC QN AUTO: 26.8 PG (ref 26.8–34.3)
MCHC RBC AUTO-ENTMCNC: 32.3 G/DL (ref 31.4–37.4)
MCV RBC AUTO: 83 FL (ref 82–98)
PLATELET # BLD AUTO: 342 THOUSANDS/UL (ref 149–390)
PMV BLD AUTO: 8.5 FL (ref 8.9–12.7)
POTASSIUM SERPL-SCNC: 3.3 MMOL/L (ref 3.5–5.3)
PROCALCITONIN SERPL-MCNC: 0.08 NG/ML
RBC # BLD AUTO: 3.81 MILLION/UL (ref 3.88–5.62)
SODIUM SERPL-SCNC: 134 MMOL/L (ref 135–147)
WBC # BLD AUTO: 6.06 THOUSAND/UL (ref 4.31–10.16)

## 2024-02-19 PROCEDURE — 93926 LOWER EXTREMITY STUDY: CPT | Performed by: SURGERY

## 2024-02-19 PROCEDURE — 99232 SBSQ HOSP IP/OBS MODERATE 35: CPT | Performed by: PHYSICIAN ASSISTANT

## 2024-02-19 PROCEDURE — NC001 PR NO CHARGE: Performed by: PODIATRIST

## 2024-02-19 PROCEDURE — 82948 REAGENT STRIP/BLOOD GLUCOSE: CPT

## 2024-02-19 PROCEDURE — 85027 COMPLETE CBC AUTOMATED: CPT | Performed by: NURSE PRACTITIONER

## 2024-02-19 PROCEDURE — 93922 UPR/L XTREMITY ART 2 LEVELS: CPT | Performed by: SURGERY

## 2024-02-19 PROCEDURE — 80048 BASIC METABOLIC PNL TOTAL CA: CPT | Performed by: NURSE PRACTITIONER

## 2024-02-19 PROCEDURE — 93926 LOWER EXTREMITY STUDY: CPT

## 2024-02-19 PROCEDURE — 84145 PROCALCITONIN (PCT): CPT | Performed by: NURSE PRACTITIONER

## 2024-02-19 RX ADMIN — ENOXAPARIN SODIUM 40 MG: 40 INJECTION SUBCUTANEOUS at 09:45

## 2024-02-19 RX ADMIN — PIPERACILLIN SODIUM AND TAZOBACTAM SODIUM 4.5 G: 36; 4.5 INJECTION, POWDER, LYOPHILIZED, FOR SOLUTION INTRAVENOUS at 20:04

## 2024-02-19 RX ADMIN — VANCOMYCIN HYDROCHLORIDE 1500 MG: 1 INJECTION, POWDER, LYOPHILIZED, FOR SOLUTION INTRAVENOUS at 13:25

## 2024-02-19 RX ADMIN — PIPERACILLIN SODIUM AND TAZOBACTAM SODIUM 4.5 G: 36; 4.5 INJECTION, POWDER, LYOPHILIZED, FOR SOLUTION INTRAVENOUS at 16:24

## 2024-02-19 RX ADMIN — ASPIRIN 81 MG 81 MG: 81 TABLET ORAL at 09:44

## 2024-02-19 RX ADMIN — VANCOMYCIN HYDROCHLORIDE 1500 MG: 1 INJECTION, POWDER, LYOPHILIZED, FOR SOLUTION INTRAVENOUS at 00:39

## 2024-02-19 RX ADMIN — PIPERACILLIN SODIUM AND TAZOBACTAM SODIUM 4.5 G: 36; 4.5 INJECTION, POWDER, LYOPHILIZED, FOR SOLUTION INTRAVENOUS at 03:14

## 2024-02-19 RX ADMIN — ATORVASTATIN CALCIUM 40 MG: 40 TABLET, FILM COATED ORAL at 17:24

## 2024-02-19 RX ADMIN — INSULIN LISPRO 1 UNITS: 100 INJECTION, SOLUTION INTRAVENOUS; SUBCUTANEOUS at 17:25

## 2024-02-19 RX ADMIN — PIPERACILLIN SODIUM AND TAZOBACTAM SODIUM 4.5 G: 36; 4.5 INJECTION, POWDER, LYOPHILIZED, FOR SOLUTION INTRAVENOUS at 09:00

## 2024-02-19 RX ADMIN — INSULIN LISPRO 1 UNITS: 100 INJECTION, SOLUTION INTRAVENOUS; SUBCUTANEOUS at 09:45

## 2024-02-19 RX ADMIN — METOPROLOL SUCCINATE 50 MG: 50 TABLET, EXTENDED RELEASE ORAL at 09:44

## 2024-02-19 RX ADMIN — AMLODIPINE BESYLATE 5 MG: 5 TABLET ORAL at 09:44

## 2024-02-19 RX ADMIN — INSULIN LISPRO 1 UNITS: 100 INJECTION, SOLUTION INTRAVENOUS; SUBCUTANEOUS at 12:10

## 2024-02-19 RX ADMIN — INSULIN LISPRO 1 UNITS: 100 INJECTION, SOLUTION INTRAVENOUS; SUBCUTANEOUS at 23:00

## 2024-02-19 NOTE — DISCHARGE INSTR - AVS FIRST PAGE
Finish course of keflex  Check blood sugars 3 times a day.  Resume taking metformin.  Get established with a family doctor as soon as possible and endocrinology due to uncontrolled diabetes  Work on healthy weight loss  Bmp outpatient    WOUND CARE INSTRUCTIONS: Right foot  Right foot dressing to be kept clean, dry and intact.   If available, recommend use of cast protector to protect right foot from accidental water/spills/contaminants.  Please change right foot dressing if soiled.   When changing dressing, please apply dry sterile gauze to incision, secure with kerlix/herve and tape.  Please schedule follow-up appointment with office as soon as possible.  Please elevate right foot to level of chest with 2 pillows when in bed & chair  Patient is to limited weight bearing (WB) as much as possible.  If WB is necessary, then patient is to Partial WB on heel with flat surgical shoe on right foot.   Recommend use of  Walker or Crutches.    DRIVING  No driving under the influence of narcotic pain medication or while recovering from anesthesia  No driving is allowed without discussion with the doctor    PAIN & SWELLING  Take your prescribed medication as directed.   Local anesthesia should last 6-8 hours & pain medication should be taken at intervals to stay ahead of the pain.  Please alternate taking Tylenol every 6 hours and Ibuprofen every 8 hours. Avoid taking pain medication on empty stomach.    Upon arrival at home, lie down & elevate your surgical foot above the level of your heart.  Recommend use of 2 to 3 pillows or leg elevation wedge pillow.  Recommend staying off your feet as much as possible for the first 24-48 hours. After 48 hours, you may begin limited walking following these restrictions: Partial WB on heel with flat surgical shoe  Pain is the worst during the first 3-4 days. Recommend taking pain medication at the same routine interval to stay ahead of the pain.  When anesthesia wears off & if any  discomfort should be present, apply an ice pack over the operated area alternating 20 minutes on and then 20 minutes off until pain subsides.  AVOID DIRECT CONTACT WITH SKIN WHICH MAY CAUSE FROSTBITE.  Do not use hot water bags or electric pads. A convenient icepack can be made by placing ice cubes in a plastic bag and covering this with a towel.  A limited amount of discomfort & swelling is to be expected. In some cases the skin may take on a bruised appearance. The surgical cleansing solution that was applied to your foot prior to the operation may darken skin color temporarily & the operation site may appear to be oozing when it actually is not.  Report any unusual or worsening discomfort or fever to the office.  Persistent pain despite elevation and your pain meds can many times be relieved by removing the tight brown compression layer (called the ACE wrap) that is over the white gauze dressing. If you are elevating and taking your pain meds and pain is still severe, remove this brown stretchy layer but leave the gauze intact. Wait 30 minutes. If the pain subsides, reapply the ACE so it’s not so tight. If pain doesn’t get better, call your doctor.     CONSTIPATION  If you have severe constipation after surgery, this can be due to the pain medication. Notify your doctor and special medication will be prescribed to deal with this.     DRESSING/CASTS  A slight amount of blood is to be expected, & is no cause for alarm. Do not remove the dressings.   If there is active bleeding & if the bleeding persists, add additional gauze to the bandage, apply direct pressure for 20 minutes, elevate your feet & call this office.  Avoid getting the dressings wet. As regular bathing may be inconvenient, use of a cast protector sleeve or sponge baths are recommended.   Drink large quantities of water. Consume no alcohol. Continue a well-balanced diet.  Take over-the-counter laxative for constipation, this is common with use of  narcotic medications.   Wear your surgical offloading boot anytime you put weight on your foot, even if it is just to walk to the restroom & back.   Expect a few weeks before you will be permitted to return to regular shoe wear.  Do not put things in your cast such as powder, coat hangers to scratch, etc.. This can cause skin damage and infections.     INFECTION  If you have a fever at or above 100 degrees, chills, sweats, or see red streaks rising above the dressing or smell odor / see pus (creamy white drainage), call your doctor immediately or go to a [facility] hospital ER and ask them to page the podiatry residents.  Having performed the operation, we are interested in a prompt recovery. Please cooperate by following the above instructions.  Please call to confirm your post-op appointment or call with any other questions.

## 2024-02-19 NOTE — ASSESSMENT & PLAN NOTE
Six weeks ago developed a callus/ blisters. Noted discharge and foul smell and decided to visit the ER for further management  XR  of the right foot revealed osseous erosions at right distal phalanx ascending to distal metatarsal head  Patient was taken to the OR for partial first ray amputation    Patient given Ceftriaxone and vancomycin ED.   On ancef and vanco now meeting sepsis criteria see note below for changes    Patient reports pain is under control.   Acetaminophen and non-pharmacological measures for pain management  Patient is hemodynamically stable  Discussed with patient that I would like to have tissue culture results prior to discharge, should have them returned within 24 hours, he is agreeable

## 2024-02-19 NOTE — PROGRESS NOTES
PROGRESS NOTE - PODIATRY  Maciel Gaitan 63 y.o. male MRN: [id number]  Unit/Bed#: W -01 Encounter: [id number]    ASSESSMENT:  S/p right partial 1st ray amputation (DOS: 2/16/24)  Uncontrolled DM2    PLAN:  Independently reviewed all labs, imaging & diagnostic studies available at time of visit.  LEAD reveal hemodynamic circulation within healing range.  On clinical exam, well-coapted incision, no acute signs of infection.  Recommend outpatient clinic appointment at least 1 week after discharge with Dr. Person.  No further plans for intervention at this time.   Rec PT/OT c/s    Patient was educated on the signs of infection. Patient was instructed to call office or present to nearest Emergency Department if signs of infection present.  Patient was instructed to call the office if concerns arise.    ABx:   Tissue Cx NGTD  Recommend Cephalexin (Keflex) for 7 day course on discharge if Cx's are susceptible    Wound Care:  Dressing to be kept clean, dry, intact until next appointment    Activity:  Partial WB on heel with flat surgical shoe  Elevation of surgical foot to level of chest with 2 pillows while in bed & chair and Limited weight bearing as much as possible    History of Present Illness   Chief Complaint   Patient presents with    Toe Injury     Pt reports he developed toe blister 2-3 weeks ago, blister became a deep wound that pt believes is now infected. Pt is borderline diabetic.      63 yrs male presents with toe blister which developed into wet gangrene with malodor. Pt reports onset 6 weeks ago. XR revealed gross osseous erosion of hallux ascending to 1st MT head. 2/16/24 s/p right partial 1st ray amputation.    Pt seen bedside.  Pt reports pain manageable at this time.  Pt reports using right surgical shoe as directed.     Patient denies fever, chills, nausea, vomiting, chest pain, shortness of breath  Patient endorses paresthesia    Review of Systems:  All other systems are negative except for  HPI.    Allergies as of 02/16/2024    (No Known Allergies)     History reviewed. No pertinent past medical history.  Past Surgical History:   Procedure Laterality Date    EYE SURGERY      TOE AMPUTATION Right 2/16/2024    Procedure: PARTIAL 1st RAY AMPUTATION, RIGHT FOOT REMOVAL OF ALL NON-VIABLE BONE AND SOFT TISSUE;  Surgeon: Severo Person DPM;  Location: [facility];  Service: Podiatry    WISDOM TOOTH EXTRACTION       Social Determinants of Health     Tobacco Use: Low Risk  (2/16/2024)    Patient History     Smoking Tobacco Use: Never     Smokeless Tobacco Use: Never     Passive Exposure: Not on file   Alcohol Use: Not on file   Financial Resource Strain: Not on file   Food Insecurity: Not on file   Transportation Needs: Not on file   Physical Activity: Not on file   Stress: Not on file   Social Connections: Not on file   Intimate Partner Violence: Not on file   Depression: Not on file   Housing Stability: Not on file   Utilities: Not on file   Health Literacy: Not on file     Family History   Problem Relation Age of Onset    Diabetes Mother     Heart disease Father     Heart disease Paternal Grandfather        Objective     /77   Pulse 87   Temp 99.2 °F (37.3 °C)   Resp 17   Wt 127 kg (279 lb 1.6 oz)   SpO2 94%   BMI 37.85 kg/m²     Lab Results   Component Value Date    HGBA1C 9.8 (H) 01/23/2024      PHYSICAL EXAM:  General: Afebrile, cooperative & no distress  Skin:  dry, pink, (-) webspace maceration    Right foot incision well-coapted, sutures intact, (+) mild sanguinous drainage, (+) mld maceration at plantar central flap, (-) no erythema        Vascular:  Cap refill < 2 seconds.   DP 1/4; PT 1/4, R  Neurologic:  Sensation is diminished to light touch.    Lower Extremity:  Manual muscle testing 5/5 at ankle in anterior, posterior & lateral groups, b/l  S/p R partial 1st ray amp    Current Facility-Administered Medications   Medication Dose Route Frequency Provider Last Rate Last Admin     acetaminophen (TYLENOL) tablet 650 mg  650 mg Oral Q6H PRN MITA Chung   650 mg at 02/18/24 1756    aluminum-magnesium hydroxide-simethicone (MAALOX) oral suspension 30 mL  30 mL Oral Q6H PRN MITA Chung        amLODIPine (NORVASC) tablet 5 mg  5 mg Oral Daily MITA Chung   5 mg at 02/19/24 0944    aspirin chewable tablet 81 mg  81 mg Oral Daily MITA Chung   81 mg at 02/19/24 0944    atorvastatin (LIPITOR) tablet 40 mg  40 mg Oral QPM MITA Chung   40 mg at 02/18/24 1743    enoxaparin (LOVENOX) subcutaneous injection 40 mg  40 mg Subcutaneous Daily Ron Virgil Vizcarra DPM   40 mg at 02/19/24 0945    insulin lispro (HumaLOG) 100 units/mL subcutaneous injection 1-5 Units  1-5 Units Subcutaneous TID AC MITA Chung   1 Units at 02/19/24 0945    insulin lispro (HumaLOG) 100 units/mL subcutaneous injection 1-5 Units  1-5 Units Subcutaneous HS MITA Chung   1 Units at 02/18/24 2105    lactated ringers infusion  100 mL/hr Intravenous Continuous Christie Corrales, CRNA 100 mL/hr at 02/18/24 1911 100 mL/hr at 02/18/24 1911    metoprolol succinate (TOPROL-XL) 24 hr tablet 50 mg  50 mg Oral Daily MITA Chugn   50 mg at 02/19/24 0944    ondansetron (ZOFRAN) injection 4 mg  4 mg Intravenous Q6H PRN MITA Chung        piperacillin-tazobactam (ZOSYN) 4.5 g in sodium chloride 0.9 % 100 mL IVPB  4.5 g Intravenous Q6H MITA Andrade 200 mL/hr at 02/19/24 0900 4.5 g at 02/19/24 0900    senna (SENOKOT) tablet 8.6 mg  1 tablet Oral Daily MITA Chung   8.6 mg at 02/18/24 0818    vancomycin (VANCOCIN) 1500 mg in sodium chloride 0.9% 250 mL IVPB  1,500 mg Intravenous Q12H Jose Deluna .7 mL/hr at 02/18/24 1611 1,500 mg at 02/19/24 0039     No current outpatient medications on file.     XR foot right 3+ views   Final Result by Abisai Byrne MD (02/17 1129)      Interval resection of the first ray distal first metatarsal.      Workstation  performed: [id number]         XR foot 3+ views RIGHT   ED Interpretation by Anh Leo PA-C (02/16 1611)   No signs of bony destruction      Final Result by Abisai yBrne MD (02/17 1129)   Soft tissue changes great toe. Questionable osteomyelitis changes distal findings.   No acute osseous abnormality.      Workstation performed: [id number]         VAS ARTERIAL DUPLEX-LOWER LIMB UNILATERAL    (Results Pending)     Results from last 7 days   Lab Units 02/19/24  0511 02/18/24  0850 02/17/24  0446   WBC Thousand/uL 6.06   < > 9.75   HEMOGLOBIN g/dL 10.2*   < > 9.5*   HEMATOCRIT % 31.6*   < > 29.1*   PLATELETS Thousands/uL 342   < > 335   NEUTROS PCT %  --   --  73   LYMPHS PCT %  --   --  12*   MONOS PCT %  --   --  12   EOS PCT %  --   --  2    < > = values in this interval not displayed.     [unfilled], C-reactive protein  Results from last 7 days   Lab Units 02/19/24  0511 02/18/24  0850   POTASSIUM mmol/L 3.3* 3.3*   CHLORIDE mmol/L 102 102   CO2 mmol/L 24 23   BUN mg/dL 11 13   CREATININE mg/dL 0.84 0.86   CALCIUM mg/dL 7.4* 7.6*   ALK PHOS U/L  --  69   ALT U/L  --  15   AST U/L  --  18     Results from last 7 days   Lab Units 02/16/24  1644   INR  1.21*     Results from last 7 days   Lab Units 02/16/24  1802 02/16/24  1558 02/16/24  1557   BLOOD CULTURE   --  No Growth at 48 hrs. No Growth at 48 hrs.   GRAM STAIN RESULT  No Polys or Bacteria seen  --   --      Results from last 7 days   Lab Units 02/16/24  1802   ANAEROBIC CULTURE  Culture results to follow.       Ron Vizcarra DPM  2/19/2024

## 2024-02-19 NOTE — ASSESSMENT & PLAN NOTE
Systolic on admission in the 170's. On admission to the floor 130's  Continue amlodipine and metoprolol daily with recs   Monitor BP per unit protocol  Monitor kidney function with daily BMP  Improved BP control

## 2024-02-19 NOTE — PLAN OF CARE
Problem: PAIN - ADULT  Goal: Verbalizes/displays adequate comfort level or baseline comfort level  Description: Interventions:  - Encourage patient to monitor pain and request assistance  - Assess pain using appropriate pain scale  - Administer analgesics based on type and severity of pain and evaluate response  - Implement non-pharmacological measures as appropriate and evaluate response  - Consider cultural and social influences on pain and pain management  - Notify physician/advanced practitioner if interventions unsuccessful or patient reports new pain  Outcome: Progressing     Problem: INFECTION - ADULT  Goal: Absence or prevention of progression during hospitalization  Description: INTERVENTIONS:  - Assess and monitor for signs and symptoms of infection  - Monitor lab/diagnostic results  - Monitor all insertion sites, i.e. indwelling lines, tubes, and drains  - Monitor endotracheal if appropriate and nasal secretions for changes in amount and color  - Marion appropriate cooling/warming therapies per order  - Administer medications as ordered  - Instruct and encourage patient and family to use good hand hygiene technique  - Identify and instruct in appropriate isolation precautions for identified infection/condition  Outcome: Progressing  Goal: Absence of fever/infection during neutropenic period  Description: INTERVENTIONS:  - Monitor WBC    Outcome: Progressing     Problem: SAFETY ADULT  Goal: Patient will remain free of falls  Description: INTERVENTIONS:  - Educate patient/family on patient safety including physical limitations  - Instruct patient to call for assistance with activity   - Consult OT/PT to assist with strengthening/mobility   - Keep Call bell within reach  - Keep bed low and locked with side rails adjusted as appropriate  - Keep care items and personal belongings within reach  - Initiate and maintain comfort rounds  - Make Fall Risk Sign visible to staff  - Offer Toileting every  Hours,  in advance of need  - Initiate/Maintain alarm  - Obtain necessary fall risk management equipment:   - Apply yellow socks and bracelet for high fall risk patients  - Consider moving patient to room near nurses station  Outcome: Progressing  Goal: Maintain or return to baseline ADL function  Description: INTERVENTIONS:  -  Assess patient's ability to carry out ADLs; assess patient's baseline for ADL function and identify physical deficits which impact ability to perform ADLs (bathing, care of mouth/teeth, toileting, grooming, dressing, etc.)  - Assess/evaluate cause of self-care deficits   - Assess range of motion  - Assess patient's mobility; develop plan if impaired  - Assess patient's need for assistive devices and provide as appropriate  - Encourage maximum independence but intervene and supervise when necessary  - Involve family in performance of ADLs  - Assess for home care needs following discharge   - Consider OT consult to assist with ADL evaluation and planning for discharge  - Provide patient education as appropriate  Outcome: Progressing  Goal: Maintains/Returns to pre admission functional level  Description: INTERVENTIONS:  - Perform AM-PAC 6 Click Basic Mobility/ Daily Activity assessment daily.  - Set and communicate daily mobility goal to care team and patient/family/caregiver.   - Collaborate with rehabilitation services on mobility goals if consulted  - Perform Range of Motion  times a day.  - Reposition patient every  hours.  - Dangle patient  times a day  - Stand patient  times a day  - Ambulate patient  times a day  - Out of bed to chair  times a day   - Out of bed for meals  times a day  - Out of bed for toileting  - Record patient progress and toleration of activity level   Outcome: Progressing     Problem: DISCHARGE PLANNING  Goal: Discharge to home or other facility with appropriate resources  Description: INTERVENTIONS:  - Identify barriers to discharge w/patient and caregiver  - Arrange for  needed discharge resources and transportation as appropriate  - Identify discharge learning needs (meds, wound care, etc.)  - Arrange for interpretive services to assist at discharge as needed  - Refer to Case Management Department for coordinating discharge planning if the patient needs post-hospital services based on physician/advanced practitioner order or complex needs related to functional status, cognitive ability, or social support system  Outcome: Progressing     Problem: Knowledge Deficit  Goal: Patient/family/caregiver demonstrates understanding of disease process, treatment plan, medications, and discharge instructions  Description: Complete learning assessment and assess knowledge base.  Interventions:  - Provide teaching at level of understanding  - Provide teaching via preferred learning methods  Outcome: Progressing

## 2024-02-19 NOTE — PROGRESS NOTES
"[facility]  Progress Note  Name: Maciel Gaitan I  MRN: [id number]  Unit/Bed#: W -01 I Date of Admission: 2/16/2024   Date of Service: 2/19/2024 I Hospital Day: 3    Assessment/Plan   Sepsis (HCC)  Assessment & Plan  Received sepsis alert from nursing due to fever 102.7 and   Discontinue ancef continued vanocmycin; ordered IV Zosyn  Check procalcitonin, lactic acid, cbc, cmp now  Give IV resuscitation based on sepsis protocol  Monitor fever curve; trend labs  If lactic elevated trend q2h until normal   Trend blood cultures negative for growth at 48 hours   Prn tylenol for fever   Last fever 2/18 1600 - 102F    Diabetes mellitus type 2 with complications (HCC)  Assessment & Plan  Lab Results   Component Value Date    HGBA1C 9.8 (H) 01/23/2024       Recent Labs     02/18/24  1723 02/18/24  2100 02/19/24  0757 02/19/24  1112   POCGLU 160* 172* 161* 178*     States he lost significant amount of weight and his HGA1c was below 6 in 2018. Used to take Metformin but endocrinology D/C due to improvement in blood glucose management. However, he states he is not following a \"clean\" diet and put on weight.  Continue SSI and BG checks   Car/controlled diet in place     Blood Sugar Average: Last 72 hrs:  (P) [id number]      Hyperlipidemia  Assessment & Plan  Lipid panel obtained during this hospitalization, unremarkable except for low HDL  Continue with home atorvastatin regimen    Essential hypertension  Assessment & Plan  Systolic on admission in the 170's. On admission to the floor 130's  Continue amlodipine and metoprolol daily with recs   Monitor BP per unit protocol  Monitor kidney function with daily BMP  Improved BP control    CAD (coronary artery disease)  Assessment & Plan  Stent placement in 2017, follows up with cardiology when needed per patient report  Last echo 1/23/24 showing left ventricular cavity size is normal,wall thickness is moderately increased. There is " moderate concentric hypertrophy. EF is 60%, systolic function, wall motion and diastolic function are normal.   Lipid panel unremarkable except for low HDL  Takes atorvastatin, aspirin, amlodipine and metoprolol daily, continue with home regimen   Stable for now    Morbid obesity (HCC)  Assessment & Plan  Obesity increase length of stay and healing process  Lost 60 pounds with life style changes and exercise. States his PCP encouraged him  Education and encouragement to continue with healthy life style and exercising daily was provided at bedside    * Osteomyelitis of great toe of right foot (HCC)  Assessment & Plan  Six weeks ago developed a callus/ blisters. Noted discharge and foul smell and decided to visit the ER for further management  XR  of the right foot revealed osseous erosions at right distal phalanx ascending to distal metatarsal head  Patient was taken to the OR for partial first ray amputation    Patient given Ceftriaxone and vancomycin ED.   On ancef and vanco now meeting sepsis criteria see note below for changes    Patient reports pain is under control.   Acetaminophen and non-pharmacological measures for pain management  Patient is hemodynamically stable  Discussed with patient that I would like to have tissue culture results prior to discharge, should have them returned within 24 hours, he is agreeable                 VTE Pharmacologic Prophylaxis: VTE Score: 6 High Risk (Score >/= 5) - Pharmacological DVT Prophylaxis Ordered: enoxaparin (Lovenox). Sequential Compression Devices Ordered.    Mobility:   Basic Mobility Inpatient Raw Score: 17  JH-HLM Goal: 5: Stand one or more mins  JH-HLM Achieved: 8: Walk 250 feet ot more  HLM Goal achieved. Continue to encourage appropriate mobility.    Patient Centered Rounds: I performed bedside rounds with nursing staff today.   Discussions with Specialists or Other Care Team Provider: podiatry    Education and Discussions with Family / Patient: Patient  declined call to .     Total Time Spent on Date of Encounter in care of patient: 45 mins. This time was spent on one or more of the following: performing physical exam; counseling and coordination of care; obtaining or reviewing history; documenting in the medical record; reviewing/ordering tests, medications or procedures; communicating with other healthcare professionals and discussing with patient's family/caregivers.    Current Length of Stay: 3 day(s)  Current Patient Status: Inpatient   Certification Statement: The patient will continue to require additional inpatient hospital stay due to tissue culture results  Discharge Plan: Anticipate discharge later today or tomorrow to home.    Code Status: Level 1 - Full Code    Subjective:   Patient reports pain is controlled. He is not sleeping well but declines anything to help for sleep.     Objective:     Vitals:   Temp (24hrs), Av.5 °F (38.1 °C), Min:99.2 °F (37.3 °C), Max:102.8 °F (39.3 °C)    Temp:  [99.2 °F (37.3 °C)-102.8 °F (39.3 °C)] 99.3 °F (37.4 °C)  HR:  [72-88] 72  Resp:  [17] 17  BP: (115-148)/(66-79) 135/77  SpO2:  [94 %-98 %] 95 %  Body mass index is 37.85 kg/m².     Input and Output Summary (last 24 hours):     Intake/Output Summary (Last 24 hours) at 2024 1303  Last data filed at 2024 0818  Gross per 24 hour   Intake 240 ml   Output 625 ml   Net -385 ml       Physical Exam:   Physical Exam  Vitals and nursing note reviewed.   Constitutional:       General: He is not in acute distress.  Cardiovascular:      Rate and Rhythm: Normal rate and regular rhythm.      Pulses: Normal pulses.      Heart sounds: Normal heart sounds.   Pulmonary:      Effort: Pulmonary effort is normal.      Breath sounds: Normal breath sounds. No wheezing or rales.   Abdominal:      General: Bowel sounds are normal.      Palpations: Abdomen is soft.      Tenderness: There is no abdominal tenderness. There is no guarding or rebound.    Musculoskeletal:      Right lower leg: No edema.      Left lower leg: No edema.   Skin:     General: Skin is warm and dry.   Neurological:      Mental Status: He is alert. Mental status is at baseline.   Psychiatric:         Mood and Affect: Mood normal.         Behavior: Behavior normal.          Additional Data:     Labs:  Results from last 7 days   Lab Units 02/19/24  0511 02/18/24  0850 02/17/24  0446   WBC Thousand/uL 6.06   < > 9.75   HEMOGLOBIN g/dL 10.2*   < > 9.5*   HEMATOCRIT % 31.6*   < > 29.1*   PLATELETS Thousands/uL 342   < > 335   NEUTROS PCT %  --   --  73   LYMPHS PCT %  --   --  12*   MONOS PCT %  --   --  12   EOS PCT %  --   --  2    < > = values in this interval not displayed.     Results from last 7 days   Lab Units 02/19/24  0511 02/18/24  0850   SODIUM mmol/L 134* 133*   POTASSIUM mmol/L 3.3* 3.3*   CHLORIDE mmol/L 102 102   CO2 mmol/L 24 23   BUN mg/dL 11 13   CREATININE mg/dL 0.84 0.86   ANION GAP mmol/L 8 8   CALCIUM mg/dL 7.4* 7.6*   ALBUMIN g/dL  --  2.9*   TOTAL BILIRUBIN mg/dL  --  0.46   ALK PHOS U/L  --  69   ALT U/L  --  15   AST U/L  --  18   GLUCOSE RANDOM mg/dL 163* 175*     Results from last 7 days   Lab Units 02/16/24  1644   INR  1.21*     Results from last 7 days   Lab Units 02/19/24  1112 02/19/24  0757 02/18/24  2100 02/18/24  1723 02/18/24  1043 02/18/24  0800 02/17/24  2100 02/17/24  1633 02/17/24  1048 02/17/24  0726 02/16/24  2148 02/16/24  2058   POC GLUCOSE mg/dl 178* 161* 172* 160* 192* 151* 189* 160* 234* 196* 236* 202*         Results from last 7 days   Lab Units 02/19/24  0511 02/18/24  0850   LACTIC ACID mmol/L  --  1.5   PROCALCITONIN ng/ml 0.08 0.08       Lines/Drains:  Invasive Devices       Peripheral Intravenous Line  Duration             Peripheral IV 02/16/24 Right Antecubital 2 days    Peripheral IV 02/19/24 Left;Ventral (anterior) Forearm <1 day                          Imaging: No pertinent imaging reviewed.    Recent Cultures (last 7 days):   Results  from last 7 days   Lab Units 02/16/24  1802 02/16/24  1558 02/16/24  1557   BLOOD CULTURE   --  No Growth at 48 hrs. No Growth at 48 hrs.   GRAM STAIN RESULT  No Polys or Bacteria seen  --   --        Last 24 Hours Medication List:   Current Facility-Administered Medications   Medication Dose Route Frequency Provider Last Rate    acetaminophen  650 mg Oral Q6H PRN MITA Chung      aluminum-magnesium hydroxide-simethicone  30 mL Oral Q6H PRN MITA Chung      amLODIPine  5 mg Oral Daily MITA Chung      aspirin  81 mg Oral Daily MITA Chung      atorvastatin  40 mg Oral QPM MITA Chung      enoxaparin  40 mg Subcutaneous Daily Ron Vizcarra DPM      insulin lispro  1-5 Units Subcutaneous TID AC MITA Chung      insulin lispro  1-5 Units Subcutaneous HS MITA Chung      lactated ringers  100 mL/hr Intravenous Continuous Christie Ferrerh [place], CRNA 100 mL/hr (02/18/24 1911)    metoprolol succinate  50 mg Oral Daily MITA Chung      ondansetron  4 mg Intravenous Q6H PRN MITA Chung      piperacillin-tazobactam  4.5 g Intravenous Q6H MITA Andrade 4.5 g (02/19/24 0900)    senna  1 tablet Oral Daily MITA Chung      vancomycin  1,500 mg Intravenous Q12H Jose Deluna MD 1,500 mg (02/18/24 1611)        Today, Patient Was Seen By: Keiko Christianson PA-C    **Please Note: This note may have been constructed using a voice recognition system.**

## 2024-02-19 NOTE — ASSESSMENT & PLAN NOTE
"Lab Results   Component Value Date    HGBA1C 9.8 (H) 01/23/2024       Recent Labs     02/18/24  1723 02/18/24  2100 02/19/24  0757 02/19/24  1112   POCGLU 160* 172* 161* 178*     States he lost significant amount of weight and his HGA1c was below 6 in 2018. Used to take Metformin but endocrinology D/C due to improvement in blood glucose management. However, he states he is not following a \"clean\" diet and put on weight.  Continue SSI and BG checks   Car/controlled diet in place     Blood Sugar Average: Last 72 hrs:  (P) [id number]    "

## 2024-02-19 NOTE — ASSESSMENT & PLAN NOTE
Received sepsis alert from nursing due to fever 102.7 and   Discontinue ancef continued vanocmycin; ordered IV Zosyn  Check procalcitonin, lactic acid, cbc, cmp now  Give IV resuscitation based on sepsis protocol  Monitor fever curve; trend labs  If lactic elevated trend q2h until normal   Trend blood cultures negative for growth at 48 hours   Prn tylenol for fever   Last fever 2/18 1600 - 102F

## 2024-02-19 NOTE — PROGRESS NOTES
Maciel Gaitan is a 63 y.o. male who is currently ordered Vancomycin IV with management by the Pharmacy Consult service.  Relevant clinical data and objective / subjective history reviewed.  Vancomycin Assessment:  Indication and Goal AUC/Trough: Bone/joint infection (goal -600, trough >10)  Clinical Status: stable  Micro:     Renal Function:  SCr: 0.84 mg/dL  CrCl: 123.3 mL/min  Renal replacement: Not on dialysis  Days of Therapy: 4  Current Dose: 1500mg IV q12 hours  Vancomycin Plan:  New Dosing: continue current regimen  Estimated AUC: 474 mcg*hr/mL  Estimated Trough: 14.4 mcg/mL  Next Level: 2/25/24 @ 0600  Renal Function Monitoring: Daily BMP and UOP  Pharmacy will continue to follow closely for s/sx of nephrotoxicity, infusion reactions and appropriateness of therapy.  BMP and CBC will be ordered per protocol. We will continue to follow the patient’s culture results and clinical progress daily.    Keyla Dillon, Pharmacist

## 2024-02-20 VITALS
BODY MASS INDEX: 37.85 KG/M2 | DIASTOLIC BLOOD PRESSURE: 75 MMHG | OXYGEN SATURATION: 97 % | SYSTOLIC BLOOD PRESSURE: 133 MMHG | TEMPERATURE: 98.5 F | HEART RATE: 65 BPM | RESPIRATION RATE: 17 BRPM | WEIGHT: 279.1 LBS

## 2024-02-20 PROBLEM — E66.812 CLASS 2 OBESITY IN ADULT: Status: ACTIVE | Noted: 2017-12-26

## 2024-02-20 PROBLEM — E66.9 CLASS 2 OBESITY IN ADULT: Status: ACTIVE | Noted: 2017-12-26

## 2024-02-20 PROBLEM — E87.6 HYPOKALEMIA: Status: ACTIVE | Noted: 2024-02-20

## 2024-02-20 LAB
ANION GAP SERPL CALCULATED.3IONS-SCNC: 7 MMOL/L
BACTERIA SPEC ANAEROBE CULT: NO GROWTH
BACTERIA TISS AEROBE CULT: ABNORMAL
BUN SERPL-MCNC: 12 MG/DL (ref 5–25)
CALCIUM SERPL-MCNC: 7.7 MG/DL (ref 8.4–10.2)
CHLORIDE SERPL-SCNC: 105 MMOL/L (ref 96–108)
CO2 SERPL-SCNC: 24 MMOL/L (ref 21–32)
CREAT SERPL-MCNC: 0.93 MG/DL (ref 0.6–1.3)
GFR SERPL CREATININE-BSD FRML MDRD: 87 ML/MIN/1.73SQ M
GLUCOSE SERPL-MCNC: 160 MG/DL (ref 65–140)
GLUCOSE SERPL-MCNC: 175 MG/DL (ref 65–140)
GLUCOSE SERPL-MCNC: 175 MG/DL (ref 65–140)
GRAM STN SPEC: ABNORMAL
POTASSIUM SERPL-SCNC: 3.5 MMOL/L (ref 3.5–5.3)
SODIUM SERPL-SCNC: 136 MMOL/L (ref 135–147)

## 2024-02-20 PROCEDURE — 80048 BASIC METABOLIC PNL TOTAL CA: CPT | Performed by: INTERNAL MEDICINE

## 2024-02-20 PROCEDURE — 97116 GAIT TRAINING THERAPY: CPT

## 2024-02-20 PROCEDURE — 82948 REAGENT STRIP/BLOOD GLUCOSE: CPT

## 2024-02-20 PROCEDURE — 99239 HOSP IP/OBS DSCHRG MGMT >30: CPT | Performed by: PHYSICIAN ASSISTANT

## 2024-02-20 PROCEDURE — 97162 PT EVAL MOD COMPLEX 30 MIN: CPT

## 2024-02-20 RX ORDER — BLOOD SUGAR DIAGNOSTIC
STRIP MISCELLANEOUS
Qty: 100 EACH | Refills: 0 | Status: SHIPPED | OUTPATIENT
Start: 2024-02-20

## 2024-02-20 RX ORDER — SACCHAROMYCES BOULARDII 250 MG
250 CAPSULE ORAL 2 TIMES DAILY
Status: DISCONTINUED | OUTPATIENT
Start: 2024-02-20 | End: 2024-02-20 | Stop reason: HOSPADM

## 2024-02-20 RX ORDER — BLOOD-GLUCOSE METER
KIT MISCELLANEOUS
Qty: 1 KIT | Refills: 0 | Status: SHIPPED | OUTPATIENT
Start: 2024-02-20

## 2024-02-20 RX ORDER — LANCETS 33 GAUGE
EACH MISCELLANEOUS
Qty: 100 EACH | Refills: 0 | Status: SHIPPED | OUTPATIENT
Start: 2024-02-20

## 2024-02-20 RX ORDER — CEPHALEXIN 500 MG/1
500 CAPSULE ORAL EVERY 6 HOURS SCHEDULED
Status: DISCONTINUED | OUTPATIENT
Start: 2024-02-20 | End: 2024-02-20 | Stop reason: HOSPADM

## 2024-02-20 RX ORDER — SACCHAROMYCES BOULARDII 250 MG
250 CAPSULE ORAL 2 TIMES DAILY
Qty: 30 CAPSULE | Refills: 0 | Status: SHIPPED | OUTPATIENT
Start: 2024-02-20

## 2024-02-20 RX ORDER — GLUCOSAMINE HCL/CHONDROITIN SU 500-400 MG
CAPSULE ORAL
Qty: 100 EACH | Refills: 0 | Status: SHIPPED | OUTPATIENT
Start: 2024-02-20

## 2024-02-20 RX ORDER — CEPHALEXIN 500 MG/1
500 CAPSULE ORAL EVERY 6 HOURS SCHEDULED
Qty: 28 CAPSULE | Refills: 0 | Status: CANCELLED | OUTPATIENT
Start: 2024-02-20 | End: 2024-02-27

## 2024-02-20 RX ORDER — SACCHAROMYCES BOULARDII 250 MG
250 CAPSULE ORAL 2 TIMES DAILY
Qty: 30 CAPSULE | Refills: 0 | Status: CANCELLED | OUTPATIENT
Start: 2024-02-20

## 2024-02-20 RX ORDER — CEPHALEXIN 500 MG/1
500 CAPSULE ORAL EVERY 6 HOURS SCHEDULED
Qty: 28 CAPSULE | Refills: 0 | Status: SHIPPED | OUTPATIENT
Start: 2024-02-20 | End: 2024-02-27

## 2024-02-20 RX ORDER — ACETAMINOPHEN 325 MG/1
650 TABLET ORAL EVERY 6 HOURS PRN
Status: CANCELLED
Start: 2024-02-20

## 2024-02-20 RX ADMIN — Medication 250 MG: at 11:37

## 2024-02-20 RX ADMIN — VANCOMYCIN HYDROCHLORIDE 1500 MG: 1 INJECTION, POWDER, LYOPHILIZED, FOR SOLUTION INTRAVENOUS at 00:33

## 2024-02-20 RX ADMIN — ENOXAPARIN SODIUM 40 MG: 40 INJECTION SUBCUTANEOUS at 08:30

## 2024-02-20 RX ADMIN — PIPERACILLIN SODIUM AND TAZOBACTAM SODIUM 4.5 G: 36; 4.5 INJECTION, POWDER, LYOPHILIZED, FOR SOLUTION INTRAVENOUS at 08:37

## 2024-02-20 RX ADMIN — INSULIN LISPRO 1 UNITS: 100 INJECTION, SOLUTION INTRAVENOUS; SUBCUTANEOUS at 08:29

## 2024-02-20 RX ADMIN — AMLODIPINE BESYLATE 5 MG: 5 TABLET ORAL at 08:30

## 2024-02-20 RX ADMIN — INSULIN LISPRO 1 UNITS: 100 INJECTION, SOLUTION INTRAVENOUS; SUBCUTANEOUS at 11:38

## 2024-02-20 RX ADMIN — PIPERACILLIN SODIUM AND TAZOBACTAM SODIUM 4.5 G: 36; 4.5 INJECTION, POWDER, LYOPHILIZED, FOR SOLUTION INTRAVENOUS at 02:28

## 2024-02-20 RX ADMIN — METOPROLOL SUCCINATE 50 MG: 50 TABLET, EXTENDED RELEASE ORAL at 08:30

## 2024-02-20 RX ADMIN — ASPIRIN 81 MG 81 MG: 81 TABLET ORAL at 08:30

## 2024-02-20 RX ADMIN — CEPHALEXIN 500 MG: 500 CAPSULE ORAL at 11:37

## 2024-02-20 NOTE — PLAN OF CARE
Problem: PHYSICAL THERAPY ADULT  Goal: Performs mobility at highest level of function for planned discharge setting.  See evaluation for individualized goals.  Description: Treatment/Interventions: Functional transfer training, LE strengthening/ROM, Elevations, Endurance training, Cognitive reorientation, Patient/family training, Equipment eval/education, Bed mobility, Gait training, Spoke to nursing, Spoke to advanced practitioner, Spoke to case management  Equipment Recommended: Crutches (declined cane)       See flowsheet documentation for full assessment, interventions and recommendations.  Note: Prognosis: Fair  Problem List: Impaired balance, Decreased endurance, Decreased mobility, Impaired sensation, Obesity, Decreased skin integrity, Pain, Orthopedic restrictions (gait deviations)  Assessment: Pt is a 63 y.o. male seen for PT evaluation s/p admit to [facility] on 2/16/2024 w/ Osteomyelitis of great toe of right foot (HCC).  Pt had above procedure and is NOW PWB on R heel in surgical shoe.  Order placed for PT.      Prior to admission: Pt lived in home w/ DARY, did not use DME prior to admission, had recently lost 60# and performed walking program, worked full time.  Upon evaluation: Pt needed no physical A for transfers nor amb, but instruction for proper technique and DME use.      Pt's clinical presentation is currently evolving given the functional mobility deficits above, especially (but not limited to) gait deviations, pain, and orthopedic restrictions, and combined with medical complications including abnormal H&H, abnormal sodium values, abnormal potassium values, and fevers, abn blood sugars per pt .  Pt IS NOT at his/her mobility baseline.  He is at risk for falls based on impaired balance and obesity.       During this admission, pt would benefit from continued skilled inpatient PT 1-2 sessions in the acute care setting in order to address deficits as defined above to maximize function  and mobility.      Recommendations:     From a PT standpoint, recommend next several sessions focus on continued mobility training w/B crutches vs LRAD, stair training.  Barriers to Discharge: Inaccessible home environment     Rehab Resource Intensity Level, PT: III (Minimum Resource Intensity)    See flowsheet documentation for full assessment.

## 2024-02-20 NOTE — ASSESSMENT & PLAN NOTE
Sepsis criteria noted on 2/18 with fever 102.7 and   Source:osteomyelitis/cellulitis  Stopped IV ancef which was started on admission, continued vancomycin and added IV Zosyn  Gave IV resuscitation based on sepsis protocol  Blood cultures negative  Change to PO keflex per above

## 2024-02-20 NOTE — PLAN OF CARE
Problem: PAIN - ADULT  Goal: Verbalizes/displays adequate comfort level or baseline comfort level  Description: Interventions:  - Encourage patient to monitor pain and request assistance  - Assess pain using appropriate pain scale  - Administer analgesics based on type and severity of pain and evaluate response  - Implement non-pharmacological measures as appropriate and evaluate response  - Consider cultural and social influences on pain and pain management  - Notify physician/advanced practitioner if interventions unsuccessful or patient reports new pain  Outcome: Progressing     Problem: INFECTION - ADULT  Goal: Absence or prevention of progression during hospitalization  Description: INTERVENTIONS:  - Assess and monitor for signs and symptoms of infection  - Monitor lab/diagnostic results  - Monitor all insertion sites, i.e. indwelling lines, tubes, and drains  - Monitor endotracheal if appropriate and nasal secretions for changes in amount and color  - Holt appropriate cooling/warming therapies per order  - Administer medications as ordered  - Instruct and encourage patient and family to use good hand hygiene technique  - Identify and instruct in appropriate isolation precautions for identified infection/condition  Outcome: Progressing  Goal: Absence of fever/infection during neutropenic period  Description: INTERVENTIONS:  - Monitor WBC    Outcome: Progressing     Problem: SAFETY ADULT  Goal: Patient will remain free of falls  Description: INTERVENTIONS:  - Educate patient/family on patient safety including physical limitations  - Instruct patient to call for assistance with activity   - Consult OT/PT to assist with strengthening/mobility   - Keep Call bell within reach  - Keep bed low and locked with side rails adjusted as appropriate  - Keep care items and personal belongings within reach  - Initiate and maintain comfort rounds  - Make Fall Risk Sign visible to staff  - Offer Toileting every  Hours,  in advance of need  - Initiate/Maintain alarm  - Obtain necessary fall risk management equipment:   - Apply yellow socks and bracelet for high fall risk patients  - Consider moving patient to room near nurses station  Outcome: Progressing  Goal: Maintain or return to baseline ADL function  Description: INTERVENTIONS:  -  Assess patient's ability to carry out ADLs; assess patient's baseline for ADL function and identify physical deficits which impact ability to perform ADLs (bathing, care of mouth/teeth, toileting, grooming, dressing, etc.)  - Assess/evaluate cause of self-care deficits   - Assess range of motion  - Assess patient's mobility; develop plan if impaired  - Assess patient's need for assistive devices and provide as appropriate  - Encourage maximum independence but intervene and supervise when necessary  - Involve family in performance of ADLs  - Assess for home care needs following discharge   - Consider OT consult to assist with ADL evaluation and planning for discharge  - Provide patient education as appropriate  Outcome: Progressing  Goal: Maintains/Returns to pre admission functional level  Description: INTERVENTIONS:  - Perform AM-PAC 6 Click Basic Mobility/ Daily Activity assessment daily.  - Set and communicate daily mobility goal to care team and patient/family/caregiver.   - Collaborate with rehabilitation services on mobility goals if consulted  - Perform Range of Motion  times a day.  - Reposition patient every  hours.  - Dangle patient  times a day  - Stand patient  times a day  - Ambulate patient times a day  - Out of bed to chair  times a day   - Out of bed for meals  times a day  - Out of bed for toileting  - Record patient progress and toleration of activity level   Outcome: Progressing     Problem: DISCHARGE PLANNING  Goal: Discharge to home or other facility with appropriate resources  Description: INTERVENTIONS:  - Identify barriers to discharge w/patient and caregiver  - Arrange for  needed discharge resources and transportation as appropriate  - Identify discharge learning needs (meds, wound care, etc.)  - Arrange for interpretive services to assist at discharge as needed  - Refer to Case Management Department for coordinating discharge planning if the patient needs post-hospital services based on physician/advanced practitioner order or complex needs related to functional status, cognitive ability, or social support system  Outcome: Progressing     Problem: Knowledge Deficit  Goal: Patient/family/caregiver demonstrates understanding of disease process, treatment plan, medications, and discharge instructions  Description: Complete learning assessment and assess knowledge base.  Interventions:  - Provide teaching at level of understanding  - Provide teaching via preferred learning methods  Outcome: Progressing

## 2024-02-20 NOTE — ASSESSMENT & PLAN NOTE
Six weeks ago developed a callus/ blisters. 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  Await PT consult  Surgical cure felt to be achieved per my discussion with podiatry today.  Cultures negative.  Per angi discussion with infectious disease, reasonable to change to 7-day course of oral Keflex  Lower extremity arterial Dopplers noted to be within healing range

## 2024-02-20 NOTE — ASSESSMENT & PLAN NOTE
Stent placement in 2017, follows up with cardiology when needed per patient report  Last echo 1/23/24 showing left ventricular cavity size is normal,wall thickness is moderately increased. There is moderate concentric hypertrophy. EF is 60%, systolic function, wall motion and diastolic function are normal.   Takes atorvastatin, aspirin, metoprolol

## 2024-02-20 NOTE — ASSESSMENT & PLAN NOTE
Lab Results   Component Value Date    HGBA1C 9.8 (H) 01/23/2024     Recent Labs     02/19/24  1112 02/19/24  1724 02/19/24  2206 02/20/24  0738   POCGLU 178* 158* 161* 160*     With underlying neuropathy and chronic wounds  States he lost significant amount of weight and his HGA1c was below 6 in 2018. Used to take Metformin but endocrinology D/C'd it due to improvement in blood glucose management.  However he has not been compliant with diet or follow-up and outpatient recent A1c noted to be severely elevated at 9.8  Per discussion with patient we will place an endocrinology referral  We will resume metformin and diet but anticipate he may require a second agent  Needs to be compliant with diet

## 2024-02-20 NOTE — PROGRESS NOTES
Maciel Gaitan is a 63 y.o. male who is currently ordered Vancomycin IV with management by the Pharmacy Consult service.  Relevant clinical data and objective / subjective history reviewed.  Vancomycin Assessment:  Indication and Goal AUC/Trough: Bone/joint infection (goal -600, trough >10)  Clinical Status: stable  Micro:     Renal Function:  SCr: 0.93 mg/dL  CrCl: 98.5 mL/min  Renal replacement: Not on dialysis  Days of Therapy: 5  Current Dose: 1500 mg every 12 hours  Vancomycin Plan:  New Dosin mg every 12 hours  Estimated AUC: 485 mcg*hr/mL  Estimated Trough: 14.9 mcg/mL  Next Level:  @ 0600  Renal Function Monitoring: Daily BMP and UOP  Pharmacy will continue to follow closely for s/sx of nephrotoxicity, infusion reactions and appropriateness of therapy.  BMP and CBC will be ordered per protocol. We will continue to follow the patient’s culture results and clinical progress daily.    Little Agarwal, Pharmacist

## 2024-02-20 NOTE — DISCHARGE SUMMARY
[facility]  Discharge- Maciel Gaitan 1960, 63 y.o. male MRN: [id number]  Unit/Bed#: W -01 Encounter: [id number]  Primary Care Provider: No primary care provider on file.   Date and time admitted to hospital: 2/16/2024  2:52 PM    * Osteomyelitis of great toe of right foot ([facility])  Assessment & Plan  Six weeks ago developed a callus/ blisters. 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  Await PT consult  Surgical cure felt to be achieved per my discussion with podiatry today.  Cultures negative.  Per curbside discussion with infectious disease, reasonable to change to 7-day course of oral Keflex  Lower extremity arterial Dopplers noted to be within healing range      Sepsis ([facility])  Assessment & Plan  Sepsis criteria noted on 2/18 with fever 102.7 and   Source:osteomyelitis/cellulitis  Stopped IV ancef which was started on admission, continued vancomycin and added IV Zosyn  Gave IV resuscitation based on sepsis protocol  Blood cultures negative  Change to PO keflex per above    Diabetes mellitus type 2 with complications ([facility])  Assessment & Plan  Lab Results   Component Value Date    HGBA1C 9.8 (H) 01/23/2024     Recent Labs     02/19/24  1112 02/19/24  1724 02/19/24  2206 02/20/24  0738   POCGLU 178* 158* 161* 160*     With underlying neuropathy and chronic wounds  States he lost significant amount of weight and his HGA1c was below 6 in 2018. Used to take Metformin but endocrinology D/C'd it due to improvement in blood glucose management.  However he has not been compliant with diet or follow-up and outpatient recent  A1c noted to be severely elevated at 9.8  Per discussion with patient we will place an endocrinology referral  We will resume metformin and diet but anticipate he may require a second agent  Needs to be compliant with diet    Hypokalemia  Assessment & Plan  Awaiting repeat lab to confirm appropriate repletion    Essential hypertension  Assessment & Plan  BP stable now  Continue amlodipine and metoprolol   Needs a PCP    CAD (coronary artery disease)  Assessment & Plan  Stent placement in 2017, follows up with cardiology when needed per patient report  Last echo 1/23/24 showing left ventricular cavity size is normal,wall thickness is moderately increased. There is moderate concentric hypertrophy. EF is 60%, systolic function, wall motion and diastolic function are normal.   Takes atorvastatin, aspirin, metoprolol       Class 2 obesity in adult  Assessment & Plan  BMI 37.8.  Needs to work on weight loss      Medical Problems       Resolved Problems  Date Reviewed: 2/20/2024   None       Discharging Physician / Practitioner: Christie Shukla PA-C  PCP: No primary care provider on file.  Admission Date:   Admission Orders (From admission, onward)       Ordered        02/16/24 1700  INPATIENT ADMISSION  Once                          Discharge Date: 02/20/24    Consultations During Hospital Stay:  podiatry    Procedures Performed:   1st ray amp    Significant Findings / Test Results:   As above    Incidental Findings:       Test Results Pending at Discharge (will require follow up):        Outpatient Tests Requested:  A1c in 8 weeks    Complications:  noncompliance with diabetes mgmt    Reason for Admission: foul smelling drainage from toe    Hospital Course:   Maciel Gaitan is a 63 y.o. male patient with prior history of diabetes no longer on medication following weight loss, who originally presented to the hospital on 2/16/2024 due to purulent foul drainage coming from blister on his great toe.  Patient was found to  have wet gangrene and osteomyelitis on imaging.  He was seen by podiatry and underwent urgent first partial ray resection on day of admission.  He was initially on Ancef and vancomycin but was changed to Zosyn and vancomycin based on development of sepsis criteria.  Podiatry felt they had achieved a clinical cure and cultures were negative.  7-day course of oral Keflex was recommended at discharge.  Per chart review it was noted that recent outpatient labs done by cardiology reflected very poorly controlled diabetes.  Patient is no longer checking sugars nor is he affiliated with any PCP.  He was represcribed metformin and given new diabetic equipment as well as endocrinology referral.  He was urgently advised to get reestablished with a PCP.  He was overall feeling better and was stable for discharge home with partial weightbearing status after evaluation by PT      Please see above list of diagnoses and related plan for additional information.     Condition at Discharge: stable    Discharge Day Visit / Exam:   Subjective: Patient reports he has been having diarrhea for the last 3 days but seems to indicate it is starting to improve  Vitals: Blood Pressure: 133/75 (02/20/24 0830)  Pulse: 65 (02/20/24 0741)  Temperature: 98.5 °F (36.9 °C) (02/20/24 0741)  Temp Source: Oral (02/19/24 1146)  Respirations: 17 (02/20/24 0741)  Weight - Scale: 127 kg (279 lb 1.6 oz) (02/19/24 0600)  SpO2: 97 % (02/20/24 0741)  Exam:   Physical Exam  Vitals reviewed.   Constitutional:       General: He is not in acute distress.     Appearance: Normal appearance. He is not ill-appearing, toxic-appearing or diaphoretic.      Comments: Patient seen laying comfortably in bed   Eyes:      General: No scleral icterus.        Right eye: No discharge.         Left eye: No discharge.      Conjunctiva/sclera: Conjunctivae normal.   Cardiovascular:      Rate and Rhythm: Normal rate and regular rhythm.      Heart sounds: No murmur heard.  Pulmonary:       Effort: No respiratory distress.      Breath sounds: No stridor. No wheezing, rhonchi or rales.   Abdominal:      General: There is no distension.      Palpations: Abdomen is soft.      Tenderness: There is no abdominal tenderness. There is no guarding.   Musculoskeletal:      Right lower leg: No edema.      Left lower leg: No edema.      Comments: Dressing in place in right lower extremity.  No evidence of cellulitis noted.   Skin:     General: Skin is warm and dry.      Coloration: Skin is not jaundiced or pale.      Findings: No bruising, erythema, lesion or rash.   Neurological:      General: No focal deficit present.      Mental Status: He is alert. Mental status is at baseline.      Comments: Awake alert interactive, no confusion noted.          Discussion with Family: Patient declined call to .     Discharge instructions/Information to patient and family:   See after visit summary for information provided to patient and family.      Provisions for Follow-Up Care:  See after visit summary for information related to follow-up care and any pertinent home health orders.      Mobility at time of Discharge:   Basic Mobility Inpatient Raw Score: 17  -HLM Goal: 5: Stand one or more mins  JH-HLM Achieved: 6: Walk 10 steps or more (Simultaneous filing. User may not have seen previous data.)  HLM Goal achieved. Continue to encourage appropriate mobility.     Disposition:   Home with VNA Services (Reminder: Complete face to face encounter)    Planned Readmission:      Discharge Statement:  I spent 35 minutes discharging the patient. This time was spent on the day of discharge. I had direct contact with the patient on the day of discharge. Greater than 50% of the total time was spent examining patient, answering all patient questions, arranging and discussing plan of care with patient as well as directly providing post-discharge instructions.  Additional time then spent on discharge activities.   Bedside nursing rounds performed.  Case discussed with case management, infectious disease, physical therapy, nursing and podiatry    Discharge Medications:  See after visit summary for reconciled discharge medications provided to patient and/or family.      **Please Note: This note may have been constructed using a voice recognition system**

## 2024-02-20 NOTE — UTILIZATION REVIEW
Continued Stay Review    Date: 2/20/24                           Current Patient Class: IP  Current Level of Care:  MS     HPI:63 y.o. male initially admitted on 2/16/24 . Osteomyelitis R great toe, wet gangrene .   S/p right partial 1st ray amputation 2/16/24.  Sepsis 2/18- IV Ancef d/c'ed , Vanco  continued, started IV Zosyn . F/U cx .     Assessment/Plan: 2/20   Pt afebrile since 2/18 afternoon . IV abx - vanco and Zosyn d/c'ed this am after receiving doses overnight and this am . Pt  started  po Keflex today x 7 days .  . LEAD 2/19 shows hemodynamic circulation within healing range. Blood cx neg . Dressing in place in right lower extremity.  No evidence of cellulitis noted.     Partial weightbearing with surgical shoe.  Await PT consult .  Patient reports he has been having diarrhea for the last 3 days but seems to indicate it is starting to improve   Outpt endocrinology referral. Resume metformin and diet but anticipate he may require a second agent . Pt needs dietary compliance . BP stable . Pt needs to establish PCP .         Vital Signs:     Date/Time Temp Pulse Resp BP MAP (mmHg) SpO2   02/20/24 0830 -- -- -- 133/75 -- --   02/20/24 07:41:14 98.5 °F (36.9 °C) 65 17 133/75 94 97 %   02/20/24 02:54:56 -- 68 -- 137/74 95 97 %   02/19/24 22:45:13 99 °F (37.2 °C) 67 12 137/75 96 97 %   02/19/24 20:33:04 98.9 °F (37.2 °C) 67 12 135/76 96 94 %   02/19/24 16:54:34 98.9 °F (37.2 °C) 75 -- 132/78 96 96 %   02/19/24 11:46:34 99.3 °F (37.4 °C) 72 -- 135/77 96 95 %   02/19/24 08:00:44 99.2 °F (37.3 °C) 87 17 141/77 98 94 %   02/18/24 19:49:35 99.2 °F (37.3 °C) -- -- 115/66 82 --   02/18/24 16:12:53 102.2 °F (39 °C) Abnormal  -- -- 148/79 102 --   02/18/24 16:12:08 102.8 °F (39.3 °C) Abnormal  -- -- 148/79 102 --   02/18/24 1354 100 °F (37.8 °C) 88 -- 140/74 96 98 %   02/18/24 10:44:13 99.2 °F (37.3 °C) -- 18 122/64 83 --   02/18/24 08:01:50 102.7 °F (39.3 °C) Abnormal  105 18 131/86 101 98 %         Pertinent  Labs/Diagnostic Results:   2/19 Arterial duplex LE-   RIGHT LOWER LIMB:  This resting evaluation shows no evidence of significant lower extremity  arterial occlusive disease.  Ankle/Brachial index: 1.28 and 1.50, which is in the supranormal/unreliable  range.  Metatarsal pressure of 178 mmHg.  Second toe pressure of 119 mmHg, within the healing range.  PVR/ PPG tracings are normal.     LEFT LOWER LIMB: Limited  Ankle/Brachial index: 1.27 and 1.41, which is in the supranormal/unreliable  range.  Metatarsal pressure of 206 mmHg.  Great toe pressure of 108 mmHg, within the healing range.  PVR/ PPG tracings are normal.          Results from last 7 days   Lab Units 02/19/24  0511 02/18/24  0850 02/17/24 0446 02/16/24  1557   WBC Thousand/uL 6.06 6.97 9.75 10.95*   HEMOGLOBIN g/dL 10.2* 10.8* 9.5* 11.3*   HEMATOCRIT % 31.6* 31.5* 29.1* 35.3*   PLATELETS Thousands/uL 342 381 335 407*   NEUTROS ABS Thousands/µL  --   --  7.17 8.04*         Results from last 7 days   Lab Units 02/19/24  0511 02/18/24  0850 02/17/24 0446 02/16/24  1557   SODIUM mmol/L 134* 133* 137 137   POTASSIUM mmol/L 3.3* 3.3* 3.7 3.4*   CHLORIDE mmol/L 102 102 104 100   CO2 mmol/L 24 23 27 28   ANION GAP mmol/L 8 8 6 9   BUN mg/dL 11 13 18 25   CREATININE mg/dL 0.84 0.86 0.87 0.98   EGFR ml/min/1.73sq m 93 92 91 81   CALCIUM mg/dL 7.4* 7.6* 7.8* 8.7   MAGNESIUM mg/dL  --   --  1.9  --    PHOSPHORUS mg/dL  --   --  3.2  --      Results from last 7 days   Lab Units 02/18/24  0850 02/17/24 0446 02/16/24  1557   AST U/L 18 14 12*   ALT U/L 15 12 14   ALK PHOS U/L 69 75 92   TOTAL PROTEIN g/dL 6.5 6.3* 7.7   ALBUMIN g/dL 2.9* 2.8* 3.4*   TOTAL BILIRUBIN mg/dL 0.46 0.47 0.58     Results from last 7 days   Lab Units 02/20/24  1132 02/20/24  0738 02/19/24  2206 02/19/24  1724 02/19/24  1112 02/19/24  0757 02/18/24  2100 02/18/24  1723 02/18/24  1043 02/18/24  0800 02/17/24  2100 02/17/24  1633   POC GLUCOSE mg/dl 175* 160* 161* 158* 178* 161* 172* 160* 192*  151* 189* 160*     Results from last 7 days   Lab Units 02/19/24  0511 02/18/24  0850 02/17/24  0446 02/16/24  1557   GLUCOSE RANDOM mg/dL 163* 175* 217* 206*           Results from last 7 days   Lab Units 02/16/24  1644   PROTIME seconds 16.0*   INR  1.21*   PTT seconds 37     Results from last 7 days   Lab Units 02/17/24  0446   TSH 3RD GENERATON uIU/mL 1.352     Results from last 7 days   Lab Units 02/19/24  0511 02/18/24  0850   PROCALCITONIN ng/ml 0.08 0.08     Results from last 7 days   Lab Units 02/18/24  0850   LACTIC ACID mmol/L 1.5               Results from last 7 days   Lab Units 02/16/24  1557   CRP mg/L 132.6*   SED RATE mm/hour 108*                           Results from last 7 days   Lab Units 02/16/24  1802 02/16/24  1558 02/16/24  1557   BLOOD CULTURE   --  No Growth at 72 hrs. No Growth at 72 hrs.   GRAM STAIN RESULT  No Polys or Bacteria seen  --   --              Results from last 7 days   Lab Units 02/18/24  0557 02/17/24  1012   VANCOMYCIN RM ug/mL 18.1 5.3*       Medications:   Scheduled Medications:  amLODIPine, 5 mg, Oral, Daily  aspirin, 81 mg, Oral, Daily  atorvastatin, 40 mg, Oral, QPM  cephalexin, 500 mg, Oral, Q6H ISABELLE Start: 02/20/24 120   enoxaparin, 40 mg, Subcutaneous, Daily  insulin lispro, 1-5 Units, Subcutaneous, TID AC  insulin lispro, 1-5 Units, Subcutaneous, HS  metoprolol succinate, 50 mg, Oral, Daily  saccharomyces boulardii, 250 mg, Oral, BID    vancomycin (VANCOCIN) 1500 mg in sodium chloride 0.9% 250 mL IVPB  Dose: 1,500 mg  Freq: Every 12 hours Route: IV  Last Dose: 1,500 mg (02/20/24 0033)  Start: 02/17/24 1130 End: 02/20/24 1019   piperacillin-tazobactam (ZOSYN) 4.5 g in sodium chloride 0.9 % 100 mL IVPB  Dose: 4.5 g  Freq: Every 6 hours Route: IV  Last Dose: 4.5 g (02/20/24 0837)  Start: 02/18/24 0830 End: 02/20/24 1019         Continuous IV Infusions:     PRN Meds:  acetaminophen, 650 mg, Oral, Q6H PRN  aluminum-magnesium hydroxide-simethicone, 30 mL, Oral, Q6H  PRN  ondansetron, 4 mg, Intravenous, Q6H PRN        Discharge Plan: TBD    Network Utilization Review Department  ATTENTION: Please call with any questions or concerns to [phone] and carefully listen to the prompts so that you are directed to the right person. All voicemails are confidential.   For Discharge needs, contact Care Management DC Support Team at [phone] opt. 2  Send all requests for admission clinical reviews, approved or denied determinations and any other requests to dedicated fax number below belonging to the [place] where the patient is receiving treatment. List of dedicated fax numbers for the Facilities:  FACILITY NAME UR FAX NUMBER   ADMISSION DENIALS (Administrative/Medical Necessity) [phone]   DISCHARGE SUPPORT TEAM (NETWORK) [phone]   PARENT CHILD HEALTH (Maternity/NICU/Pediatrics) [phone]   [facility] [phone]   [facility] [phone]   [facility] [phone]   [facility] [phone]   [facility] [phone]   [facility] [phone]   [facility] [phone]   [facility] [phone]   [facility] [phone]   [facility] [phone]   [facility] [phone]   [facility] [phone]

## 2024-02-20 NOTE — PHYSICAL THERAPY NOTE
"CHART REVIEW ONLY      Activity:  Partial WB on heel with flat surgical shoe 2/19 in note, but orders are PWB on heel    Elevation of surgical foot to level of chest with 2 pillows while in bed & chair and Limited weight bearing as much as possible        02/17/24 1031   OT Last Visit   OT Visit Date 02/17/24   Note Type   Note type Evaluation   Pain Assessment   Pain Assessment Tool 0-10   Pain Score No Pain   Restrictions/Precautions   Weight Bearing Precautions Per Order Yes   RLE Weight Bearing Per Order (S)  NWB   Braces or Orthoses    (surgical shoe R LE)   Other Precautions Bed Alarm;Chair Alarm;Fall Risk;WBS;Pain   Home Living   Type of Home House  (2Sh with 3STE)   Home Layout Two level;1/2 bath on main level   Bathroom Shower/Tub Walk-in shower   Bathroom Toilet Standard   Bathroom Equipment    (none PTA)   Home Equipment Crutches  (reports they were for a family member previously)   Additional Comments Pt resides with wife in a 2SH.  Wife works   Prior Function   Level of [place] Independent with functional mobility;Independent with ADLs   Lives With Spouse   IADLs Independent with driving;Independent with medication management;Independent with meal prep   Falls in the last 6 months 0   Vocational Full time employment   Lifestyle   Autonomy Pt reports being I with ADLs, IADLs, and functional mobility without use of DME PTA.  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

## 2024-02-20 NOTE — PLAN OF CARE
Problem: PAIN - ADULT  Goal: Verbalizes/displays adequate comfort level or baseline comfort level  Description: Interventions:  - Encourage patient to monitor pain and request assistance  - Assess pain using appropriate pain scale  - Administer analgesics based on type and severity of pain and evaluate response  - Implement non-pharmacological measures as appropriate and evaluate response  - Consider cultural and social influences on pain and pain management  - Notify physician/advanced practitioner if interventions unsuccessful or patient reports new pain  Outcome: Progressing     Problem: INFECTION - ADULT  Goal: Absence or prevention of progression during hospitalization  Description: INTERVENTIONS:  - Assess and monitor for signs and symptoms of infection  - Monitor lab/diagnostic results  - Monitor all insertion sites, i.e. indwelling lines, tubes, and drains  - Monitor endotracheal if appropriate and nasal secretions for changes in amount and color  - [place] appropriate cooling/warming therapies per order  - Administer medications as ordered  - Instruct and encourage patient and family to use good hand hygiene technique  - Identify and instruct in appropriate isolation precautions for identified infection/condition  Outcome: Progressing  Goal: Absence of fever/infection during neutropenic period  Description: INTERVENTIONS:  - Monitor WBC    Outcome: Progressing     Problem: SAFETY ADULT  Goal: Patient will remain free of falls  Description: INTERVENTIONS:  - Educate patient/family on patient safety including physical limitations  - Instruct patient to call for assistance with activity   - Consult OT/PT to assist with strengthening/mobility   - Keep Call bell within reach  - Keep bed low and locked with side rails adjusted as appropriate  - Keep care items and personal belongings within reach  - Initiate and maintain comfort rounds  - Make Fall Risk Sign visible to staff  - Offer Toileting every  Hours,  in advance of need  - Initiate/Maintain alarm  - Obtain necessary fall risk management equipment:   - Apply yellow socks and bracelet for high fall risk patients  - Consider moving patient to room near nurses station  Outcome: Progressing  Goal: Maintain or return to baseline ADL function  Description: INTERVENTIONS:  -  Assess patient's ability to carry out ADLs; assess patient's baseline for ADL function and identify physical deficits which impact ability to perform ADLs (bathing, care of mouth/teeth, toileting, grooming, dressing, etc.)  - Assess/evaluate cause of self-care deficits   - Assess range of motion  - Assess patient's mobility; develop plan if impaired  - Assess patient's need for assistive devices and provide as appropriate  - Encourage maximum independence but intervene and supervise when necessary  - Involve family in performance of ADLs  - Assess for home care needs following discharge   - Consider OT consult to assist with ADL evaluation and planning for discharge  - Provide patient education as appropriate  Outcome: Progressing  Goal: Maintains/Returns to pre admission functional level  Description: INTERVENTIONS:  - Perform AM-PAC 6 Click Basic Mobility/ Daily Activity assessment daily.  - Set and communicate daily mobility goal to care team and patient/family/caregiver.   - Collaborate with rehabilitation services on mobility goals if consulted  - Perform Range of Motion  times a day.  - Reposition patient every  hours.  - Dangle patient  times a day  - Stand patient  times a day  - Ambulate patient  times a day  - Out of bed to chair  times a day   - Out of bed for meals  times a day  - Out of bed for toileting  - Record patient progress and toleration of activity level   Outcome: Progressing     Problem: DISCHARGE PLANNING  Goal: Discharge to home or other facility with appropriate resources  Description: INTERVENTIONS:  - Identify barriers to discharge w/patient and caregiver  - Arrange for  needed discharge resources and transportation as appropriate  - Identify discharge learning needs (meds, wound care, etc.)  - Arrange for interpretive services to assist at discharge as needed  - Refer to Case Management Department for coordinating discharge planning if the patient needs post-hospital services based on physician/advanced practitioner order or complex needs related to functional status, cognitive ability, or social support system  Outcome: Progressing     Problem: Knowledge Deficit  Goal: Patient/family/caregiver demonstrates understanding of disease process, treatment plan, medications, and discharge instructions  Description: Complete learning assessment and assess knowledge base.  Interventions:  - Provide teaching at level of understanding  - Provide teaching via preferred learning methods  Outcome: Progressing

## 2024-02-20 NOTE — PHYSICAL THERAPY NOTE
"PHYSICAL THERAPY EVALUATION  NAME:  Maciel Gaitan  DATE: 02/20/24    AGE:   63 y.o.  Mrn:   [id number]  Principal problem: Principal Problem:    Osteomyelitis of great toe of right foot (HCC)  Active Problems:    Class 2 obesity in adult    CAD (coronary artery disease)    Essential hypertension    Diabetes mellitus type 2 with complications (HCC)    Sepsis (HCC)    Hypokalemia      Vitals:    02/19/24 2245 02/20/24 0254 02/20/24 0741 02/20/24 0830   BP: 137/75 137/74 133/75 133/75   BP Location:       Pulse: 67 68 65    Resp: 12  17    Temp: 99 °F (37.2 °C)  98.5 °F (36.9 °C)    TempSrc:       SpO2: 97% 97% 97%    Weight:           Length Of Stay: 4  Performed at least 2 patient identifiers during session: Name and ID bracelet  PHYSICAL THERAPY EVALUATION :    02/20/24 1233   PT Last Visit   PT Visit Date 02/20/24   Note Type   Note type Evaluation   Pain Assessment   Pain Assessment Tool 0-10   Pain Score 1   Pain Location/Orientation Orientation: Right;Location: Foot  (toe)   Effect of Pain on Daily Activities limits speed and indep of mobility   Patient's Stated Pain Goal No pain   Hospital Pain Intervention(s) Repositioned;Emotional support  (education re: limiting WB aond \"toe off\")   Restrictions/Precautions   Weight Bearing Precautions Per Order Yes   RLE Weight Bearing Per Order PWB  (onto heel)   Braces or Orthoses   (surgical shoe already donned)   Other Precautions WBS;Pain;Fall Risk   Home Living   Type of Home House   Home Layout Two level;1/2 bath on main level  (3STE)   Home Equipment   (Did not use DME prior to injury. reports having crutches  from another family member that are in good condition and are weight rated.)   Prior Function   Level of [place] Independent with functional mobility;Independent with ADLs   Lives With Spouse   IADLs Independent with medication management;Independent with meal prep;Independent with driving   Falls in the last 6 months 0   Vocational Full time employment " "  General   Family/Caregiver Present No   Cognition   Overall Cognitive Status WFL   Arousal/Participation Alert   Attention Within functional limits   Following Commands Follows one step commands with increased time or repetition   Subjective   Subjective Pt pleasant and cooperative, reports walking in the room w/o device, and states that he is \"heel WBing\" but demonstrated  step through patter with \"Toe off\"   RUE Assessment   RUE Assessment WFL   LUE Assessment   LUE Assessment WFL   RLE Assessment   RLE Assessment   (does have open area at distal lower leg, pink in color.)   Strength RLE   R Hip Flexion 4/5   R Knee Extension 4/5   R Ankle Dorsiflexion   (tested to 3)   Strength LLE   L Hip Flexion 4/5   L Knee Extension 4/5   L Ankle Dorsiflexion 4/5   Coordination   Movements are Fluid and Coordinated 1   Light Touch   RLE Light Touch Grossly intact  (@ exposed toes)   LLE Light Touch Grossly intact  (but inconsistent w/ temperature)   Bed Mobility   Supine to Sit Unable to assess  (as Pt @ EOB on first attempt and on sofa on second PT eval attempt.)   Transfers   Sit to Stand 6  Modified independent   Additional items Increased time required;Armrests   Stand to Sit 6  Modified independent   Additional items Increased time required;Armrests   Ambulation/Elevation   Gait pattern Step through pattern;Excessively slow;Decreased R stance  (however pt pis performing more step through pattern initially)   Gait Assistance 5  Supervision   Additional items Assist x 1;Verbal cues   Assistive Device None   Distance 40'   Balance   Static Sitting Good   Static Standing Fair +   Ambulatory Fair   Endurance Deficit   Endurance Deficit Yes   Endurance Deficit Description degradation of gait quality with increased distances   Activity Tolerance   Activity Tolerance Patient limited by fatigue;Patient limited by pain   Medical Staff Made Aware messaged SLIM and case management   Nurse Made Aware spoke to nursing "     Assessment:   Pt is a 63 y.o. male seen for PT evaluation [facility] w/ Osteomyelitis of great toe of right foot (HCC).  Pt had above procedure and is NOW PWB on R heel in surgical shoe.  Order placed for PT.      Prior to admission: Pt lived in home w/ DARY, did not use DME prior to admission, had recently lost 60# and performed walking program, worked full time.  Upon evaluation: Pt needed no physical A for transfers nor amb, but instruction for proper technique and DME use.      Pt's clinical presentation is currently evolving given the functional mobility deficits above, especially (but not limited to) gait deviations, pain, and orthopedic restrictions, and combined with medical complications including abnormal H&H, abnormal sodium values, abnormal potassium values, and fevers, abn blood sugars per pt.  Pt IS NOT at his/her mobility baseline.  He is at risk for falls based on impaired balance and obesity.       During this admission, pt would benefit from continued skilled inpatient PT 1-2 sessions in the acute care setting in order to address deficits as defined above to maximize function and mobility.      Recommendations:    From a PT standpoint, recommend next several sessions focus on continued mobility training w/B crutches vs LRAD, stair training.     Prognosis Fair   Problem List Impaired balance;Decreased endurance;Decreased mobility;Impaired sensation;Obesity;Decreased skin integrity;Pain;Orthopedic restrictions  (gait deviations)   Barriers to Discharge Inaccessible home environment   Goals   Patient Goals to go home, take time off and allow my foot/toe to heal   STG Expiration Date 02/21/24   Short Term Goal #1 Goals: Pt will: Perform rolling  and supine<>sit bed mobility tasks with modified I to prepare for transfers and reposition in bed. Perform transfers with modified I to promote proper hand placement and approach. Perform ambulation with LRAD for up to 50'  with modified I to demonstrate compliance with WB limitations and improve gait quality. Perform 2 stairs w/ railing +/- DME and w/no more than min A to return to home with DARY.   PT Treatment Day 1   Plan   Treatment/Interventions Functional transfer training;LE strengthening/ROM;Elevations;Endurance training;Cognitive reorientation;Patient/family training;Equipment eval/education;Bed mobility;Gait training;Spoke to nursing;Spoke to advanced practitioner;Spoke to case management   PT Frequency   (1-2 more sessions and then DC from IPPT)   Discharge Recommendation   Rehab Resource Intensity Level, PT III (Minimum Resource Intensity)   Equipment Recommended Crutches  (declined cane)   Additional Comments Recommendations:     From a PT standpoint, recommend next several sessions focus on continued mobility training w/B crutches vs LRAD, stair training.   Additional Comments 2 Co-morbidities affecting pt's physical performance at time of assessment include but are not limited to: obesity, HTN, CAD, DM. Personal factors affecting pt at time of IE include: steps to enter environment, multi-level environment, inability to perform current job functions, and inability to perform IADLs.   AM-PAC Basic Mobility Inpatient   Turning in Flat Bed Without Bedrails 4   Lying on Back to Sitting on Edge of Flat Bed Without Bedrails 4   Moving Bed to Chair 4   Standing Up From Chair Using Arms 4   Walk in Room 3   Climb 3-5 Stairs With Railing 3   Basic Mobility Inpatient Raw Score 22   Basic Mobility Standardized Score 47.4   Highest Level Of Mobility   JH-HLM Goal 7: Walk 25 feet or more   JH-HLM Achieved 7: Walk 25 feet or more   Additional Treatment Session   Start Time 1245   End Time 1300   Treatment Assessment Pt was more consistent by end of treatment session maintaining PWB on R heel with use of crutches. He was able to perform more consistent Step to gait pattern and perform stairs w/ proper sequencing technique. He reports  "having proper DME @ home and in adequate condition. Therefore no further IPPT indicated at this time. Recommend OPPT in the future ONCE cleared by podiatry   Equipment Use B Crutches   Additional Treatment Day 1   Exercises   Neuro re-ed [place] demonstration, verbal instruction : Amb w/ B crutches (only andrew crutches available) for 80' within room, needing no physical A nor instruction by end of treatment. After demonstration and instruction:  Pt also performed 8\" curb step w/ B crutches and close S x 2 reps. Pt verbalized good understanding of training and is adequate for DC with S for performing DARY. Pt reports having DME in proper condition at home and was instructed on how to progress to LRAD.   End of Consult   Patient Position at End of Consult All needs within reach  (seated on sofa)   (Please find full objective findings from PT assessment regarding body systems outlined above).       The patient's AM-[facility] Basic Mobility Inpatient Short Form Raw Score is 22. A Raw score of greater than 16 suggests the patient may benefit from discharge to home, which DOES coincide with CURRENT above PT recommendations.     However please refer to therapist recommendation for discharge planning given other factors that may influence destination.     Adapted from Damion MCFADDEN, Izzy J, Fabrice J, Sreekanth J. Association of -PAC “6-Clicks” Basic Mobility and Daily Activity Scores With Discharge Destination. Physical Therapy, 2021;101:1-9. DOI: [email or web address]    Portions of the record may have been created with voice recognition software.  Occasional wrong word or \"sound a like\" substitutions may have occurred due to the inherent limitations of voice recognition software.  Read the chart carefully and recognize, using context, where substitutions have occurred    "

## 2024-02-21 PROBLEM — A41.9 SEPSIS (HCC): Status: RESOLVED | Noted: 2024-02-18 | Resolved: 2024-02-21

## 2024-02-21 LAB
BACTERIA BLD CULT: NORMAL
BACTERIA BLD CULT: NORMAL

## 2024-02-22 PROCEDURE — 88305 TISSUE EXAM BY PATHOLOGIST: CPT | Performed by: STUDENT IN AN ORGANIZED HEALTH CARE EDUCATION/TRAINING PROGRAM

## 2024-02-22 PROCEDURE — 88311 DECALCIFY TISSUE: CPT | Performed by: STUDENT IN AN ORGANIZED HEALTH CARE EDUCATION/TRAINING PROGRAM

## 2024-02-22 NOTE — RESULT ENCOUNTER NOTE
Notify patient that his clean margin bone biopsy was negative for bone infection.   Will see him for follow up at the office.

## 2024-02-23 NOTE — UTILIZATION REVIEW
NOTIFICATION OF ADMISSION DISCHARGE   This is a Notification of Discharge from [facility]. Please be advised that this patient has been discharge from our facility. Below you will find the admission and discharge date and time including the patient’s disposition.   UTILIZATION REVIEW CONTACT:  Riky Zafar  Utilization   Network Utilization Review Department  Phone: [phone] x carefully listen to the prompts. All voicemails are confidential.  Email: [email or web address]     ADMISSION INFORMATION  PRESENTATION DATE: 2/16/2024  2:52 PM  OBERVATION ADMISSION DATE:   INPATIENT ADMISSION DATE: 2/16/24  5:00 PM   DISCHARGE DATE: 2/20/2024  5:04 PM   DISPOSITION:Home/Self Care    Network Utilization Review Department  ATTENTION: Please call with any questions or concerns to [phone] and carefully listen to the prompts so that you are directed to the right person. All voicemails are confidential.   For Discharge needs, contact Care Management DC Support Team at [phone] opt. [phone]  Send all requests for admission clinical reviews, approved or denied determinations and any other requests to dedicated fax number below belonging to the campus where the patient is receiving treatment. List of dedicated fax numbers for the Facilities:  FACILITY NAME UR FAX NUMBER   ADMISSION DENIALS (Administrative/Medical Necessity) [phone]   DISCHARGE SUPPORT TEAM ([facility]) [phone]   PARENT CHILD HEALTH (Maternity/NICU/Pediatrics) [phone]   [facility] [phone]   [facility] [phone]   [facility] [phone]   [facility] [phone]   [facility] [phone]   General acute hospital [phone]   General acute hospital [phone]   [facility] [phone]   [facility]  [facility] [phone]   [facility] [phone]   [facility] [phone]   [facility] [phone]

## 2024-02-26 ENCOUNTER — OFFICE VISIT (OUTPATIENT)
Dept: PODIATRY | Facility: CLINIC | Age: 64
End: 2024-02-26

## 2024-02-26 ENCOUNTER — TELEPHONE (OUTPATIENT)
Age: 64
End: 2024-02-26

## 2024-02-26 VITALS
BODY MASS INDEX: 35.73 KG/M2 | HEART RATE: 65 BPM | SYSTOLIC BLOOD PRESSURE: 122 MMHG | WEIGHT: 263.8 LBS | DIASTOLIC BLOOD PRESSURE: 77 MMHG | HEIGHT: 72 IN

## 2024-02-26 DIAGNOSIS — E11.8 TYPE 2 DIABETES MELLITUS WITH COMPLICATION, WITHOUT LONG-TERM CURRENT USE OF INSULIN (HCC): ICD-10-CM

## 2024-02-26 DIAGNOSIS — E11.9 NEW ONSET TYPE 2 DIABETES MELLITUS (HCC): Primary | ICD-10-CM

## 2024-02-26 PROCEDURE — 99024 POSTOP FOLLOW-UP VISIT: CPT | Performed by: PODIATRIST

## 2024-02-26 NOTE — TELEPHONE ENCOUNTER
Caller: Demar Gaitan    Doctor: Raaz Woods    Reason for call: Calling regarding a return to work note. Can someone please put it on Demar's my chart?      What is the medicine called that he needs to put on his wound?  Can someone please put that on  Demar's my chart as well?     Thank you.    Call back#: [phone]

## 2024-02-26 NOTE — PROGRESS NOTES
Name: Maciel Gaitan      : 1960      MRN: [id number]  Encounter Provider: Severo Person DPM  Encounter Date: 2024   Encounter department: [facility] PODIATRY [facility]    Assessment & Plan     1. New onset type 2 diabetes mellitus (HCC)    2. Type 2 diabetes mellitus with complication, without long-term current use of insulin (HCC)          Subjective      Patient had surgical intervention which included RIGHT partial first ray resection clean margin bone biopsy taken.  Right great toe and sesamoids showed acute osteomyelitis.  The clean margin bone biopsy was unremarkable was negative for osteomyelitis.      Review of Systems   Constitutional:  Negative for chills and fever.   Respiratory:  Negative for shortness of breath and wheezing.    Cardiovascular:  Negative for chest pain and leg swelling.   Gastrointestinal:  Negative for diarrhea, nausea and vomiting.   Neurological:  Positive for numbness.         Constitutional: Negative for fever.   Respiratory: Negative for shortness of breath.    Cardiovascular: Negative for chest pain.  Gastrointestinal: Negative for nausea and vomiting.     Current Outpatient Medications on File Prior to Visit   Medication Sig   • Alcohol Swabs 70 % PADS May substitute brand based on insurance coverage. Check glucose TID.   • amLODIPine (NORVASC) 5 mg tablet Take 1 tablet (5 mg total) by mouth daily   • aspirin 81 MG tablet Take 1 tablet by mouth daily   • atorvastatin (LIPITOR) 40 mg tablet Take 1 tablet (40 mg total) by mouth every evening   • Blood Glucose Monitoring Suppl (OneTouch Verio Reflect) w/Device KIT May substitute brand based on insurance coverage. Check glucose TID.   • cephalexin (KEFLEX) 500 mg capsule Take 1 capsule (500 mg total) by mouth every 6 (six) hours for 28 doses   • glucose blood (OneTouch Verio) test strip May substitute brand based on insurance coverage. Check glucose TID.   • metFORMIN (GLUCOPHAGE) 500 mg tablet Take  1 tablet (500 mg total) by mouth 2 (two) times a day with meals   • metoprolol succinate (TOPROL-XL) 50 mg 24 hr tablet Take 1 tablet (50 mg total) by mouth daily   • OneTouch Delica Lancets 33G MISC May substitute brand based on insurance coverage. Check glucose TID.   • saccharomyces boulardii (FLORASTOR) 250 mg capsule Take 1 capsule (250 mg total) by mouth 2 (two) times a day       Objective     /77   Pulse 65   Ht 6' (1.829 m) Comment: verbal  Wt 120 kg (263 lb 12.8 oz)   BMI 35.78 kg/m²     Physical Exam      Incision site appears well healing, without any warmth or redness.  There are no signs of necrosis.  There is some expected swelling.  No calf pain or tenderness on palpation.  Neurological status is at baseline.  Vascular status is at baseline.          Patient doing well at this point.  Will plan on having him remain utilizing the surgical shoe.  Will plan on removing sutures at the next visit.  Will have him start changing the dressing with Betadine soaked gauze and dry sterile dressing he may wash the area soap and water.    Patient is doing quite well working on his diabetes control this will directly improve his healing potential.

## 2024-02-26 NOTE — LETTER
February 26, 2024     Patient: Maciel Gaitan  YOB: 1960  Date of Visit: 2/26/2024      To Whom it May Concern:    Maciel Gaitan is under my professional care. Maciel was seen in my office on 2/26/2024. Maciel may return to work with limitations must work from home for 2 weeks. .    If you have any questions or concerns, please don't hesitate to call.         Sincerely,          Severo Person DPM        CC: No Recipients

## 2024-02-27 ENCOUNTER — TELEPHONE (OUTPATIENT)
Dept: PODIATRY | Facility: CLINIC | Age: 64
End: 2024-02-27

## 2024-02-27 NOTE — TELEPHONE ENCOUNTER
Caller: Demar Gaitan    Doctor/Office: Severo Person DPM    #: [phone]    Escalation: Demar called, he did not see the work note in his My Chart.  I went into his chart and it is there.

## 2024-02-27 NOTE — TELEPHONE ENCOUNTER
11/5/2024          Jojo Yin        [address] [place] [address]         Dear Jojo,    This letter is to inform you that our office has made several attempts to reach you by phone without success.  We were attempting to contact you by phone regarding illness or problem    Please contact our office at the number listed below as soon as you receive this letter to discuss this issue and to make the necessary changes in our system to your contact information.  Thank you for your cooperation.        Sincerely,    Tim Cee MD  [facility], [address] [place] [address] [id number] [phone]        Document electronically generated by:  Gunjan KHAN RN    I spoke with Demar he stated he was able to fing the work note and office visit on his MyChart and I also reiterated it as well just to be sure all questions were answered. He verbalized understanding.  No. MANUELA screening performed.  STOP BANG Legend: 0-2 = LOW Risk; 3-4 = INTERMEDIATE Risk; 5-8 = HIGH Risk

## 2024-03-04 ENCOUNTER — OFFICE VISIT (OUTPATIENT)
Dept: PODIATRY | Facility: CLINIC | Age: 64
End: 2024-03-04

## 2024-03-04 VITALS
DIASTOLIC BLOOD PRESSURE: 77 MMHG | HEART RATE: 73 BPM | SYSTOLIC BLOOD PRESSURE: 134 MMHG | WEIGHT: 263 LBS | HEIGHT: 72 IN | BODY MASS INDEX: 35.62 KG/M2

## 2024-03-04 DIAGNOSIS — E11.9 NEW ONSET TYPE 2 DIABETES MELLITUS (HCC): Primary | ICD-10-CM

## 2024-03-04 DIAGNOSIS — Z98.890 S/P FOOT SURGERY: ICD-10-CM

## 2024-03-04 PROCEDURE — 99024 POSTOP FOLLOW-UP VISIT: CPT | Performed by: PODIATRIST

## 2024-03-04 NOTE — PROGRESS NOTES
Name: Maciel Gaitan      : 1960      MRN: [id number]  Encounter Provider: Severo Person DPM  Encounter Date: 3/4/2024   Encounter department: [facility] PODIATRY [facility]    Assessment & Plan     1. New onset type 2 diabetes mellitus (HCC)    2. S/P foot surgery        Recommend follow-up in 1 week for suture removal.  Patient should continue with Betadine soaked gauze dry sterile dressing elevate foot and minimize ambulation is much as possible.    Continue working on diabetes control.    Notify office of any new acute changes.    Subjective      2024 partial first ray resection.  He does have some mild drainage in the central portion with maceration.  He states he was pretty active yesterday.  Has been using Betadine soaked gauze dry sterile dressing to the area.  States that he is working on his diabetes control.      Review of Systems      Constitutional: Negative for fever.   Respiratory: Negative for shortness of breath.    Cardiovascular: Negative for chest pain.  Gastrointestinal: Negative for nausea and vomiting.     Current Outpatient Medications on File Prior to Visit   Medication Sig   • Alcohol Swabs 70 % PADS May substitute brand based on insurance coverage. Check glucose TID.   • amLODIPine (NORVASC) 5 mg tablet Take 1 tablet (5 mg total) by mouth daily   • aspirin 81 MG tablet Take 1 tablet by mouth daily   • atorvastatin (LIPITOR) 40 mg tablet Take 1 tablet (40 mg total) by mouth every evening   • Blood Glucose Monitoring Suppl (OneTouch Verio Reflect) w/Device KIT May substitute brand based on insurance coverage. Check glucose TID.   • glucose blood (OneTouch Verio) test strip May substitute brand based on insurance coverage. Check glucose TID.   • metFORMIN (GLUCOPHAGE) 500 mg tablet Take 1 tablet (500 mg total) by mouth 2 (two) times a day with meals   • metoprolol succinate (TOPROL-XL) 50 mg 24 hr tablet Take 1 tablet (50 mg total) by mouth daily   • OneTouch  Delica Lancets 33G MISC May substitute brand based on insurance coverage. Check glucose TID.   • saccharomyces boulardii (FLORASTOR) 250 mg capsule Take 1 capsule (250 mg total) by mouth 2 (two) times a day       Objective     /77   Pulse 73   Ht 6' (1.829 m)   Wt 119 kg (263 lb)   BMI 35.67 kg/m²     Physical Exam      Incision site appears stable, there is some mild maceration of the central portion of the incision.  There is no significant ascending erythema.  There is some mild erythema noted locally however with elevation this is improved.   There are no signs of necrosis.  There is some expected swelling.  No calf pain or tenderness on palpation.  Neurological status is at baseline.  Vascular status is at baseline.

## 2024-03-05 ENCOUNTER — OFFICE VISIT (OUTPATIENT)
Dept: FAMILY MEDICINE CLINIC | Facility: CLINIC | Age: 64
End: 2024-03-05
Payer: COMMERCIAL

## 2024-03-05 VITALS
TEMPERATURE: 97.8 F | DIASTOLIC BLOOD PRESSURE: 84 MMHG | BODY MASS INDEX: 35.33 KG/M2 | OXYGEN SATURATION: 99 % | RESPIRATION RATE: 16 BRPM | HEIGHT: 72 IN | SYSTOLIC BLOOD PRESSURE: 124 MMHG | WEIGHT: 260.8 LBS | HEART RATE: 81 BPM

## 2024-03-05 DIAGNOSIS — I10 ESSENTIAL HYPERTENSION: ICD-10-CM

## 2024-03-05 DIAGNOSIS — Z89.411 HISTORY OF PARTIAL RAY AMPUTATION OF RIGHT GREAT TOE (HCC): ICD-10-CM

## 2024-03-05 DIAGNOSIS — E78.5 HYPERLIPIDEMIA, UNSPECIFIED HYPERLIPIDEMIA TYPE: ICD-10-CM

## 2024-03-05 DIAGNOSIS — I25.10 CORONARY ARTERY DISEASE INVOLVING NATIVE HEART WITHOUT ANGINA PECTORIS, UNSPECIFIED VESSEL OR LESION TYPE: ICD-10-CM

## 2024-03-05 DIAGNOSIS — Z12.11 COLON CANCER SCREENING: ICD-10-CM

## 2024-03-05 DIAGNOSIS — M86.9 OSTEOMYELITIS OF GREAT TOE OF RIGHT FOOT (HCC): Primary | ICD-10-CM

## 2024-03-05 DIAGNOSIS — Z12.5 PROSTATE CANCER SCREENING: ICD-10-CM

## 2024-03-05 DIAGNOSIS — E11.65 UNCONTROLLED TYPE 2 DIABETES MELLITUS WITH HYPERGLYCEMIA (HCC): ICD-10-CM

## 2024-03-05 DIAGNOSIS — E87.6 HYPOKALEMIA: ICD-10-CM

## 2024-03-05 PROCEDURE — 99204 OFFICE O/P NEW MOD 45 MIN: CPT | Performed by: NURSE PRACTITIONER

## 2024-03-05 NOTE — PROGRESS NOTES
FAMILY PRACTICE OFFICE VISIT       NAME: Maciel Gaitan  AGE: 63 y.o. SEX: male       : 1960        MRN: [id number]    Assessment and Plan   1. Osteomyelitis of great toe of right foot (HCC)  Assessment & Plan:  S/P right partial first ray amputation 24.   He does his own daily dressing changes, washes with antibacterial soap, applies betadine, gauze, followed by gauze wrap and ace wrap. Podiatry following. Lower extremity arterial Dopplers noted to be within healing range.        2. 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       3. Uncontrolled type 2 diabetes mellitus with hyperglycemia (HCC)  Assessment & Plan:    Lab Results   Component Value Date    HGBA1C 9.8 (H) 2024     Restarted Metformin 500 mg twice daily.  Checking sugars TID, counting carbs.   Sugars running 140's-170's now.   Will follow up with endocrinology as scheduled in May.  Has had eye exam, will try to obtain this record. [facility].   Follows with podiatry.  Urine for albumin creatinine ratio with next blood work.     Orders:  -     Hemoglobin A1C; Future; Expected date: 2024  -     metFORMIN (GLUCOPHAGE) 500 mg tablet; Take 1 tablet (500 mg total) by mouth 2 (two) times a day with meals  -     OneTouch Delica Lancets 33G MISC; May substitute brand based on insurance coverage. Check glucose TID.  -     glucose blood (OneTouch Verio) test strip; May substitute brand based on insurance coverage. Check glucose TID.  -     Albumin / creatinine urine ratio; Future; Expected date: 2024    4. Essential hypertension  Assessment & Plan:  BP is stable on current medications.     Orders:  -     CBC; Future; Expected date: 2024  -     Comprehensive metabolic panel; Future; Expected date: 2024  -     TSH, 3rd generation; Future; Expected date: 2024    5. Coronary artery  Monitor closely for  High risk for hydro  EVD watch   disease involving native heart without angina pectoris, unspecified vessel or lesion type  Assessment & Plan:  Stent placement in 2017.   Continue aspirin, statin, beta blocker.   Continue cardiology follow up.       6. Hyperlipidemia, unspecified hyperlipidemia type  Assessment & Plan:  Total cholesterol 112, Triglycerides 84, HDL 29, LDL 66.   Continue atorvastatin 40 mg daily.     Orders:  -     Lipid panel; Future; Expected date: 05/20/2024    7. Hypokalemia  Assessment & Plan:  Resolved. Continue to monitor.      8. Colon cancer screening  -     Ambulatory Referral to Gastroenterology; Future    9. Prostate cancer screening  -     PSA, Total Screen; Future; Expected date: 05/20/2024         Blood work in May.   Follow up in 3 months.       Chief Complaint     Chief Complaint   Patient presents with    Establish Care    Follow-up     Hospital follow up       History of Present Illness     Maciel Gaitan is a 63 year old male presenting today for hospital discharge follow up.  He is re-establishing care at our office. Previous patient of Dr. Buck, has not been seen for several years now.     Admitted to the hospital 2/16 through 2/20 for wet gangrene of great toe/osteomyelitis.   Emergent right first ray amputation completed on 2/16.    He was previously diagnosed with diabetes around 2018, and was on metformin. He did really well managing through lifestyle modifications, was able to get off Jardiance and metformin. When he stopped metformin he stopped checking his sugars, and stopped watching so closely.     He is back to checking sugars TID.   Back to counting carbohydrates.   Sugars are running 140's-170's.   Taking metformin 500 mg BID since 2/21/24 now.   Has endocrinology appointment scheduled.     Notes weight in 2018 was 280, he is at 260 now.                Review of Systems   Review of Systems   Constitutional: Negative.    HENT: Negative.     Respiratory: Negative.     Cardiovascular: Negative.     Gastrointestinal: Negative.    Genitourinary: Negative.    Musculoskeletal: Negative.    Skin: Negative.    Neurological: Negative.    Hematological: Negative.    Psychiatric/Behavioral: Negative.         I have reviewed the patient's medical history in detail; there are no changes to the history as noted in the electronic medical record.    Objective     Vitals:    03/05/24 1139   BP: 124/84   BP Location: Left arm   Patient Position: Sitting   Cuff Size: Large   Pulse: 81   Resp: 16   Temp: 97.8 °F (36.6 °C)   TempSrc: Temporal   SpO2: 99%   Weight: 118 kg (260 lb 12.8 oz)   Height: 6' (1.829 m)     Wt Readings from Last 3 Encounters:   03/05/24 118 kg (260 lb 12.8 oz)   03/04/24 119 kg (263 lb)   02/26/24 120 kg (263 lb 12.8 oz)     Physical Exam  Vitals and nursing note reviewed.   Constitutional:       General: He is not in acute distress.     Appearance: Normal appearance. He is not ill-appearing.   HENT:      Head: Atraumatic.      Right Ear: Tympanic membrane normal.      Left Ear: Tympanic membrane normal.      Mouth/Throat:      Mouth: Mucous membranes are moist.      Pharynx: Oropharynx is clear.   Eyes:      Conjunctiva/sclera: Conjunctivae normal.   Cardiovascular:      Rate and Rhythm: Normal rate and regular rhythm.      Heart sounds: No murmur heard.  Pulmonary:      Effort: Pulmonary effort is normal. No respiratory distress.      Breath sounds: Normal breath sounds.   Abdominal:      General: Bowel sounds are normal.      Palpations: Abdomen is soft.      Tenderness: There is no abdominal tenderness.   Musculoskeletal:      Cervical back: Normal range of motion and neck supple.      Right lower leg: No edema.      Left lower leg: No edema.      Comments: Bilateral peripheral vascular discoloration.    Lymphadenopathy:      Cervical: No cervical adenopathy.   Skin:     Comments: Right foot dressing dry and intact   Neurological:      Mental Status: He is alert.   Psychiatric:         Mood and  Affect: Mood normal.         Depression Screening and Follow-up Plan: Patient was screened for depression during today's encounter. They screened negative with a PHQ-2 score of 0.        ALLERGIES:  No Known Allergies    Current Medications     Current Outpatient Medications   Medication Sig Dispense Refill    Alcohol Swabs 70 % PADS May substitute brand based on insurance coverage. Check glucose TID. 100 each 0    amLODIPine (NORVASC) 5 mg tablet Take 1 tablet (5 mg total) by mouth daily 90 tablet 3    aspirin 81 MG tablet Take 1 tablet by mouth daily  0    atorvastatin (LIPITOR) 40 mg tablet Take 1 tablet (40 mg total) by mouth every evening 90 tablet 3    Blood Glucose Monitoring Suppl (OneTouch Verio Reflect) w/Device KIT May substitute brand based on insurance coverage. Check glucose TID. 1 kit 0    glucose blood (OneTouch Verio) test strip May substitute brand based on insurance coverage. Check glucose TID. 100 each 3    metFORMIN (GLUCOPHAGE) 500 mg tablet Take 1 tablet (500 mg total) by mouth 2 (two) times a day with meals 180 tablet 3    metoprolol succinate (TOPROL-XL) 50 mg 24 hr tablet Take 1 tablet (50 mg total) by mouth daily 90 tablet 3    OneTouch Delica Lancets 33G MISC May substitute brand based on insurance coverage. Check glucose TID. 100 each 3    saccharomyces boulardii (FLORASTOR) 250 mg capsule Take 1 capsule (250 mg total) by mouth 2 (two) times a day 30 capsule 0     No current facility-administered medications for this visit.         Health Maintenance     Health Maintenance   Topic Date Due    Hepatitis C Screening  Never done    Pneumococcal Vaccine: Pediatrics (0 to 5 Years) and At-Risk Patients (6 to 64 Years) (1 of 2 - PCV) Never done    Diabetic Foot Exam  Never done    DM Eye Exam  Never done    HIV Screening  Never done    Annual Physical  Never done    DTaP,Tdap,and Td Vaccines (1 - Tdap) Never done    Colorectal Cancer Screening  Never done    Zoster Vaccine (1 of 2) Never done     Kidney Health Evaluation: Albumin/Creatinine Ratio  10/04/2019    COVID-19 Vaccine (2 - 2023-24 season) 09/01/2023    HEMOGLOBIN A1C  07/23/2024    Kidney Health Evaluation: GFR  02/20/2025    Depression Screening  03/05/2025    Influenza Vaccine  Completed    HIB Vaccine  Aged Out    IPV Vaccine  Aged Out    Hepatitis A Vaccine  Aged Out    Meningococcal ACWY Vaccine  Aged Out    HPV Vaccine  Aged Out     Immunization History   Administered Date(s) Administered    COVID-19 PFIZER VACCINE 0.3 ML IM 11/13/2021    INFLUENZA 11/13/2021, 09/19/2022, 11/06/2023    Influenza Quadrivalent Preservative Free 3 years and older IM 12/28/2017       MITA Choudhury

## 2024-03-09 PROBLEM — E11.65 UNCONTROLLED TYPE 2 DIABETES MELLITUS WITH HYPERGLYCEMIA (HCC): Status: ACTIVE | Noted: 2017-12-26

## 2024-03-09 PROBLEM — Z89.411: Status: ACTIVE | Noted: 2024-03-09

## 2024-03-09 PROBLEM — I21.4 NSTEMI, INITIAL EPISODE OF CARE (HCC): Status: RESOLVED | Noted: 2018-01-02 | Resolved: 2024-03-09

## 2024-03-09 RX ORDER — BLOOD SUGAR DIAGNOSTIC
STRIP MISCELLANEOUS
Qty: 100 EACH | Refills: 3 | Status: SHIPPED | OUTPATIENT
Start: 2024-03-09

## 2024-03-09 RX ORDER — LANCETS 33 GAUGE
EACH MISCELLANEOUS
Qty: 100 EACH | Refills: 3 | Status: SHIPPED | OUTPATIENT
Start: 2024-03-09

## 2024-03-09 NOTE — ASSESSMENT & PLAN NOTE
Stent placement in 2017.   Continue aspirin, statin, beta blocker.   Continue cardiology follow up.

## 2024-03-09 NOTE — ASSESSMENT & PLAN NOTE
Lab Results   Component Value Date    HGBA1C 9.8 (H) 01/23/2024     Restarted Metformin 500 mg twice daily.  Checking sugars TID, counting carbs.   Sugars running 140's-170's now.   Will follow up with endocrinology as scheduled in May.  Has had eye exam, will try to obtain this record. [facility].   Follows with podiatry.  Urine for albumin creatinine ratio with next blood work.

## 2024-03-09 NOTE — ASSESSMENT & PLAN NOTE
S/P right partial first ray amputation 2/16/24.   He does his own daily dressing changes, washes with antibacterial soap, applies betadine, gauze, followed by gauze wrap and ace wrap. Podiatry following. Lower extremity arterial Dopplers noted to be within healing range.

## 2024-03-11 ENCOUNTER — OFFICE VISIT (OUTPATIENT)
Dept: PODIATRY | Facility: CLINIC | Age: 64
End: 2024-03-11

## 2024-03-11 ENCOUNTER — TELEPHONE (OUTPATIENT)
Dept: ADMINISTRATIVE | Facility: OTHER | Age: 64
End: 2024-03-11

## 2024-03-11 ENCOUNTER — APPOINTMENT (INPATIENT)
Dept: LAB | Facility: HOSPITAL | Age: 64
DRG: 908 | End: 2024-03-11
Payer: COMMERCIAL

## 2024-03-11 VITALS
SYSTOLIC BLOOD PRESSURE: 128 MMHG | HEART RATE: 84 BPM | BODY MASS INDEX: 35.27 KG/M2 | WEIGHT: 260.4 LBS | DIASTOLIC BLOOD PRESSURE: 82 MMHG | HEIGHT: 72 IN

## 2024-03-11 DIAGNOSIS — L08.9 SUPERFICIAL SKIN INFECTION: Primary | ICD-10-CM

## 2024-03-11 DIAGNOSIS — E11.9 NEW ONSET TYPE 2 DIABETES MELLITUS (HCC): ICD-10-CM

## 2024-03-11 DIAGNOSIS — Z98.890 S/P FOOT SURGERY: ICD-10-CM

## 2024-03-11 PROCEDURE — 99024 POSTOP FOLLOW-UP VISIT: CPT | Performed by: PODIATRIST

## 2024-03-11 PROCEDURE — 87205 SMEAR GRAM STAIN: CPT | Performed by: PODIATRIST

## 2024-03-11 PROCEDURE — 87186 SC STD MICRODIL/AGAR DIL: CPT | Performed by: PODIATRIST

## 2024-03-11 PROCEDURE — 87070 CULTURE OTHR SPECIMN AEROBIC: CPT | Performed by: PODIATRIST

## 2024-03-11 PROCEDURE — 87147 CULTURE TYPE IMMUNOLOGIC: CPT | Performed by: PODIATRIST

## 2024-03-11 RX ORDER — AMOXICILLIN AND CLAVULANATE POTASSIUM 875; 125 MG/1; MG/1
1 TABLET, FILM COATED ORAL EVERY 12 HOURS SCHEDULED
Qty: 20 TABLET | Refills: 0 | Status: SHIPPED | OUTPATIENT
Start: 2024-03-11 | End: 2024-03-18

## 2024-03-11 NOTE — TELEPHONE ENCOUNTER
----- Message from MITA Choudhury sent at 3/9/2024  4:02 PM EST -----  03/09/24 4:02 PM    Hello, our patient Maciel Gaitan has had Diabetic Eye Exam completed/performed. Please assist in updating the patient chart by making an External outreach to [facility] facility located in [facility]. The date of service is about 11 months ago.    Thank you,  MITA Choudhury  [facility]

## 2024-03-11 NOTE — LETTER
March 11, 2024     Patient: Maciel Gaitan  YOB: 1960  Date of Visit: 3/11/2024      To Whom it May Concern:    Maciel Gaitan is under my professional care. Maciel was seen in my office on 3/11/2024. Maciel may return to work on 3/26/24 tentatively .    If you have any questions or concerns, please don't hesitate to call.         Sincerely,          Severo Person DPM        CC: No Recipients

## 2024-03-11 NOTE — TELEPHONE ENCOUNTER
Upon review of the In Basket request and the patient's chart, initial outreach has been made via fax to facility. Please see Contacts section for details.     Thank you  Jatin Noriega

## 2024-03-11 NOTE — PROGRESS NOTES
Name: Maciel Gaitan      : 1960      MRN: [id number]  Encounter Provider: Severo Person DPM  Encounter Date: 3/11/2024   Encounter department: [facility] PODIATRY [facility]    Assessment & Plan     1. Superficial skin infection  -     amoxicillin-clavulanate (AUGMENTIN) 875-125 mg per tablet; Take 1 tablet by mouth every 12 (twelve) hours for 10 days  -     Wound culture and Gram stain; Future    2. New onset type 2 diabetes mellitus (HCC)    3. S/P foot surgery      Patient has localized erythema and some drainage to the area he did have some bleeding drainage to the wound yesterday.    I did obtain a culture today we will start him on an antibiotic.    Will follow-up on the results of his culture see him back in 1 week for reevaluation.  I did remove the sutures today.    Return in about 1 week (around 3/18/2024).      Subjective      Follow-up 2024 partial first ray resection right foot.  He has been working hard on his diabetes control.  He has noticed yesterday some bleeding to the foot.  Denies any fevers chills nausea vomiting or other issues at this time.             Review of Systems   Constitutional:  Negative for chills and fever.   Respiratory:  Negative for shortness of breath.    Cardiovascular:  Negative for leg swelling.   Gastrointestinal:  Negative for diarrhea, nausea and vomiting.   Skin:  Positive for wound.       Current Outpatient Medications on File Prior to Visit   Medication Sig    Alcohol Swabs 70 % PADS May substitute brand based on insurance coverage. Check glucose TID.    amLODIPine (NORVASC) 5 mg tablet Take 1 tablet (5 mg total) by mouth daily    aspirin 81 MG tablet Take 1 tablet by mouth daily    atorvastatin (LIPITOR) 40 mg tablet Take 1 tablet (40 mg total) by mouth every evening    Blood Glucose Monitoring Suppl (OneTouch Verio Reflect) w/Device KIT May substitute brand based on insurance coverage. Check glucose TID.    glucose blood (OneTouch  Verio) test strip May substitute brand based on insurance coverage. Check glucose TID.    metFORMIN (GLUCOPHAGE) 500 mg tablet Take 1 tablet (500 mg total) by mouth 2 (two) times a day with meals    metoprolol succinate (TOPROL-XL) 50 mg 24 hr tablet Take 1 tablet (50 mg total) by mouth daily    OneTouch Delica Lancets 33G MISC May substitute brand based on insurance coverage. Check glucose TID.    saccharomyces boulardii (FLORASTOR) 250 mg capsule Take 1 capsule (250 mg total) by mouth 2 (two) times a day           Objective     /82   Pulse 84   Ht 6' (1.829 m)   Wt 118 kg (260 lb 6.4 oz)   BMI 35.32 kg/m²     Physical Exam  Constitutional:       Appearance: Normal appearance.   Feet:      Comments: Incision site shows area of dehiscence of the central portion of the incision with some bloody drainage noted to this area and some serous drainage noted as well.  There is erythema that is noted surrounding the incision site.  There is no significant purulent drainage noted today on examination.  Mild increase in temperature noted surrounding the wound itself.  No other areas of acute issues noted neurovascular status is at baseline.  Neurological:      Mental Status: He is alert.       Size of wound is 2.5 cm x 0.4 cm x 0.4 cm.

## 2024-03-11 NOTE — LETTER
Diabetic Eye Exam Form    Date Requested: 24  Patient: Maciel Gaitan  Patient : 1960   Referring Provider: MITA Choudhury      DIABETIC Eye Exam Date _______________________________      Type of Exam MUST be documented for Diabetic Eye Exams. Please CHECK ONE.     Retinal Exam       Dilated Retinal Exam       OCT       Optomap-Iris Exam      Fundus Photography       Left Eye - Please check Retinopathy or No Retinopathy        Exam did show retinopathy    Exam did not show retinopathy       Right Eye - Please check Retinopathy or No Retinopathy       Exam did show retinopathy    Exam did not show retinopathy       Comments __________________________________________________________    Practice Providing Exam ______________________________________________    Exam Performed By (print name) _______________________________________      Provider Signature ___________________________________________________      These reports are needed for  compliance.  Please fax this completed form and a copy of the Diabetic Eye Exam report to our office located at [address] [place] [address] as soon as possible via Fax [phone] attention Jatin: Phone [phone]  We thank you for your assistance in treating our mutual patient.

## 2024-03-12 ENCOUNTER — TELEPHONE (OUTPATIENT)
Age: 64
End: 2024-03-12

## 2024-03-12 NOTE — TELEPHONE ENCOUNTER
Caller: Demar     Doctor/Office: Andressa LISA#: [phone]    Escalation: Care Patient states that someone was supposed to call him about supplies.  He said he did not receive a call yet and he is worried he will run out.  Please advise than you

## 2024-03-14 LAB
BACTERIA WND AEROBE CULT: ABNORMAL
BACTERIA WND AEROBE CULT: ABNORMAL
GRAM STN SPEC: ABNORMAL

## 2024-03-18 ENCOUNTER — APPOINTMENT (EMERGENCY)
Dept: RADIOLOGY | Facility: HOSPITAL | Age: 64
DRG: 908 | End: 2024-03-18
Payer: COMMERCIAL

## 2024-03-18 ENCOUNTER — APPOINTMENT (OUTPATIENT)
Dept: RADIOLOGY | Facility: HOSPITAL | Age: 64
DRG: 908 | End: 2024-03-18
Payer: COMMERCIAL

## 2024-03-18 ENCOUNTER — OFFICE VISIT (OUTPATIENT)
Dept: PODIATRY | Facility: CLINIC | Age: 64
End: 2024-03-18

## 2024-03-18 ENCOUNTER — HOSPITAL ENCOUNTER (INPATIENT)
Facility: HOSPITAL | Age: 64
LOS: 8 days | Discharge: HOME HEALTH | DRG: 908 | End: 2024-03-26
Attending: EMERGENCY MEDICINE | Admitting: PODIATRIST
Payer: COMMERCIAL

## 2024-03-18 VITALS
BODY MASS INDEX: 35.38 KG/M2 | HEART RATE: 75 BPM | WEIGHT: 261.2 LBS | HEIGHT: 72 IN | DIASTOLIC BLOOD PRESSURE: 73 MMHG | SYSTOLIC BLOOD PRESSURE: 115 MMHG

## 2024-03-18 DIAGNOSIS — I10 ESSENTIAL HYPERTENSION: ICD-10-CM

## 2024-03-18 DIAGNOSIS — L08.9 SUPERFICIAL SKIN INFECTION: Primary | ICD-10-CM

## 2024-03-18 DIAGNOSIS — L97.516 DIABETIC ULCER OF OTHER PART OF RIGHT FOOT ASSOCIATED WITH TYPE 2 DIABETES MELLITUS, WITH BONE INVOLVEMENT WITHOUT EVIDENCE OF NECROSIS (HCC): ICD-10-CM

## 2024-03-18 DIAGNOSIS — E11.65 UNCONTROLLED TYPE 2 DIABETES MELLITUS WITH HYPERGLYCEMIA (HCC): ICD-10-CM

## 2024-03-18 DIAGNOSIS — Z48.89 ENCOUNTER FOR POST SURGICAL WOUND CHECK: Primary | ICD-10-CM

## 2024-03-18 DIAGNOSIS — M86.9 OSTEOMYELITIS OF RIGHT FOOT, UNSPECIFIED TYPE (HCC): ICD-10-CM

## 2024-03-18 DIAGNOSIS — L08.9 WOUND INFECTION: ICD-10-CM

## 2024-03-18 DIAGNOSIS — Z98.890 POST-OPERATIVE STATE: ICD-10-CM

## 2024-03-18 DIAGNOSIS — Z89.411 HISTORY OF PARTIAL RAY AMPUTATION OF RIGHT GREAT TOE (HCC): ICD-10-CM

## 2024-03-18 DIAGNOSIS — T14.8XXA WOUND INFECTION: ICD-10-CM

## 2024-03-18 DIAGNOSIS — E11.621 DIABETIC ULCER OF OTHER PART OF RIGHT FOOT ASSOCIATED WITH TYPE 2 DIABETES MELLITUS, WITH BONE INVOLVEMENT WITHOUT EVIDENCE OF NECROSIS (HCC): ICD-10-CM

## 2024-03-18 LAB
ALBUMIN SERPL BCP-MCNC: 3.6 G/DL (ref 3.5–5)
ALP SERPL-CCNC: 66 U/L (ref 34–104)
ALT SERPL W P-5'-P-CCNC: 19 U/L (ref 7–52)
ANION GAP SERPL CALCULATED.3IONS-SCNC: 9 MMOL/L (ref 4–13)
APTT PPP: 35 SECONDS (ref 23–37)
AST SERPL W P-5'-P-CCNC: 19 U/L (ref 13–39)
BASOPHILS # BLD AUTO: 0.05 THOUSANDS/ÂΜL (ref 0–0.1)
BASOPHILS NFR BLD AUTO: 1 % (ref 0–1)
BILIRUB SERPL-MCNC: 0.51 MG/DL (ref 0.2–1)
BUN SERPL-MCNC: 23 MG/DL (ref 5–25)
CALCIUM SERPL-MCNC: 8.5 MG/DL (ref 8.4–10.2)
CHLORIDE SERPL-SCNC: 106 MMOL/L (ref 96–108)
CO2 SERPL-SCNC: 22 MMOL/L (ref 21–32)
CREAT SERPL-MCNC: 0.94 MG/DL (ref 0.6–1.3)
CRP SERPL HS-MCNC: 35.79 MG/L
EOSINOPHIL # BLD AUTO: 0.36 THOUSAND/ÂΜL (ref 0–0.61)
EOSINOPHIL NFR BLD AUTO: 5 % (ref 0–6)
ERYTHROCYTE [DISTWIDTH] IN BLOOD BY AUTOMATED COUNT: 14.2 % (ref 11.6–15.1)
ERYTHROCYTE [SEDIMENTATION RATE] IN BLOOD: 58 MM/HOUR (ref 0–19)
GFR SERPL CREATININE-BSD FRML MDRD: 85 ML/MIN/1.73SQ M
GLUCOSE SERPL-MCNC: 111 MG/DL (ref 65–140)
GLUCOSE SERPL-MCNC: 122 MG/DL (ref 65–140)
HCT VFR BLD AUTO: 34.2 % (ref 36.5–49.3)
HGB BLD-MCNC: 11 G/DL (ref 12–17)
IMM GRANULOCYTES # BLD AUTO: 0.03 THOUSAND/UL (ref 0–0.2)
IMM GRANULOCYTES NFR BLD AUTO: 0 % (ref 0–2)
INR PPP: 1.15 (ref 0.84–1.19)
LYMPHOCYTES # BLD AUTO: 1.39 THOUSANDS/ÂΜL (ref 0.6–4.47)
LYMPHOCYTES NFR BLD AUTO: 18 % (ref 14–44)
MCH RBC QN AUTO: 27.5 PG (ref 26.8–34.3)
MCHC RBC AUTO-ENTMCNC: 32.2 G/DL (ref 31.4–37.4)
MCV RBC AUTO: 86 FL (ref 82–98)
MONOCYTES # BLD AUTO: 1.1 THOUSAND/ÂΜL (ref 0.17–1.22)
MONOCYTES NFR BLD AUTO: 14 % (ref 4–12)
NEUTROPHILS # BLD AUTO: 5.02 THOUSANDS/ÂΜL (ref 1.85–7.62)
NEUTS SEG NFR BLD AUTO: 62 % (ref 43–75)
NRBC BLD AUTO-RTO: 0 /100 WBCS
PLATELET # BLD AUTO: 328 THOUSANDS/UL (ref 149–390)
PMV BLD AUTO: 8.9 FL (ref 8.9–12.7)
POTASSIUM SERPL-SCNC: 4 MMOL/L (ref 3.5–5.3)
PROT SERPL-MCNC: 7 G/DL (ref 6.4–8.4)
PROTHROMBIN TIME: 14.6 SECONDS (ref 11.6–14.5)
RBC # BLD AUTO: 4 MILLION/UL (ref 3.88–5.62)
SODIUM SERPL-SCNC: 137 MMOL/L (ref 135–147)
WBC # BLD AUTO: 7.95 THOUSAND/UL (ref 4.31–10.16)

## 2024-03-18 PROCEDURE — 99284 EMERGENCY DEPT VISIT MOD MDM: CPT

## 2024-03-18 PROCEDURE — 99285 EMERGENCY DEPT VISIT HI MDM: CPT | Performed by: EMERGENCY MEDICINE

## 2024-03-18 PROCEDURE — 87070 CULTURE OTHR SPECIMN AEROBIC: CPT | Performed by: PODIATRIST

## 2024-03-18 PROCEDURE — 82948 REAGENT STRIP/BLOOD GLUCOSE: CPT

## 2024-03-18 PROCEDURE — 87186 SC STD MICRODIL/AGAR DIL: CPT | Performed by: PODIATRIST

## 2024-03-18 PROCEDURE — 85025 COMPLETE CBC W/AUTO DIFF WBC: CPT | Performed by: EMERGENCY MEDICINE

## 2024-03-18 PROCEDURE — 85652 RBC SED RATE AUTOMATED: CPT | Performed by: EMERGENCY MEDICINE

## 2024-03-18 PROCEDURE — 85730 THROMBOPLASTIN TIME PARTIAL: CPT | Performed by: EMERGENCY MEDICINE

## 2024-03-18 PROCEDURE — 80053 COMPREHEN METABOLIC PANEL: CPT | Performed by: EMERGENCY MEDICINE

## 2024-03-18 PROCEDURE — 85610 PROTHROMBIN TIME: CPT | Performed by: EMERGENCY MEDICINE

## 2024-03-18 PROCEDURE — 99024 POSTOP FOLLOW-UP VISIT: CPT | Performed by: PODIATRIST

## 2024-03-18 PROCEDURE — 73718 MRI LOWER EXTREMITY W/O DYE: CPT

## 2024-03-18 PROCEDURE — 73630 X-RAY EXAM OF FOOT: CPT

## 2024-03-18 PROCEDURE — 87077 CULTURE AEROBIC IDENTIFY: CPT | Performed by: PODIATRIST

## 2024-03-18 PROCEDURE — 86141 C-REACTIVE PROTEIN HS: CPT | Performed by: EMERGENCY MEDICINE

## 2024-03-18 PROCEDURE — 87040 BLOOD CULTURE FOR BACTERIA: CPT

## 2024-03-18 PROCEDURE — 87205 SMEAR GRAM STAIN: CPT | Performed by: PODIATRIST

## 2024-03-18 PROCEDURE — 36415 COLL VENOUS BLD VENIPUNCTURE: CPT

## 2024-03-18 RX ORDER — INSULIN LISPRO 100 [IU]/ML
10 INJECTION, SOLUTION INTRAVENOUS; SUBCUTANEOUS
Status: DISCONTINUED | OUTPATIENT
Start: 2024-03-19 | End: 2024-03-21

## 2024-03-18 RX ORDER — CEFAZOLIN SODIUM 2 G/50ML
2000 SOLUTION INTRAVENOUS EVERY 8 HOURS
Status: DISCONTINUED | OUTPATIENT
Start: 2024-03-18 | End: 2024-03-20

## 2024-03-18 RX ORDER — ASPIRIN 81 MG/1
81 TABLET, CHEWABLE ORAL DAILY
Status: DISCONTINUED | OUTPATIENT
Start: 2024-03-19 | End: 2024-03-26 | Stop reason: HOSPADM

## 2024-03-18 RX ORDER — METOPROLOL SUCCINATE 50 MG/1
50 TABLET, EXTENDED RELEASE ORAL DAILY
Status: DISCONTINUED | OUTPATIENT
Start: 2024-03-19 | End: 2024-03-26 | Stop reason: HOSPADM

## 2024-03-18 RX ORDER — INSULIN LISPRO 100 [IU]/ML
2-12 INJECTION, SOLUTION INTRAVENOUS; SUBCUTANEOUS
Status: DISCONTINUED | OUTPATIENT
Start: 2024-03-19 | End: 2024-03-26 | Stop reason: HOSPADM

## 2024-03-18 RX ORDER — AMLODIPINE BESYLATE 5 MG/1
5 TABLET ORAL DAILY
Status: DISCONTINUED | OUTPATIENT
Start: 2024-03-19 | End: 2024-03-26 | Stop reason: HOSPADM

## 2024-03-18 RX ORDER — INSULIN LISPRO 100 [IU]/ML
10 INJECTION, SOLUTION INTRAVENOUS; SUBCUTANEOUS
Status: DISCONTINUED | OUTPATIENT
Start: 2024-03-18 | End: 2024-03-21

## 2024-03-18 RX ORDER — HEPARIN SODIUM 5000 [USP'U]/ML
5000 INJECTION, SOLUTION INTRAVENOUS; SUBCUTANEOUS EVERY 8 HOURS SCHEDULED
Status: DISCONTINUED | OUTPATIENT
Start: 2024-03-18 | End: 2024-03-26 | Stop reason: HOSPADM

## 2024-03-18 RX ORDER — INSULIN GLARGINE 100 [IU]/ML
30 INJECTION, SOLUTION SUBCUTANEOUS
Status: DISCONTINUED | OUTPATIENT
Start: 2024-03-19 | End: 2024-03-19

## 2024-03-18 RX ORDER — CLINDAMYCIN HYDROCHLORIDE 300 MG/1
300 CAPSULE ORAL 4 TIMES DAILY
Qty: 40 CAPSULE | Refills: 0 | Status: SHIPPED | OUTPATIENT
Start: 2024-03-18 | End: 2024-03-26

## 2024-03-18 RX ORDER — ATORVASTATIN CALCIUM 40 MG/1
40 TABLET, FILM COATED ORAL EVERY EVENING
Status: DISCONTINUED | OUTPATIENT
Start: 2024-03-18 | End: 2024-03-26 | Stop reason: HOSPADM

## 2024-03-18 RX ADMIN — HEPARIN SODIUM 5000 UNITS: 5000 INJECTION INTRAVENOUS; SUBCUTANEOUS at 22:31

## 2024-03-18 RX ADMIN — CEFAZOLIN SODIUM 2000 MG: 2 SOLUTION INTRAVENOUS at 15:46

## 2024-03-18 RX ADMIN — CEFAZOLIN SODIUM 2000 MG: 2 SOLUTION INTRAVENOUS at 23:41

## 2024-03-18 NOTE — PROGRESS NOTES
Name: Maciel Gaitan      : 1960      MRN: [id number]  Encounter Provider: Severo Person DPM  Encounter Date: 3/18/2024   Encounter department: [facility]    Assessment & Plan     1. Superficial skin infection  -     clindamycin (CLEOCIN) 300 MG capsule; Take 1 capsule (300 mg total) by mouth 4 (four) times a day for 10 days    2. 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    Patient has bone exposure noted to the first ray resection site.  He has been on Augmentin.  He will need IV antibiotics and likely additional debridement to the area.    Will place Betadine soaked gauze dry sterile dressing to the area.    Return for Admit to hospital .      Subjective      Patient had surgical procedure completed on 2024, he has been doing well with his diabetes control however has had further gapping of his incision.  Patient denies any fevers chills nausea vomiting or other issues at this time.  Has been applying dressings appropriately.      Review of Systems   Constitutional:  Negative for chills and fever.   Respiratory:  Negative for shortness of breath and wheezing.    Cardiovascular:  Negative for chest pain and leg swelling.   Gastrointestinal:  Negative for diarrhea, nausea and vomiting.   Neurological:  Positive for numbness.       Current Outpatient Medications on File Prior to Visit   Medication Sig   • Alcohol Swabs 70 % PADS May substitute brand based on insurance coverage. Check glucose TID.   • amLODIPine (NORVASC) 5 mg tablet Take 1 tablet (5 mg total) by mouth daily   • aspirin 81 MG tablet Take 1 tablet by mouth daily   • atorvastatin (LIPITOR) 40 mg tablet Take 1 tablet (40 mg total) by mouth every  evening   • Blood Glucose Monitoring Suppl (OneTouch Verio Reflect) w/Device KIT May substitute brand based on insurance coverage. Check glucose TID.   • glucose blood (OneTouch Verio) test strip May substitute brand based on insurance coverage. Check glucose TID.   • metFORMIN (GLUCOPHAGE) 500 mg tablet Take 1 tablet (500 mg total) by mouth 2 (two) times a day with meals   • metoprolol succinate (TOPROL-XL) 50 mg 24 hr tablet Take 1 tablet (50 mg total) by mouth daily   • OneTouch Delica Lancets 33G MISC May substitute brand based on insurance coverage. Check glucose TID.   • saccharomyces boulardii (FLORASTOR) 250 mg capsule Take 1 capsule (250 mg total) by mouth 2 (two) times a day   • [DISCONTINUED] amoxicillin-clavulanate (AUGMENTIN) 875-125 mg per tablet Take 1 tablet by mouth every 12 (twelve) hours for 10 days           Objective     /73   Pulse 75   Ht 6' (1.829 m)   Wt 118 kg (261 lb 3.2 oz)   BMI 35.43 kg/m²     Physical Exam  Constitutional:       Appearance: Normal appearance.   Musculoskeletal:        Feet:    Feet:      Comments: Ulceration noted to the right foot partial first ray resection site with dehiscence and positive bone exposure noted at the distal portion of the first metatarsal.  There is some erythema and edema noted around this area.  There are intact pulses.  Some swelling noted to the forefoot.  No other areas of acute discomfort or tenderness noted on examination.  Neurological:      Mental Status: He is alert.

## 2024-03-18 NOTE — ED NOTES
Patient permitted to take PM doses of home medications per Dr. Jamil. 40mg of atorvastatin, 500mg of metformin, and probiotic.     Lyric Rogers RN  03/18/24 [id number]

## 2024-03-18 NOTE — H&P
[facility] - H&P  Maciel Gaitan 63 y.o. male MRN: [id number]  Unit/Bed#: ED 26 Encounter: [id number]  Admission Date: 03/18/24    ASSESSMENT:    Maciel Gaitan is a 63 y.o. 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 No DARY.   Admission to podiatry service for IV Ancef for right foot cellulitis. Tailor according to wound culture results.   Will need right foot revision surgery, pending MRI results and clearance by medicine.   Appreciate SLIM consult for medical management and pre-op clearance.   Continue local wound care. Wound care instructions placed. Appreciate nursing assistance with this.   Will discuss this plan with my attending and update as needed.    Antibiotics started: Ancef  Pharmacologic VTE Prophylaxis: Heparin   Mechanical VTE Prophylaxis: sequential compression device   Weight Bearing Status: NWB      Disposition:  Patient requires >2 midnight stay for the above.  Code Status: Prior      SUBJECTIVE    History of Present Illness:    Maciel Gaitan is a 63 y.o. male with pmh of T2DM, CAD, essential HTN, hyperlipidemia and history of partial right 1st ray resection who presents with non healing wound of surgical site. He routinely sees Dr. Person outpatient. Patient states that since the surgery, his incision site has had trouble healing despite doing local wound care. Within the last couple weeks, he admits to bearing more weight on the foot. He saw Dr. Person in the office this morning. He was concerned for infection and sent him to the ED for admission. Patient denies N/V/F/CP/SOB/chills.    Review of Systems:    Constitutional: Negative.    HENT: Negative.    Eyes: Negative.    Respiratory: Negative.    Cardiovascular: Negative.    Gastrointestinal: Negative.    Musculoskeletal: hx of right partial 1st ray resection   Skin:right foot  nonhealing wound   Neurological: negative  Psych: Negative.     Past Medical and Surgical History:     Past Medical History:   Diagnosis Date    Hyperlipidemia     NSTEMI, initial episode of care ([facility]) 01/02/2018       Past Surgical History:   Procedure Laterality Date    EYE SURGERY      TOE AMPUTATION Right 2/16/2024    Procedure: PARTIAL 1st RAY AMPUTATION, RIGHT FOOT REMOVAL OF ALL NON-VIABLE BONE AND SOFT TISSUE;  Surgeon: Severo Person DPM;  Location: [facility];  Service: Podiatry    WISDOM TOOTH EXTRACTION         Meds/Allergies:    (Not in a hospital admission)      No Known Allergies    Social History:    Social History     Marital Status: /Civil Union    Substance Use History:   Social History     Substance and Sexual Activity   Alcohol Use Not Currently    Comment: 1-2 drinks a year     Social History     Tobacco Use   Smoking Status Never   Smokeless Tobacco Never     Social History     Substance and Sexual Activity   Drug Use No       Family History:    Family History   Problem Relation Age of Onset    Diabetes Mother     Heart disease Father     Heart disease Paternal Grandfather          OBJECTIVE:    First Vitals:   Blood Pressure: 131/77 (03/18/24 1056)  Pulse: 75 (03/18/24 1056)  Temperature: 97.8 °F (36.6 °C) (03/18/24 1056)  Temp Source: Temporal (03/18/24 1056)  Respirations: 18 (03/18/24 1056)  SpO2: 98 % (03/18/24 1056)    Current Vitals:   Blood Pressure: 111/70 (03/18/24 1256)  Pulse: 70 (03/18/24 1256)  Temperature: 97.8 °F (36.6 °C) (03/18/24 1056)  Temp Source: Temporal (03/18/24 1056)  Respirations: 18 (03/18/24 1056)  SpO2: 99 % (03/18/24 1256)      Physical Exam:    General Appearance: Alert, cooperative, no distress.  HEENT: Head normocephalic, atraumatic, without obvious abnormality.  Heart: Normal rate and rhythm.  Lungs: Non-labored breathing. No respiratory distress.  Abdomen: Without distension.  Psychiatric: AAOx3  Lower Extremity:  Vascular:   DP/PT pedal  pulses on the left are present. DP/PT pedal pulses on the right are present. CRT brisk at the digits. Pedal hair is present. Edema and erythema noted at right foot. Skin temperature is within normal limits bilaterally.    Musculoskeletal:  MMT is 5/5 in all muscle compartments bilaterally. Passive ROM at the 1st MPJ and ankle joint are Normal on left with the leg extended. Active ROM at the lesser digits is intact. No Pain on palpation of bilateral feet. Hx of right partial 1st ray resection.    Dermatological:    Wound #: 1  Location: right partial 1st ray resection site  Length 3.2cm: Width 1cm: Depth 1.4cm:   Deepest Tissue Noted in Base: bone  Probe to Bone: Yes  Peripheral Skin Description: Attached  Granulation: 50% Fibrotic Tissue: 50% Necrotic Tissue: 0%   Drainage Amount: moderate serosanguinous  Signs of Infection: Yes - cellulitis    Neurological:  Gross sensation is intact. Protective sensation is absent. Patient Reports numbness and/or paresthesias.    Clinical Images 03/18/24:      Lab Results:   Admission on 03/18/2024   Component Date Value    WBC 03/18/2024 7.95     RBC 03/18/2024 4.00     Hemoglobin 03/18/2024 11.0 (L)     Hematocrit 03/18/2024 34.2 (L)     MCV 03/18/2024 86     MCH 03/18/2024 27.5     MCHC 03/18/2024 32.2     RDW 03/18/2024 14.2     MPV 03/18/2024 8.9     Platelets 03/18/2024 328     nRBC 03/18/2024 0     Neutrophils Relative 03/18/2024 62     Immature Grans % 03/18/2024 0     Lymphocytes Relative 03/18/2024 18     Monocytes Relative 03/18/2024 14 (H)     Eosinophils Relative 03/18/2024 5     Basophils Relative 03/18/2024 1     Neutrophils Absolute 03/18/2024 5.02     Absolute Immature Grans 03/18/2024 0.03     Absolute Lymphocytes 03/18/2024 1.39     Absolute Monocytes 03/18/2024 1.10     Eosinophils Absolute 03/18/2024 0.36     Basophils Absolute 03/18/2024 0.05     Sodium 03/18/2024 137     Potassium 03/18/2024 4.0     Chloride 03/18/2024 106     CO2 03/18/2024 22     ANION GAP  03/18/2024 9     BUN 03/18/2024 23     Creatinine 03/18/2024 0.94     Glucose 03/18/2024 122     Calcium 03/18/2024 8.5     AST 03/18/2024 19     ALT 03/18/2024 19     Alkaline Phosphatase 03/18/2024 66     Total Protein 03/18/2024 7.0     Albumin 03/18/2024 3.6     Total Bilirubin 03/18/2024 0.51     eGFR 03/18/2024 85     CRP, High Sensitivity 03/18/2024 35.79 (H)     Sed Rate 03/18/2024 58 (H)     Protime 03/18/2024 14.6 (H)     INR 03/18/2024 1.15     PTT 03/18/2024 35        Cultures:            Imaging: I have personally reviewed pertinent films in PACS  No results found.  EKG, Pathology, and Other Studies: I have personally reviewed pertinent reports.

## 2024-03-18 NOTE — ED PROVIDER NOTES
History  Chief Complaint   Patient presents with    Wound Check     Reports great toe on R foot has infection that will not go away with anitbiotics     63-year-old male presents for wound check.  Patient had amputation of right great toe, left hospital on antibiotics.  Finished that course of antibiotics and then about a week ago went back to the podiatrist and had concern for infection so was placed on antibiotic.  Recheck in podiatry office today and having more drainage from wound and nonhealing.  Send emergency department by podiatry for likely debridement.  Patient denies fevers.  Has been doing wound care and take antibiotics as directed.        Prior to Admission Medications   Prescriptions Last Dose Informant Patient Reported? Taking?   Alcohol Swabs 70 % PADS Not Taking  No No   Sig: May substitute brand based on insurance coverage. Check glucose TID.   Patient not taking: Reported on 3/18/2024   Blood Glucose Monitoring Suppl (OneTouch Verio Reflect) w/Device KIT Not Taking  No No   Sig: May substitute brand based on insurance coverage. Check glucose TID.   Patient not taking: Reported on 3/18/2024   OneTouch Delica Lancets 33G MISC Not Taking  No No   Sig: May substitute brand based on insurance coverage. Check glucose TID.   Patient not taking: Reported on 3/18/2024   amLODIPine (NORVASC) 5 mg tablet 3/18/2024 at 0700 Self No Yes   Sig: Take 1 tablet (5 mg total) by mouth daily   aspirin 81 MG tablet 3/18/2024 at 0700 Self No Yes   Sig: Take 1 tablet by mouth daily   atorvastatin (LIPITOR) 40 mg tablet 3/18/2024 at 1830 Self No Yes   Sig: Take 1 tablet (40 mg total) by mouth every evening   clindamycin (CLEOCIN) 300 MG capsule Not Taking  No No   Sig: Take 1 capsule (300 mg total) by mouth 4 (four) times a day for 10 days   Patient not taking: Reported on 3/18/2024   glucose blood (OneTouch Verio) test strip Not Taking  No No   Sig: May substitute brand based on insurance coverage. Check glucose TID.    Patient not taking: Reported on 3/18/2024   metFORMIN (GLUCOPHAGE) 500 mg tablet 3/18/2024 at 0730  No Yes   Sig: Take 1 tablet (500 mg total) by mouth 2 (two) times a day with meals   metoprolol succinate (TOPROL-XL) 50 mg 24 hr tablet 3/18/2024 at 0730 Self No Yes   Sig: Take 1 tablet (50 mg total) by mouth daily   saccharomyces boulardii (FLORASTOR) 250 mg capsule 3/18/2024 at 0730  No Yes   Sig: Take 1 capsule (250 mg total) by mouth 2 (two) times a day      Facility-Administered Medications: None       Past Medical History:   Diagnosis Date    Hyperlipidemia     NSTEMI, initial episode of care ([facility]) 01/02/2018       Past Surgical History:   Procedure Laterality Date    EYE SURGERY      TOE AMPUTATION Right 2/16/2024    Procedure: PARTIAL 1st RAY AMPUTATION, RIGHT FOOT REMOVAL OF ALL NON-VIABLE BONE AND SOFT TISSUE;  Surgeon: Severo Person DPM;  Location: [facility];  Service: Podiatry    WISDOM TOOTH EXTRACTION         Family History   Problem Relation Age of Onset    Diabetes Mother     Heart disease Father     Heart disease Paternal Grandfather      I have reviewed and agree with the history as documented.    E-Cigarette/Vaping    E-Cigarette Use Never User      E-Cigarette/Vaping Substances    Nicotine No     THC No     CBD No     Flavoring No     Other No     Unknown No      Social History     Tobacco Use    Smoking status: Never    Smokeless tobacco: Never   Vaping Use    Vaping status: Never Used   Substance Use Topics    Alcohol use: Not Currently     Comment: 1-2 drinks a year    Drug use: No       Review of Systems   Constitutional:  Negative for chills, fatigue and fever.   HENT:  Negative for congestion.    Respiratory:  Negative for cough and shortness of breath.    Cardiovascular:  Negative for chest pain.   Gastrointestinal:  Negative for abdominal pain, nausea and vomiting.   Genitourinary:  Negative for difficulty urinating.   Skin:  Positive for wound.   Neurological:  Negative for  dizziness.   Psychiatric/Behavioral:  Negative for agitation and confusion.        Physical Exam  Physical Exam  Vitals and nursing note reviewed.   Constitutional:       General: He is not in acute distress.     Appearance: Normal appearance. He is normal weight. He is not ill-appearing or toxic-appearing.   HENT:      Head: Normocephalic and atraumatic.      Right Ear: External ear normal.      Left Ear: External ear normal.      Nose: Nose normal.   Eyes:      Extraocular Movements: Extraocular movements intact.      Conjunctiva/sclera: Conjunctivae normal.   Cardiovascular:      Rate and Rhythm: Normal rate and regular rhythm.   Pulmonary:      Effort: Pulmonary effort is normal. No respiratory distress.   Abdominal:      General: There is no distension.   Musculoskeletal:         General: Normal range of motion.      Cervical back: Normal range of motion.      Comments: Amputation of right great toe with surrounding erythema, open wound with drainage   Skin:     General: Skin is warm and dry.      Comments: Surrounding erythema from.  Wound with drainage and some skin breakdown   Neurological:      General: No focal deficit present.      Mental Status: He is alert and oriented to person, place, and time.   Psychiatric:         Mood and Affect: Mood normal.         Behavior: Behavior normal.         Thought Content: Thought content normal.         Vital Signs  ED Triage Vitals   Temperature Pulse Respirations Blood Pressure SpO2   03/18/24 1056 03/18/24 1056 03/18/24 1056 03/18/24 1056 03/18/24 1056   97.8 °F (36.6 °C) 75 18 131/77 98 %      Temp Source Heart Rate Source Patient Position - Orthostatic VS BP Location FiO2 (%)   03/18/24 1056 03/18/24 1056 03/18/24 1256 03/18/24 1256 --   Temporal Monitor Sitting Left arm       Pain Score       03/18/24 1056       No Pain           Vitals:    03/18/24 2300 03/19/24 0814 03/19/24 0824 03/19/24 0901   BP: 143/88 139/83  139/83   Pulse: 70 59 68 60   Patient Position  - Orthostatic VS: Lying   Lying         Visual Acuity      ED Medications  Medications   amLODIPine (NORVASC) tablet 5 mg (5 mg Oral Given 3/19/24 0814)   aspirin chewable tablet 81 mg (81 mg Oral Given 3/19/24 0814)   atorvastatin (LIPITOR) tablet 40 mg (40 mg Oral Not Given 3/18/24 1835)   metoprolol succinate (TOPROL-XL) 24 hr tablet 50 mg (50 mg Oral Given 3/19/24 0814)   heparin (porcine) subcutaneous injection 5,000 Units (5,000 Units Subcutaneous Given 3/19/24 1358)   insulin glargine (LANTUS) subcutaneous injection 30 Units 0.3 mL (30 Units Subcutaneous Given 3/19/24 0831)   insulin lispro (HumALOG/ADMELOG) 100 units/mL subcutaneous injection 10 Units (10 Units Subcutaneous Given 3/19/24 0815)   insulin lispro (HumALOG/ADMELOG) 100 units/mL subcutaneous injection 10 Units (10 Units Subcutaneous Given 3/19/24 1237)   insulin lispro (HumALOG/ADMELOG) 100 units/mL subcutaneous injection 10 Units (10 Units Subcutaneous Not Given 3/18/24 1835)   insulin lispro (HumALOG/ADMELOG) 100 units/mL subcutaneous injection 2-12 Units ( Subcutaneous Not Given 3/19/24 1150)   ceFAZolin (ANCEF) IVPB (premix in dextrose) 2,000 mg 50 mL (2,000 mg Intravenous New Bag 3/19/24 0759)       Diagnostic Studies  Results Reviewed       Procedure Component Value Units Date/Time    Basic metabolic panel [[id number]]  (Abnormal) Collected: 03/19/24 0525    Lab Status: Final result Specimen: Blood from Arm, Right Updated: 03/19/24 0617     Sodium 140 mmol/L      Potassium 3.4 mmol/L      Chloride 106 mmol/L      CO2 25 mmol/L      ANION GAP 9 mmol/L      BUN 20 mg/dL      Creatinine 0.89 mg/dL      Glucose 103 mg/dL      Calcium 8.6 mg/dL      eGFR 90 ml/min/1.73sq m     Narrative:      National Kidney Disease Foundation guidelines for Chronic Kidney Disease (CKD):     Stage 1 with normal or high GFR (GFR > 90 mL/min/1.73 square meters)    Stage 2 Mild CKD (GFR = 60-89 mL/min/1.73 square meters)    Stage 3A Moderate CKD (GFR = 45-59  mL/min/1.73 square meters)    Stage 3B Moderate CKD (GFR = 30-44 mL/min/1.73 square meters)    Stage 4 Severe CKD (GFR = 15-29 mL/min/1.73 square meters)    Stage 5 End Stage CKD (GFR <15 mL/min/1.73 square meters)  Note: GFR calculation is accurate only with a steady state creatinine    CBC and differential [[id number]]  (Abnormal) Collected: 03/19/24 0525    Lab Status: Final result Specimen: Blood from Arm, Right Updated: 03/19/24 0552     WBC 5.49 Thousand/uL      RBC 4.02 Million/uL      Hemoglobin 10.7 g/dL      Hematocrit 33.9 %      MCV 84 fL      MCH 26.6 pg      MCHC 31.6 g/dL      RDW 14.4 %      MPV 8.8 fL      Platelets 300 Thousands/uL      nRBC 0 /100 WBCs      Neutrophils Relative 52 %      Immature Grans % 0 %      Lymphocytes Relative 24 %      Monocytes Relative 15 %      Eosinophils Relative 8 %      Basophils Relative 1 %      Neutrophils Absolute 2.86 Thousands/µL      Absolute Immature Grans 0.02 Thousand/uL      Absolute Lymphocytes 1.29 Thousands/µL      Absolute Monocytes 0.83 Thousand/µL      Eosinophils Absolute 0.42 Thousand/µL      Basophils Absolute 0.07 Thousands/µL     Blood culture [[id number]] Collected: 03/18/24 1900    Lab Status: Preliminary result Specimen: Blood from Hand, Right Updated: 03/18/24 2201     Blood Culture Received in Microbiology Lab. Culture in Progress.    Blood culture [[id number]] Collected: 03/18/24 1154    Lab Status: Preliminary result Specimen: Blood from Arm, Left Updated: 03/18/24 2201     Blood Culture Received in Microbiology Lab. Culture in Progress.    Comprehensive metabolic panel [[id number]] Collected: 03/18/24 1154    Lab Status: Final result Specimen: Blood from Arm, Left Updated: 03/18/24 1233     Sodium 137 mmol/L      Potassium 4.0 mmol/L      Chloride 106 mmol/L      CO2 22 mmol/L      ANION GAP 9 mmol/L      BUN 23 mg/dL      Creatinine 0.94 mg/dL      Glucose 122 mg/dL      Calcium 8.5 mg/dL      AST 19 U/L      ALT 19 U/L      Alkaline  Phosphatase 66 U/L      Total Protein 7.0 g/dL      Albumin 3.6 g/dL      Total Bilirubin 0.51 mg/dL      eGFR 85 ml/min/1.73sq m     Narrative:      National Kidney Disease Foundation guidelines for Chronic Kidney Disease (CKD):     Stage 1 with normal or high GFR (GFR > 90 mL/min/1.73 square meters)    Stage 2 Mild CKD (GFR = 60-89 mL/min/1.73 square meters)    Stage 3A Moderate CKD (GFR = 45-59 mL/min/1.73 square meters)    Stage 3B Moderate CKD (GFR = 30-44 mL/min/1.73 square meters)    Stage 4 Severe CKD (GFR = 15-29 mL/min/1.73 square meters)    Stage 5 End Stage CKD (GFR <15 mL/min/1.73 square meters)  Note: GFR calculation is accurate only with a steady state creatinine    High sensitivity CRP [[id number]]  (Abnormal) Collected: 03/18/24 1154    Lab Status: Final result Specimen: Blood from Arm, Left Updated: 03/18/24 1233     CRP, High Sensitivity 35.79 mg/L     Narrative:            HsCRP Level       Relative Risk           <1.0 mg/L          Low           1.0 to 3.0 mg/L    Average           >3.0 mg/L          High        Sedimentation rate, automated [[id number]]  (Abnormal) Collected: 03/18/24 1154    Lab Status: Final result Specimen: Blood from Arm, Left Updated: 03/18/24 1231     Sed Rate 58 mm/hour     Protime-INR [[id number]]  (Abnormal) Collected: 03/18/24 1154    Lab Status: Final result Specimen: Blood from Arm, Left Updated: 03/18/24 1228     Protime 14.6 seconds      INR 1.15    APTT [[id number]]  (Normal) Collected: 03/18/24 1154    Lab Status: Final result Specimen: Blood from Arm, Left Updated: 03/18/24 1228     PTT 35 seconds     CBC and differential [[id number]]  (Abnormal) Collected: 03/18/24 1154    Lab Status: Final result Specimen: Blood from Arm, Left Updated: 03/18/24 1208     WBC 7.95 Thousand/uL      RBC 4.00 Million/uL      Hemoglobin 11.0 g/dL      Hematocrit 34.2 %      MCV 86 fL      MCH 27.5 pg      MCHC 32.2 g/dL      RDW 14.2 %      MPV 8.9 fL      Platelets 328 Thousands/uL       nRBC 0 /100 WBCs      Neutrophils Relative 62 %      Immature Grans % 0 %      Lymphocytes Relative 18 %      Monocytes Relative 14 %      Eosinophils Relative 5 %      Basophils Relative 1 %      Neutrophils Absolute 5.02 Thousands/µL      Absolute Immature Grans 0.03 Thousand/uL      Absolute Lymphocytes 1.39 Thousands/µL      Absolute Monocytes 1.10 Thousand/µL      Eosinophils Absolute 0.36 Thousand/µL      Basophils Absolute 0.05 Thousands/µL                    MRI foot/forefoot toest right wo contrast   Final Result by Gabe Mckinney MD (03/18 1645)      Status post amputation of the great toe to the level of the distal third of the metatarsal shaft. Overlying soft tissue ulceration and phlegmonous change with cellulitis. Osteomyelitis of the remaining first metatarsal.      Plantar fascial fibromatosis.               Workstation performed: [id number]         XR foot 3+ views RIGHT   Final Result by Miguel Villagomez MD (03/18 2043)      Permeative bony destruction at the margin of the first metatarsal shaft amputation site in keeping with osteomyelitis. This has also been reported on the follow-up MRI.      Workstation performed: [id number]                    Procedures  Procedures         ED Course                                             Medical Decision Making  63-year-old male presents postop from trauma mutation with nonhealing wound.  Sent here from podiatry office for podiatry resident to evaluate.  Will send off blood work, x-ray.  Contacted podiatry to evaluate and consult placed    Amount and/or Complexity of Data Reviewed  External Data Reviewed: notes.  Labs: ordered.  Radiology: ordered.    Risk  Decision regarding hospitalization.             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)   Wound infection     Time reflects when diagnosis was documented in both MDM as applicable and the Disposition  within this note       Time User Action Codes Description Comment    3/18/2024 11:41 AM Janine Elisa ALPHONSO Add [Z48.89] Encounter for post surgical wound check     3/18/2024 11:41 AM JaninePolyy ALPHONSO Add [Z89.411] History of partial ray amputation of right great toe (HCC)     3/18/2024  1:16 PM Elisa Mehta Add [T14.8XXA,  L08.9] Wound infection     3/18/2024  2:12 PM Makenzie Howard Add [I10] Essential hypertension     3/18/2024  2:12 PM Makenzie Howard Add [E11.65] Uncontrolled type 2 diabetes mellitus with hyperglycemia (HCC)     3/19/2024  1:29 PM Marilu Cortez Add [M86.9] Osteomyelitis of right foot, unspecified type (HCC)           ED Disposition       ED Disposition   Admit    Condition   Stable    Date/Time   Mon Mar 18, 2024  1:17 PM    Comment   Case was discussed with podiatry and the patient's admission status was agreed to be Admission Status: inpatient status to the service of Dr. Domingo  .               Follow-up Information    None         Current Discharge Medication List        CONTINUE these medications which have NOT CHANGED    Details   amLODIPine (NORVASC) 5 mg tablet Take 1 tablet (5 mg total) by mouth daily  Qty: 90 tablet, Refills: 3    Associated Diagnoses: Essential hypertension      aspirin 81 MG tablet Take 1 tablet by mouth daily  Refills: 0      atorvastatin (LIPITOR) 40 mg tablet Take 1 tablet (40 mg total) by mouth every evening  Qty: 90 tablet, Refills: 3    Associated Diagnoses: Coronary artery disease involving native heart without angina pectoris, unspecified vessel or lesion type      metFORMIN (GLUCOPHAGE) 500 mg tablet Take 1 tablet (500 mg total) by mouth 2 (two) times a day with meals  Qty: 180 tablet, Refills: 3    Associated Diagnoses: Uncontrolled type 2 diabetes mellitus with hyperglycemia (HCC)      metoprolol succinate (TOPROL-XL) 50 mg 24 hr tablet Take 1 tablet (50 mg total) by mouth daily  Qty: 90 tablet, Refills: 3    Associated Diagnoses:  Essential hypertension      saccharomyces boulardii (FLORASTOR) 250 mg capsule Take 1 capsule (250 mg total) by mouth 2 (two) times a day  Qty: 30 capsule, Refills: 0    Associated Diagnoses: Other chronic osteomyelitis of right foot (HCC)      Alcohol Swabs 70 % PADS May substitute brand based on insurance coverage. Check glucose TID.  Qty: 100 each, Refills: 0    Associated Diagnoses: New onset type 2 diabetes mellitus (HCC)      Blood Glucose Monitoring Suppl (OneTouch Verio Reflect) w/Device KIT May substitute brand based on insurance coverage. Check glucose TID.  Qty: 1 kit, Refills: 0    Associated Diagnoses: New onset type 2 diabetes mellitus (HCC)      clindamycin (CLEOCIN) 300 MG capsule Take 1 capsule (300 mg total) by mouth 4 (four) times a day for 10 days  Qty: 40 capsule, Refills: 0    Associated Diagnoses: Superficial skin infection      glucose blood (OneTouch Verio) test strip May substitute brand based on insurance coverage. Check glucose TID.  Qty: 100 each, Refills: 3    Associated Diagnoses: Uncontrolled type 2 diabetes mellitus with hyperglycemia (HCC)      OneTouch Delica Lancets 33G MISC May substitute brand based on insurance coverage. Check glucose TID.  Qty: 100 each, Refills: 3    Associated Diagnoses: Uncontrolled type 2 diabetes mellitus with hyperglycemia (HCC)             No discharge procedures on file.    PDMP Review         Value Time User    PDMP Reviewed  Yes 2/16/2024  8:21 PM MITA Chung            ED Provider  Electronically Signed by             Elisa Mehta DO  03/19/24 4122

## 2024-03-19 PROBLEM — M86.9 OSTEOMYELITIS OF RIGHT FOOT (HCC): Status: ACTIVE | Noted: 2024-03-19

## 2024-03-19 LAB
ANION GAP SERPL CALCULATED.3IONS-SCNC: 9 MMOL/L (ref 4–13)
BASOPHILS # BLD AUTO: 0.07 THOUSANDS/ÂΜL (ref 0–0.1)
BASOPHILS NFR BLD AUTO: 1 % (ref 0–1)
BUN SERPL-MCNC: 20 MG/DL (ref 5–25)
CALCIUM SERPL-MCNC: 8.6 MG/DL (ref 8.4–10.2)
CHLORIDE SERPL-SCNC: 106 MMOL/L (ref 96–108)
CO2 SERPL-SCNC: 25 MMOL/L (ref 21–32)
CREAT SERPL-MCNC: 0.89 MG/DL (ref 0.6–1.3)
EOSINOPHIL # BLD AUTO: 0.42 THOUSAND/ÂΜL (ref 0–0.61)
EOSINOPHIL NFR BLD AUTO: 8 % (ref 0–6)
ERYTHROCYTE [DISTWIDTH] IN BLOOD BY AUTOMATED COUNT: 14.4 % (ref 11.6–15.1)
EST. AVERAGE GLUCOSE BLD GHB EST-MCNC: 197 MG/DL
GFR SERPL CREATININE-BSD FRML MDRD: 90 ML/MIN/1.73SQ M
GLUCOSE SERPL-MCNC: 103 MG/DL (ref 65–140)
GLUCOSE SERPL-MCNC: 105 MG/DL (ref 65–140)
GLUCOSE SERPL-MCNC: 112 MG/DL (ref 65–140)
GLUCOSE SERPL-MCNC: 124 MG/DL (ref 65–140)
GLUCOSE SERPL-MCNC: 136 MG/DL (ref 65–140)
GLUCOSE SERPL-MCNC: 148 MG/DL (ref 65–140)
GLUCOSE SERPL-MCNC: 90 MG/DL (ref 65–140)
HBA1C MFR BLD: 8.5 %
HCT VFR BLD AUTO: 33.9 % (ref 36.5–49.3)
HGB BLD-MCNC: 10.7 G/DL (ref 12–17)
IMM GRANULOCYTES # BLD AUTO: 0.02 THOUSAND/UL (ref 0–0.2)
IMM GRANULOCYTES NFR BLD AUTO: 0 % (ref 0–2)
LYMPHOCYTES # BLD AUTO: 1.29 THOUSANDS/ÂΜL (ref 0.6–4.47)
LYMPHOCYTES NFR BLD AUTO: 24 % (ref 14–44)
MCH RBC QN AUTO: 26.6 PG (ref 26.8–34.3)
MCHC RBC AUTO-ENTMCNC: 31.6 G/DL (ref 31.4–37.4)
MCV RBC AUTO: 84 FL (ref 82–98)
MONOCYTES # BLD AUTO: 0.83 THOUSAND/ÂΜL (ref 0.17–1.22)
MONOCYTES NFR BLD AUTO: 15 % (ref 4–12)
NEUTROPHILS # BLD AUTO: 2.86 THOUSANDS/ÂΜL (ref 1.85–7.62)
NEUTS SEG NFR BLD AUTO: 52 % (ref 43–75)
NRBC BLD AUTO-RTO: 0 /100 WBCS
PLATELET # BLD AUTO: 300 THOUSANDS/UL (ref 149–390)
PMV BLD AUTO: 8.8 FL (ref 8.9–12.7)
POTASSIUM SERPL-SCNC: 3.4 MMOL/L (ref 3.5–5.3)
RBC # BLD AUTO: 4.02 MILLION/UL (ref 3.88–5.62)
SODIUM SERPL-SCNC: 140 MMOL/L (ref 135–147)
WBC # BLD AUTO: 5.49 THOUSAND/UL (ref 4.31–10.16)

## 2024-03-19 PROCEDURE — 97163 PT EVAL HIGH COMPLEX 45 MIN: CPT

## 2024-03-19 PROCEDURE — 99024 POSTOP FOLLOW-UP VISIT: CPT | Performed by: PODIATRIST

## 2024-03-19 PROCEDURE — 80048 BASIC METABOLIC PNL TOTAL CA: CPT

## 2024-03-19 PROCEDURE — 82948 REAGENT STRIP/BLOOD GLUCOSE: CPT

## 2024-03-19 PROCEDURE — 97166 OT EVAL MOD COMPLEX 45 MIN: CPT

## 2024-03-19 PROCEDURE — 83036 HEMOGLOBIN GLYCOSYLATED A1C: CPT | Performed by: INTERNAL MEDICINE

## 2024-03-19 PROCEDURE — 99255 IP/OBS CONSLTJ NEW/EST HI 80: CPT | Performed by: INTERNAL MEDICINE

## 2024-03-19 PROCEDURE — 85025 COMPLETE CBC W/AUTO DIFF WBC: CPT

## 2024-03-19 RX ORDER — INSULIN LISPRO 100 [IU]/ML
1-5 INJECTION, SOLUTION INTRAVENOUS; SUBCUTANEOUS
Status: DISCONTINUED | OUTPATIENT
Start: 2024-03-19 | End: 2024-03-20

## 2024-03-19 RX ORDER — INSULIN GLARGINE 100 [IU]/ML
15 INJECTION, SOLUTION SUBCUTANEOUS
Status: DISCONTINUED | OUTPATIENT
Start: 2024-03-20 | End: 2024-03-24

## 2024-03-19 RX ADMIN — CEFAZOLIN SODIUM 2000 MG: 2 SOLUTION INTRAVENOUS at 23:50

## 2024-03-19 RX ADMIN — INSULIN LISPRO 10 UNITS: 100 INJECTION, SOLUTION INTRAVENOUS; SUBCUTANEOUS at 12:37

## 2024-03-19 RX ADMIN — CEFAZOLIN SODIUM 2000 MG: 2 SOLUTION INTRAVENOUS at 07:59

## 2024-03-19 RX ADMIN — AMLODIPINE BESYLATE 5 MG: 5 TABLET ORAL at 08:14

## 2024-03-19 RX ADMIN — ATORVASTATIN CALCIUM 40 MG: 40 TABLET, FILM COATED ORAL at 18:26

## 2024-03-19 RX ADMIN — CEFAZOLIN SODIUM 2000 MG: 2 SOLUTION INTRAVENOUS at 15:31

## 2024-03-19 RX ADMIN — HEPARIN SODIUM 5000 UNITS: 5000 INJECTION INTRAVENOUS; SUBCUTANEOUS at 21:01

## 2024-03-19 RX ADMIN — INSULIN GLARGINE 30 UNITS: 100 INJECTION, SOLUTION SUBCUTANEOUS at 08:31

## 2024-03-19 RX ADMIN — HEPARIN SODIUM 5000 UNITS: 5000 INJECTION INTRAVENOUS; SUBCUTANEOUS at 13:58

## 2024-03-19 RX ADMIN — HEPARIN SODIUM 5000 UNITS: 5000 INJECTION INTRAVENOUS; SUBCUTANEOUS at 06:32

## 2024-03-19 RX ADMIN — ASPIRIN 81 MG CHEWABLE TABLET 81 MG: 81 TABLET CHEWABLE at 08:14

## 2024-03-19 RX ADMIN — METOPROLOL SUCCINATE 50 MG: 50 TABLET, EXTENDED RELEASE ORAL at 08:14

## 2024-03-19 RX ADMIN — INSULIN LISPRO 10 UNITS: 100 INJECTION, SOLUTION INTRAVENOUS; SUBCUTANEOUS at 08:15

## 2024-03-19 RX ADMIN — INSULIN LISPRO 10 UNITS: 100 INJECTION, SOLUTION INTRAVENOUS; SUBCUTANEOUS at 18:27

## 2024-03-19 NOTE — OCCUPATIONAL THERAPY NOTE
Occupational Therapy Evaluation     Patient Name: Maciel Gaitan  Today's Date: 3/19/2024  Problem List  Principal Problem:    Osteomyelitis of right foot (HCC)    Past Medical History  Past Medical History:   Diagnosis Date    Hyperlipidemia     NSTEMI, initial episode of care (HCC) 01/02/2018     Past Surgical History  Past Surgical History:   Procedure Laterality Date    EYE SURGERY      TOE AMPUTATION Right 2/16/2024    Procedure: PARTIAL 1st RAY AMPUTATION, RIGHT FOOT REMOVAL OF ALL NON-VIABLE BONE AND SOFT TISSUE;  Surgeon: Severo Person DPM;  Location: [facility];  Service: Podiatry    WISDOM TOOTH EXTRACTION           03/19/24 1031   OT Last Visit   OT Visit Date 03/19/24   Note Type   Note type Evaluation   Pain Assessment   Pain Assessment Tool 0-10   Pain Score No Pain   Restrictions/Precautions   Weight Bearing Precautions Per Order Yes   RLE Weight Bearing Per Order (S)  NWB   Other Precautions WBS;Fall Risk   Home Living   Type of Home House   Home Layout Two level;1/2 bath on main level;Bed/bath upstairs  (3 DARY)   Bathroom Shower/Tub Walk-in shower   Bathroom Toilet Standard   Bathroom Equipment Other (Comment)  (denies any)   Home Equipment Crutches   Additional Comments pt resides in 2 SH, 3 DARY, bed/bath 2nd floor   Prior Function   Level of [place] Independent with functional mobility;Independent with ADLs;Independent with IADLS   Lives With Spouse   Receives Help From Family   IADLs Independent with driving;Independent with meal prep;Independent with medication management   Falls in the last 6 months 0   Vocational Full time employment   Comments (+) . Pt reports he has been I w/ all functional activities including driving; w/o use of AD/DME. Pt was to be NWB R foot , PTA, however pt reports he has been fully weight bearing on R foot, including driving, grocery shopping and wearing regular sneakers.   Lifestyle   Autonomy I w/ ADLS/IADLS, transfers and functional  "mobility PTA   Reciprocal Relationships Pt lives w/ his spouse   Service to Others works full time; instructor for [employer]   Intrinsic Gratification Enjoys being w/ family   General   Additional General Comments (S)  Pt admits to being noncomplaint w/ WBS PTA; reports has been fully weight bearing in R LE.   Subjective   Subjective \"I guess I over did it too early\"   ADL   Eating Assistance 7  Independent   Grooming Assistance 7  Independent   UB Bathing Assistance 7  Independent   LB Bathing Assistance 5  Supervision/Setup   UB Dressing Assistance 7  Independent   LB Dressing Assistance 5  Supervision/Setup   Toileting Assistance  5  Supervision/Setup   Functional Assistance 5  Supervision/Setup   Bed Mobility   Supine to Sit 6  Modified independent   Additional items HOB elevated   Sit to Supine 6  Modified independent   Additional items HOB elevated   Additional Comments pt sat EOB w/ G balance/trunk control   Transfers   Sit to Stand 5  Supervision   Additional items Verbal cues;Impulsive   Stand to Sit 5  Supervision   Additional items Verbal cues;Impulsive   Additional Comments pt stood w/ RW, w/ S. VC for carryover of WBS.   Functional Mobility   Functional Mobility 5  Supervision   Additional Comments Pt hopped in room w/ RW and close S/CGA. Needed VC for WBS. Pt reports he did not know he was NWB in R LE. Demonstrated ability to maintain WBS w/ RW in room.   Additional items Rolling walker   Balance   Static Sitting Good   Dynamic Sitting Fair +   Static Standing Fair   Dynamic Standing Fair   Ambulatory Fair   Activity Tolerance   Activity Tolerance Patient tolerated treatment well   Medical Staff Made Aware Ana CARABALLO   Nurse Made Aware yes   RUE Assessment   RUE Assessment WFL   LUE Assessment   LUE Assessment WFL   Hand Function   Gross Motor Coordination Functional   Fine Motor Coordination Functional   Sensation   Light Touch No apparent deficits   Vision-Basic Assessment   Current Vision " Wears glasses only for reading   Psychosocial   Psychosocial (WDL) WDL   Cognition   Overall Cognitive Status WFL   Arousal/Participation Responsive;Cooperative   Attention Within functional limits   Orientation Level Oriented X4   Memory Decreased recall of precautions   Following Commands Follows one step commands without difficulty   Comments pt is pleasant and cooperative; admits to noncompliance w/ NWB status however reports was told could bear weight through heel. Pt was fully weight bearing through RLE PTA. Pt does appear to have decreased insight into repercussions of being noncomplaint w/ WBS.   Assessment   Limitation Decreased Safe judgement during ADL;Decreased endurance;Decreased high-level ADLs   Prognosis Fair   Assessment Pt is a 62 y/o male seen for OT eval s/p adm to [facility] w/ recent hx of partial R 1st ray resection presenting w/ nonhealing wound of surgical site. Pt is dx'd w/ osteomyelitis of R foot. Pt  has a past medical history of Hyperlipidemia and NSTEMI, initial episode of care ([facility]) (01/02/2018). Pt with active OT orders and activity as tolerated orders. Pt lives with his spouse in 2 , 3 DARY, bed/bath 2nd floor. Pt was I w/  ADLS and IADLS, drove, & required no use of DME PTA. Pt is currently demonstrating the following occupational deficits: S UB/LB ADLS, S bed mobility and transfers w/ RW; VC for carryover of WBS. These deficits that are impacting pt's baseline areas of occupation are a result of the following impairments: endurance, functional mobility, balance, functional standing tolerance, decreased I w/ ADLS/IADLS, and decreased safety awareness.The following Occupational Performance Areas to address include: bathing/shower, toilet hygiene, dressing, functional mobility, community mobility, clothing management, household maintenance, and job performance/volunteering. Recommend home with family support upon D/C. Pt to continue to benefit from acute immediate OT services to address  the following goals 2-3x/week to  w/in 10-14 days:   Goals   Patient Goals to walk his dght down the aisle Oct. 12   LT Time Frame 10-14   Long Term Goal #1 see below listed goals   Plan   Treatment Interventions ADL retraining;Functional transfer training;Endurance training;Cognitive reorientation;Equipment evaluation/education;Compensatory technique education;Continued evaluation;Activityengagement;Energy conservation   Goal Expiration Date 24   OT Frequency 2-3x/wk   Discharge Recommendation   Rehab Resource Intensity Level, OT No post-acute rehabilitation needs (pending further workup/possible intervention w/ podiatry); pt needs continued educated on carrying over NWB during functional activities.   Equipment Recommended (S)  Shower/Tub chair with back ($)   Additional Comments  The patient's raw score on the AM-PAC Daily Activity Inpatient Short Form is 21. A raw score of greater than or equal to 19 suggests the patient may benefit from discharge to home. Please refer to the recommendation of the Occupational Therapist for safe discharge planning.   Additional Comments 2 Pt seen as a co-session due to the patient's co-morbidities, clinically unstable presentation, and present impairments which are a regression from the patient's baseline.   AM-PAC Daily Activity Inpatient   Lower Body Dressing 3   Bathing 3   Toileting 3   Upper Body Dressing 4   Grooming 4   Eating 4   Daily Activity Raw Score 21   Daily Activity Standardized Score (Calc for Raw Score >=11) 44.27   AM-PAC Applied Cognition Inpatient   Following a Speech/Presentation 4   Understanding Ordinary Conversation 4   Taking Medications 4   Remembering Where Things Are Placed or Put Away 4   Remembering List of 4-5 Errands 4   Taking Care of Complicated Tasks 4   Applied Cognition Raw Score 24   Applied Cognition Standardized Score 62.21   End of Consult   Education Provided Yes   Patient Position at End of Consult Seated edge of bed;All  needs within reach   Nurse Communication Nurse aware of consult       GOALS    1) Pt will improve activity tolerance to G for 30 min txment sessions for increase engagement in functional tasks    2) Pt will complete UB/LB dressing/self care w/ mod I using adaptive device and DME as needed    3) Pt will complete bathing w/ Mod I w/ use of AE and DME as needed    4) Pt will complete toileting w/ mod I w/ G hygiene/thoroughness using DME as needed    5) Pt will improve functional transfers to Mod I on/off all surfaces using DME as needed w/ G balance/safety     6) Pt will improve functional mobility during ADL/IADL/leisure tasks to Mod I using DME as needed w/ G balance/safety     7) Pt will participate in simulated IADL management task to increase independence to Mod I w/ G safety and endurance    8) Pt will be attentive 100% of the time during ongoing cognitive assessment w/ G participation to assist w/ safe d/c planning/recommendations    9) Pt will demonstrate G carryover of pt/caregiver education and training as appropriate w/o cues w/ good tolerance to increase safety during functional tasks    10) Pt will demonstrate 100% carryover of WBS and energy conservation techniques t/o functional I/ADL/leisure tasks w/o cues s/p skilled education to increase endurance during functional tasks     Darcy Wilkins MS, OTR/L

## 2024-03-19 NOTE — PLAN OF CARE
Problem: PHYSICAL THERAPY ADULT  Goal: Performs mobility at highest level of function for planned discharge setting.  See evaluation for individualized goals.  Description: Treatment/Interventions: Functional transfer training, Therapeutic exercise, Endurance training, Gait training, Bed mobility, Equipment eval/education, Elevations  Equipment Recommended: Walker       See flowsheet documentation for full assessment, interventions and recommendations.  Outcome: Progressing  Note: Prognosis: Good     Assessment: Pt is 63 y.o. male seen for PT evaluation s/p admit to [facility] on 3/18/2024 w/ Osteomyelitis of right foot (HCC). PT consulted to assess pt's functional mobility and d/c needs. Order placed for PT eval and tx, w/ up and OOB as tolerated order. Pt agreeable to PT  session upon arrival, pt found supine in bed.  PTA, pt was independent w/ all functional mobility w/ no AD, ambulates unrestricted distances and all terrain, lives w/ spouse in 2 level home, and works full time.  Pt to benefit from continued PT tx to address deficits and maximize level of functional independent mobility and consistency. Upon conclusion pt  supine in bed. Complexity: Comorbidities affecting pt's physical performance at time of assessment include: DM and R partial ray amputation . Personal factors affecting pt at time of IE include: active prior to admission, history of falls, steps to enter home, and compliance. Please find objective findings from PT assessment regarding body systems outlined above with impairments and limitations including impaired balance, decreased endurance, gait deviations, decreased functional mobility tolerance, decreased safety awareness, and fall risk.  Pt's clinical presentation is currently unstable/unpredictable seen in pt's presentation of abnormal H&H, abnormal potassium levels, wounds, and multiple readmissions. The patient's AM-PAC Basic Mobility Inpatient Short Form Raw Score is 19.   Based on patient presentations and impairments, pt would most appropriately benefit from Level 3 resource intensity upon discharge. Please also refer to the recommendation of the Physical Therapist for safe discharge planning. RN verbalized pt appropriate for PT session. Pt seen as a co-eval with OT due to the patient's co-morbidities, clinically unstable presentation, and present impairments which are a regression from the patient's baseline.        Rehab Resource Intensity Level, PT: III (Minimum Resource Intensity)    See flowsheet documentation for full assessment.

## 2024-03-19 NOTE — PROGRESS NOTES
[facility] Podiatry - Progress Note  Patient: Maciel Gatian 63 y.o. male   MRN: [id number]  PCP: MITA Choudhury  Unit/Bed#: -01 Encounter: [id number]  Date Of Visit: 03/19/24    ASSESSMENT:    Maciel Gaitan is a 63 y.o. male with:    Right partial first ray amputation surgical wound dehiscence, Jacobs 3  S/p right partial first ray amputation, DOS: 2/16/2024  Cellulitis of right foot  Type 2 diabetes mellitus      PLAN:    Patient seen at bedside and dressings changed today.  Right partial first ray amputation surgical wound dehiscence with osteomyelitis confirmed on advanced imaging  Reviewed right foot x-ray, final read: Permeative bony destruction at the margin of the first metatarsal shaft amputation site in keeping with osteomyelitis  Reviewed right foot MRI, final read: Status post amputation of the great toe to the level of the distal third of the metatarsal shaft.  Overlying soft tissue ulceration with phlegmonous change with cellulitis.  Osteomyelitis of remaining first metatarsal.  Plantar fascial fibromatosis  Follow-up final wound culture results, preliminary result: 1+ disintegrating polys, 2+ gram-negative rods  Follow-up final blood culture  Reviewed labs and vitals, patient is afebrile with no leukocytosis at this time  Plan to continue local wound care at this time.  Plan for revisional right first ray amputation with excision of remaining first metatarsal.  Consult placed for internal medicine regarding medical management and medical clearance for surgery  Continue local wound care consisting of Betadine Adaptic DSD to right foot surgical wound, appreciate nursing assistance with dressing changes.  Elevation on green foam wedges or pillows when non-ambulatory.  Appreciate consulting services for recommendations and management.      Antibiotics started: Ancef  Pharmacologic VTE Prophylaxis: Heparin   Mechanical VTE Prophylaxis: sequential compression device   Weightbearing status:  Nonweightbearing to right lower extremity    Disposition: Patient will require continued inpatient stay for the above    SUBJECTIVE:     The patient was seen, evaluated, and assessed at bedside today. The patient was awake, alert, and in no acute distress. No acute events overnight. The patient reports no pain to right foot and wants to have as little of his foot removed as possible if he needs surgery. Patient denies N/V/F/chills/SOB/CP.      OBJECTIVE:     Vitals:   /73 (BP Location: Right arm)   Pulse 72   Temp 97.8 °F (36.6 °C) (Oral)   Resp 16   SpO2 98%     Temp (24hrs), Av.8 °F (36.6 °C), Min:97.8 °F (36.6 °C), Max:97.8 °F (36.6 °C)      Physical Exam:     General:  Alert, cooperative, and in no distress.  Lungs: Non labored breathing  Abdomen: Soft, non-tender.  Lower extremity exam:  Cardiovascular status at baseline.  Neurological status at baseline.  Musculoskeletal status at baseline. No calf tenderness noted.     Lower extremity wound(s) as noted below:  Wound #: 1  Location: Right partial first ray amputation surgical wound dehiscence  Length 3.2 cm: Width 1.0 cm: Depth 1.4 cm:   Deepest Tissue Noted in Base: Bone  Probe to Bone: Yes  Peripheral Skin Description: Attached  Granulation: 30% Fibrotic Tissue: 70% Necrotic Tissue: 0%   Drainage Amount: moderate, serosanguinous  Signs of Infection: Yes -erythema, edema, positive probe to bone    Bilateral lower extremity: Stable well adhered superficial eschar's to lower leg without signs of active infection: no purulence, no malodor, no ascending erythema, no crepitus, no fluctuance.    Clinical Images 24:                Additional Data:     Labs:    Results from last 7 days   Lab Units 24  0525   WBC Thousand/uL 5.49   HEMOGLOBIN g/dL 10.7*   HEMATOCRIT % 33.9*   PLATELETS Thousands/uL 300   NEUTROS PCT % 52   LYMPHS PCT % 24   MONOS PCT % 15*   EOS PCT % 8*     Results from last 7 days   Lab Units 24  0525 24  1154  "  POTASSIUM mmol/L 3.4* 4.0   CHLORIDE mmol/L 106 106   CO2 mmol/L 25 22   BUN mg/dL 20 23   CREATININE mg/dL 0.89 0.94   CALCIUM mg/dL 8.6 8.5   ALK PHOS U/L  --  66   ALT U/L  --  19   AST U/L  --  19     Results from last 7 days   Lab Units 03/18/24  1154   INR  1.15       * I Have Reviewed All Lab Data Listed Above.    Recent Cultures (last 7 days):     Results from last 7 days   Lab Units 03/18/24  1900 03/18/24  1154 03/18/24  0000   BLOOD CULTURE  Received in Microbiology Lab. Culture in Progress. Received in Microbiology Lab. Culture in Progress.  --    GRAM STAIN RESULT   --   --  1+ Disintegrating polys*  2+ Gram negative rods*           Imaging: I have personally reviewed pertinent films in PACS  EKG, Pathology, and Other Studies: I have personally reviewed pertinent reports.      ** Please Note: Portions of the record may have been created with voice recognition software. Occasional wrong word or \"sound a like\" substitutions may have occurred due to the inherent limitations of voice recognition software. Read the chart carefully and recognize, using context, where substitutions have occurred. **    "

## 2024-03-19 NOTE — UTILIZATION REVIEW
Initial Clinical Review    Admission: Date/Time/Statement:   Admission Orders (From admission, onward)       Ordered        03/18/24 1318  INPATIENT ADMISSION  Once                          Orders Placed This Encounter   Procedures    INPATIENT ADMISSION     Standing Status:   Standing     Number of Occurrences:   1     Order Specific Question:   Level of Care     Answer:   Med Surg [16]     Order Specific Question:   Estimated length of stay     Answer:   More than 2 Midnights     Order Specific Question:   Certification     Answer:   I certify that inpatient services are medically necessary for this patient for a duration of greater than two midnights. See H&P and MD Progress Notes for additional information about the patient's course of treatment.     ED Arrival Information       Expected   -    Arrival   3/18/2024 10:52    Acuity   Urgent              Means of arrival   Ambulance    Escorted by   Self    Service   Podiatry    Admission type   Emergency              Arrival complaint   wound check             Chief Complaint   Patient presents with    Wound Check     Reports great toe on R foot has infection that will not go away with anitbiotics       Initial Presentation: 63 y.o. male who presented self from outpatient Podiatry appointment to [facility] ED. Inpatient admission for evaluation and treatment of R foot wound. PMHx: T2DM, CAD, HLD, HTN, NSTEMI. Presented w/ non-healing wound of surgical site s/p R 1st ray resection. On exam, edema and erythema of R foot; image below. Plan: IV ABX, MRI foot, local wound care, Trend labs, replete electrolytes as needed; will need R foot revision surgery once MRI results.         Date: 03/19/24   Day 2: Right partial first ray amputation surgical wound dehiscence with osteomyelitis confirmed on MRI. Follow wound cultures and blood cultures. Local wound care - betadine adaptic DSD to R foot surgical wound. Revision R first ray amputation w/  excision of remaining first metatarsal.  Elevate RLE when non-ambulatory. NWB RLE.         ED Triage Vitals   Temperature Pulse Respirations Blood Pressure SpO2   03/18/24 1056 03/18/24 1056 03/18/24 1056 03/18/24 1056 03/18/24 1056   97.8 °F (36.6 °C) 75 18 131/77 98 %      Temp Source Heart Rate Source Patient Position - Orthostatic VS BP Location FiO2 (%)   03/18/24 1056 03/18/24 1056 03/18/24 1256 03/18/24 1256 --   Temporal Monitor Sitting Left arm       Pain Score       03/18/24 1056       No Pain          Wt Readings from Last 1 Encounters:   03/18/24 118 kg (261 lb 3.2 oz)     Additional Vital Signs:   Date/Time Temp Pulse Resp BP MAP (mmHg) SpO2 O2 Device   03/19/24 09:01:52 98 °F (36.7 °C) 60 16 139/83 102 99 % None (Room air)   03/19/24 08:24:05 -- 68 -- -- -- 99 % --   03/19/24 0814 -- 59 -- 139/83 -- -- --   03/18/24 2300 -- 70 6 Abnormal  143/88 -- -- --   03/18/24 2100 -- -- -- -- -- -- None (Room air)   03/18/24 1554 97.8 °F (36.6 °C) 72 16 134/73 91 98 % None (Room air)   03/18/24 1256 -- 70 -- 111/70 -- 99 % --     Pertinent Labs/Diagnostic Test Results:   MRI foot/forefoot toest right wo contrast   Final Result by Gabe Mckinney MD (03/18 1645)      Status post amputation of the great toe to the level of the distal third of the metatarsal shaft. Overlying soft tissue ulceration and phlegmonous change with cellulitis. Osteomyelitis of the remaining first metatarsal.      Plantar fascial fibromatosis.               Workstation performed: [id number]         XR foot 3+ views RIGHT   Final Result by Miguel Villagomez MD (03/18 2043)      Permeative bony destruction at the margin of the first metatarsal shaft amputation site in keeping with osteomyelitis. This has also been reported on the follow-up MRI.      Workstation performed: [id number]               Results from last 7 days   Lab Units 03/19/24  0525 03/18/24  1154   WBC Thousand/uL 5.49 7.95   HEMOGLOBIN g/dL 10.7* 11.0*   HEMATOCRIT %  33.9* 34.2*   PLATELETS Thousands/uL 300 328   NEUTROS ABS Thousands/µL 2.86 5.02         Results from last 7 days   Lab Units 03/19/24  0525 03/18/24  1154   SODIUM mmol/L 140 137   POTASSIUM mmol/L 3.4* 4.0   CHLORIDE mmol/L 106 106   CO2 mmol/L 25 22   ANION GAP mmol/L 9 9   BUN mg/dL 20 23   CREATININE mg/dL 0.89 0.94   EGFR ml/min/1.73sq m 90 85   CALCIUM mg/dL 8.6 8.5     Results from last 7 days   Lab Units 03/18/24  1154   AST U/L 19   ALT U/L 19   ALK PHOS U/L 66   TOTAL PROTEIN g/dL 7.0   ALBUMIN g/dL 3.6   TOTAL BILIRUBIN mg/dL 0.51     Results from last 7 days   Lab Units 03/19/24  0643 03/19/24  0524 03/18/24  2128   POC GLUCOSE mg/dl 124 112 111     Results from last 7 days   Lab Units 03/19/24  0525 03/18/24  1154   GLUCOSE RANDOM mg/dL 103 122     Results from last 7 days   Lab Units 03/18/24  1154   PROTIME seconds 14.6*   INR  1.15   PTT seconds 35     Results from last 7 days   Lab Units 03/18/24  1154   SED RATE mm/hour 58*     Results from last 7 days   Lab Units 03/18/24  1900 03/18/24  1154 03/18/24  0000   BLOOD CULTURE  Received in Microbiology Lab. Culture in Progress. Received in Microbiology Lab. Culture in Progress.  --    GRAM STAIN RESULT   --   --  1+ Disintegrating polys*  2+ Gram negative rods*         ED Treatment:   Medication Administration from 03/18/2024 1052 to 03/18/2024 2045         Date/Time Order Dose Route Action     03/18/2024 1546 EDT ceFAZolin (ANCEF) IVPB (premix in dextrose) 2,000 mg 50 mL 2,000 mg Intravenous New Bag          Past Medical History:   Diagnosis Date    Hyperlipidemia     NSTEMI, initial episode of care (HCC) 01/02/2018     Present on Admission:  **None**      Admitting Diagnosis: Essential hypertension [I10]  Wound infection [T14.8XXA, L08.9]  Visit for wound check [Z51.89]  Encounter for post surgical wound check [Z48.89]  Uncontrolled type 2 diabetes mellitus with hyperglycemia (HCC) [E11.65]  History of partial ray amputation of right great toe  ([facility]) [Z89.411]  Age/Sex: 63 y.o. male  Admission Orders:  Consistent Carbohydrate Diet.  Blood glucose checks ACHS.  NWB RLE - heel touch for transfers.  Local wound care.  SCDs.    Scheduled Medications:  amLODIPine, 5 mg, Oral, Daily  aspirin, 81 mg, Oral, Daily  atorvastatin, 40 mg, Oral, QPM  cefazolin, 2,000 mg, Intravenous, Q8H  heparin (porcine), 5,000 Units, Subcutaneous, Q8H ISABELLE  insulin glargine, 30 Units, Subcutaneous, Daily With Breakfast  insulin lispro, 10 Units, Subcutaneous, Daily With Breakfast  insulin lispro, 10 Units, Subcutaneous, Daily With Lunch  insulin lispro, 10 Units, Subcutaneous, Daily With Dinner  insulin lispro, 2-12 Units, Subcutaneous, TID AC  metoprolol succinate, 50 mg, Oral, Daily    Continuous IV Infusions: none    PRN Meds: none      IP CONSULT TO INTERNAL MEDICINE    Network Utilization Review Department  ATTENTION: Please call with any questions or concerns to [phone] and carefully listen to the prompts so that you are directed to the right person. All voicemails are confidential.   For Discharge needs, contact Care Management DC Support Team at [phone] opt. 2  Send all requests for admission clinical reviews, approved or denied determinations and any other requests to dedicated fax number below belonging to the campus where the patient is receiving treatment. List of dedicated fax numbers for the Facilities:  FACILITY NAME UR FAX NUMBER   ADMISSION DENIALS (Administrative/Medical Necessity) [phone]   DISCHARGE SUPPORT TEAM ([facility]) [phone]   PARENT CHILD HEALTH (Maternity/NICU/Pediatrics) [phone]   [facility] [phone]   [facility] [phone]   [facility] [phone]   [facility] [phone]   [facility] [phone]   [facility] [phone]   [facility]  [phone]   [facility] [phone]   [facility] [phone]   [facility] [phone]   [facility] [phone]   [facility] [phone]

## 2024-03-19 NOTE — WOUND OSTOMY CARE
Consult Note - Wound   Maciel Gaitan 63 y.o. male MRN: [id number]  Unit/Bed#: -01 Encounter: [id number]        History and Present Illness:  Patient is a 64 yo male that was admitted to [facility] for treatment of osteomyelitis of right foot.  Patient has a PMH of T2DM, CAD, essential HTN, hyperlipidemia. Patient is independent with turning and repositioning. Patient is continent of bowel and bladder. On assessment, patient is seen lying on a regular mattress.     Wound Care was consulted for B/L foot leg ulcers     Assessment Findings:   Right foot wound per podiatry recommendations- unable to assess heel due to dressing in place. Patient declined assessment of sacro-buttocks. Reports no wounds or pain in areas. Left heel is dry, intact and blanches with no skin loss or wounds present. Preventative orders in place for sacro-buttocks and left heel.     B/L Anterior and Posterior Tibias with scattered well adhered scabs. No skin loss or drainage noted. Recommend leaving areas JESSICA. Patient reports that he has a history of picking at scabs - education provided to patient regarding not picking wounds - he stated understanding of teaching. Patient reports that he has not picked at leg scabs in 2 weeks.     No induration, fluctuance, odor, warmth/temperature differences, redness, or purulence noted to the above noted wounds and skin areas assessed. New dressings applied per orders listed below. Patient tolerated well- no s/s of non-verbal pain or discomfort observed during the encounter. Bedside nurse aware of plan of care. See flow sheets for more detailed assessment findings.      Orders listed below and wound care will sign off, call or tiger text with questions.     Skin Care Plan:  1-Right foot wound: per podiatry recommendations.   2-Turn/reposition q2h or when medically stable for pressure re-distribution on skin .  3-Elevate heels to offload pressure.  4-Moisturize skin daily with skin nourishing  cream  5-Ehob cushion in chair when out of bed.  6-Preventative Hydraguard to bilateral sacro-buttocks and left heel BID and PRN.       Wounds:  Wound 03/18/24 Diabetic Ulcer Pretibial Distal;Left (Active)   Wound Image    03/19/24 1036   Wound Description Intact 03/19/24 1036   Lisa-wound Assessment Intact 03/19/24 1036   Wound Length (cm) 0 cm 03/19/24 1036   Wound Width (cm) 0 cm 03/19/24 1036   Wound Depth (cm) 0 cm 03/19/24 1036   Wound Surface Area (cm^2) 0 cm^2 03/19/24 1036   Wound Volume (cm^3) 0 cm^3 03/19/24 1036   Calculated Wound Volume (cm^3) 0 cm^3 03/19/24 1036   Drainage Amount None 03/19/24 1036   Dressing Protective barrier 03/19/24 1036       Wound 03/18/24 Diabetic Ulcer Pretibial Distal;Right (Active)   Wound Image   03/19/24 1037   Wound Description Intact 03/19/24 1037   Lisa-wound Assessment Intact 03/19/24 1037   Wound Length (cm) 0 cm 03/19/24 1037   Wound Width (cm) 0 cm 03/19/24 1037   Wound Depth (cm) 0 cm 03/19/24 1037   Wound Surface Area (cm^2) 0 cm^2 03/19/24 1037   Wound Volume (cm^3) 0 cm^3 03/19/24 1037   Calculated Wound Volume (cm^3) 0 cm^3 03/19/24 1037   Drainage Amount None 03/19/24 1037   Dressing Protective barrier 03/19/24 1037               Kanika Cortez RN, BSN, CWOCN

## 2024-03-19 NOTE — PLAN OF CARE
Problem: OCCUPATIONAL THERAPY ADULT  Goal: Performs self-care activities at highest level of function for planned discharge setting.  See evaluation for individualized goals.  Description: Treatment Interventions: ADL retraining, Functional transfer training, Endurance training, Cognitive reorientation, Equipment evaluation/education, Compensatory technique education, Continued evaluation, Activityengagement, Energy conservation  Equipment Recommended: (S) Shower/Tub chair with back ($)       See flowsheet documentation for full assessment, interventions and recommendations.   Note: Limitation: Decreased Safe judgement during ADL, Decreased endurance, Decreased high-level ADLs  Prognosis: Fair  Assessment: Pt is a 64 y/o male seen for OT eval s/p adm to [facility] w/ recent hx of partial R 1st ray resection presenting w/ nonhealing wound of surgical site. Pt is dx'd w/ osteomyelitis of R foot. Pt  has a past medical history of Hyperlipidemia and NSTEMI, initial episode of care (HCC) (01/02/2018). Pt with active OT orders and activity as tolerated orders. Pt lives with his spouse in 2 SH, 3 DARY, bed/bath 2nd floor. Pt was I w/  ADLS and IADLS, drove, & required no use of DME PTA. Pt is currently demonstrating the following occupational deficits: S UB/LB ADLS, S bed mobility and transfers w/ RW; VC for carryover of WBS. These deficits that are impacting pt's baseline areas of occupation are a result of the following impairments: endurance, functional mobility, balance, functional standing tolerance, decreased I w/ ADLS/IADLS, and decreased safety awareness.The following Occupational Performance Areas to address include: bathing/shower, toilet hygiene, dressing, functional mobility, community mobility, clothing management, household maintenance, and job performance/volunteering. Recommend home with family support upon D/C. Pt to continue to benefit from acute immediate OT services to address the following goals 2-3x/week to   w/in 10-14 days:     Rehab Resource Intensity Level, OT: No post-acute rehabilitation needs     Darcy Wilkins MS, OTR/L

## 2024-03-19 NOTE — TREATMENT PLAN
Podiatry Treatment Plan     Discussed in depth with patient the surgical plans from podiatry standpoint for right first metatarsal stump osteomyelitis.      Plan for right revisional first ray resection. Case request placed with Dr. Domingo on 3/20/24, pending OR availability. Patient will be NPO at midnight tonight in preparation for OR plans. Heparin will be held pre-operatively. Appreciate internal medicine clearance to proceed to OR prior to procedure     Surgical consent form was discussed in depth with patient. Form signed by patient and provider at bedside, scanned into media. Patient is aware of details of procedure including risks and complications. Patient is amenable to procedure and will proceed with procedure as planned.

## 2024-03-19 NOTE — ASSESSMENT & PLAN NOTE
Lab Results   Component Value Date    HGBA1C 9.8 (H) 01/23/2024       Recent Labs     03/18/24  2128 03/19/24  0524 03/19/24  0643 03/19/24  1033   POCGLU 111 112 124 105       Blood Sugar Average: Last 72 hrs:  (P) 113  Lantus 30 units with breakfast.  Will reduce to 15 units in AM.  Humalog 10 units 3 times daily with meals.  Sliding scale insulin  Fortunately blood sugar appears stable currently.  Follow-up on A1c  Metformin on hold while inpatient.

## 2024-03-19 NOTE — PHYSICAL THERAPY NOTE
PHYSICAL THERAPY EVALUATION  NAME:  Maciel Gaitan  DATE: 03/19/24    AGE:   63 y.o.  Mrn:   [id number]  ADMIT DX:  Essential hypertension [I10]  Wound infection [T14.8XXA, L08.9]  Visit for wound check [Z51.89]  Encounter for post surgical wound check [Z48.89]  Uncontrolled type 2 diabetes mellitus with hyperglycemia (HCC) [E11.65]  History of partial ray amputation of right great toe (HCC) [Z89.411]  Problem List:   Patient Active Problem List   Diagnosis    Uncontrolled type 2 diabetes mellitus with hyperglycemia (HCC)    CAD (coronary artery disease)    Essential hypertension    Family history of colon cancer    Hyperlipidemia    Nocturia    Osteomyelitis of great toe of right foot (HCC)    Hypokalemia    History of partial ray amputation of right great toe (HCC)    Osteomyelitis of right foot (HCC)       Past Medical History  Past Medical History:   Diagnosis Date    Hyperlipidemia     NSTEMI, initial episode of care ([facility]) 01/02/2018       Past Surgical History  Past Surgical History:   Procedure Laterality Date    EYE SURGERY      TOE AMPUTATION Right 2/16/2024    Procedure: PARTIAL 1st RAY AMPUTATION, RIGHT FOOT REMOVAL OF ALL NON-VIABLE BONE AND SOFT TISSUE;  Surgeon: Severo Person DPM;  Location: [facility];  Service: Podiatry    WISDOM TOOTH EXTRACTION         Length Of Stay: 1  Performed at least 2 patient identifiers during session: Name and ID bracelet       03/19/24 1030   PT Last Visit   PT Visit Date 03/19/24   Note Type   Note type Evaluation   Pain Assessment   Pain Assessment Tool 0-10   Pain Score No Pain   Restrictions/Precautions   Weight Bearing Precautions Per Order Yes   RLE Weight Bearing Per Order NWB   Other Precautions WBS;Fall Risk   Home Living   Type of Home House   Home Layout Two level;1/2 bath on main level;Bed/bath upstairs  (3 DARY)   Bathroom Shower/Tub Walk-in shower   Bathroom Toilet Standard   Bathroom Equipment   (none reported)   Home Equipment Crutches  (no AD  used at baseline)   Prior Function   Level of [place] Independent with functional mobility;Independent with ADLs;Independent with IADLS   Lives With Spouse   Receives Help From Family   IADLs Independent with driving;Independent with meal prep;Independent with medication management   Falls in the last 6 months 0   Vocational Full time employment   Comments no braces or orthos reported   General   Family/Caregiver Present No   Cognition   Overall Cognitive Status WFL   Arousal/Participation Alert   Attention Within functional limits   Orientation Level Oriented X4   Memory Decreased recall of precautions   Following Commands Follows one step commands without difficulty   RUE Assessment   RUE Assessment WFL   LUE Assessment   LUE Assessment WFL   RLE Assessment   RLE Assessment WFL   LLE Assessment   LLE Assessment WFL   Vision-Basic Assessment   Current Vision Wears glasses only for reading   Bed Mobility   Supine to Sit 6  Modified independent   Additional items HOB elevated   Sit to Supine 6  Modified independent   Additional items HOB elevated   Additional Comments pt denied dizziness with transitional movement   Transfers   Sit to Stand 5  Supervision   Additional items Verbal cues;Impulsive   Stand to Sit 5  Supervision   Additional items Verbal cues;Impulsive   Additional Comments pt required cues for safety   Ambulation/Elevation   Gait pattern   (hopped on LLE at fast pace; at times impulsive)   Gait Assistance 5  Supervision   Additional items Verbal cues   Assistive Device Rolling walker   Distance 15 ft   Balance   Static Sitting Good   Dynamic Sitting Fair +   Static Standing Fair   Dynamic Standing Fair   Ambulatory Fair   Endurance Deficit   Endurance Deficit Yes   Endurance Deficit Description pt reported fatigue with activity   Activity Tolerance   Activity Tolerance Patient tolerated treatment well   Assessment   Prognosis Good   Assessment Pt is 63 y.o. male seen for PT evaluation s/p admit to  [facility] on 3/18/2024 w/ Osteomyelitis of right foot (HCC). PT consulted to assess pt's functional mobility and d/c needs. Order placed for PT eval and tx, w/ up and OOB as tolerated order. Pt agreeable to PT  session upon arrival, pt found supine in bed.  PTA, pt was independent w/ all functional mobility w/ no AD, ambulates unrestricted distances and all terrain, lives w/ spouse in 2 level home, and works full time.  Pt to benefit from continued PT tx to address deficits and maximize level of functional independent mobility and consistency. Upon conclusion pt  supine in bed. Complexity: Comorbidities affecting pt's physical performance at time of assessment include: DM and R partial ray amputation . Personal factors affecting pt at time of IE include: active prior to admission, history of falls, steps to enter home, and compliance. Please find objective findings from PT assessment regarding body systems outlined above with impairments and limitations including impaired balance, decreased endurance, gait deviations, decreased functional mobility tolerance, decreased safety awareness, and fall risk.  Pt's clinical presentation is currently unstable/unpredictable seen in pt's presentation of abnormal H&H, abnormal potassium levels, wounds, and multiple readmissions. The patient's AM-PAC Basic Mobility Inpatient Short Form Raw Score is 19.  Based on patient presentations and impairments, pt would most appropriately benefit from Level 3 resource intensity upon discharge. Please also refer to the recommendation of the Physical Therapist for safe discharge planning. RN verbalized pt appropriate for PT session. Pt seen as a co-eval with OT due to the patient's co-morbidities, clinically unstable presentation, and present impairments which are a regression from the patient's baseline.   Goals   Patient Goals to walk his daughter down the aisle in Oct   LTG Expiration Date 03/29/24   Long Term Goal #1 Pt will:  Perform transfers to modified I to improve ease of transfers. Perform ambulation with MI and RW for 250 feet to increase Indep in home environment. Increase dynamic standing balance to F+ to decrease fall risk. Increase OOB activity tolerance to 10 minutes without s/s of exertion to decrease fall risk. Navigate up and down 3 steps with MI so patient can enter and exit home.   Plan   Treatment/Interventions Functional transfer training;Therapeutic exercise;Endurance training;Gait training;Bed mobility;Equipment eval/education;Elevations   PT Frequency 3-5x/wk   Discharge Recommendation   Rehab Resource Intensity Level, PT III (Minimum Resource Intensity)   Equipment Recommended Walker   Walker Package Recommended Wheeled walker   AM-PAC Basic Mobility Inpatient   Turning in Flat Bed Without Bedrails 4   Lying on Back to Sitting on Edge of Flat Bed Without Bedrails 4   Moving Bed to Chair 3   Standing Up From Chair Using Arms 3   Walk in Room 3   Climb 3-5 Stairs With Railing 2   Basic Mobility Inpatient Raw Score 19   Basic Mobility Standardized Score 42.48   [facility] Highest Level Of Mobility   -HLM Goal 6: Walk 10 steps or more   JH-HLM Achieved 6: Walk 10 steps or more       Time In: 1017  Time Out: 1031  Total Evaluation Minutes: 14    Ana Gan, PT

## 2024-03-19 NOTE — UTILIZATION REVIEW
NOTIFICATION OF INPATIENT ADMISSION      AUTHORIZATION REQUEST   SERVICING FACILITY:   [facility]  Address: [address] [place] [address]  Tax ID: [id number]  NPI: [id number] ATTENDING PROVIDER:  Attending Name and NPI#: Severo Person Dpm [[id number]]  Address: [address] [place] [address]  Phone: [phone]   ADMISSION INFORMATION:  Place of Service: Inpatient [facility]  Place of Service Code: 21  Inpatient Admission Date/Time: 3/18/24  1:18 PM  Discharge Date/Time: No discharge date for patient encounter.  Admitting Diagnosis Code/Description:  Essential hypertension [I10]  Wound infection [T14.8XXA, L08.9]  Visit for wound check [Z51.89]  Encounter for post surgical wound check [Z48.89]  Uncontrolled type 2 diabetes mellitus with hyperglycemia (HCC) [E11.65]  History of partial ray amputation of right great toe (HCC) [Z89.411]     UTILIZATION REVIEW CONTACT:  Becky Jo, Utilization   Network Utilization Review Department  Phone: [phone]  Fax: [phone]  Email: [email or web address]  Contact for approvals/pending authorizations, clinical reviews, and discharge.     PHYSICIAN ADVISORY SERVICES:  Medical Necessity Denial & Pvan-ou-Zdui Review  Phone: [phone]  Fax: [phone]  Email: [email or web address]     DISCHARGE SUPPORT TEAM:  For Patients Discharge Needs & Updates  Phone: [phone] opt. [phone] Fax: [phone]  Email: [email or web address]

## 2024-03-19 NOTE — PLAN OF CARE
Problem: PAIN - ADULT  Goal: Verbalizes/displays adequate comfort level or baseline comfort level  Description: Interventions:  - Encourage patient to monitor pain and request assistance  - Assess pain using appropriate pain scale  - Administer analgesics based on type and severity of pain and evaluate response  - Implement non-pharmacological measures as appropriate and evaluate response  - Consider cultural and social influences on pain and pain management  - Notify physician/advanced practitioner if interventions unsuccessful or patient reports new pain  Outcome: Progressing     Problem: INFECTION - ADULT  Goal: Absence or prevention of progression during hospitalization  Description: INTERVENTIONS:  - Assess and monitor for signs and symptoms of infection  - Monitor lab/diagnostic results  - Monitor all insertion sites, i.e. indwelling lines, tubes, and drains  - Monitor endotracheal if appropriate and nasal secretions for changes in amount and color  - [place] appropriate cooling/warming therapies per order  - Administer medications as ordered  - Instruct and encourage patient and family to use good hand hygiene technique  - Identify and instruct in appropriate isolation precautions for identified infection/condition  Outcome: Progressing     Problem: SAFETY ADULT  Goal: Patient will remain free of falls  Description: INTERVENTIONS:  - Educate patient/family on patient safety including physical limitations  - Instruct patient to call for assistance with activity   - Consult OT/PT to assist with strengthening/mobility   - Keep Call bell within reach  - Keep bed low and locked with side rails adjusted as appropriate  - Keep care items and personal belongings within reach  - Initiate and maintain comfort rounds  - Make Fall Risk Sign visible to staff  - Offer Toileting every 2 Hours, in advance of need  - Initiate/Maintain 24/7 alarm  - Obtain necessary fall risk management equipment: rw  - Apply yellow socks  and bracelet for high fall risk patients  - Consider moving patient to room near nurses station  Outcome: Progressing

## 2024-03-19 NOTE — ASSESSMENT & PLAN NOTE
Currently on metoprolol and Norvasc therapy  Continue with pain control  Monitor blood pressure closely

## 2024-03-19 NOTE — ASSESSMENT & PLAN NOTE
Podiatry for or intervention tentatively in AM.  Patient appears acceptable risk for surgery.  Patient for surgery involving the right partial first ray resection dehiscence with clinical osteomyelitis.  Benefit of surgery outweighs risk.  Will follow postoperatively.  Plan for surgery tentatively tomorrow morning.  Will reduce basal insulin to 15 units.  Monitor blood sugars closely.  Prior to wound infection patient had been ambulating up to 4 miles a day.  He denies any symptoms of chest pain or shortness of breath

## 2024-03-19 NOTE — CONSULTS
[facility]  Consult  Name: Maciel Gaitan 63 y.o. male I MRN: [id number]  Unit/Bed#: -01 I Date of Admission: 3/18/2024   Date of Service: 3/19/2024 I Hospital Day: 1    Inpatient consult to Internal Medicine  Consult performed by: Dar Pitt DO  Consult ordered by: Makenzie Howard DPM          Assessment/Plan   * Osteomyelitis of right foot (HCC)  Assessment & Plan  Podiatry for or intervention tentatively in AM.  Patient appears acceptable risk for surgery.  Patient for surgery involving the right partial first ray resection dehiscence with clinical osteomyelitis.  Benefit of surgery outweighs risk.  Will follow postoperatively.  Plan for surgery tentatively tomorrow morning.  Will reduce basal insulin to 15 units.  Monitor blood sugars closely.  Prior to wound infection patient had been ambulating up to 4 miles a day.  He denies any symptoms of chest pain or shortness of breath        Hypokalemia  Assessment & Plan  Replete potassium.  Check magnesium      Essential hypertension  Assessment & Plan  Currently on metoprolol and Norvasc therapy  Continue with pain control  Monitor blood pressure closely    CAD (coronary artery disease)  Assessment & Plan   This is a very pleasant 63-year-old gentleman with known history non-STEMI type I prior cardiac catheterization on 12/2017 for non-STEMI type I.  Cardiac catheterization at that time had illustrated a 95% left circumflex and chronic mid right coronary artery stenosis.  Patient successfully underwent a PCI of left circumflex.  Patient was initially placed on dual antiplatelet therapy and subsequently de-escalated to aspirin daily by cardiolog.  Patient reports that he was walking up to 4 miles a day prior to his initial intervention for his right foot back in February.  He reports that his mobility was limited due to his lower extremity wound and has not been walking as much since that time.  Due to the lower  extremity wound.  He denies any history of chest pain or shortness of breath symptoms or exertional dyspnea.  Last echo 1/23/24 showing left ventricular cavity size is normal,wall thickness is moderately increased. There is moderate concentric hypertrophy. EF is 60%, systolic function, wall motion and diastolic function are normal.   Patient has been managed on aspirin metoprolol and Lipitor therapy.  Patient also on Norvasc for blood pressure control.                        Uncontrolled type 2 diabetes mellitus with hyperglycemia (HCC)  Assessment & Plan  Lab Results   Component Value Date    HGBA1C 9.8 (H) 01/23/2024       Recent Labs     03/18/24  2128 03/19/24  0524 03/19/24  0643 03/19/24  1033   POCGLU 111 112 124 105       Blood Sugar Average: Last 72 hrs:  (P) 113  Lantus 30 units with breakfast.  Will reduce to 15 units in AM.  Humalog 10 units 3 times daily with meals.  Sliding scale insulin  Fortunately blood sugar appears stable currently.  Follow-up on A1c  Metformin on hold while inpatient.         VTE Prophylaxis: Sequential compression device (Venodyne)   / sequential compression device         Counseling / Coordination of Care Time:  85 .  Greater than 50% of total time spent on patient counseling and coordination of care.    Collaboration of Care: Were Recommendations Directly Discussed with Primary Treatment Team? -     History of Present Illness:    Maciel Gaitan is a 63 y.o. male who is originally admitted to the podiatry service due to for evaluation of right partial first ray resection dehiscence with clinical osteomyelitis. We are consulted for preoperative assessment.  Patient has multiple chronic comorbidities including type 2 diabetes, coronary artery disease, hypertension hyperlipidemia and history of right-sided first ray resection and presents with nonhealing wound surgical site.  Patient typically sees Dr. Person as an outpatient for podiatry.  Patient with prior intervention on toe  in February at at the [facility].  Patient denies any major postoperative complications or intolerance anesthesia at that time.  Patient reports ambulating up to 4 miles a day prior to his foot injury.  Patient has been aggressively dieting in anticipation of his daughter's wedding this year in October.     Review of Systems:    Review of Systems   Constitutional:  Negative for fatigue and fever.   Respiratory:  Negative for cough, chest tightness and shortness of breath.    Cardiovascular:  Negative for chest pain and leg swelling.   Musculoskeletal:  Negative for arthralgias.   Neurological:  Negative for dizziness.       Past Medical and Surgical History:     Past Medical History:   Diagnosis Date    Hyperlipidemia     NSTEMI, initial episode of care ([facility]) 01/02/2018       Past Surgical History:   Procedure Laterality Date    EYE SURGERY      TOE AMPUTATION Right 2/16/2024    Procedure: PARTIAL 1st RAY AMPUTATION, RIGHT FOOT REMOVAL OF ALL NON-VIABLE BONE AND SOFT TISSUE;  Surgeon: Severo Person DPM;  Location: [facility];  Service: Podiatry    WISDOM TOOTH EXTRACTION         Meds/Allergies:    all medications and allergies reviewed    Allergies: No Known Allergies    Social History:     Marital Status: /Civil Union    Substance Use History:   Social History     Substance and Sexual Activity   Alcohol Use Not Currently    Comment: 1-2 drinks a year     Social History     Tobacco Use   Smoking Status Never   Smokeless Tobacco Never     Social History     Substance and Sexual Activity   Drug Use No       Family History:    Family History   Problem Relation Age of Onset    Diabetes Mother     Heart disease Father     Heart disease Paternal Grandfather        Physical Exam:     Vitals:   Blood Pressure: 137/82 (03/19/24 1538)  Pulse: 56 (03/19/24 1538)  Temperature: 98 °F (36.7 °C) (03/19/24 1538)  Temp Source: Oral (03/19/24 0901)  Respirations: 16 (03/19/24 0901)  SpO2: 99 %  (03/19/24 1538)    Physical Exam  Constitutional:       Appearance: Normal appearance.   HENT:      Nose: Nose normal.   Cardiovascular:      Rate and Rhythm: Normal rate and regular rhythm.      Heart sounds: No murmur heard.  Pulmonary:      Effort: Pulmonary effort is normal.      Breath sounds: Normal breath sounds.   Abdominal:      General: Abdomen is flat.      Palpations: Abdomen is soft.   Musculoskeletal:         General: No swelling or tenderness.   Skin:     General: Skin is warm and dry.   Neurological:      General: No focal deficit present.      Mental Status: He is alert and oriented to person, place, and time.   Psychiatric:         Mood and Affect: Mood normal.           Additional Data:     Lab Results: I have personally reviewed pertinent reports.      Results from last 7 days   Lab Units 03/19/24  0525   WBC Thousand/uL 5.49   HEMOGLOBIN g/dL 10.7*   HEMATOCRIT % 33.9*   PLATELETS Thousands/uL 300   NEUTROS PCT % 52   LYMPHS PCT % 24   MONOS PCT % 15*   EOS PCT % 8*     Results from last 7 days   Lab Units 03/19/24  0525 03/18/24  1154   SODIUM mmol/L 140 137   POTASSIUM mmol/L 3.4* 4.0   CHLORIDE mmol/L 106 106   CO2 mmol/L 25 22   BUN mg/dL 20 23   CREATININE mg/dL 0.89 0.94   ANION GAP mmol/L 9 9   CALCIUM mg/dL 8.6 8.5   ALBUMIN g/dL  --  3.6   TOTAL BILIRUBIN mg/dL  --  0.51   ALK PHOS U/L  --  66   ALT U/L  --  19   AST U/L  --  19   GLUCOSE RANDOM mg/dL 103 122     Results from last 7 days   Lab Units 03/18/24  1154   INR  1.15         Lab Results   Component Value Date/Time    HGBA1C 9.8 (H) 01/23/2024 07:43 AM    HGBA1C 5.8 10/04/2018 07:10 AM    HGBA1C 6.4 (H) 03/26/2018 07:03 AM     Results from last 7 days   Lab Units 03/19/24  1033 03/19/24  0643 03/19/24  0524 03/18/24  2128   POC GLUCOSE mg/dl 105 124 112 111           Imaging: I have personally reviewed pertinent reports.      MRI foot/forefoot toest right wo contrast   Final Result by Gabe Mckinney MD (03/18 2865)      Status  post amputation of the great toe to the level of the distal third of the metatarsal shaft. Overlying soft tissue ulceration and phlegmonous change with cellulitis. Osteomyelitis of the remaining first metatarsal.      Plantar fascial fibromatosis.               Workstation performed: [id number]         XR foot 3+ views RIGHT   Final Result by Miguel Villagomez MD (03/18 2043)      Permeative bony destruction at the margin of the first metatarsal shaft amputation site in keeping with osteomyelitis. This has also been reported on the follow-up MRI.      Workstation performed: [id number]             ** Please Note: This note has been constructed using a voice recognition system. **

## 2024-03-19 NOTE — ASSESSMENT & PLAN NOTE
This is a very pleasant 63-year-old gentleman with known history non-STEMI type I prior cardiac catheterization on 12/2017 for non-STEMI type I.  Cardiac catheterization at that time had illustrated a 95% left circumflex and chronic mid right coronary artery stenosis.  Patient successfully underwent a PCI of left circumflex.  Patient was initially placed on dual antiplatelet therapy and subsequently de-escalated to aspirin daily by cardiolog.  Patient reports that he was walking up to 4 miles a day prior to his initial intervention for his right foot back in February.  He reports that his mobility was limited due to his lower extremity wound and has not been walking as much since that time.  Due to the lower extremity wound.  He denies any history of chest pain or shortness of breath symptoms or exertional dyspnea.  Last echo 1/23/24 showing left ventricular cavity size is normal,wall thickness is moderately increased. There is moderate concentric hypertrophy. EF is 60%, systolic function, wall motion and diastolic function are normal.   Patient has been managed on aspirin metoprolol and Lipitor therapy.  Patient also on Norvasc for blood pressure control.

## 2024-03-19 NOTE — DISCHARGE INSTR - OTHER ORDERS
Skin Care Plan:  1-Right foot wound: per podiatry recommendations.   2-Turn/reposition q2h or when medically stable for pressure re-distribution on skin .  3-Elevate heels to offload pressure.  4-Moisturize skin daily with skin nourishing cream  5-Ehob cushion in chair when out of bed.  6-Preventative Hydraguard to bilateral sacro-buttocks and left heel BID and PRN.       Discharge Instructions - Podiatry    Weight Bearing Status: Non-weight bearing to right lower extremity                   Pain: Continue analgesics as needed    Follow-up appointment instructions: Please make an appointment within one week of discharge with [facility] Podiatry[facility]Wound [facility]. Contact sooner if any increase in pain, or signs of infection occur    Wound Care: Leave dressings clean, dry, and intact between professional dressing changes    Nursing Instructions: Please apply betadine soaked adaptic to incision site. Then cover with Gauze and secure with Kerlix and tape. Please change dressing 3x a week .

## 2024-03-20 ENCOUNTER — ANESTHESIA EVENT (INPATIENT)
Dept: PERIOP | Facility: HOSPITAL | Age: 64
DRG: 908 | End: 2024-03-20
Payer: COMMERCIAL

## 2024-03-20 ENCOUNTER — APPOINTMENT (INPATIENT)
Dept: RADIOLOGY | Facility: HOSPITAL | Age: 64
DRG: 908 | End: 2024-03-20
Payer: COMMERCIAL

## 2024-03-20 ENCOUNTER — ANESTHESIA (INPATIENT)
Dept: PERIOP | Facility: HOSPITAL | Age: 64
DRG: 908 | End: 2024-03-20
Payer: COMMERCIAL

## 2024-03-20 LAB
ANION GAP SERPL CALCULATED.3IONS-SCNC: 10 MMOL/L (ref 4–13)
ANION GAP SERPL CALCULATED.3IONS-SCNC: 8 MMOL/L (ref 4–13)
BACTERIA WND AEROBE CULT: ABNORMAL
BASOPHILS # BLD AUTO: 0.05 THOUSANDS/ÂΜL (ref 0–0.1)
BASOPHILS # BLD AUTO: 0.07 THOUSANDS/ÂΜL (ref 0–0.1)
BASOPHILS NFR BLD AUTO: 1 % (ref 0–1)
BASOPHILS NFR BLD AUTO: 1 % (ref 0–1)
BUN SERPL-MCNC: 28 MG/DL (ref 5–25)
BUN SERPL-MCNC: 29 MG/DL (ref 5–25)
CALCIUM SERPL-MCNC: 8.4 MG/DL (ref 8.4–10.2)
CALCIUM SERPL-MCNC: 8.7 MG/DL (ref 8.4–10.2)
CHLORIDE SERPL-SCNC: 108 MMOL/L (ref 96–108)
CHLORIDE SERPL-SCNC: 108 MMOL/L (ref 96–108)
CO2 SERPL-SCNC: 24 MMOL/L (ref 21–32)
CO2 SERPL-SCNC: 26 MMOL/L (ref 21–32)
CREAT SERPL-MCNC: 0.97 MG/DL (ref 0.6–1.3)
CREAT SERPL-MCNC: 0.97 MG/DL (ref 0.6–1.3)
EOSINOPHIL # BLD AUTO: 0.3 THOUSAND/ÂΜL (ref 0–0.61)
EOSINOPHIL # BLD AUTO: 0.39 THOUSAND/ÂΜL (ref 0–0.61)
EOSINOPHIL NFR BLD AUTO: 6 % (ref 0–6)
EOSINOPHIL NFR BLD AUTO: 7 % (ref 0–6)
ERYTHROCYTE [DISTWIDTH] IN BLOOD BY AUTOMATED COUNT: 14.4 % (ref 11.6–15.1)
ERYTHROCYTE [DISTWIDTH] IN BLOOD BY AUTOMATED COUNT: 14.4 % (ref 11.6–15.1)
GFR SERPL CREATININE-BSD FRML MDRD: 82 ML/MIN/1.73SQ M
GFR SERPL CREATININE-BSD FRML MDRD: 82 ML/MIN/1.73SQ M
GLUCOSE SERPL-MCNC: 110 MG/DL (ref 65–140)
GLUCOSE SERPL-MCNC: 114 MG/DL (ref 65–140)
GLUCOSE SERPL-MCNC: 119 MG/DL (ref 65–140)
GLUCOSE SERPL-MCNC: 123 MG/DL (ref 65–140)
GLUCOSE SERPL-MCNC: 127 MG/DL (ref 65–140)
GLUCOSE SERPL-MCNC: 129 MG/DL (ref 65–140)
GLUCOSE SERPL-MCNC: 134 MG/DL (ref 65–140)
GRAM STN SPEC: ABNORMAL
GRAM STN SPEC: ABNORMAL
HCT VFR BLD AUTO: 36.1 % (ref 36.5–49.3)
HCT VFR BLD AUTO: 37.5 % (ref 36.5–49.3)
HGB BLD-MCNC: 11.4 G/DL (ref 12–17)
HGB BLD-MCNC: 11.8 G/DL (ref 12–17)
IMM GRANULOCYTES # BLD AUTO: 0.01 THOUSAND/UL (ref 0–0.2)
IMM GRANULOCYTES # BLD AUTO: 0.02 THOUSAND/UL (ref 0–0.2)
IMM GRANULOCYTES NFR BLD AUTO: 0 % (ref 0–2)
IMM GRANULOCYTES NFR BLD AUTO: 0 % (ref 0–2)
LYMPHOCYTES # BLD AUTO: 1.09 THOUSANDS/ÂΜL (ref 0.6–4.47)
LYMPHOCYTES # BLD AUTO: 1.35 THOUSANDS/ÂΜL (ref 0.6–4.47)
LYMPHOCYTES NFR BLD AUTO: 21 % (ref 14–44)
LYMPHOCYTES NFR BLD AUTO: 23 % (ref 14–44)
MAGNESIUM SERPL-MCNC: 1.9 MG/DL (ref 1.9–2.7)
MCH RBC QN AUTO: 26.6 PG (ref 26.8–34.3)
MCH RBC QN AUTO: 26.8 PG (ref 26.8–34.3)
MCHC RBC AUTO-ENTMCNC: 31.5 G/DL (ref 31.4–37.4)
MCHC RBC AUTO-ENTMCNC: 31.6 G/DL (ref 31.4–37.4)
MCV RBC AUTO: 84 FL (ref 82–98)
MCV RBC AUTO: 85 FL (ref 82–98)
MONOCYTES # BLD AUTO: 0.66 THOUSAND/ÂΜL (ref 0.17–1.22)
MONOCYTES # BLD AUTO: 0.77 THOUSAND/ÂΜL (ref 0.17–1.22)
MONOCYTES NFR BLD AUTO: 13 % (ref 4–12)
MONOCYTES NFR BLD AUTO: 13 % (ref 4–12)
NEUTROPHILS # BLD AUTO: 2.97 THOUSANDS/ÂΜL (ref 1.85–7.62)
NEUTROPHILS # BLD AUTO: 3.2 THOUSANDS/ÂΜL (ref 1.85–7.62)
NEUTS SEG NFR BLD AUTO: 56 % (ref 43–75)
NEUTS SEG NFR BLD AUTO: 59 % (ref 43–75)
NRBC BLD AUTO-RTO: 0 /100 WBCS
NRBC BLD AUTO-RTO: 0 /100 WBCS
PLATELET # BLD AUTO: 350 THOUSANDS/UL (ref 149–390)
PLATELET # BLD AUTO: 351 THOUSANDS/UL (ref 149–390)
PMV BLD AUTO: 8.8 FL (ref 8.9–12.7)
PMV BLD AUTO: 8.8 FL (ref 8.9–12.7)
POTASSIUM SERPL-SCNC: 3.7 MMOL/L (ref 3.5–5.3)
POTASSIUM SERPL-SCNC: 3.8 MMOL/L (ref 3.5–5.3)
RBC # BLD AUTO: 4.28 MILLION/UL (ref 3.88–5.62)
RBC # BLD AUTO: 4.4 MILLION/UL (ref 3.88–5.62)
SODIUM SERPL-SCNC: 142 MMOL/L (ref 135–147)
SODIUM SERPL-SCNC: 142 MMOL/L (ref 135–147)
WBC # BLD AUTO: 5.1 THOUSAND/UL (ref 4.31–10.16)
WBC # BLD AUTO: 5.78 THOUSAND/UL (ref 4.31–10.16)

## 2024-03-20 PROCEDURE — 85025 COMPLETE CBC W/AUTO DIFF WBC: CPT

## 2024-03-20 PROCEDURE — 99233 SBSQ HOSP IP/OBS HIGH 50: CPT | Performed by: GENERAL PRACTICE

## 2024-03-20 PROCEDURE — NC001 PR NO CHARGE: Performed by: PODIATRIST

## 2024-03-20 PROCEDURE — 0Y6M0Z9 DETACHMENT AT RIGHT FOOT, PARTIAL 1ST RAY, OPEN APPROACH: ICD-10-PCS | Performed by: PODIATRIST

## 2024-03-20 PROCEDURE — 28122 PARTIAL REMOVAL OF FOOT BONE: CPT | Performed by: PODIATRIST

## 2024-03-20 PROCEDURE — 80048 BASIC METABOLIC PNL TOTAL CA: CPT

## 2024-03-20 PROCEDURE — 83735 ASSAY OF MAGNESIUM: CPT | Performed by: INTERNAL MEDICINE

## 2024-03-20 PROCEDURE — 80048 BASIC METABOLIC PNL TOTAL CA: CPT | Performed by: INTERNAL MEDICINE

## 2024-03-20 PROCEDURE — 88305 TISSUE EXAM BY PATHOLOGIST: CPT | Performed by: PATHOLOGY

## 2024-03-20 PROCEDURE — 73630 X-RAY EXAM OF FOOT: CPT

## 2024-03-20 PROCEDURE — 82948 REAGENT STRIP/BLOOD GLUCOSE: CPT

## 2024-03-20 PROCEDURE — 88311 DECALCIFY TISSUE: CPT | Performed by: PATHOLOGY

## 2024-03-20 PROCEDURE — 85025 COMPLETE CBC W/AUTO DIFF WBC: CPT | Performed by: INTERNAL MEDICINE

## 2024-03-20 RX ORDER — INSULIN LISPRO 100 [IU]/ML
1-5 INJECTION, SOLUTION INTRAVENOUS; SUBCUTANEOUS
Status: DISCONTINUED | OUTPATIENT
Start: 2024-03-20 | End: 2024-03-26 | Stop reason: HOSPADM

## 2024-03-20 RX ORDER — MIDAZOLAM HYDROCHLORIDE 2 MG/2ML
INJECTION, SOLUTION INTRAMUSCULAR; INTRAVENOUS AS NEEDED
Status: DISCONTINUED | OUTPATIENT
Start: 2024-03-20 | End: 2024-03-20

## 2024-03-20 RX ORDER — ACETAMINOPHEN 325 MG/1
650 TABLET ORAL EVERY 6 HOURS PRN
Status: DISCONTINUED | OUTPATIENT
Start: 2024-03-20 | End: 2024-03-26 | Stop reason: HOSPADM

## 2024-03-20 RX ORDER — HYDROMORPHONE HCL/PF 1 MG/ML
0.4 SYRINGE (ML) INJECTION
Status: DISCONTINUED | OUTPATIENT
Start: 2024-03-20 | End: 2024-03-20 | Stop reason: HOSPADM

## 2024-03-20 RX ORDER — FENTANYL CITRATE 50 UG/ML
INJECTION, SOLUTION INTRAMUSCULAR; INTRAVENOUS AS NEEDED
Status: DISCONTINUED | OUTPATIENT
Start: 2024-03-20 | End: 2024-03-20

## 2024-03-20 RX ORDER — LIDOCAINE HYDROCHLORIDE 10 MG/ML
INJECTION, SOLUTION EPIDURAL; INFILTRATION; INTRACAUDAL; PERINEURAL AS NEEDED
Status: DISCONTINUED | OUTPATIENT
Start: 2024-03-20 | End: 2024-03-20

## 2024-03-20 RX ORDER — MAGNESIUM HYDROXIDE 1200 MG/15ML
LIQUID ORAL AS NEEDED
Status: DISCONTINUED | OUTPATIENT
Start: 2024-03-20 | End: 2024-03-20 | Stop reason: HOSPADM

## 2024-03-20 RX ORDER — PROPOFOL 10 MG/ML
INJECTION, EMULSION INTRAVENOUS CONTINUOUS PRN
Status: DISCONTINUED | OUTPATIENT
Start: 2024-03-20 | End: 2024-03-20

## 2024-03-20 RX ORDER — CIPROFLOXACIN 2 MG/ML
400 INJECTION, SOLUTION INTRAVENOUS EVERY 12 HOURS
Status: DISCONTINUED | OUTPATIENT
Start: 2024-03-21 | End: 2024-03-25

## 2024-03-20 RX ADMIN — AMLODIPINE BESYLATE 5 MG: 5 TABLET ORAL at 09:21

## 2024-03-20 RX ADMIN — LIDOCAINE HYDROCHLORIDE 50 MG: 10 INJECTION, SOLUTION EPIDURAL; INFILTRATION; INTRACAUDAL; PERINEURAL at 12:34

## 2024-03-20 RX ADMIN — CEFTRIAXONE 1000 MG: 1 INJECTION, POWDER, FOR SOLUTION INTRAMUSCULAR; INTRAVENOUS at 09:36

## 2024-03-20 RX ADMIN — HEPARIN SODIUM 5000 UNITS: 5000 INJECTION INTRAVENOUS; SUBCUTANEOUS at 22:19

## 2024-03-20 RX ADMIN — ACETAMINOPHEN 650 MG: 325 TABLET, FILM COATED ORAL at 21:30

## 2024-03-20 RX ADMIN — FENTANYL CITRATE 50 MCG: 50 INJECTION INTRAMUSCULAR; INTRAVENOUS at 12:31

## 2024-03-20 RX ADMIN — CIPROFLOXACIN 400 MG: 2 INJECTION, SOLUTION INTRAVENOUS at 23:56

## 2024-03-20 RX ADMIN — MIDAZOLAM 2 MG: 1 INJECTION INTRAMUSCULAR; INTRAVENOUS at 12:23

## 2024-03-20 RX ADMIN — ASPIRIN 81 MG CHEWABLE TABLET 81 MG: 81 TABLET CHEWABLE at 09:21

## 2024-03-20 RX ADMIN — FENTANYL CITRATE 25 MCG: 50 INJECTION INTRAMUSCULAR; INTRAVENOUS at 12:36

## 2024-03-20 RX ADMIN — PROPOFOL 100 MCG/KG/MIN: 10 INJECTION, EMULSION INTRAVENOUS at 12:33

## 2024-03-20 RX ADMIN — ATORVASTATIN CALCIUM 40 MG: 40 TABLET, FILM COATED ORAL at 17:48

## 2024-03-20 RX ADMIN — FENTANYL CITRATE 25 MCG: 50 INJECTION INTRAMUSCULAR; INTRAVENOUS at 12:40

## 2024-03-20 RX ADMIN — INSULIN LISPRO 10 UNITS: 100 INJECTION, SOLUTION INTRAVENOUS; SUBCUTANEOUS at 17:48

## 2024-03-20 RX ADMIN — METOPROLOL SUCCINATE 50 MG: 50 TABLET, EXTENDED RELEASE ORAL at 09:21

## 2024-03-20 NOTE — PHYSICAL THERAPY NOTE
PT cancel note     03/20/24 1249   PT Last Visit   PT Visit Date 03/20/24   Note Type   Note type Cancelled Session   Cancel Reasons Patient to operating room     Ana Gan

## 2024-03-20 NOTE — OCCUPATIONAL THERAPY NOTE
OT CANCEL NOTE:    Pt. Is pending OR with podiatry for R revisional first ray resection. Will hold OT for today pending surgery.

## 2024-03-20 NOTE — ASSESSMENT & PLAN NOTE
Podiatry for or intervention tentatively in AM.  Patient appears acceptable risk for surgery.    S/p surgery involving the right partial first ray resection dehiscence with clinical osteomyelitis today  Benefit of surgery outweighs risk.    Advise against Rocephin as does not cover Enterobacter cloacae on most recent wound Cx.  Podiatry switched to Cipro which I agree with.    Pt likely had surgical cure but podiatry wants further abx to make sure infection also gone from soft tissues  Prior to wound infection patient had been ambulating up to 4 miles a day.  He denies any symptoms of chest pain or shortness of breath

## 2024-03-20 NOTE — PLAN OF CARE
Problem: PAIN - ADULT  Goal: Verbalizes/displays adequate comfort level or baseline comfort level  Description: Interventions:  - Encourage patient to monitor pain and request assistance  - Assess pain using appropriate pain scale  - Administer analgesics based on type and severity of pain and evaluate response  - Implement non-pharmacological measures as appropriate and evaluate response  - Consider cultural and social influences on pain and pain management  - Notify physician/advanced practitioner if interventions unsuccessful or patient reports new pain  Outcome: Progressing     Problem: INFECTION - ADULT  Goal: Absence or prevention of progression during hospitalization  Description: INTERVENTIONS:  - Assess and monitor for signs and symptoms of infection  - Monitor lab/diagnostic results  - Monitor all insertion sites, i.e. indwelling lines, tubes, and drains  - Monitor endotracheal if appropriate and nasal secretions for changes in amount and color  - [place] appropriate cooling/warming therapies per order  - Administer medications as ordered  - Instruct and encourage patient and family to use good hand hygiene technique  - Identify and instruct in appropriate isolation precautions for identified infection/condition  Outcome: Progressing     Problem: SAFETY ADULT  Goal: Patient will remain free of falls  Description: INTERVENTIONS:  - Educate patient/family on patient safety including physical limitations  - Instruct patient to call for assistance with activity   - Consult OT/PT to assist with strengthening/mobility   - Keep Call bell within reach  - Keep bed low and locked with side rails adjusted as appropriate  - Keep care items and personal belongings within reach  - Initiate and maintain comfort rounds  - Make Fall Risk Sign visible to staff  - Apply yellow socks and bracelet for high fall risk patients  - Consider moving patient to room near nurses station  Outcome: Progressing     Problem: PAIN -  ADULT  Goal: Verbalizes/displays adequate comfort level or baseline comfort level  Description: Interventions:  - Encourage patient to monitor pain and request assistance  - Assess pain using appropriate pain scale  - Administer analgesics based on type and severity of pain and evaluate response  - Implement non-pharmacological measures as appropriate and evaluate response  - Consider cultural and social influences on pain and pain management  - Notify physician/advanced practitioner if interventions unsuccessful or patient reports new pain  Outcome: Progressing     Problem: INFECTION - ADULT  Goal: Absence or prevention of progression during hospitalization  Description: INTERVENTIONS:  - Assess and monitor for signs and symptoms of infection  - Monitor lab/diagnostic results  - Monitor all insertion sites, i.e. indwelling lines, tubes, and drains  - Monitor endotracheal if appropriate and nasal secretions for changes in amount and color  - [place] appropriate cooling/warming therapies per order  - Administer medications as ordered  - Instruct and encourage patient and family to use good hand hygiene technique  - Identify and instruct in appropriate isolation precautions for identified infection/condition  Outcome: Progressing     Problem: SAFETY ADULT  Goal: Patient will remain free of falls  Description: INTERVENTIONS:  - Educate patient/family on patient safety including physical limitations  - Instruct patient to call for assistance with activity   - Consult OT/PT to assist with strengthening/mobility   - Keep Call bell within reach  - Keep bed low and locked with side rails adjusted as appropriate  - Keep care items and personal belongings within reach  - Initiate and maintain comfort rounds  - Make Fall Risk Sign visible to staff  - Offer Toileting every 2 Hours, in advance of need  - Initiate/Maintain 24/7 alarm  - Obtain necessary fall risk management equipment: RW  - Apply yellow socks and bracelet for  high fall risk patients  - Consider moving patient to room near nurses station  Outcome: Progressing

## 2024-03-20 NOTE — PROGRESS NOTES
[facility] Podiatry - Progress Note  Patient: Maciel Gaitan 63 y.o. male   MRN: [id number]  PCP: MITA Choudhury  Unit/Bed#: -01 Encounter: [id number]  Date Of Visit: 24    ASSESSMENT:    Maciel Gaitan is a 63 y.o. male with:    Right partial first ray amputation surgical wound dehiscence (Jac[facility] 3)  - S/p right partial first ray amputation (DOS: 2024)  Cellulitis of right foot  Type 2 diabetes mellitus  - HbA1c 8.5% (3/19/24)    PLAN:    Patient to go to OR today,24, for right first metatarsal resection with Dr. Domingo.  Consent signed with surgeon prior to procedure.   Confirmed NPO status.  H&P, vitals, and current labs reviewed.  No acute changes noted.  Blood cultures negative at 24 hours  Alternatives, risks, and complications discussed with patient.  All questions answered.  No guarantees given of outcome.  Appreciate clearance by internal medicine to proceed to the OR  Appreciate consulting services for recommendations and management.    Antibiotics started: Ceftriaxone  Pharmacologic VTE Prophylaxis: Heparin - held pre operatively  Mechanical VTE Prophylaxis: sequential compression device   Weightbearing status: Nonweightbearing to right foot    Disposition: Patient will require continued inpatient stay for the above    SUBJECTIVE:     The patient was seen, evaluated, and assessed at bedside today. The patient was awake, alert, and in no acute distress. Patient confirmed NPO status. All questions and concerns regarding the surgical procedure addressed. Patient understands risks vs benefits of procedure and remains amenable with plan for surgery today. Patient denies N/V/F/chills/SOB/CP    OBJECTIVE:     Vitals:   /86   Pulse 58   Temp 97.9 °F (36.6 °C)   Resp 16   SpO2 99%     Temp (24hrs), Av.9 °F (36.6 °C), Min:97.8 °F (36.6 °C), Max:98 °F (36.7 °C)    Physical Exam:     General:  Alert, cooperative, and in no distress.  Lungs: Non labored  "breathing  Abdomen: Soft, non-tender.  Lower extremity exam:  Cardiovascular status at baseline.  Neurological status at baseline.  Musculoskeletal status at baseline. No calf tenderness noted bilaterally.     Dressing left intact to the Operating Room.     Additional Data:     Labs:    Results from last 7 days   Lab Units 03/20/24  0634   WBC Thousand/uL 5.78   HEMOGLOBIN g/dL 11.4*   HEMATOCRIT % 36.1*   PLATELETS Thousands/uL 351   NEUTROS PCT % 56   LYMPHS PCT % 23   MONOS PCT % 13*   EOS PCT % 7*     Results from last 7 days   Lab Units 03/20/24  0634 03/19/24  0525 03/18/24  1154   POTASSIUM mmol/L 3.7   < > 4.0   CHLORIDE mmol/L 108   < > 106   CO2 mmol/L 24   < > 22   BUN mg/dL 29*   < > 23   CREATININE mg/dL 0.97   < > 0.94   CALCIUM mg/dL 8.4   < > 8.5   ALK PHOS U/L  --   --  66   ALT U/L  --   --  19   AST U/L  --   --  19    < > = values in this interval not displayed.     Results from last 7 days   Lab Units 03/18/24  1154   INR  1.15       * I Have Reviewed All Lab Data Listed Above.    Recent Cultures (last 7 days):     Results from last 7 days   Lab Units 03/18/24  1900 03/18/24  1154 03/18/24  0000   BLOOD CULTURE  No Growth at 24 hrs. No Growth at 24 hrs.  --    GRAM STAIN RESULT   --   --  1+ Disintegrating polys*  2+ Gram negative rods*   WOUND CULTURE   --   --  2+ Growth of Enterobacter cloacae*           Imaging: I have personally reviewed pertinent films in PACS  EKG, Pathology, and Other Studies: I have personally reviewed pertinent reports.      ** Please Note: Portions of the record may have been created with voice recognition software. Occasional wrong word or \"sound a like\" substitutions may have occurred due to the inherent limitations of voice recognition software. Read the chart carefully and recognize, using context, where substitutions have occurred. **     "

## 2024-03-20 NOTE — ANESTHESIA PREPROCEDURE EVALUATION
Procedure:  First ray resection, metatarsal stump resection (Right: Foot)    Relevant Problems   CARDIO   (+) CAD (coronary artery disease)   (+) Essential hypertension   (+) Hyperlipidemia      Other   (+) Osteomyelitis of great toe of right foot (HCC)   (+) Osteomyelitis of right foot (HCC)        Physical Exam    Airway    Mallampati score: II  TM Distance: >3 FB  Neck ROM: full     Dental   Comment: Few missing teeth and says that bottom teeth are a little loose      Cardiovascular  Cardiovascular exam normal    Pulmonary  Pulmonary exam normal     Other Findings      Left Ventricle: Left ventricular cavity size is normal. Wall thickness is moderately increased. There is moderate concentric hypertrophy. The left ventricular ejection fraction is 60%. Systolic function is normal. Wall motion is normal. Diastolic function is normal for age.    Aorta: The aortic root is mildly dilated. The ascending aorta is mildly dilated. The aortic root is 3.80 cm. The ascending aorta is 3.9 cm.         Anesthesia Plan  ASA Score- 3     Anesthesia Type- IV sedation with anesthesia with ASA Monitors.         Additional Monitors:     Airway Plan:            Plan Factors-Exercise tolerance (METS): >4 METS.    Chart reviewed. EKG reviewed.  Existing labs reviewed. Patient summary reviewed.    Patient is not a current smoker. Patient not instructed to abstain from smoking on day of procedure. Patient did not smoke on day of surgery.    Obstructive sleep apnea risk education given perioperatively.        Induction-     Postoperative Plan-     Informed Consent- Anesthetic plan and risks discussed with patient.              Sinus tachycardia   Anteroseptal infarct, age undetermined       LAD stent

## 2024-03-20 NOTE — PLAN OF CARE
Problem: INFECTION - ADULT  Goal: Absence or prevention of progression during hospitalization  Description: INTERVENTIONS:  - Assess and monitor for signs and symptoms of infection  - Monitor lab/diagnostic results  - Monitor all insertion sites, i.e. indwelling lines, tubes, and drains  - Monitor endotracheal if appropriate and nasal secretions for changes in amount and color  - Bentley appropriate cooling/warming therapies per order  - Administer medications as ordered  - Instruct and encourage patient and family to use good hand hygiene technique  - Identify and instruct in appropriate isolation precautions for identified infection/condition  Outcome: Progressing

## 2024-03-20 NOTE — ANESTHESIA POSTPROCEDURE EVALUATION
Post-Op Assessment Note    CV Status:  Stable  Pain Score: 0    Pain management: adequate       Mental Status:  Alert   Hydration Status:  Stable   PONV Controlled:  None   Airway Patency:  Patent     Post Op Vitals Reviewed: Yes    No anethesia notable event occurred.    Staff: Anesthesiologist               BP 97/67 (03/20/24 1324)    Temp 97.8 °F (36.6 °C) (03/20/24 1324)    Pulse 59 (03/20/24 1324)   Resp 14 (03/20/24 1324)    SpO2 97 % (03/20/24 1324)

## 2024-03-20 NOTE — PROGRESS NOTES
[facility]  Progress Note  Name: Maciel Gaitan I  MRN: [id number]  Unit/Bed#: -01 I Date of Admission: 3/18/2024   Date of Service: 3/20/2024 I Hospital Day: 2    Assessment/Plan   * Osteomyelitis of right foot (HCC)  Assessment & Plan  Podiatry for or intervention tentatively in AM.  Patient appears acceptable risk for surgery.    S/p surgery involving the right partial first ray resection dehiscence with clinical osteomyelitis today  Benefit of surgery outweighs risk.    Advise against Rocephin as does not cover Enterobacter cloacae on most recent wound Cx.  Podiatry switched to Cipro which I agree with.    Pt likely had surgical cure but podiatry wants further abx to make sure infection also gone from soft tissues  Prior to wound infection patient had been ambulating up to 4 miles a day.  He denies any symptoms of chest pain or shortness of breath        Uncontrolled type 2 diabetes mellitus with hyperglycemia (HCC)  Assessment & Plan  Lab Results   Component Value Date    HGBA1C 8.5 (H) 03/19/2024       Recent Labs     03/19/24  2104 03/20/24  0633 03/20/24  1132 03/20/24  1326   POCGLU 136 134 119 129         Blood Sugar Average: Last 72 hrs:  (P) 120.8  Lantus 30 units with breakfast.  Will reduce to 15 units in AM.  Humalog 10 units 3 times daily with meals.  Sliding scale insulin  Fortunately blood sugar appears stable currently.  Metformin on hold while inpatient.    Hypokalemia  Assessment & Plan  Replete potassium.  Mag WNL      Essential hypertension  Assessment & Plan  Currently on metoprolol and Norvasc therapy  Continue with pain control  Monitor blood pressure closely    CAD (coronary artery disease)  Assessment & Plan   This is a very pleasant 63-year-old gentleman with known history non-STEMI type I prior cardiac catheterization on 12/2017 for non-STEMI type I.  Cardiac catheterization at that time had illustrated a 95% left circumflex and chronic mid  right coronary artery stenosis.  Patient successfully underwent a PCI of left circumflex.  Patient was initially placed on dual antiplatelet therapy and subsequently de-escalated to aspirin daily by cardiolog.  Patient reports that he was walking up to 4 miles a day prior to his initial intervention for his right foot back in February.  He reports that his mobility was limited due to his lower extremity wound and has not been walking as much since that time.  Due to the lower extremity wound.  He denies any history of chest pain or shortness of breath symptoms or exertional dyspnea.  Last echo 24 showing left ventricular cavity size is normal,wall thickness is moderately increased. There is moderate concentric hypertrophy. EF is 60%, systolic function, wall motion and diastolic function are normal.   Patient has been managed on aspirin metoprolol and Lipitor therapy.  Patient also on Norvasc for blood pressure control.                                   VTE Pharmacologic Prophylaxis:    heparin    Mobility:   Basic Mobility Inpatient Raw Score: 19  JH-HLM Goal: 6: Walk 10 steps or more  JH-HLM Achieved: 6: Walk 10 steps or more  JH-HLM Goal achieved. Continue to encourage appropriate mobility.    Patient Centered Rounds: I performed bedside rounds with nursing staff today.   Discussions with Specialists or Other Care Team Provider: podiatry    Education and Discussions with Family / Patient:  per primary.         Current Length of Stay: 2 day(s)  Current Patient Status: Inpatient     Discharge Plan: [facility] is following this patient on consult. They are medically stable for discharge when deemed appropriate by primary service.    Code Status: Level 1 - Full Code    Subjective:   No acute complaints    Objective:     Vitals:   Temp (24hrs), Av.7 °F (36.5 °C), Min:97 °F (36.1 °C), Max:98 °F (36.7 °C)    Temp:  [97 °F (36.1 °C)-98 °F (36.7 °C)] 97.8 °F (36.6 °C)  HR:  [46-65] 47  Resp:  [14-16] 15  BP:  ()/(67-86) 124/77  SpO2:  [97 %-100 %] 100 %  There is no height or weight on file to calculate BMI.     Input and Output Summary (last 24 hours):     Intake/Output Summary (Last 24 hours) at 3/20/2024 1523  Last data filed at 3/19/2024 1700  Gross per 24 hour   Intake 238 ml   Output --   Net 238 ml       Physical Exam:   Physical Exam  HENT:      Head: Normocephalic and atraumatic.      Nose: Nose normal.      Mouth/Throat:      Mouth: Mucous membranes are moist.   Eyes:      Extraocular Movements: Extraocular movements intact.      Conjunctiva/sclera: Conjunctivae normal.   Cardiovascular:      Rate and Rhythm: Normal rate and regular rhythm.   Pulmonary:      Effort: Pulmonary effort is normal.      Breath sounds: Normal breath sounds.   Abdominal:      General: Bowel sounds are normal. There is no distension.      Palpations: Abdomen is soft.      Tenderness: There is no abdominal tenderness.   Musculoskeletal:         General: Normal range of motion.      Cervical back: Normal range of motion and neck supple.      Right lower leg: No edema.      Left lower leg: No edema.   Skin:     General: Skin is warm and dry.   Neurological:      Mental Status: He is alert and oriented to person, place, and time.          Additional Data:     Labs:  Results from last 7 days   Lab Units 03/20/24  1144   WBC Thousand/uL 5.10   HEMOGLOBIN g/dL 11.8*   HEMATOCRIT % 37.5   PLATELETS Thousands/uL 350   NEUTROS PCT % 59   LYMPHS PCT % 21   MONOS PCT % 13*   EOS PCT % 6     Results from last 7 days   Lab Units 03/20/24  1144 03/19/24  0525 03/18/24  1154   SODIUM mmol/L 142   < > 137   POTASSIUM mmol/L 3.8   < > 4.0   CHLORIDE mmol/L 108   < > 106   CO2 mmol/L 26   < > 22   BUN mg/dL 28*   < > 23   CREATININE mg/dL 0.97   < > 0.94   ANION GAP mmol/L 8   < > 9   CALCIUM mg/dL 8.7   < > 8.5   ALBUMIN g/dL  --   --  3.6   TOTAL BILIRUBIN mg/dL  --   --  0.51   ALK PHOS U/L  --   --  66   ALT U/L  --   --  19   AST U/L  --   --   19   GLUCOSE RANDOM mg/dL 123   < > 122    < > = values in this interval not displayed.     Results from last 7 days   Lab Units 03/18/24  1154   INR  1.15     Results from last 7 days   Lab Units 03/20/24  1326 03/20/24  1132 03/20/24  0633 03/19/24  2104 03/19/24  1814 03/19/24  1647 03/19/24  1033 03/19/24  0643 03/19/24  0524 03/18/24  2128   POC GLUCOSE mg/dl 129 119 134 136 148* 90 105 124 112 111     Results from last 7 days   Lab Units 03/19/24  1649   HEMOGLOBIN A1C % 8.5*           Lines/Drains:  Invasive Devices       Peripheral Intravenous Line  Duration             Peripheral IV 03/18/24 Dorsal (posterior);Left Hand 2 days              Airway  Duration             Non-Surgical Airway Oral pharyngeal airway <1 day                          Imaging: No pertinent imaging reviewed.    Recent Cultures (last 7 days):   Results from last 7 days   Lab Units 03/18/24  1900 03/18/24  1154 03/18/24  0000   BLOOD CULTURE  No Growth at 24 hrs. No Growth at 24 hrs.  --    GRAM STAIN RESULT   --   --  1+ Disintegrating polys*  2+ Gram negative rods*   WOUND CULTURE   --   --  2+ Growth of Enterobacter cloacae*       Last 24 Hours Medication List:   Current Facility-Administered Medications   Medication Dose Route Frequency Provider Last Rate    amLODIPine  5 mg Oral Daily Marilu Cortez DPM      aspirin  81 mg Oral Daily Marilu Cortez DPM      atorvastatin  40 mg Oral QPM Marilu Cortez DPM      [START ON 3/21/2024] ciprofloxacin  400 mg Intravenous Q12H Vicente Zuleta DPM      heparin (porcine)  5,000 Units Subcutaneous Q8H [facility] Marilu Cortez DPM      insulin glargine  15 Units Subcutaneous Daily With Breakfast Marilu Cortez DPM      insulin lispro  1-5 Units Subcutaneous HS Marilu Cortez DPM      insulin lispro  10 Units Subcutaneous Daily With Breakfast Marilu Cortez DPM      insulin lispro  10 Units Subcutaneous Daily With Lunch Marilu Cortez DPM      insulin lispro  10 Units Subcutaneous Daily With Dinner  Marilu Cortez DPM      insulin lispro  2-12 Units Subcutaneous TID AC Marilu Cortez DPM      metoprolol succinate  50 mg Oral Daily Marilu Cortez DPM          Today, Patient Was Seen By: Santos Reynoso DO    **Please Note: This note may have been constructed using a voice recognition system.**

## 2024-03-20 NOTE — OP NOTE
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) [Z89.411]     * Osteomyelitis of right foot, unspecified type (HCC) [M86.9]    Procedure(s) (LRB):  First ray resection, metatarsal stump resection (Right)    Specimen(s):  ID Type Source Tests Collected by Time Destination   1 : first metatarsal Tissue Foot, Right TISSUE EXAM Ryland Domingo DPM 3/20/2024 1258        Estimated Blood Loss:   Minimal    Drains:  None    Anesthesia Type:   Choice with 20 ml of 1% Lidocaine and 0.5% Bupivacaine in a 1:1 mixture    Hemostasis:  Direct compression, electrocautery    Materials:  2-0 Nylon  1/2 inch iodoform packing, entire bottle    Injectables:  None    Operative Findings:  - We believe to have obtained surgical cure of bone infection with removal of the remaining first metatarsal bone  - No sinus tracts or purulence appreciated  - Adequate bleeding noted to surgical site and tissue margins  - Partial closure achieved, distal portion of surgical site packed open. Plan for wound vac application at bedside tomorrow, 3/21/24  - Continue antibiotics post operatively to complete course for residual skin and soft tissue infection    Complications:   None    Procedure and Technique:     Under mild sedation, the patient was brought into the operating room and placed on the operating room table in the supine position. IV sedation was achieved by anesthesia team and a universal timeout was performed where all parties are in agreement of correct patient, correct procedure and correct site. A  proximal agee block was performed consisting of 20 ml of 1% Lidocaine and 0.5% Bupivacaine in a 1:1 mixture. The foot was then prepped and draped in the usual aseptic manner.    Attention was directed to the medial right foot. An elliptical incision drawn out over the existing partial first ray wound dehiscence site. Using a #15 blade, the incision was made straight down to bone to resect the wound margins and expose the underlying first metatarsal stump.  Soft tissue attachments were freed off the first metatarsal shaft and base using a blade and periosteal elevator to expose the first tarsometatarsal cuneiform joint.  Under atraumatic technique, the first metatarsal stump was disarticulated at the level of the first tarso-metatarsal joint and passed off to the back table. The medial cuneiform was noted to be of hard and viable quality. The surgical site was inspected, no purulence or sinus tracts appreciated. Any nonviable or necrotic tissue was removed using a rongeur. Adequate bleeding to surgical site and tissue margins.    The surgical incision was irrigated with copious amounts of normal sterile saline. Skin edges at proximal aspect of  were reapproximated and partial closure was obtained utilizing 2-0 Nylon. The distal portion of the surgical site was packed open using an entire bottle of 1/2 inch iodoform packing The foot was then cleansed and dried. The incision site was dressed with gauze. This was then covered with a kerlex and an ACE wrap.     The patient tolerated the procedure and anesthesia well without immediate complications and transferred to PACU with vital signs stable.     As with many limb salvage procedures, we contemplate the possibility of performing further stages to this procedure. Procedures may include debridements, delayed closure, plastic surgery techniques, or more proximal amputations. This procedure may be considered part of a multi-staged limb salvage treatment plan.     The  "patient will remain strict nonweightbearing to his right lower extremity    Dr. Domingo was present during the entire procedure and participated in all key aspects.    SIGNATURE: Marilu Cortez DPM  DATE: March 20, 2024  TIME: 1:15 PM      Portions of the record may have been created with voice recognition software. Occasional wrong word or \"sound a like\" substitutions may have occurred due to the inherent limitations of voice recognition software. Read the chart carefully and recognize, using context, where substitutions have occurred.                "

## 2024-03-21 LAB
ANION GAP SERPL CALCULATED.3IONS-SCNC: 9 MMOL/L (ref 4–13)
BASOPHILS # BLD AUTO: 0.05 THOUSANDS/ÂΜL (ref 0–0.1)
BASOPHILS NFR BLD AUTO: 1 % (ref 0–1)
BUN SERPL-MCNC: 28 MG/DL (ref 5–25)
CALCIUM SERPL-MCNC: 8.5 MG/DL (ref 8.4–10.2)
CHLORIDE SERPL-SCNC: 107 MMOL/L (ref 96–108)
CO2 SERPL-SCNC: 25 MMOL/L (ref 21–32)
CREAT SERPL-MCNC: 0.93 MG/DL (ref 0.6–1.3)
EOSINOPHIL # BLD AUTO: 0.36 THOUSAND/ÂΜL (ref 0–0.61)
EOSINOPHIL NFR BLD AUTO: 5 % (ref 0–6)
ERYTHROCYTE [DISTWIDTH] IN BLOOD BY AUTOMATED COUNT: 14.4 % (ref 11.6–15.1)
GFR SERPL CREATININE-BSD FRML MDRD: 87 ML/MIN/1.73SQ M
GLUCOSE SERPL-MCNC: 108 MG/DL (ref 65–140)
GLUCOSE SERPL-MCNC: 109 MG/DL (ref 65–140)
GLUCOSE SERPL-MCNC: 119 MG/DL (ref 65–140)
GLUCOSE SERPL-MCNC: 146 MG/DL (ref 65–140)
GLUCOSE SERPL-MCNC: 84 MG/DL (ref 65–140)
HCT VFR BLD AUTO: 33.6 % (ref 36.5–49.3)
HGB BLD-MCNC: 10.7 G/DL (ref 12–17)
IMM GRANULOCYTES # BLD AUTO: 0.03 THOUSAND/UL (ref 0–0.2)
IMM GRANULOCYTES NFR BLD AUTO: 0 % (ref 0–2)
LYMPHOCYTES # BLD AUTO: 1.29 THOUSANDS/ÂΜL (ref 0.6–4.47)
LYMPHOCYTES NFR BLD AUTO: 17 % (ref 14–44)
MCH RBC QN AUTO: 27.1 PG (ref 26.8–34.3)
MCHC RBC AUTO-ENTMCNC: 31.8 G/DL (ref 31.4–37.4)
MCV RBC AUTO: 85 FL (ref 82–98)
MONOCYTES # BLD AUTO: 0.98 THOUSAND/ÂΜL (ref 0.17–1.22)
MONOCYTES NFR BLD AUTO: 13 % (ref 4–12)
NEUTROPHILS # BLD AUTO: 4.86 THOUSANDS/ÂΜL (ref 1.85–7.62)
NEUTS SEG NFR BLD AUTO: 64 % (ref 43–75)
NRBC BLD AUTO-RTO: 0 /100 WBCS
PLATELET # BLD AUTO: 333 THOUSANDS/UL (ref 149–390)
PMV BLD AUTO: 8.8 FL (ref 8.9–12.7)
POTASSIUM SERPL-SCNC: 3.7 MMOL/L (ref 3.5–5.3)
RBC # BLD AUTO: 3.95 MILLION/UL (ref 3.88–5.62)
SODIUM SERPL-SCNC: 141 MMOL/L (ref 135–147)
WBC # BLD AUTO: 7.57 THOUSAND/UL (ref 4.31–10.16)

## 2024-03-21 PROCEDURE — 80048 BASIC METABOLIC PNL TOTAL CA: CPT

## 2024-03-21 PROCEDURE — 99232 SBSQ HOSP IP/OBS MODERATE 35: CPT | Performed by: GENERAL PRACTICE

## 2024-03-21 PROCEDURE — 97535 SELF CARE MNGMENT TRAINING: CPT

## 2024-03-21 PROCEDURE — 97605 NEG PRS WND THER DME<=50SQCM: CPT | Performed by: PODIATRIST

## 2024-03-21 PROCEDURE — 85025 COMPLETE CBC W/AUTO DIFF WBC: CPT

## 2024-03-21 PROCEDURE — 97530 THERAPEUTIC ACTIVITIES: CPT

## 2024-03-21 PROCEDURE — 82948 REAGENT STRIP/BLOOD GLUCOSE: CPT

## 2024-03-21 PROCEDURE — 97116 GAIT TRAINING THERAPY: CPT

## 2024-03-21 RX ORDER — INSULIN LISPRO 100 [IU]/ML
7 INJECTION, SOLUTION INTRAVENOUS; SUBCUTANEOUS
Status: DISCONTINUED | OUTPATIENT
Start: 2024-03-21 | End: 2024-03-21

## 2024-03-21 RX ADMIN — ATORVASTATIN CALCIUM 40 MG: 40 TABLET, FILM COATED ORAL at 17:35

## 2024-03-21 RX ADMIN — INSULIN GLARGINE 15 UNITS: 100 INJECTION, SOLUTION SUBCUTANEOUS at 08:50

## 2024-03-21 RX ADMIN — CIPROFLOXACIN 400 MG: 2 INJECTION, SOLUTION INTRAVENOUS at 11:49

## 2024-03-21 RX ADMIN — INSULIN LISPRO 10 UNITS: 100 INJECTION, SOLUTION INTRAVENOUS; SUBCUTANEOUS at 08:50

## 2024-03-21 RX ADMIN — ACETAMINOPHEN 650 MG: 325 TABLET, FILM COATED ORAL at 15:52

## 2024-03-21 RX ADMIN — CIPROFLOXACIN 400 MG: 2 INJECTION, SOLUTION INTRAVENOUS at 23:35

## 2024-03-21 RX ADMIN — ACETAMINOPHEN 650 MG: 325 TABLET, FILM COATED ORAL at 21:22

## 2024-03-21 RX ADMIN — HEPARIN SODIUM 5000 UNITS: 5000 INJECTION INTRAVENOUS; SUBCUTANEOUS at 21:19

## 2024-03-21 RX ADMIN — ACETAMINOPHEN 650 MG: 325 TABLET, FILM COATED ORAL at 05:35

## 2024-03-21 RX ADMIN — METOPROLOL SUCCINATE 50 MG: 50 TABLET, EXTENDED RELEASE ORAL at 08:49

## 2024-03-21 RX ADMIN — ASPIRIN 81 MG CHEWABLE TABLET 81 MG: 81 TABLET CHEWABLE at 08:49

## 2024-03-21 RX ADMIN — HEPARIN SODIUM 5000 UNITS: 5000 INJECTION INTRAVENOUS; SUBCUTANEOUS at 05:35

## 2024-03-21 RX ADMIN — AMLODIPINE BESYLATE 5 MG: 5 TABLET ORAL at 08:49

## 2024-03-21 RX ADMIN — HEPARIN SODIUM 5000 UNITS: 5000 INJECTION INTRAVENOUS; SUBCUTANEOUS at 13:33

## 2024-03-21 RX ADMIN — INSULIN LISPRO 7 UNITS: 100 INJECTION, SOLUTION INTRAVENOUS; SUBCUTANEOUS at 11:57

## 2024-03-21 NOTE — PLAN OF CARE
Problem: OCCUPATIONAL THERAPY ADULT  Goal: Performs self-care activities at highest level of function for planned discharge setting.  See evaluation for individualized goals.  Description: Treatment Interventions: ADL retraining, Functional transfer training, Endurance training, Cognitive reorientation, Equipment evaluation/education, Compensatory technique education, Continued evaluation, Activityengagement, Energy conservation  Equipment Recommended: (S) Shower/Tub chair with back ($)       See flowsheet documentation for full assessment, interventions and recommendations.   Outcome: Adequate for Discharge  Note: Limitation: Decreased Safe judgement during ADL, Decreased endurance, Decreased high-level ADLs  Prognosis: Fair  Assessment: Pt seen for OT treatment session today. Pt is s/p 1st ray resection, metatarsal stump resection 3/20/24. He remains NWB to RLE. Pt demonstrates independent bedmobility w good sitting balance. He is mod I sit to stand and able to hop to bathroom using RW. He is able to maintain NWB to RLE throughout entire session. Pt able to stand x 5+ min w support of RW in order to complete simple grooming and self care. He can bathe and dress himself. He will be going home to live w his wife who is able to assist should he need help. Pt educated on ECT/self pacing skills, fall prevention strategies. At this time, ongoing skilled OT treatment is not indicated while in acute care. DC OT.     Rehab Resource Intensity Level, OT: No post-acute rehabilitation needs

## 2024-03-21 NOTE — ASSESSMENT & PLAN NOTE
Lab Results   Component Value Date    HGBA1C 8.5 (H) 03/19/2024       Recent Labs     03/20/24  2055 03/21/24  0742 03/21/24  1054 03/21/24  1556   POCGLU 110 119 108 84         Blood Sugar Average: Last 72 hrs:  (P) 116.2  C/w Lantus 15 units daily while IP  Stop Humalog AC  Sliding scale insulin  Fortunately blood sugar appears stable currently.  Metformin on hold while inpatient - Would d/c on Metformin 1000 mg bid

## 2024-03-21 NOTE — OCCUPATIONAL THERAPY NOTE
Occupational Therapy Treatment Note      Maciel Gaitan    3/21/2024    Principal Problem:    Osteomyelitis of right foot ([facility])  Active Problems:    Uncontrolled type 2 diabetes mellitus with hyperglycemia ([facility])    CAD (coronary artery disease)    Essential hypertension    Hypokalemia      Past Medical History:   Diagnosis Date    Hyperlipidemia     NSTEMI, initial episode of care ([facility]) 01/02/2018       Past Surgical History:   Procedure Laterality Date    EYE SURGERY      TOE AMPUTATION Right 2/16/2024    Procedure: PARTIAL 1st RAY AMPUTATION, RIGHT FOOT REMOVAL OF ALL NON-VIABLE BONE AND SOFT TISSUE;  Surgeon: Severo Person DPM;  Location: [facility];  Service: Podiatry    WISDOM TOOTH EXTRACTION          03/21/24 1233   OT Last Visit   OT Visit Date 03/21/24   Note Type   Note Type Treatment   Pain Assessment   Pain Assessment Tool 0-10   Pain Score No Pain   Restrictions/Precautions   Weight Bearing Precautions Per Order Yes   RLE Weight Bearing Per Order NWB   Other Precautions WBS;Fall Risk   Lifestyle   Autonomy I w/ ADLS/IADLS, transfers and functional mobility PTA   Reciprocal Relationships Pt lives w/ his spouse   Service to Others works full time; instructor for [employer]   Intrinsic Gratification Enjoys being w/ family   ADL   Eating Assistance 7  Independent   Grooming Assistance 7  Independent   UB Bathing Assistance 7  Independent   LB Bathing Assistance 5  Supervision/Setup   LB Bathing Deficit Setup   UB Dressing Assistance 7  Independent   LB Dressing Assistance 5  Supervision/Setup   Toileting Assistance  5  Supervision/Setup   Functional Standing Tolerance   Time 5+ min   Activity standing on LLE only, support of RW   Comments hops w RW. able to maintain NWB to RLE   Bed Mobility   Rolling R 7  Independent   Rolling L 7  Independent   Supine to Sit 7  Independent   Sit to Supine 7  Independent   Transfers   Sit to Stand 6  Modified independent   Stand to Sit 6  Modified  "independent   Stand pivot 6  Modified independent   Toilet transfer 6  Modified independent   Additional items Standard toilet   Additional Comments use of RW   Functional Mobility   Functional Mobility 6  Modified independent   Additional Comments hopping w use of RW   Additional items Rolling walker   Toilet Transfers   Toilet Transfer From Rolling walker   Toilet Transfer Type To and from   Toilet Transfer to Standard toilet   Toilet Transfer Technique Ambulating   Toilet Transfers Modified independence   Subjective   Subjective \"I know not to put any weight on my leg\"   Cognition   Overall Cognitive Status WFL   Arousal/Participation Alert;Cooperative   Attention Within functional limits   Orientation Level Oriented X4   Memory Within functional limits   Following Commands Follows all commands and directions without difficulty   Activity Tolerance   Activity Tolerance Patient tolerated treatment well   Assessment   Assessment Pt seen for OT treatment session today. Pt is s/p 1st ray resection, metatarsal stump resection 3/20/24. He remains NWB to RLE. Pt demonstrates independent bedmobility w good sitting balance. He is mod I sit to stand and able to hop to bathroom using RW. He is able to maintain NWB to RLE throughout entire session. Pt able to stand x 5+ min w support of RW in order to complete simple grooming and self care. He can bathe and dress himself. He will be going home to live w his wife who is able to assist should he need help. Pt educated on ECT/self pacing skills, fall prevention strategies. At this time, ongoing skilled OT treatment is not indicated while in acute care. DC OT.   Plan   Treatment Interventions ADL retraining;Functional transfer training;Endurance training;Patient/family training;Compensatory technique education;Energy conservation;Activityengagement   Goal Expiration Date 04/02/24   OT Treatment Day 1   OT Frequency Other (comment)  (DC OT)   Discharge Recommendation   Rehab " Resource Intensity Level, OT No post-acute rehabilitation needs   Equipment Recommended Shower/Tub chair with back ($)   AM-PAC Daily Activity Inpatient   Lower Body Dressing 3   Bathing 3   Toileting 4   Upper Body Dressing 4   Grooming 4   Eating 4   Daily Activity Raw Score 22   Daily Activity Standardized Score (Calc for Raw Score >=11) 47.1

## 2024-03-21 NOTE — PROGRESS NOTES
[facility]  Progress Note  Name: Maciel Gaitan I  MRN: [id number]  Unit/Bed#: -01 I Date of Admission: 3/18/2024   Date of Service: 3/21/2024 I Hospital Day: 3    Assessment/Plan   * Osteomyelitis of right foot (HCC)  Assessment & Plan  Podiatry for or intervention tentatively in AM.  Patient appears acceptable risk for surgery.    S/p surgery involving the right partial first ray resection dehiscence with clinical osteomyelitis today  Benefit of surgery outweighs risk.    Advise against Rocephin as does not cover Enterobacter cloacae on most recent wound Cx.  Podiatry switched to Cipro which I agree with.    Pt likely had surgical cure but podiatry wants further abx to make sure infection also gone from soft tissues  Prior to wound infection patient had been ambulating up to 4 miles a day.  He denies any symptoms of chest pain or shortness of breath        Uncontrolled type 2 diabetes mellitus with hyperglycemia (HCC)  Assessment & Plan  Lab Results   Component Value Date    HGBA1C 8.5 (H) 03/19/2024       Recent Labs     03/20/24  2055 03/21/24  0742 03/21/24  1054 03/21/24  1556   POCGLU 110 119 108 84         Blood Sugar Average: Last 72 hrs:  (P) 116.2  C/w Lantus 15 units daily while IP  Stop Humalog AC  Sliding scale insulin  Fortunately blood sugar appears stable currently.  Metformin on hold while inpatient - Would d/c on Metformin 1000 mg bid    Hypokalemia  Assessment & Plan  Replete potassium.  Mag WNL      Essential hypertension  Assessment & Plan  Currently on metoprolol and Norvasc therapy  Continue with pain control  Monitor blood pressure closely    CAD (coronary artery disease)  Assessment & Plan   This is a very pleasant 63-year-old gentleman with known history non-STEMI type I prior cardiac catheterization on 12/2017 for non-STEMI type I.  Cardiac catheterization at that time had illustrated a 95% left circumflex and chronic mid right coronary artery  stenosis.  Patient successfully underwent a PCI of left circumflex.  Patient was initially placed on dual antiplatelet therapy and subsequently de-escalated to aspirin daily by cardiolog.  Patient reports that he was walking up to 4 miles a day prior to his initial intervention for his right foot back in February.  He reports that his mobility was limited due to his lower extremity wound and has not been walking as much since that time.  Due to the lower extremity wound.  He denies any history of chest pain or shortness of breath symptoms or exertional dyspnea.  Last echo 24 showing left ventricular cavity size is normal,wall thickness is moderately increased. There is moderate concentric hypertrophy. EF is 60%, systolic function, wall motion and diastolic function are normal.   Patient has been managed on aspirin metoprolol and Lipitor therapy.  Patient also on Norvasc for blood pressure control.                                     Mobility:   Basic Mobility Inpatient Raw Score: 23  JH-HLM Goal: 7: Walk 25 feet or more  JH-HLM Achieved: 7: Walk 25 feet or more  JH-HLM Goal achieved. Continue to encourage appropriate mobility.    Patient Centered Rounds: I performed bedside rounds with nursing staff today.   Discussions with Specialists or Other Care Team Provider: not today    Education and Discussions with Family / Patient:  per primary.         Current Length of Stay: 3 day(s)  Current Patient Status: Inpatient     Discharge Plan: [facility] is following this patient on consult. They are medically stable for discharge when deemed appropriate by primary service.    Code Status: Level 1 - Full Code    Subjective:   No acute complaints    Objective:     Vitals:   Temp (24hrs), Av.1 °F (36.7 °C), Min:97.9 °F (36.6 °C), Max:98.3 °F (36.8 °C)    Temp:  [97.9 °F (36.6 °C)-98.3 °F (36.8 °C)] 98.3 °F (36.8 °C)  HR:  [57-73] 65  Resp:  [16-18] 18  BP: (123-133)/(70-75) 130/70  SpO2:  [94 %-99 %] 97 %  There is no height  or weight on file to calculate BMI.     Input and Output Summary (last 24 hours):     Intake/Output Summary (Last 24 hours) at 3/21/2024 1607  Last data filed at 3/21/2024 0744  Gross per 24 hour   Intake 118 ml   Output --   Net 118 ml       Physical Exam:   Physical Exam  HENT:      Head: Normocephalic and atraumatic.      Nose: Nose normal.      Mouth/Throat:      Mouth: Mucous membranes are moist.   Eyes:      Extraocular Movements: Extraocular movements intact.      Conjunctiva/sclera: Conjunctivae normal.   Cardiovascular:      Rate and Rhythm: Normal rate and regular rhythm.   Pulmonary:      Effort: Pulmonary effort is normal.      Breath sounds: Normal breath sounds.   Abdominal:      General: Bowel sounds are normal.      Palpations: Abdomen is soft.   Musculoskeletal:         General: Normal range of motion.      Cervical back: Normal range of motion and neck supple.      Right lower leg: No edema.      Left lower leg: No edema.   Skin:     General: Skin is warm and dry.   Neurological:      Mental Status: He is alert and oriented to person, place, and time.          Additional Data:     Labs:  Results from last 7 days   Lab Units 03/21/24  0545   WBC Thousand/uL 7.57   HEMOGLOBIN g/dL 10.7*   HEMATOCRIT % 33.6*   PLATELETS Thousands/uL 333   NEUTROS PCT % 64   LYMPHS PCT % 17   MONOS PCT % 13*   EOS PCT % 5     Results from last 7 days   Lab Units 03/21/24  0545 03/19/24  0525 03/18/24  1154   SODIUM mmol/L 141   < > 137   POTASSIUM mmol/L 3.7   < > 4.0   CHLORIDE mmol/L 107   < > 106   CO2 mmol/L 25   < > 22   BUN mg/dL 28*   < > 23   CREATININE mg/dL 0.93   < > 0.94   ANION GAP mmol/L 9   < > 9   CALCIUM mg/dL 8.5   < > 8.5   ALBUMIN g/dL  --   --  3.6   TOTAL BILIRUBIN mg/dL  --   --  0.51   ALK PHOS U/L  --   --  66   ALT U/L  --   --  19   AST U/L  --   --  19   GLUCOSE RANDOM mg/dL 109   < > 122    < > = values in this interval not displayed.     Results from last 7 days   Lab Units 03/18/24  1157    INR  1.15     Results from last 7 days   Lab Units 03/21/24  1556 03/21/24  1054 03/21/24  0742 03/20/24  2055 03/20/24  1659 03/20/24  1326 03/20/24  1132 03/20/24  0633 03/19/24  2104 03/19/24  1814 03/19/24  1647 03/19/24  1033   POC GLUCOSE mg/dl 84 108 119 110 114 129 119 134 136 148* 90 105     Results from last 7 days   Lab Units 03/19/24  1649   HEMOGLOBIN A1C % 8.5*           Lines/Drains:  Invasive Devices       Peripheral Intravenous Line  Duration             Peripheral IV 03/18/24 Dorsal (posterior);Left Hand 3 days              Airway  Duration             Non-Surgical Airway Oral pharyngeal airway 1 day                          Imaging: No pertinent imaging reviewed.    Recent Cultures (last 7 days):   Results from last 7 days   Lab Units 03/18/24  1900 03/18/24  1154 03/18/24  0000   BLOOD CULTURE  No Growth at 48 hrs. No Growth at 48 hrs.  --    GRAM STAIN RESULT   --   --  1+ Disintegrating polys*  2+ Gram negative rods*   WOUND CULTURE   --   --  2+ Growth of Enterobacter cloacae*       Last 24 Hours Medication List:   Current Facility-Administered Medications   Medication Dose Route Frequency Provider Last Rate    acetaminophen  650 mg Oral Q6H PRN Juancho Jamil DPM      amLODIPine  5 mg Oral Daily Marilu Cortez DPM      aspirin  81 mg Oral Daily Marilu Cortez DPM      atorvastatin  40 mg Oral QPM Marilu Cortez DPM      ciprofloxacin  400 mg Intravenous Q12H Vicente Zuleta  mg (03/21/24 1149)    heparin (porcine)  5,000 Units Subcutaneous Q8H ISABELLE Marilu Cortez DPM      insulin glargine  15 Units Subcutaneous Daily With Breakfast Marilu Cortez DPM      insulin lispro  1-5 Units Subcutaneous HS Marilu Cortez DPM      insulin lispro  2-12 Units Subcutaneous TID AC Marilu Cortez DPM      metoprolol succinate  50 mg Oral Daily Marilu Cortez DPM          Today, Patient Was Seen By: Santos Reynoso DO    **Please Note: This note may have been constructed using a voice  recognition system.**

## 2024-03-21 NOTE — CASE MANAGEMENT
Case Management Assessment & Discharge Planning Note    Patient name Maciel Gaitan  Location /-01 MRN [id number]  : 1960 Date 3/21/2024       Current Admission Date: 3/18/2024  Current Admission Diagnosis:Osteomyelitis of right foot (HCC)   Patient Active Problem List    Diagnosis Date Noted    Osteomyelitis of right foot (HCC) 2024    History of partial ray amputation of right great toe (HCC) 2024    Hypokalemia 2024    Osteomyelitis of great toe of right foot (HCC) 2024    Family history of colon cancer 01/15/2024    Essential hypertension 2018    Hyperlipidemia 2018    Nocturia 2018    CAD (coronary artery disease) 2017    Uncontrolled type 2 diabetes mellitus with hyperglycemia (HCC) 2017      LOS (days): 3  Geometric Mean LOS (GMLOS) (days):   Days to GMLOS:     OBJECTIVE:  PATIENT READMITTED TO HOSPITAL  Risk of Unplanned Readmission Score: 14.27         Current admission status: Inpatient       Preferred Pharmacy:   [facility]/pharmacy #[id number] - [place] - [address] [place] [address]  Phone: [phone] Fax: [phone]    [facility] [place] - [facility] [address]  Phone: [phone] Fax: [phone]    Primary Care Provider: MITA Choudhury    Primary Insurance: BLUE CROSS  Secondary Insurance:     ASSESSMENT:  Active Health Care Proxies    There are no active Health Care Proxies on file.                 Readmission Root Cause  30 Day Readmission: Yes  Who directed you to return to the hospital?: Specialist (At podiatry appt and was sent back to the hospital)  Did you understand whom to contact if you had questions or problems?: Yes  Did you get your prescriptions before you left the hospital?: Yes  Were you able to get your prescriptions filled when you left the hospital?: Yes  Did you take your medications as  prescribed?: Yes  Were you able to get to your follow-up appointments?: Yes  During previous admission, was a post-acute recommendation made?: No  Patient was readmitted due to: Ostwomyelitis of foot    Patient Information  Admitted from:: Home  Mental Status: Alert  During Assessment patient was accompanied by: Not accompanied during assessment  Assessment information provided by:: Patient  Primary Caregiver: Self  Support Systems: Self, Spouse/significant other, Son  County of Residence: [place]  What city do you live in?: [place]  Home entry access options. Select all that apply.: Stairs  Number of steps to enter home.: 2  Do the steps have railings?: No  Type of Current Residence: 2 story home  Upon entering residence, is there a bedroom on the main floor (no further steps)?: No (Pt stays in the sleeper in the living room right now)  A bedroom is located on the following floor levels of residence (select all that apply):: 2nd Floor  Upon entering residence, is there a bathroom on the main floor (no further steps)?: Yes (1/2 bath only)  Number of steps to 2nd floor from main floor: One Flight  Living Arrangements: Lives w/ Spouse/significant other    Activities of Daily Living Prior to Admission  Functional Status: Independent  Completes ADLs independently?: Yes  Ambulates independently?: Yes  Does patient use assisted devices?: No  Does patient currently own DME?: Yes  What DME does the patient currently own?: Shower Chair, Crutches, Other (Comment) (Pt has a knee scooter that he can use)  Does patient have a history of Outpatient Therapy (PT/OT)?: No  Does the patient have a history of Short-Term Rehab?: No  Does patient have a history of HHC?: No  Does patient currently have HHC?: No         Patient Information Continued  Income Source: Employed  Does patient have prescription coverage?: Yes  Does patient receive dialysis treatments?: No  Does patient have a history of substance abuse?: No  Does patient  have a history of Mental Health Diagnosis?: No         Means of Transportation  Means of Transport to Appts:: Drives Self      Social Determinants of Health (SDOH)      Flowsheet Row Most Recent Value   Housing Stability    In the last 12 months, was there a time when you were not able to pay the mortgage or rent on time? N   In the last 12 months, was there a time when you did not have a steady place to sleep or slept in a shelter (including now)? N   Transportation Needs    In the past 12 months, has lack of transportation kept you from medical appointments or from getting medications? no   In the past 12 months, has lack of transportation kept you from meetings, work, or from getting things needed for daily living? No   Food Insecurity    Within the past 12 months, you worried that your food would run out before you got the money to buy more. Never true   Within the past 12 months, the food you bought just didn't last and you didn't have money to get more. Never true   Utilities    In the past 12 months has the electric, gas, oil, or water company threatened to shut off services in your home? No            DISCHARGE DETAILS:    Discharge planning discussed with:: Patient  Freedom of Choice: Yes  Comments - Freedom of Choice: Patient is interested in VNA service to help with wound care when he is d/c home.  CM contacted family/caregiver?: No- see comments (Pt is AAOX4 and able to provide info)     Did patient/caregiver verbalize understanding of patient care needs?: Yes  Were patient/caregiver advised of the risks associated with not following Treatment Team discharge recommendations?: Yes         Requested Home Health Care         Is the patient interested in HHC at discharge?: Yes  Home Health Discipline requested:: Nursing  Home Health Agency Name:: [facility]  Home Health Follow-Up Provider:: PCP  Home Health Services Needed:: Wound/Ostomy Care, Post-Op Care and Assessment  Homebound Criteria Met::  Requires the Assistance of Another Person for Safe Ambulation or to Leave the Home  Supporting Clincal Findings:: Limited Endurance    DME Referral Provided  Referral made for DME?: No    Other Referral/Resources/Interventions Provided:  Interventions: [facility]         Treatment Team Recommendation: Home with Home Health Care  Discharge Destination Plan:: Home with Home Health Care  Transport at Discharge : Family

## 2024-03-21 NOTE — PLAN OF CARE
Problem: PAIN - ADULT  Goal: Verbalizes/displays adequate comfort level or baseline comfort level  Description: Interventions:  - Encourage patient to monitor pain and request assistance  - Assess pain using appropriate pain scale  - Administer analgesics based on type and severity of pain and evaluate response  - Implement non-pharmacological measures as appropriate and evaluate response  - Consider cultural and social influences on pain and pain management  - Notify physician/advanced practitioner if interventions unsuccessful or patient reports new pain  Outcome: Progressing     Problem: INFECTION - ADULT  Goal: Absence or prevention of progression during hospitalization  Description: INTERVENTIONS:  - Assess and monitor for signs and symptoms of infection  - Monitor lab/diagnostic results  - Monitor all insertion sites, i.e. indwelling lines, tubes, and drains  - Monitor endotracheal if appropriate and nasal secretions for changes in amount and color  - [place] appropriate cooling/warming therapies per order  - Administer medications as ordered  - Instruct and encourage patient and family to use good hand hygiene technique  - Identify and instruct in appropriate isolation precautions for identified infection/condition  Outcome: Progressing     Problem: SAFETY ADULT  Goal: Patient will remain free of falls  Description: INTERVENTIONS:  - Educate patient/family on patient safety including physical limitations  - Instruct patient to call for assistance with activity   - Consult OT/PT to assist with strengthening/mobility   - Keep Call bell within reach  - Keep bed low and locked with side rails adjusted as appropriate  - Keep care items and personal belongings within reach  - Initiate and maintain comfort rounds  - Make Fall Risk Sign visible to staff  - Offer Toileting every 2 Hours, in advance of need  - Initiate/Maintain 24/7 alarm  - Obtain necessary fall risk management equipment: rw  - Apply yellow socks  and bracelet for high fall risk patients  - Consider moving patient to room near nurses station  Outcome: Progressing

## 2024-03-21 NOTE — PHYSICAL THERAPY NOTE
PHYSICAL THERAPY TREATMENT  NAME:  Maciel Gaitan  DATE: 03/21/24    AGE:   63 y.o.  Mrn:   [id number]  ADMIT DX:  Essential hypertension [I10]  Wound infection [T14.8XXA, L08.9]  Visit for wound check [Z51.89]  Encounter for post surgical wound check [Z48.89]  Uncontrolled type 2 diabetes mellitus with hyperglycemia (HCC) [E11.65]  History of partial ray amputation of right great toe ([facility]) [Z89.411]    Past Medical History:   Diagnosis Date    Hyperlipidemia     NSTEMI, initial episode of care ([facility]) 01/02/2018     Past Surgical History:   Procedure Laterality Date    EYE SURGERY      AK AMPUTATION METATARSAL W/TOE SINGLE Right 3/20/2024    Procedure: First ray resection, metatarsal stump resection;  Surgeon: Ryland Domingo DPM;  Location: [facility];  Service: Podiatry    TOE AMPUTATION Right 2/16/2024    Procedure: PARTIAL 1st RAY AMPUTATION, RIGHT FOOT REMOVAL OF ALL NON-VIABLE BONE AND SOFT TISSUE;  Surgeon: Severo Person DPM;  Location[facility];  Service: Podiatry    WISDOM TOOTH EXTRACTION         Length Of Stay: 3         03/21/24 1156   PT Last Visit   PT Visit Date 03/21/24   Note Type   Note Type Treatment   Pain Assessment   Pain Assessment Tool 0-10   Pain Score No Pain   Restrictions/Precautions   Weight Bearing Precautions Per Order Yes   RLE Weight Bearing Per Order (S)  NWB   Other Precautions Multiple lines;WBS   General   Chart Reviewed Yes   Family/Caregiver Present No   Cognition   Overall Cognitive Status WFL   Arousal/Participation Alert;Cooperative;Responsive   Attention Within functional limits   Orientation Level Oriented X4   Memory Within functional limits   Following Commands Follows all commands and directions without difficulty   Comments pt reports he is now fully aware of having to be 100% compliant w/ strict NWB to R LE - he reports that he was putting some weight through his heel prior. Pt reports he is aware of risks of putting weight through his foot now.  "  Subjective   Subjective \"Im not going to do anything to mess this up again\"   Bed Mobility   Supine to Sit 6  Modified independent   Sit to Supine 6  Modified independent   Transfers   Sit to Stand 6  Modified independent   Stand to Sit 6  Modified independent   Stand pivot 6  Modified independent  (to and from commode + recliner w/ and w/o RW use- pt was able to maintain NWB to R LE using both methods)   Toilet transfer 6  Modified independent   Ambulation/Elevation   Gait pattern   (hop to gait w/ 100% compliance w/ NWB to R LE throughout)   Gait Assistance 5  Supervision  (distant S> MI w/ RW)   Assistive Device Rolling walker   Distance 20'+25' + 20' w/ seated rest b/t trials   Stair Management Assistance 5  Supervision   Stair Management Technique Bumping  (bump up method 5 steps (up then down w/ 100% compliance w/ NWB)- pt reports completing stairs this way PTA-)   Balance   Static Sitting Normal   Dynamic Sitting Good   Static Standing Fair +   Dynamic Standing Fair   Ambulatory Fair  (rw)   Endurance Deficit   Endurance Deficit Yes   Endurance Deficit Description expected UE fatigue and limited ambualtion distnaces 2* NWB status at this time   Activity Tolerance   Activity Tolerance Patient limited by fatigue;Patient tolerated treatment well   Medical Staff Made Aware OT/ CM and RN   Nurse Made Aware yes- cleared for session  (pt cleared for ambualtion to and from BR w/ RW as long as he is not on IV- pt aware to ask for assist if feeling unsteady or dizzy + at night)   Exercises   Knee AROM Long Arc Quad Sitting;20 reps;Right;Left  (x2)   Balance training  standing balance NWB w/ UE reaching from RW while maintaining %   Assessment   Prognosis Good   Problem List Decreased strength;Decreased range of motion;Decreased endurance;Impaired balance   Assessment Pt seen for PT tx session as documented above. Pt is s/p First ray resection, metatarsal stump resection 3/20/24. Pt remains strict NWB to R LE " post op. Pt reports he was not fully compliant and was putting some weight through his heel prior; but is now in full understanding of risks associated w/ noncompliance w/ NWB to R LE. Pt was able to demonstrate multiple xfers to and from toilet + recliner MI w/ 100% compliance to NWB R LE + ambulate short household distances w/ RW +hop to gait pattern MI w/o dizziness or LOB while maintaining % throughout. Stair training was also completed using bump up method w/ good safety/ technique. Pt is MI w/ all mobility and is 100% compliant w/ strict NWB to R LE. Pt was educated in elevation and safety/ fall prevention strategies for home d/c. Further skilled PT in inpt setting not indicated. Pt reports he has been hopping to and from bathroom MI'ly when not on IV w/o difficulty. PT signing off at this time. Level 3 resources on d/c recommended. Pt has RW; kneeler scooter; for home. Staff vs restorative to assist w/ ambulation/ mobility as needed. Please re consult if functional decline or status changes.   Goals   Patient Goals to walk his daughter down the [place] [employer] wedding   LTG Expiration Date 03/29/24   Plan   Treatment/Interventions ADL retraining;Functional transfer training;LE strengthening/ROM;Elevations;Therapeutic exercise;Endurance training;Cognitive reorientation;Patient/family training;Equipment eval/education;Bed mobility;Gait training;Compensatory technique education;Spoke to nursing;Spoke to case management;OT;Spoke to advanced practitioner   Progress Discontinue PT   PT Frequency 3-5x/wk   Discharge Recommendation   Rehab Resource Intensity Level, PT III (Minimum Resource Intensity)   Equipment Recommended Walker   AM-PAC Basic Mobility Inpatient   Turning in Flat Bed Without Bedrails 4   Lying on Back to Sitting on Edge of Flat Bed Without Bedrails 4   Moving Bed to Chair 4   Standing Up From Chair Using Arms 4   Walk in Room 4   Climb 3-5 Stairs With Railing 3   Basic Mobility Inpatient Raw Score  23   Basic Mobility Standardized Score 50.88   [facility] Highest Level Of Mobility   -HL Goal 7: Walk 25 feet or more   -HLM Achieved 7: Walk 25 feet or more     The patient's AM-PAC Basic Mobility Inpatient Short Form Raw Score is 23. A Raw score of greater than 16 suggests the patient may benefit from discharge to home. Please also refer to the recommendation of the Physical Therapist for safe discharge planning.

## 2024-03-21 NOTE — PLAN OF CARE
Problem: PHYSICAL THERAPY ADULT  Goal: Performs mobility at highest level of function for planned discharge setting.  See evaluation for individualized goals.  Description: Treatment/Interventions: Functional transfer training, Therapeutic exercise, Endurance training, Gait training, Bed mobility, Equipment eval/education, Elevations  Equipment Recommended: Walker       See flowsheet documentation for full assessment, interventions and recommendations.  3/21/2024 1412 by Janice Kaur PT  Outcome: Adequate for Discharge  Note: Prognosis: Good  Problem List: Decreased strength, Decreased range of motion, Decreased endurance, Impaired balance  Assessment: Pt seen for PT tx session as documented above. Pt is s/p First ray resection, metatarsal stump resection 3/20/24. Pt remains strict NWB to R LE post op. Pt reports he was not fully compliant and was putting some weight through his heel prior; but is now in full understanding of risks associated w/ noncompliance w/ NWB to R LE. Pt was able to demonstrate multiple xfers to and from toilet + recliner MI w/ 100% compliance to NWB R LE + ambulate short household distances w/ RW +hop to gait pattern MI w/o dizziness or LOB while maintaining % throughout. Stair training was also completed using bump up method w/ good safety/ technique. Pt is MI w/ all mobility and is 100% compliant w/ strict NWB to R LE. Pt was educated in elevation and safety/ fall prevention strategies for home d/c. Further skilled PT in inpt setting not indicated. Pt reports he has been hopping to and from bathroom MI'ly when not on IV w/o difficulty. PT signing off at this time. Level 3 resources on d/c recommended. Pt has RW; kneeler scooter; for home. Staff vs restorative to assist w/ ambulation/ mobility as needed. Please re consult if functional decline or status changes.        Rehab Resource Intensity Level, PT: III (Minimum Resource Intensity)    See flowsheet documentation for  full assessment.     3/21/2024 1410 by Janice Kaur PT  Outcome: Adequate for Discharge  Note: Prognosis: Good  Problem List: Decreased strength, Decreased range of motion, Decreased endurance, Impaired balance  Assessment: Pt seen for PT tx session as documented above. Pt is s/p First ray resection, metatarsal stump resection 3/20/24. Pt remains strict NWB to R LE post op. Pt reports he was not fully compliant and was putting some weight through his heel prior; but is now in full understanding of risks associated w/ noncompliance w/ NWB to R LE. Pt was able to demonstrate multiple xfers to and from toilet + recliner MI w/ 100% compliance to NWB R LE + ambulate short household distances w/ RW +hop to gait pattern MI w/o dizziness or LOB while maintaining % throughout. Stair training was also completed using bump up method w/ good safety/ technique. Pt is MI w/ all mobility and is 100% compliant w/ strict NWB to R LE. Pt was educated in elevation and safety/ fall prevention strategies for home d/c. Further skilled PT in inpt setting not indicated. Pt reports he has been hopping to and from bathroom MI'ly when not on IV w/o difficulty. PT signing off at this time. Level 3 resources on d/c recommended. Pt has RW; kneeler scooter; for home. Staff vs restorative to assist w/ ambulation/ mobility as needed. Please re consult if functional decline or status changes.        Rehab Resource Intensity Level, PT: III (Minimum Resource Intensity)    See flowsheet documentation for full assessment.

## 2024-03-21 NOTE — PROGRESS NOTES
[facility] Podiatry - Progress Note  Patient: Maciel Gaitan 63 y.o. male   MRN: [id number]  PCP: MITA Choudhury  Unit/Bed#: -01 Encounter: [id number]  Date Of Visit: 24    ASSESSMENT:    Maciel Gaitan is a 63 y.o. 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  Right foot surgical site appears clean and viable and healthy with pinpoint bleeding and no acute clinical signs of infection.  Proximal surgical site incision is stable with intact sutures and well aligned skin edges  Negative pressure wound VAC applied to open surgical wound today.  See wound VAC application note below.  Next wound VAC change on 3/23/2024  Plan to continue local wound care with negative pressure wound VAC therapy.  Plan to reevaluate on 3/23/2024, to determine possible continuation of wound VAC therapy versus delayed primary closure  Continue IV Cipro.  Appreciate internal medicine's recommendations.  Will transition to oral antibiotics at time of discharge  Continue local wound care consisting of negative pressure wound VAC therapy, appreciate nursing assistance with dressing changes.  Elevation on green foam wedges or pillows when non-ambulatory.  Appreciate consulting services for recommendations and management.      Antibiotics started: Cipro  Pharmacologic VTE Prophylaxis: Heparin   Mechanical VTE Prophylaxis: sequential compression device   Weightbearing status: Nonweightbearing to right foot    Disposition: Patient will require continued inpatient stay for the above    Wound VAC application  1. Number of sponges: 1  2. Pressure settin continuous  3. Size of wound: 5.0  x 2.0  x 2.0 cm  4. Description of  wound: Surgical wound consisting of entirely granular tissue with pinpoint bleeding    SUBJECTIVE:     The patient was seen, evaluated, and assessed at bedside today. The patient was awake, alert, and in no acute distress. No acute events overnight. The patient reports minimal pain and discomfort to right foot. Patient denies N/V/F/chills/SOB/CP.      OBJECTIVE:     Vitals:   /75   Pulse 73   Temp 98.3 °F (36.8 °C)   Resp 16   SpO2 94%     Temp (24hrs), Av.7 °F (36.5 °C), Min:97 °F (36.1 °C), Max:98.3 °F (36.8 °C)      Physical Exam:     General:  Alert, cooperative, and in no distress.  Lungs: Non labored breathing  Abdomen: Soft, non-tender.  Lower extremity exam:  Cardiovascular status at baseline.  Neurological status at baseline.  Musculoskeletal status at baseline. No calf tenderness noted.     Lower extremity wound(s) as noted below:  Wound #: 1  Location: Right revisional partial first ray amputation surgical wound  Length 5.0 cm: Width 2.0 cm: Depth 2.0 cm:   Deepest Tissue Noted in Base: Surgical bone  Probe to Bone: Yes  Peripheral Skin Description: Attached  Granulation: 100% Fibrotic Tissue: 0% Necrotic Tissue: 0%   Drainage Amount: moderate, bloody  Signs of Infection: No    Right lower extremity: Proximal revision of first ray amputation incision is stable with intact sutures, well aligned skin edges, capillary refill within normal limits without signs of active infection: no purulence, no malodor, no ascending erythema, no crepitus, no fluctuance.      Clinical Images 24:      Additional Data:     Labs:    Results from last 7 days   Lab Units 24  0545   WBC Thousand/uL 7.57   HEMOGLOBIN g/dL 10.7*   HEMATOCRIT % 33.6*   PLATELETS Thousands/uL 333   NEUTROS PCT % 64   LYMPHS PCT % 17   MONOS PCT % 13*   EOS PCT % 5     Results from last 7 days   Lab Units 24  0545 24  0525 24  1154   POTASSIUM mmol/L 3.7   < > 4.0   CHLORIDE mmol/L 107   < > 106   CO2 mmol/L  "25   < > 22   BUN mg/dL 28*   < > 23   CREATININE mg/dL 0.93   < > 0.94   CALCIUM mg/dL 8.5   < > 8.5   ALK PHOS U/L  --   --  66   ALT U/L  --   --  19   AST U/L  --   --  19    < > = values in this interval not displayed.     Results from last 7 days   Lab Units 03/18/24  1154   INR  1.15       * I Have Reviewed All Lab Data Listed Above.    Recent Cultures (last 7 days):     Results from last 7 days   Lab Units 03/18/24  1900 03/18/24  1154 03/18/24  0000   BLOOD CULTURE  No Growth at 48 hrs. No Growth at 48 hrs.  --    GRAM STAIN RESULT   --   --  1+ Disintegrating polys*  2+ Gram negative rods*   WOUND CULTURE   --   --  2+ Growth of Enterobacter cloacae*           Imaging: I have personally reviewed pertinent films in PACS  EKG, Pathology, and Other Studies: I have personally reviewed pertinent reports.      ** Please Note: Portions of the record may have been created with voice recognition software. Occasional wrong word or \"sound a like\" substitutions may have occurred due to the inherent limitations of voice recognition software. Read the chart carefully and recognize, using context, where substitutions have occurred. **    "

## 2024-03-21 NOTE — PLAN OF CARE
Problem: PAIN - ADULT  Goal: Verbalizes/displays adequate comfort level or baseline comfort level  Description: Interventions:  - Encourage patient to monitor pain and request assistance  - Assess pain using appropriate pain scale  - Administer analgesics based on type and severity of pain and evaluate response  - Implement non-pharmacological measures as appropriate and evaluate response  - Consider cultural and social influences on pain and pain management  - Notify physician/advanced practitioner if interventions unsuccessful or patient reports new pain  Outcome: Progressing     Problem: INFECTION - ADULT  Goal: Absence or prevention of progression during hospitalization  Description: INTERVENTIONS:  - Assess and monitor for signs and symptoms of infection  - Monitor lab/diagnostic results  - Monitor all insertion sites, i.e. indwelling lines, tubes, and drains  - Monitor endotracheal if appropriate and nasal secretions for changes in amount and color  - [place] appropriate cooling/warming therapies per order  - Administer medications as ordered  - Instruct and encourage patient and family to use good hand hygiene technique  - Identify and instruct in appropriate isolation precautions for identified infection/condition  Outcome: Progressing  Goal: Absence of fever/infection during neutropenic period  Description: INTERVENTIONS:  - Monitor WBC    Outcome: Progressing     Problem: SAFETY ADULT  Goal: Patient will remain free of falls  Description: INTERVENTIONS:  - Educate patient/family on patient safety including physical limitations  - Instruct patient to call for assistance with activity   - Consult OT/PT to assist with strengthening/mobility   - Keep Call bell within reach  - Keep bed low and locked with side rails adjusted as appropriate  - Keep care items and personal belongings within reach  - Initiate and maintain comfort rounds  - Make Fall Risk Sign visible to staff  - Offer Toileting every 2 Hours,  in advance of need  - Initiate/Maintain bed alarm  - Obtain necessary fall risk management equipment: yellow socks  - Apply yellow socks and bracelet for high fall risk patients  - Consider moving patient to room near nurses station  Outcome: Progressing  Goal: Maintain or return to baseline ADL function  Description: INTERVENTIONS:  -  Assess patient's ability to carry out ADLs; assess patient's baseline for ADL function and identify physical deficits which impact ability to perform ADLs (bathing, care of mouth/teeth, toileting, grooming, dressing, etc.)  - Assess/evaluate cause of self-care deficits   - Assess range of motion  - Assess patient's mobility; develop plan if impaired  - Assess patient's need for assistive devices and provide as appropriate  - Encourage maximum independence but intervene and supervise when necessary  - Involve family in performance of ADLs  - Assess for home care needs following discharge   - Consider OT consult to assist with ADL evaluation and planning for discharge  - Provide patient education as appropriate  Outcome: Progressing  Goal: Maintains/Returns to pre admission functional level  Description: INTERVENTIONS:  - Perform AM-PAC 6 Click Basic Mobility/ Daily Activity assessment daily.  - Set and communicate daily mobility goal to care team and patient/family/caregiver.   - Collaborate with rehabilitation services on mobility goals if consulted  - Perform Range of Motion 3 times a day.  - Reposition patient every 3 hours.  - Dangle patient 3 times a day  - Stand patient 3 times a day  - Ambulate patient 3 times a day  - Out of bed to chair 3 times a day   - Out of bed for meals 3 times a day  - Out of bed for toileting  - Record patient progress and toleration of activity level   Outcome: Progressing     Problem: DISCHARGE PLANNING  Goal: Discharge to home or other facility with appropriate resources  Description: INTERVENTIONS:  - Identify barriers to discharge w/patient and  caregiver  - Arrange for needed discharge resources and transportation as appropriate  - Identify discharge learning needs (meds, wound care, etc.)  - Arrange for interpretive services to assist at discharge as needed  - Refer to Case Management Department for coordinating discharge planning if the patient needs post-hospital services based on physician/advanced practitioner order or complex needs related to functional status, cognitive ability, or social support system  Outcome: Progressing     Problem: Knowledge Deficit  Goal: Patient/family/caregiver demonstrates understanding of disease process, treatment plan, medications, and discharge instructions  Description: Complete learning assessment and assess knowledge base.  Interventions:  - Provide teaching at level of understanding  - Provide teaching via preferred learning methods  Outcome: Progressing     Problem: Nutrition/Hydration-ADULT  Goal: Nutrient/Hydration intake appropriate for improving, restoring or maintaining nutritional needs  Description: Monitor and assess patient's nutrition/hydration status for malnutrition. Collaborate with interdisciplinary team and initiate plan and interventions as ordered.  Monitor patient's weight and dietary intake as ordered or per policy. Utilize nutrition screening tool and intervene as necessary. Determine patient's food preferences and provide high-protein, high-caloric foods as appropriate.     INTERVENTIONS:  - Monitor oral intake, urinary output, labs, and treatment plans  - Assess nutrition and hydration status and recommend course of action  - Evaluate amount of meals eaten  - Assist patient with eating if necessary   - Allow adequate time for meals  - Recommend/ encourage appropriate diets, oral nutritional supplements, and vitamin/mineral supplements  - Order, calculate, and assess calorie counts as needed  - Recommend, monitor, and adjust tube feedings and TPN/PPN based on assessed needs  - Assess need for  intravenous fluids  - Provide specific nutrition/hydration education as appropriate  - Include patient/family/caregiver in decisions related to nutrition  Outcome: Progressing

## 2024-03-22 LAB
ANION GAP SERPL CALCULATED.3IONS-SCNC: 8 MMOL/L (ref 4–13)
BASOPHILS # BLD AUTO: 0.06 THOUSANDS/ÂΜL (ref 0–0.1)
BASOPHILS NFR BLD AUTO: 1 % (ref 0–1)
BUN SERPL-MCNC: 26 MG/DL (ref 5–25)
CALCIUM SERPL-MCNC: 8.2 MG/DL (ref 8.4–10.2)
CHLORIDE SERPL-SCNC: 106 MMOL/L (ref 96–108)
CO2 SERPL-SCNC: 24 MMOL/L (ref 21–32)
CREAT SERPL-MCNC: 0.87 MG/DL (ref 0.6–1.3)
EOSINOPHIL # BLD AUTO: 0.34 THOUSAND/ÂΜL (ref 0–0.61)
EOSINOPHIL NFR BLD AUTO: 5 % (ref 0–6)
ERYTHROCYTE [DISTWIDTH] IN BLOOD BY AUTOMATED COUNT: 14.6 % (ref 11.6–15.1)
GFR SERPL CREATININE-BSD FRML MDRD: 91 ML/MIN/1.73SQ M
GLUCOSE SERPL-MCNC: 102 MG/DL (ref 65–140)
GLUCOSE SERPL-MCNC: 116 MG/DL (ref 65–140)
GLUCOSE SERPL-MCNC: 120 MG/DL (ref 65–140)
GLUCOSE SERPL-MCNC: 131 MG/DL (ref 65–140)
GLUCOSE SERPL-MCNC: 136 MG/DL (ref 65–140)
HCT VFR BLD AUTO: 32.6 % (ref 36.5–49.3)
HGB BLD-MCNC: 10.4 G/DL (ref 12–17)
IMM GRANULOCYTES # BLD AUTO: 0.03 THOUSAND/UL (ref 0–0.2)
IMM GRANULOCYTES NFR BLD AUTO: 1 % (ref 0–2)
LYMPHOCYTES # BLD AUTO: 1.34 THOUSANDS/ÂΜL (ref 0.6–4.47)
LYMPHOCYTES NFR BLD AUTO: 21 % (ref 14–44)
MCH RBC QN AUTO: 26.9 PG (ref 26.8–34.3)
MCHC RBC AUTO-ENTMCNC: 31.9 G/DL (ref 31.4–37.4)
MCV RBC AUTO: 84 FL (ref 82–98)
MONOCYTES # BLD AUTO: 0.86 THOUSAND/ÂΜL (ref 0.17–1.22)
MONOCYTES NFR BLD AUTO: 13 % (ref 4–12)
NEUTROPHILS # BLD AUTO: 3.89 THOUSANDS/ÂΜL (ref 1.85–7.62)
NEUTS SEG NFR BLD AUTO: 59 % (ref 43–75)
NRBC BLD AUTO-RTO: 0 /100 WBCS
PLATELET # BLD AUTO: 330 THOUSANDS/UL (ref 149–390)
PMV BLD AUTO: 8.7 FL (ref 8.9–12.7)
POTASSIUM SERPL-SCNC: 4 MMOL/L (ref 3.5–5.3)
RBC # BLD AUTO: 3.87 MILLION/UL (ref 3.88–5.62)
SODIUM SERPL-SCNC: 138 MMOL/L (ref 135–147)
WBC # BLD AUTO: 6.52 THOUSAND/UL (ref 4.31–10.16)

## 2024-03-22 PROCEDURE — NC001 PR NO CHARGE: Performed by: PODIATRIST

## 2024-03-22 PROCEDURE — 82948 REAGENT STRIP/BLOOD GLUCOSE: CPT

## 2024-03-22 PROCEDURE — 99232 SBSQ HOSP IP/OBS MODERATE 35: CPT | Performed by: GENERAL PRACTICE

## 2024-03-22 PROCEDURE — 80048 BASIC METABOLIC PNL TOTAL CA: CPT

## 2024-03-22 PROCEDURE — 85025 COMPLETE CBC W/AUTO DIFF WBC: CPT

## 2024-03-22 RX ADMIN — HEPARIN SODIUM 5000 UNITS: 5000 INJECTION INTRAVENOUS; SUBCUTANEOUS at 22:03

## 2024-03-22 RX ADMIN — INSULIN GLARGINE 15 UNITS: 100 INJECTION, SOLUTION SUBCUTANEOUS at 09:16

## 2024-03-22 RX ADMIN — CIPROFLOXACIN 400 MG: 2 INJECTION, SOLUTION INTRAVENOUS at 11:21

## 2024-03-22 RX ADMIN — HEPARIN SODIUM 5000 UNITS: 5000 INJECTION INTRAVENOUS; SUBCUTANEOUS at 13:49

## 2024-03-22 RX ADMIN — METOPROLOL SUCCINATE 50 MG: 50 TABLET, EXTENDED RELEASE ORAL at 09:16

## 2024-03-22 RX ADMIN — ACETAMINOPHEN 650 MG: 325 TABLET, FILM COATED ORAL at 22:03

## 2024-03-22 RX ADMIN — AMLODIPINE BESYLATE 5 MG: 5 TABLET ORAL at 09:16

## 2024-03-22 RX ADMIN — ACETAMINOPHEN 650 MG: 325 TABLET, FILM COATED ORAL at 07:20

## 2024-03-22 RX ADMIN — ASPIRIN 81 MG CHEWABLE TABLET 81 MG: 81 TABLET CHEWABLE at 09:16

## 2024-03-22 RX ADMIN — ACETAMINOPHEN 650 MG: 325 TABLET, FILM COATED ORAL at 16:04

## 2024-03-22 RX ADMIN — HEPARIN SODIUM 5000 UNITS: 5000 INJECTION INTRAVENOUS; SUBCUTANEOUS at 05:02

## 2024-03-22 RX ADMIN — ATORVASTATIN CALCIUM 40 MG: 40 TABLET, FILM COATED ORAL at 17:32

## 2024-03-22 NOTE — ASSESSMENT & PLAN NOTE
Podiatry for or intervention tentatively in AM.  Patient appears acceptable risk for surgery.    S/p surgery involving the right partial first ray resection dehiscence with clinical osteomyelitis today  Benefit of surgery outweighs risk.    Advise against Rocephin as does not cover Enterobacter cloacae on most recent wound Cx.  Podiatry switched to Cipro which I agree with.    Prior to wound infection patient had been ambulating up to 4 miles a day.  He denies any symptoms of chest pain or shortness of breath

## 2024-03-22 NOTE — PLAN OF CARE
Problem: PAIN - ADULT  Goal: Verbalizes/displays adequate comfort level or baseline comfort level  Description: Interventions:  - Encourage patient to monitor pain and request assistance  - Assess pain using appropriate pain scale  - Administer analgesics based on type and severity of pain and evaluate response  - Implement non-pharmacological measures as appropriate and evaluate response  - Consider cultural and social influences on pain and pain management  - Notify physician/advanced practitioner if interventions unsuccessful or patient reports new pain  Outcome: Progressing     Problem: INFECTION - ADULT  Goal: Absence or prevention of progression during hospitalization  Description: INTERVENTIONS:  - Assess and monitor for signs and symptoms of infection  - Monitor lab/diagnostic results  - Monitor all insertion sites, i.e. indwelling lines, tubes, and drains  - Monitor endotracheal if appropriate and nasal secretions for changes in amount and color  - [place] appropriate cooling/warming therapies per order  - Administer medications as ordered  - Instruct and encourage patient and family to use good hand hygiene technique  - Identify and instruct in appropriate isolation precautions for identified infection/condition  Outcome: Progressing  Goal: Absence of fever/infection during neutropenic period  Description: INTERVENTIONS:  - Monitor WBC    Outcome: Progressing     Problem: SAFETY ADULT  Goal: Patient will remain free of falls  Description: INTERVENTIONS:  - Educate patient/family on patient safety including physical limitations  - Instruct patient to call for assistance with activity   - Consult OT/PT to assist with strengthening/mobility   - Keep Call bell within reach  - Keep bed low and locked with side rails adjusted as appropriate  - Keep care items and personal belongings within reach  - Initiate and maintain comfort rounds  - Make Fall Risk Sign visible to staff  - Offer Toileting every  Hours,  in advance of need  - Initiate/Maintain alarm  - Obtain necessary fall risk management equipment:   - Apply yellow socks and bracelet for high fall risk patients  - Consider moving patient to room near nurses station  Outcome: Progressing  Goal: Maintain or return to baseline ADL function  Description: INTERVENTIONS:  -  Assess patient's ability to carry out ADLs; assess patient's baseline for ADL function and identify physical deficits which impact ability to perform ADLs (bathing, care of mouth/teeth, toileting, grooming, dressing, etc.)  - Assess/evaluate cause of self-care deficits   - Assess range of motion  - Assess patient's mobility; develop plan if impaired  - Assess patient's need for assistive devices and provide as appropriate  - Encourage maximum independence but intervene and supervise when necessary  - Involve family in performance of ADLs  - Assess for home care needs following discharge   - Consider OT consult to assist with ADL evaluation and planning for discharge  - Provide patient education as appropriate  Outcome: Progressing  Goal: Maintains/Returns to pre admission functional level  Description: INTERVENTIONS:  - Perform AM-PAC 6 Click Basic Mobility/ Daily Activity assessment daily.  - Set and communicate daily mobility goal to care team and patient/family/caregiver.   - Collaborate with rehabilitation services on mobility goals if consulted  - Perform Range of Motion  times a day.  - Reposition patient every  hours.  - Dangle patient  times a day  - Stand patient  times a day  - Ambulate patient  times a day  - Out of bed to chair  times a day   - Out of bed for meals  times a day  - Out of bed for toileting  - Record patient progress and toleration of activity level   Outcome: Progressing     Problem: DISCHARGE PLANNING  Goal: Discharge to home or other facility with appropriate resources  Description: INTERVENTIONS:  - Identify barriers to discharge w/patient and caregiver  - Arrange for  needed discharge resources and transportation as appropriate  - Identify discharge learning needs (meds, wound care, etc.)  - Arrange for interpretive services to assist at discharge as needed  - Refer to Case Management Department for coordinating discharge planning if the patient needs post-hospital services based on physician/advanced practitioner order or complex needs related to functional status, cognitive ability, or social support system  Outcome: Progressing     Problem: Knowledge Deficit  Goal: Patient/family/caregiver demonstrates understanding of disease process, treatment plan, medications, and discharge instructions  Description: Complete learning assessment and assess knowledge base.  Interventions:  - Provide teaching at level of understanding  - Provide teaching via preferred learning methods  Outcome: Progressing     Problem: Nutrition/Hydration-ADULT  Goal: Nutrient/Hydration intake appropriate for improving, restoring or maintaining nutritional needs  Description: Monitor and assess patient's nutrition/hydration status for malnutrition. Collaborate with interdisciplinary team and initiate plan and interventions as ordered.  Monitor patient's weight and dietary intake as ordered or per policy. Utilize nutrition screening tool and intervene as necessary. Determine patient's food preferences and provide high-protein, high-caloric foods as appropriate.     INTERVENTIONS:  - Monitor oral intake, urinary output, labs, and treatment plans  - Assess nutrition and hydration status and recommend course of action  - Evaluate amount of meals eaten  - Assist patient with eating if necessary   - Allow adequate time for meals  - Recommend/ encourage appropriate diets, oral nutritional supplements, and vitamin/mineral supplements  - Order, calculate, and assess calorie counts as needed  - Recommend, monitor, and adjust tube feedings and TPN/PPN based on assessed needs  - Assess need for intravenous fluids  -  Provide specific nutrition/hydration education as appropriate  - Include patient/family/caregiver in decisions related to nutrition  Outcome: Progressing

## 2024-03-22 NOTE — PROGRESS NOTES
[facility]  Progress Note  Name: Maciel Gaitan I  MRN: [id number]  Unit/Bed#: -01 I Date of Admission: 3/18/2024   Date of Service: 3/22/2024 I Hospital Day: 4    Assessment/Plan   * Osteomyelitis of right foot (HCC)  Assessment & Plan  Podiatry for or intervention tentatively in AM.  Patient appears acceptable risk for surgery.    S/p surgery involving the right partial first ray resection dehiscence with clinical osteomyelitis today  Benefit of surgery outweighs risk.    Advise against Rocephin as does not cover Enterobacter cloacae on most recent wound Cx.  Podiatry switched to Cipro which I agree with.    Prior to wound infection patient had been ambulating up to 4 miles a day.  He denies any symptoms of chest pain or shortness of breath        Uncontrolled type 2 diabetes mellitus with hyperglycemia (HCC)  Assessment & Plan  Lab Results   Component Value Date    HGBA1C 8.5 (H) 03/19/2024       Recent Labs     03/21/24  2052 03/22/24  0633 03/22/24  1034 03/22/24  1551   POCGLU 146* 120 131 102         Blood Sugar Average: Last 72 hrs:  (P) [id number]  Lantus 15 units daily while IP  Sliding scale insulin while IP  Fortunately blood sugar appears stable currently.  Metformin on hold while inpatient - Would d/c on Metformin 1000 mg bid    Hypokalemia  Assessment & Plan  Replete potassium.  Mag WNL      Essential hypertension  Assessment & Plan  Currently on metoprolol and Norvasc therapy  Continue with pain control  Monitor blood pressure closely    CAD (coronary artery disease)  Assessment & Plan   This is a very pleasant 63-year-old gentleman with known history non-STEMI type I prior cardiac catheterization on 12/2017 for non-STEMI type I.  Cardiac catheterization at that time had illustrated a 95% left circumflex and chronic mid right coronary artery stenosis.  Patient successfully underwent a PCI of left circumflex.  Patient was initially placed on dual  antiplatelet therapy and subsequently de-escalated to aspirin daily by cardiolog.  Patient reports that he was walking up to 4 miles a day prior to his initial intervention for his right foot back in February.  He reports that his mobility was limited due to his lower extremity wound and has not been walking as much since that time.  Due to the lower extremity wound.  He denies any history of chest pain or shortness of breath symptoms or exertional dyspnea.  Last echo 24 showing left ventricular cavity size is normal,wall thickness is moderately increased. There is moderate concentric hypertrophy. EF is 60%, systolic function, wall motion and diastolic function are normal.   Patient has been managed on aspirin metoprolol and Lipitor therapy.  Patient also on Norvasc for blood pressure control.                                   VTE Pharmacologic Prophylaxis:    heparin SQ    Mobility:   Basic Mobility Inpatient Raw Score: 23  JH-HLM Goal: 7: Walk 25 feet or more  JH-HLM Achieved: 7: Walk 25 feet or more  JH-HLM Goal achieved. Continue to encourage appropriate mobility.    Patient Centered Rounds: I performed bedside rounds with nursing staff today.   Discussions with Specialists or Other Care Team Provider: no    Education and Discussions with Family / Patient:  per primary.         Current Length of Stay: 4 day(s)  Current Patient Status: Inpatient     Discharge Plan: [facility] is following this patient on consult. They are medically stable for discharge when deemed appropriate by primary service.    Code Status: Level 1 - Full Code    Subjective:   No acute complaints    Objective:     Vitals:   Temp (24hrs), Av.7 °F (37.1 °C), Min:98.1 °F (36.7 °C), Max:99.3 °F (37.4 °C)    Temp:  [98.1 °F (36.7 °C)-99.3 °F (37.4 °C)] 98.1 °F (36.7 °C)  HR:  [62-77] 74  Resp:  [16] 16  BP: (124-132)/(72-75) 124/75  SpO2:  [95 %] 95 %  There is no height or weight on file to calculate BMI.     Input and Output Summary (last 24  hours):     Intake/Output Summary (Last 24 hours) at 3/22/2024 1846  Last data filed at 3/22/2024 1750  Gross per 24 hour   Intake 1040 ml   Output --   Net 1040 ml       Physical Exam:   Physical Exam  HENT:      Head: Normocephalic and atraumatic.      Nose: Nose normal.      Mouth/Throat:      Mouth: Mucous membranes are moist.   Eyes:      Extraocular Movements: Extraocular movements intact.      Conjunctiva/sclera: Conjunctivae normal.   Cardiovascular:      Rate and Rhythm: Normal rate and regular rhythm.   Pulmonary:      Effort: Pulmonary effort is normal.      Breath sounds: Normal breath sounds.   Abdominal:      General: Bowel sounds are normal.      Palpations: Abdomen is soft.   Musculoskeletal:         General: Normal range of motion.      Cervical back: Normal range of motion and neck supple.      Right lower leg: No edema.      Left lower leg: No edema.   Skin:     Comments: Dressing c/d/i   Neurological:      Mental Status: He is alert and oriented to person, place, and time.          Additional Data:     Labs:  Results from last 7 days   Lab Units 03/22/24  0502   WBC Thousand/uL 6.52   HEMOGLOBIN g/dL 10.4*   HEMATOCRIT % 32.6*   PLATELETS Thousands/uL 330   NEUTROS PCT % 59   LYMPHS PCT % 21   MONOS PCT % 13*   EOS PCT % 5     Results from last 7 days   Lab Units 03/22/24  0502 03/19/24  0525 03/18/24  1154   SODIUM mmol/L 138   < > 137   POTASSIUM mmol/L 4.0   < > 4.0   CHLORIDE mmol/L 106   < > 106   CO2 mmol/L 24   < > 22   BUN mg/dL 26*   < > 23   CREATININE mg/dL 0.87   < > 0.94   ANION GAP mmol/L 8   < > 9   CALCIUM mg/dL 8.2*   < > 8.5   ALBUMIN g/dL  --   --  3.6   TOTAL BILIRUBIN mg/dL  --   --  0.51   ALK PHOS U/L  --   --  66   ALT U/L  --   --  19   AST U/L  --   --  19   GLUCOSE RANDOM mg/dL 116   < > 122    < > = values in this interval not displayed.     Results from last 7 days   Lab Units 03/18/24  1154   INR  1.15     Results from last 7 days   Lab Units 03/22/24  1551  03/22/24  1034 03/22/24  0633 03/21/24  2052 03/21/24  1556 03/21/24  1054 03/21/24  0742 03/20/24  2055 03/20/24  1659 03/20/24  1326 03/20/24  1132 03/20/24  0633   POC GLUCOSE mg/dl 102 131 120 146* 84 108 119 110 114 129 119 134     Results from last 7 days   Lab Units 03/19/24  1649   HEMOGLOBIN A1C % 8.5*           Lines/Drains:  Invasive Devices       Peripheral Intravenous Line  Duration             Peripheral IV 03/22/24 Left Antecubital <1 day                          Imaging: No pertinent imaging reviewed.    Recent Cultures (last 7 days):   Results from last 7 days   Lab Units 03/18/24  1900 03/18/24  1154 03/18/24  0000   BLOOD CULTURE  No Growth at 72 hrs. No Growth at 72 hrs.  --    GRAM STAIN RESULT   --   --  1+ Disintegrating polys*  2+ Gram negative rods*   WOUND CULTURE   --   --  2+ Growth of Enterobacter cloacae*       Last 24 Hours Medication List:   Current Facility-Administered Medications   Medication Dose Route Frequency Provider Last Rate    acetaminophen  650 mg Oral Q6H PRN Juancho Jamil DPM      amLODIPine  5 mg Oral Daily Marilu Cortez DPM      aspirin  81 mg Oral Daily Marilu Cortez DPM      atorvastatin  40 mg Oral QPM Marilu Cortez DPM      ciprofloxacin  400 mg Intravenous Q12H Vicente Zuleta  mg (03/22/24 1121)    heparin (porcine)  5,000 Units Subcutaneous Q8H ISABELLE Marilu Cortez DPM      insulin glargine  15 Units Subcutaneous Daily With Breakfast Marilu Cortez DPM      insulin lispro  1-5 Units Subcutaneous HS Marilu Cortez DPM      insulin lispro  2-12 Units Subcutaneous TID AC Marilu Cortez DPM      metoprolol succinate  50 mg Oral Daily Marilu Cortez DPM          Today, Patient Was Seen By: Santos Reynoso DO    **Please Note: This note may have been constructed using a voice recognition system.**

## 2024-03-22 NOTE — PROGRESS NOTES
[facility] Podiatry - Progress Note  Patient: Maciel Gaitan 63 y.o. male   MRN: [id number]  PCP: MITA Choudhury  Unit/Bed#: -01 Encounter: [id number]  Date Of Visit: 03/22/24    ASSESSMENT:    Maciel Gaitan is a 63 y.o. 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  Wound VAC dressings left intact today. Plan for next change tomorrow, 3/23/24. Will re-assess at that time to determine definitive treatment plan of continuation of wound VAC therapy vs delayed primary closure procedure  Continue IV antibiotics postoperatively, will transition to oral antibiotics at discharge to complete course for residual skin and soft tissue infection  Podiatry to do all dressing changes at this time  Pain is well controlled. Continue current pain management regimen.  Elevation on green foam wedges or pillows when non-ambulatory  Appreciate consulting services for recommendations and management.    Antibiotics started: Cipro  Pharmacologic VTE Prophylaxis: Heparin   Mechanical VTE Prophylaxis: sequential compression device   Weightbearing status: Nonweightbearing to right lower extremity    Disposition: Patient will require continued inpatient stay for the above    SUBJECTIVE:     The patient was seen, evaluated, and assessed at bedside today. The patient was awake, alert, and in no acute distress. No acute events overnight. The patient reports minimal pain at this time, he reports no issues overnight with the wound vac. Pain is well controlled with current pain management regimen. Patient reports normal appetite and using the restroom postoperatively. Patient denies N/V/F/chills/SOB/CP.      OBJECTIVE:     Vitals:    /74 (BP Location: Left arm)   Pulse 62   Temp 98.7 °F (37.1 °C) (Oral)   Resp 18   SpO2 95%     Temp (24hrs), Av.6 °F (37 °C), Min:98.3 °F (36.8 °C), Max:98.7 °F (37.1 °C)        Physical Exam:     General:  Alert, cooperative, and in no distress.  Lungs: Non labored breathing  Abdomen: Soft, non-tender.  Lower extremity exam:  Cardiovascular status at baseline.  Neurological status at baseline.  S/p right first metatarsal resection. No calf tenderness noted.     Dermatological:    Right lower extremity: Wound VAC dressings remain clean, dry and intact. No ascending erythema, edema or strike through noted. VAC machine running without incident at 125 mmHg continuous    Additional Data:     Labs:    Results from last 7 days   Lab Units 24  0502   WBC Thousand/uL 6.52   HEMOGLOBIN g/dL 10.4*   HEMATOCRIT % 32.6*   PLATELETS Thousands/uL 330   NEUTROS PCT % 59   LYMPHS PCT % 21   MONOS PCT % 13*   EOS PCT % 5     Results from last 7 days   Lab Units 24  0502 24  0525 24  1154   POTASSIUM mmol/L 4.0   < > 4.0   CHLORIDE mmol/L 106   < > 106   CO2 mmol/L 24   < > 22   BUN mg/dL 26*   < > 23   CREATININE mg/dL 0.87   < > 0.94   CALCIUM mg/dL 8.2*   < > 8.5   ALK PHOS U/L  --   --  66   ALT U/L  --   --  19   AST U/L  --   --  19    < > = values in this interval not displayed.     Results from last 7 days   Lab Units 24  1154   INR  1.15       * I Have Reviewed All Lab Data Listed Above.    Recent Cultures (last 7 days):     Results from last 7 days   Lab Units 24  1900 24  1154 24  0000   BLOOD CULTURE  No Growth at 72 hrs. No Growth at 72 hrs.  --    GRAM STAIN RESULT   --   --  1+ Disintegrating polys*  2+ Gram negative rods*   WOUND CULTURE   --   --  2+ Growth of Enterobacter cloacae*           Imaging: I have personally reviewed pertinent films in PACS  EKG, Pathology, and Other Studies: I have personally reviewed pertinent reports.      ** Please Note:  "Portions of the record may have been created with voice recognition software. Occasional wrong word or \"sound a like\" substitutions may have occurred due to the inherent limitations of voice recognition software. Read the chart carefully and recognize, using context, where substitutions have occurred. **      "

## 2024-03-23 LAB
ANION GAP SERPL CALCULATED.3IONS-SCNC: 9 MMOL/L (ref 4–13)
BACTERIA BLD CULT: NORMAL
BACTERIA BLD CULT: NORMAL
BASOPHILS # BLD AUTO: 0.05 THOUSANDS/ÂΜL (ref 0–0.1)
BASOPHILS NFR BLD AUTO: 1 % (ref 0–1)
BUN SERPL-MCNC: 26 MG/DL (ref 5–25)
CALCIUM SERPL-MCNC: 8.1 MG/DL (ref 8.4–10.2)
CHLORIDE SERPL-SCNC: 108 MMOL/L (ref 96–108)
CO2 SERPL-SCNC: 22 MMOL/L (ref 21–32)
CREAT SERPL-MCNC: 0.83 MG/DL (ref 0.6–1.3)
EOSINOPHIL # BLD AUTO: 0.39 THOUSAND/ÂΜL (ref 0–0.61)
EOSINOPHIL NFR BLD AUTO: 6 % (ref 0–6)
ERYTHROCYTE [DISTWIDTH] IN BLOOD BY AUTOMATED COUNT: 14.5 % (ref 11.6–15.1)
GFR SERPL CREATININE-BSD FRML MDRD: 93 ML/MIN/1.73SQ M
GLUCOSE SERPL-MCNC: 113 MG/DL (ref 65–140)
GLUCOSE SERPL-MCNC: 115 MG/DL (ref 65–140)
GLUCOSE SERPL-MCNC: 119 MG/DL (ref 65–140)
GLUCOSE SERPL-MCNC: 124 MG/DL (ref 65–140)
GLUCOSE SERPL-MCNC: 127 MG/DL (ref 65–140)
HCT VFR BLD AUTO: 31.7 % (ref 36.5–49.3)
HGB BLD-MCNC: 10.3 G/DL (ref 12–17)
IMM GRANULOCYTES # BLD AUTO: 0.03 THOUSAND/UL (ref 0–0.2)
IMM GRANULOCYTES NFR BLD AUTO: 0 % (ref 0–2)
LYMPHOCYTES # BLD AUTO: 1.45 THOUSANDS/ÂΜL (ref 0.6–4.47)
LYMPHOCYTES NFR BLD AUTO: 21 % (ref 14–44)
MCH RBC QN AUTO: 27.3 PG (ref 26.8–34.3)
MCHC RBC AUTO-ENTMCNC: 32.5 G/DL (ref 31.4–37.4)
MCV RBC AUTO: 84 FL (ref 82–98)
MONOCYTES # BLD AUTO: 0.96 THOUSAND/ÂΜL (ref 0.17–1.22)
MONOCYTES NFR BLD AUTO: 14 % (ref 4–12)
NEUTROPHILS # BLD AUTO: 4.13 THOUSANDS/ÂΜL (ref 1.85–7.62)
NEUTS SEG NFR BLD AUTO: 58 % (ref 43–75)
NRBC BLD AUTO-RTO: 0 /100 WBCS
PLATELET # BLD AUTO: 327 THOUSANDS/UL (ref 149–390)
PMV BLD AUTO: 8.9 FL (ref 8.9–12.7)
POTASSIUM SERPL-SCNC: 3.5 MMOL/L (ref 3.5–5.3)
RBC # BLD AUTO: 3.77 MILLION/UL (ref 3.88–5.62)
SODIUM SERPL-SCNC: 139 MMOL/L (ref 135–147)
WBC # BLD AUTO: 7.01 THOUSAND/UL (ref 4.31–10.16)

## 2024-03-23 PROCEDURE — 80048 BASIC METABOLIC PNL TOTAL CA: CPT

## 2024-03-23 PROCEDURE — 82948 REAGENT STRIP/BLOOD GLUCOSE: CPT

## 2024-03-23 PROCEDURE — 85025 COMPLETE CBC W/AUTO DIFF WBC: CPT

## 2024-03-23 PROCEDURE — 99024 POSTOP FOLLOW-UP VISIT: CPT | Performed by: PODIATRIST

## 2024-03-23 PROCEDURE — 99232 SBSQ HOSP IP/OBS MODERATE 35: CPT | Performed by: GENERAL PRACTICE

## 2024-03-23 RX ADMIN — ACETAMINOPHEN 650 MG: 325 TABLET, FILM COATED ORAL at 21:31

## 2024-03-23 RX ADMIN — ATORVASTATIN CALCIUM 40 MG: 40 TABLET, FILM COATED ORAL at 17:15

## 2024-03-23 RX ADMIN — CIPROFLOXACIN 400 MG: 2 INJECTION, SOLUTION INTRAVENOUS at 00:02

## 2024-03-23 RX ADMIN — HEPARIN SODIUM 5000 UNITS: 5000 INJECTION INTRAVENOUS; SUBCUTANEOUS at 05:47

## 2024-03-23 RX ADMIN — ACETAMINOPHEN 650 MG: 325 TABLET, FILM COATED ORAL at 08:47

## 2024-03-23 RX ADMIN — METOPROLOL SUCCINATE 50 MG: 50 TABLET, EXTENDED RELEASE ORAL at 08:45

## 2024-03-23 RX ADMIN — INSULIN GLARGINE 15 UNITS: 100 INJECTION, SOLUTION SUBCUTANEOUS at 08:45

## 2024-03-23 RX ADMIN — ASPIRIN 81 MG CHEWABLE TABLET 81 MG: 81 TABLET CHEWABLE at 08:45

## 2024-03-23 RX ADMIN — AMLODIPINE BESYLATE 5 MG: 5 TABLET ORAL at 08:45

## 2024-03-23 RX ADMIN — CIPROFLOXACIN 400 MG: 2 INJECTION, SOLUTION INTRAVENOUS at 11:24

## 2024-03-23 RX ADMIN — HEPARIN SODIUM 5000 UNITS: 5000 INJECTION INTRAVENOUS; SUBCUTANEOUS at 14:08

## 2024-03-23 RX ADMIN — HEPARIN SODIUM 5000 UNITS: 5000 INJECTION INTRAVENOUS; SUBCUTANEOUS at 21:31

## 2024-03-23 NOTE — ASSESSMENT & PLAN NOTE
Podiatry for or intervention tentatively in AM.  Patient appears acceptable risk for surgery.    S/p surgery 3/20 involving the right partial first ray resection dehiscence with clinical osteomyelitis   Benefit of surgery outweighs risk.    Advise against Rocephin as does not cover Enterobacter cloacae on most recent wound Cx.  Podiatry switched to Cipro which I agree with.    Prior to wound infection patient had been ambulating up to 4 miles a day.  He denies any symptoms of chest pain or shortness of breath

## 2024-03-23 NOTE — PLAN OF CARE
Problem: PAIN - ADULT  Goal: Verbalizes/displays adequate comfort level or baseline comfort level  Description: Interventions:  - Encourage patient to monitor pain and request assistance  - Assess pain using appropriate pain scale  - Administer analgesics based on type and severity of pain and evaluate response  - Implement non-pharmacological measures as appropriate and evaluate response  - Consider cultural and social influences on pain and pain management  - Notify physician/advanced practitioner if interventions unsuccessful or patient reports new pain  Outcome: Progressing     Problem: INFECTION - ADULT  Goal: Absence or prevention of progression during hospitalization  Description: INTERVENTIONS:  - Assess and monitor for signs and symptoms of infection  - Monitor lab/diagnostic results  - Monitor all insertion sites, i.e. indwelling lines, tubes, and drains  - Monitor endotracheal if appropriate and nasal secretions for changes in amount and color  - [place] appropriate cooling/warming therapies per order  - Administer medications as ordered  - Instruct and encourage patient and family to use good hand hygiene technique  - Identify and instruct in appropriate isolation precautions for identified infection/condition  Outcome: Progressing  Goal: Absence of fever/infection during neutropenic period  Description: INTERVENTIONS:  - Monitor WBC    Outcome: Progressing     Problem: SAFETY ADULT  Goal: Patient will remain free of falls  Description: INTERVENTIONS:  - Educate patient/family on patient safety including physical limitations  - Instruct patient to call for assistance with activity   - Consult OT/PT to assist with strengthening/mobility   - Keep Call bell within reach  - Keep bed low and locked with side rails adjusted as appropriate  - Keep care items and personal belongings within reach  - Initiate and maintain comfort rounds  - Make Fall Risk Sign visible to staff  - Offer Toileting every  Hours,  in advance of need  - Initiate/Maintain alarm  - Obtain necessary fall risk management equipment:   - Apply yellow socks and bracelet for high fall risk patients  - Consider moving patient to room near nurses station  Outcome: Progressing  Goal: Maintain or return to baseline ADL function  Description: INTERVENTIONS:  -  Assess patient's ability to carry out ADLs; assess patient's baseline for ADL function and identify physical deficits which impact ability to perform ADLs (bathing, care of mouth/teeth, toileting, grooming, dressing, etc.)  - Assess/evaluate cause of self-care deficits   - Assess range of motion  - Assess patient's mobility; develop plan if impaired  - Assess patient's need for assistive devices and provide as appropriate  - Encourage maximum independence but intervene and supervise when necessary  - Involve family in performance of ADLs  - Assess for home care needs following discharge   - Consider OT consult to assist with ADL evaluation and planning for discharge  - Provide patient education as appropriate  Outcome: Progressing  Goal: Maintains/Returns to pre admission functional level  Description: INTERVENTIONS:  - Perform AM-PAC 6 Click Basic Mobility/ Daily Activity assessment daily.  - Set and communicate daily mobility goal to care team and patient/family/caregiver.   - Collaborate with rehabilitation services on mobility goals if consulted  - Perform Range of Motion  times a day.  - Reposition patient every  hours.  - Dangle patient  times a day  - Stand patient  times a day  - Ambulate patient  times a day  - Out of bed to chair  times a day   - Out of bed for meals  times a day  - Out of bed for toileting  - Record patient progress and toleration of activity level   Outcome: Progressing     Problem: DISCHARGE PLANNING  Goal: Discharge to home or other facility with appropriate resources  Description: INTERVENTIONS:  - Identify barriers to discharge w/patient and caregiver  - Arrange for  needed discharge resources and transportation as appropriate  - Identify discharge learning needs (meds, wound care, etc.)  - Arrange for interpretive services to assist at discharge as needed  - Refer to Case Management Department for coordinating discharge planning if the patient needs post-hospital services based on physician/advanced practitioner order or complex needs related to functional status, cognitive ability, or social support system  Outcome: Progressing     Problem: Knowledge Deficit  Goal: Patient/family/caregiver demonstrates understanding of disease process, treatment plan, medications, and discharge instructions  Description: Complete learning assessment and assess knowledge base.  Interventions:  - Provide teaching at level of understanding  - Provide teaching via preferred learning methods  Outcome: Progressing     Problem: Nutrition/Hydration-ADULT  Goal: Nutrient/Hydration intake appropriate for improving, restoring or maintaining nutritional needs  Description: Monitor and assess patient's nutrition/hydration status for malnutrition. Collaborate with interdisciplinary team and initiate plan and interventions as ordered.  Monitor patient's weight and dietary intake as ordered or per policy. Utilize nutrition screening tool and intervene as necessary. Determine patient's food preferences and provide high-protein, high-caloric foods as appropriate.     INTERVENTIONS:  - Monitor oral intake, urinary output, labs, and treatment plans  - Assess nutrition and hydration status and recommend course of action  - Evaluate amount of meals eaten  - Assist patient with eating if necessary   - Allow adequate time for meals  - Recommend/ encourage appropriate diets, oral nutritional supplements, and vitamin/mineral supplements  - Order, calculate, and assess calorie counts as needed  - Recommend, monitor, and adjust tube feedings and TPN/PPN based on assessed needs  - Assess need for intravenous fluids  -  Provide specific nutrition/hydration education as appropriate  - Include patient/family/caregiver in decisions related to nutrition  Outcome: Progressing

## 2024-03-23 NOTE — PROGRESS NOTES
[facility]  Progress Note  Name: Maciel Gaitan I  MRN: [id number]  Unit/Bed#: -01 I Date of Admission: 3/18/2024   Date of Service: 3/23/2024 I Hospital Day: 5    Assessment/Plan   * Osteomyelitis of right foot (HCC)  Assessment & Plan  Podiatry for or intervention tentatively in AM.  Patient appears acceptable risk for surgery.    S/p surgery 3/20 involving the right partial first ray resection dehiscence with clinical osteomyelitis   Benefit of surgery outweighs risk.    Advise against Rocephin as does not cover Enterobacter cloacae on most recent wound Cx.  Podiatry switched to Cipro which I agree with.    Prior to wound infection patient had been ambulating up to 4 miles a day.  He denies any symptoms of chest pain or shortness of breath        Uncontrolled type 2 diabetes mellitus with hyperglycemia (HCC)  Assessment & Plan  Lab Results   Component Value Date    HGBA1C 8.5 (H) 03/19/2024       Recent Labs     03/22/24  1551 03/22/24  2111 03/23/24  0618 03/23/24  1040   POCGLU 102 136 115 124         Blood Sugar Average: Last 72 hrs:  (P) 119.4  Lantus 15 units daily while IP  Sliding scale insulin while IP  Fortunately blood sugar appears stable currently.  Metformin on hold while inpatient - Would d/c on Metformin 1000 mg bid    Hypokalemia  Assessment & Plan  Replete potassium prn  Mag WNL      Essential hypertension  Assessment & Plan  Currently on metoprolol and Norvasc therapy  Continue with pain control  Monitor blood pressure closely    CAD (coronary artery disease)  Assessment & Plan   This is a very pleasant 63-year-old gentleman with known history non-STEMI type I prior cardiac catheterization on 12/2017 for non-STEMI type I.  Cardiac catheterization at that time had illustrated a 95% left circumflex and chronic mid right coronary artery stenosis.  Patient successfully underwent a PCI of left circumflex.  Patient was initially placed on dual antiplatelet  therapy and subsequently de-escalated to aspirin daily by cardiolog.  Patient reports that he was walking up to 4 miles a day prior to his initial intervention for his right foot back in February.  He reports that his mobility was limited due to his lower extremity wound and has not been walking as much since that time.  Due to the lower extremity wound.  He denies any history of chest pain or shortness of breath symptoms or exertional dyspnea.  Last echo 24 showing left ventricular cavity size is normal,wall thickness is moderately increased. There is moderate concentric hypertrophy. EF is 60%, systolic function, wall motion and diastolic function are normal.   Patient has been managed on aspirin metoprolol and Lipitor therapy.  Patient also on Norvasc for blood pressure control.                                 Mobility:   Basic Mobility Inpatient Raw Score: 23  JH-HLM Goal: 7: Walk 25 feet or more  JH-HLM Achieved: 7: Walk 25 feet or more  JH-HLM Goal achieved. Continue to encourage appropriate mobility.    Patient Centered Rounds: I performed bedside rounds with nursing staff today.   Discussions with Specialists or Other Care Team Provider: no    Education and Discussions with Family / Patient:  per primary.         Current Length of Stay: 5 day(s)  Current Patient Status: Inpatient     Discharge Plan: SUMAN is following this patient on consult. They are medically stable for discharge when deemed appropriate by primary service.    Code Status: Level 1 - Full Code    Subjective:   No acute complaints    Objective:     Vitals:   Temp (24hrs), Av.6 °F (37 °C), Min:98.1 °F (36.7 °C), Max:99 °F (37.2 °C)    Temp:  [98.1 °F (36.7 °C)-99 °F (37.2 °C)] 98.6 °F (37 °C)  HR:  [70-74] 70  Resp:  [18] 18  BP: (132-138)/(73-77) 138/77  SpO2:  [94 %-96 %] 96 %  There is no height or weight on file to calculate BMI.     Input and Output Summary (last 24 hours):     Intake/Output Summary (Last 24 hours) at 3/23/2024  1614  Last data filed at 3/23/2024 0849  Gross per 24 hour   Intake 360 ml   Output 650 ml   Net -290 ml       Physical Exam:   Physical Exam  HENT:      Head: Normocephalic and atraumatic.      Nose: Nose normal.      Mouth/Throat:      Mouth: Mucous membranes are moist.   Eyes:      Extraocular Movements: Extraocular movements intact.      Conjunctiva/sclera: Conjunctivae normal.   Cardiovascular:      Rate and Rhythm: Normal rate and regular rhythm.   Pulmonary:      Effort: Pulmonary effort is normal.      Breath sounds: Normal breath sounds.   Abdominal:      General: Bowel sounds are normal. There is no distension.      Palpations: Abdomen is soft.      Tenderness: There is no abdominal tenderness.   Musculoskeletal:         General: Normal range of motion.      Cervical back: Normal range of motion and neck supple.      Right lower leg: No edema.      Left lower leg: No edema.   Skin:     General: Skin is warm and dry.   Neurological:      Mental Status: He is alert and oriented to person, place, and time.          Additional Data:     Labs:  Results from last 7 days   Lab Units 03/23/24  0551   WBC Thousand/uL 7.01   HEMOGLOBIN g/dL 10.3*   HEMATOCRIT % 31.7*   PLATELETS Thousands/uL 327   NEUTROS PCT % 58   LYMPHS PCT % 21   MONOS PCT % 14*   EOS PCT % 6     Results from last 7 days   Lab Units 03/23/24  0551 03/19/24  0525 03/18/24  1154   SODIUM mmol/L 139   < > 137   POTASSIUM mmol/L 3.5   < > 4.0   CHLORIDE mmol/L 108   < > 106   CO2 mmol/L 22   < > 22   BUN mg/dL 26*   < > 23   CREATININE mg/dL 0.83   < > 0.94   ANION GAP mmol/L 9   < > 9   CALCIUM mg/dL 8.1*   < > 8.5   ALBUMIN g/dL  --   --  3.6   TOTAL BILIRUBIN mg/dL  --   --  0.51   ALK PHOS U/L  --   --  66   ALT U/L  --   --  19   AST U/L  --   --  19   GLUCOSE RANDOM mg/dL 113   < > 122    < > = values in this interval not displayed.     Results from last 7 days   Lab Units 03/18/24  1154   INR  1.15     Results from last 7 days   Lab Units  03/23/24  1040 03/23/24  0618 03/22/24  2111 03/22/24  1551 03/22/24  1034 03/22/24  0633 03/21/24  2052 03/21/24  1556 03/21/24  1054 03/21/24  0742 03/20/24  2055 03/20/24  1659   POC GLUCOSE mg/dl 124 115 136 102 131 120 146* 84 108 119 110 114     Results from last 7 days   Lab Units 03/19/24  1649   HEMOGLOBIN A1C % 8.5*           Lines/Drains:  Invasive Devices       Peripheral Intravenous Line  Duration             Peripheral IV 03/22/24 Left Antecubital 1 day                          Imaging: No pertinent imaging reviewed.    Recent Cultures (last 7 days):   Results from last 7 days   Lab Units 03/18/24  1900 03/18/24  1154 03/18/24  0000   BLOOD CULTURE  No Growth After 4 Days. No Growth After 4 Days.  --    GRAM STAIN RESULT   --   --  1+ Disintegrating polys*  2+ Gram negative rods*   WOUND CULTURE   --   --  2+ Growth of Enterobacter cloacae*       Last 24 Hours Medication List:   Current Facility-Administered Medications   Medication Dose Route Frequency Provider Last Rate    acetaminophen  650 mg Oral Q6H PRN Juancho Jamil DPM      amLODIPine  5 mg Oral Daily Marilu Cortez DPM      aspirin  81 mg Oral Daily Marilu Cortez DPM      atorvastatin  40 mg Oral QPM Marilu Cortez DPM      ciprofloxacin  400 mg Intravenous Q12H SRAVANI HoustonM 400 mg (03/23/24 1124)    heparin (porcine)  5,000 Units Subcutaneous Q8H [facility] Marilu Cortez DPM      insulin glargine  15 Units Subcutaneous Daily With Breakfast Marilu Cortez DPM      insulin lispro  1-5 Units Subcutaneous HS Marilu Cortez DPM      insulin lispro  2-12 Units Subcutaneous TID AC Marilu Cortez DPM      metoprolol succinate  50 mg Oral Daily Marilu Cortez DPM          Today, Patient Was Seen By: Santos Reynoso DO    **Please Note: This note may have been constructed using a voice recognition system.**

## 2024-03-23 NOTE — PROGRESS NOTES
[facility] Podiatry - Progress Note  Patient: Maciel Gaitan 63 y.o. male   MRN: [id number]  PCP: MITA Choudhury  Unit/Bed#: -01 Encounter: [id number]  Date Of Visit: 24    ASSESSMENT:    Maciel Gaitan is a 63 y.o. male with:    Right partial first ray amputation surgical wound dehiscence (Jacobs 3)  - S/p right partial first ray amputation (DOS: 2024)  - S/p right first metatarsal stump resection (DOS: 3/20/2024)  Cellulitis of right foot  Type 2 diabetes mellitus  - HbA1c 8.5% (3/19/24)      PLAN:    POD 3 s/p right first metatarsal stump resection.  Right first metatarsal stump resection surgical wound is stable consisting entirely of granular tissue with no acute clinical signs of infection  Wound VAC discontinued today  Plan for washout and delayed primary closure of right first metatarsal stump resection surgical wound on 3/25/2024 with podiatry  Continue IV Cipro.  Plan to transition to oral Cipro upon discharge  Continue local wound care consisting of Maxorb DSD to right foot surgical wound, appreciate nursing assistance with dressing changes.  Elevation on green foam wedges or pillows when non-ambulatory.  Appreciate consulting services for recommendations and management.      Antibiotics started: Cipro  Pharmacologic VTE Prophylaxis: Heparin   Mechanical VTE Prophylaxis: sequential compression device   Weightbearing status: Nonweightbearing to right lower extremity    Disposition: Patient will require continued inpatient stay for the above    SUBJECTIVE:     The patient was seen, evaluated, and assessed at bedside today. The patient was awake, alert, and in no acute distress. No acute events overnight. The patient reports no pain to right foot. Patient denies N/V/F/chills/SOB/CP.      OBJECTIVE:     Vitals:   /73   Pulse 71   Temp 99 °F (37.2 °C)   Resp 16   SpO2 94%     Temp (24hrs), Av.8 °F (37.1 °C), Min:98.1 °F (36.7 °C), Max:99.3 °F (37.4 °C)      Physical Exam:      General:  Alert, cooperative, and in no distress.  Lungs: Non labored breathing  Abdomen: Soft, non-tender.  Lower extremity exam:  Cardiovascular status at baseline.  Neurological status at baseline.  Musculoskeletal status at baseline. No calf tenderness noted.     Lower extremity wound(s) as noted below:  Wound #: 1  Location: Right first metatarsal stump resection surgical wound  Length 5.0 cm: Width 2.0 cm: Depth 2.0 cm:   Deepest Tissue Noted in Base: Surgical bone  Probe to Bone: Yes  Peripheral Skin Description: Attached  Granulation: 100% Fibrotic Tissue: 0% Necrotic Tissue: 0%   Drainage Amount: minimal, bloody  Signs of Infection: No    Right lower extremity: Proximal aspect of incision is stable with intact sutures, well aligned skin edges, and normal capillary refill without signs of active infection: no purulence, no malodor, no ascending erythema, no crepitus, no fluctuance.      Clinical Images 03/23/24:      Additional Data:     Labs:    Results from last 7 days   Lab Units 03/23/24  0551   WBC Thousand/uL 7.01   HEMOGLOBIN g/dL 10.3*   HEMATOCRIT % 31.7*   PLATELETS Thousands/uL 327   NEUTROS PCT % 58   LYMPHS PCT % 21   MONOS PCT % 14*   EOS PCT % 6     Results from last 7 days   Lab Units 03/23/24  0551 03/19/24  0525 03/18/24  1154   POTASSIUM mmol/L 3.5   < > 4.0   CHLORIDE mmol/L 108   < > 106   CO2 mmol/L 22   < > 22   BUN mg/dL 26*   < > 23   CREATININE mg/dL 0.83   < > 0.94   CALCIUM mg/dL 8.1*   < > 8.5   ALK PHOS U/L  --   --  66   ALT U/L  --   --  19   AST U/L  --   --  19    < > = values in this interval not displayed.     Results from last 7 days   Lab Units 03/18/24  1154   INR  1.15       * I Have Reviewed All Lab Data Listed Above.    Recent Cultures (last 7 days):     Results from last 7 days   Lab Units 03/18/24  1900 03/18/24  1154 03/18/24  0000   BLOOD CULTURE  No Growth After 4 Days. No Growth After 4 Days.  --    GRAM STAIN RESULT   --   --  1+ Disintegrating polys*  2+  "Gram negative rods*   WOUND CULTURE   --   --  2+ Growth of Enterobacter cloacae*           Imaging: I have personally reviewed pertinent films in PACS  EKG, Pathology, and Other Studies: I have personally reviewed pertinent reports.      ** Please Note: Portions of the record may have been created with voice recognition software. Occasional wrong word or \"sound a like\" substitutions may have occurred due to the inherent limitations of voice recognition software. Read the chart carefully and recognize, using context, where substitutions have occurred. **    "

## 2024-03-23 NOTE — ASSESSMENT & PLAN NOTE
Lab Results   Component Value Date    HGBA1C 8.5 (H) 03/19/2024       Recent Labs     03/22/24  1551 03/22/24  2111 03/23/24  0618 03/23/24  1040   POCGLU 102 136 115 124         Blood Sugar Average: Last 72 hrs:  (P) 119.4  Lantus 15 units daily while IP  Sliding scale insulin while IP  Fortunately blood sugar appears stable currently.  Metformin on hold while inpatient - Would d/c on Metformin 1000 mg bid

## 2024-03-24 LAB
ANION GAP SERPL CALCULATED.3IONS-SCNC: 8 MMOL/L (ref 4–13)
BASOPHILS # BLD AUTO: 0.07 THOUSANDS/ÂΜL (ref 0–0.1)
BASOPHILS NFR BLD AUTO: 1 % (ref 0–1)
BUN SERPL-MCNC: 24 MG/DL (ref 5–25)
CALCIUM SERPL-MCNC: 8.4 MG/DL (ref 8.4–10.2)
CHLORIDE SERPL-SCNC: 105 MMOL/L (ref 96–108)
CO2 SERPL-SCNC: 25 MMOL/L (ref 21–32)
CREAT SERPL-MCNC: 0.77 MG/DL (ref 0.6–1.3)
EOSINOPHIL # BLD AUTO: 0.29 THOUSAND/ÂΜL (ref 0–0.61)
EOSINOPHIL NFR BLD AUTO: 4 % (ref 0–6)
ERYTHROCYTE [DISTWIDTH] IN BLOOD BY AUTOMATED COUNT: 14.5 % (ref 11.6–15.1)
GFR SERPL CREATININE-BSD FRML MDRD: 96 ML/MIN/1.73SQ M
GLUCOSE SERPL-MCNC: 109 MG/DL (ref 65–140)
GLUCOSE SERPL-MCNC: 123 MG/DL (ref 65–140)
GLUCOSE SERPL-MCNC: 125 MG/DL (ref 65–140)
GLUCOSE SERPL-MCNC: 129 MG/DL (ref 65–140)
GLUCOSE SERPL-MCNC: 161 MG/DL (ref 65–140)
GLUCOSE SERPL-MCNC: 178 MG/DL (ref 65–140)
HCT VFR BLD AUTO: 32.5 % (ref 36.5–49.3)
HGB BLD-MCNC: 10.4 G/DL (ref 12–17)
IMM GRANULOCYTES # BLD AUTO: 0.03 THOUSAND/UL (ref 0–0.2)
IMM GRANULOCYTES NFR BLD AUTO: 0 % (ref 0–2)
LYMPHOCYTES # BLD AUTO: 1.27 THOUSANDS/ÂΜL (ref 0.6–4.47)
LYMPHOCYTES NFR BLD AUTO: 17 % (ref 14–44)
MCH RBC QN AUTO: 26.9 PG (ref 26.8–34.3)
MCHC RBC AUTO-ENTMCNC: 32 G/DL (ref 31.4–37.4)
MCV RBC AUTO: 84 FL (ref 82–98)
MONOCYTES # BLD AUTO: 1.1 THOUSAND/ÂΜL (ref 0.17–1.22)
MONOCYTES NFR BLD AUTO: 14 % (ref 4–12)
NEUTROPHILS # BLD AUTO: 4.9 THOUSANDS/ÂΜL (ref 1.85–7.62)
NEUTS SEG NFR BLD AUTO: 64 % (ref 43–75)
NRBC BLD AUTO-RTO: 0 /100 WBCS
PLATELET # BLD AUTO: 357 THOUSANDS/UL (ref 149–390)
PMV BLD AUTO: 8.9 FL (ref 8.9–12.7)
POTASSIUM SERPL-SCNC: 3.5 MMOL/L (ref 3.5–5.3)
RBC # BLD AUTO: 3.87 MILLION/UL (ref 3.88–5.62)
SODIUM SERPL-SCNC: 138 MMOL/L (ref 135–147)
WBC # BLD AUTO: 7.66 THOUSAND/UL (ref 4.31–10.16)

## 2024-03-24 PROCEDURE — NC001 PR NO CHARGE: Performed by: PODIATRIST

## 2024-03-24 PROCEDURE — 85025 COMPLETE CBC W/AUTO DIFF WBC: CPT

## 2024-03-24 PROCEDURE — 80048 BASIC METABOLIC PNL TOTAL CA: CPT

## 2024-03-24 PROCEDURE — 99232 SBSQ HOSP IP/OBS MODERATE 35: CPT | Performed by: GENERAL PRACTICE

## 2024-03-24 PROCEDURE — 82948 REAGENT STRIP/BLOOD GLUCOSE: CPT

## 2024-03-24 RX ORDER — SODIUM CHLORIDE, SODIUM GLUCONATE, SODIUM ACETATE, POTASSIUM CHLORIDE, MAGNESIUM CHLORIDE, SODIUM PHOSPHATE, DIBASIC, AND POTASSIUM PHOSPHATE .53; .5; .37; .037; .03; .012; .00082 G/100ML; G/100ML; G/100ML; G/100ML; G/100ML; G/100ML; G/100ML
75 INJECTION, SOLUTION INTRAVENOUS CONTINUOUS
Status: DISPENSED | OUTPATIENT
Start: 2024-03-25 | End: 2024-03-25

## 2024-03-24 RX ORDER — INSULIN GLARGINE 100 [IU]/ML
12 INJECTION, SOLUTION SUBCUTANEOUS EVERY MORNING
Status: DISCONTINUED | OUTPATIENT
Start: 2024-03-24 | End: 2024-03-26 | Stop reason: HOSPADM

## 2024-03-24 RX ADMIN — HEPARIN SODIUM 5000 UNITS: 5000 INJECTION INTRAVENOUS; SUBCUTANEOUS at 13:03

## 2024-03-24 RX ADMIN — HEPARIN SODIUM 5000 UNITS: 5000 INJECTION INTRAVENOUS; SUBCUTANEOUS at 06:08

## 2024-03-24 RX ADMIN — CIPROFLOXACIN 400 MG: 2 INJECTION, SOLUTION INTRAVENOUS at 11:44

## 2024-03-24 RX ADMIN — INSULIN LISPRO 1 UNITS: 100 INJECTION, SOLUTION INTRAVENOUS; SUBCUTANEOUS at 21:21

## 2024-03-24 RX ADMIN — ACETAMINOPHEN 650 MG: 325 TABLET, FILM COATED ORAL at 06:08

## 2024-03-24 RX ADMIN — ATORVASTATIN CALCIUM 40 MG: 40 TABLET, FILM COATED ORAL at 17:02

## 2024-03-24 RX ADMIN — CIPROFLOXACIN 400 MG: 2 INJECTION, SOLUTION INTRAVENOUS at 23:23

## 2024-03-24 RX ADMIN — AMLODIPINE BESYLATE 5 MG: 5 TABLET ORAL at 08:48

## 2024-03-24 RX ADMIN — CIPROFLOXACIN 400 MG: 2 INJECTION, SOLUTION INTRAVENOUS at 00:08

## 2024-03-24 RX ADMIN — METOPROLOL SUCCINATE 50 MG: 50 TABLET, EXTENDED RELEASE ORAL at 08:48

## 2024-03-24 RX ADMIN — ASPIRIN 81 MG CHEWABLE TABLET 81 MG: 81 TABLET CHEWABLE at 08:48

## 2024-03-24 RX ADMIN — HEPARIN SODIUM 5000 UNITS: 5000 INJECTION INTRAVENOUS; SUBCUTANEOUS at 21:19

## 2024-03-24 RX ADMIN — ACETAMINOPHEN 650 MG: 325 TABLET, FILM COATED ORAL at 13:02

## 2024-03-24 RX ADMIN — INSULIN LISPRO 1 UNITS: 100 INJECTION, SOLUTION INTRAVENOUS; SUBCUTANEOUS at 11:33

## 2024-03-24 RX ADMIN — ACETAMINOPHEN 650 MG: 325 TABLET, FILM COATED ORAL at 21:21

## 2024-03-24 RX ADMIN — INSULIN GLARGINE 12 UNITS: 100 INJECTION, SOLUTION SUBCUTANEOUS at 08:47

## 2024-03-24 RX ADMIN — SODIUM CHLORIDE, SODIUM GLUCONATE, SODIUM ACETATE, POTASSIUM CHLORIDE, MAGNESIUM CHLORIDE, SODIUM PHOSPHATE, DIBASIC, AND POTASSIUM PHOSPHATE 75 ML/HR: .53; .5; .37; .037; .03; .012; .00082 INJECTION, SOLUTION INTRAVENOUS at 23:31

## 2024-03-24 NOTE — ASSESSMENT & PLAN NOTE
Podiatry for or intervention tentatively in AM.  Patient appears acceptable risk for surgery.    S/p surgery 3/20 involving the right partial first ray resection dehiscence with clinical osteomyelitis   Benefit of surgery outweighs risk.    Advise against Rocephin as does not cover Enterobacter cloacae on most recent wound Cx.  Podiatry switched to Cipro which I agree with.    OR again tomorrow - will start IVF @ [facility]  Prior to wound infection patient had been ambulating up to 4 miles a day.  He denies any symptoms of chest pain or shortness of breath

## 2024-03-24 NOTE — PROGRESS NOTES
[facility]  Progress Note  Name: Maciel Gaitan I  MRN: [id number]  Unit/Bed#: -01 I Date of Admission: 3/18/2024   Date of Service: 3/24/2024 I Hospital Day: 6    Assessment/Plan   * Osteomyelitis of right foot (HCC)  Assessment & Plan  Podiatry for or intervention tentatively in AM.  Patient appears acceptable risk for surgery.    S/p surgery 3/20 involving the right partial first ray resection dehiscence with clinical osteomyelitis   Benefit of surgery outweighs risk.    Advise against Rocephin as does not cover Enterobacter cloacae on most recent wound Cx.  Podiatry switched to Cipro which I agree with.    OR again tomorrow - will start IVF @ [facility]  Prior to wound infection patient had been ambulating up to 4 miles a day.  He denies any symptoms of chest pain or shortness of breath        Uncontrolled type 2 diabetes mellitus with hyperglycemia (HCC)  Assessment & Plan  Lab Results   Component Value Date    HGBA1C 8.5 (H) 03/19/2024       Recent Labs     03/23/24  1626 03/23/24  2108 03/24/24  0605 03/24/24  1113   POCGLU 119 127 129 161*         Blood Sugar Average: Last 72 hrs:  (P) 122.7004515851773457  Lantus while IP  - decrease dose as BSs below goal  Sliding scale insulin while IP  Fortunately blood sugar appears stable currently.  Metformin on hold while inpatient - Would d/c on Metformin 1000 mg bid    Hypokalemia  Assessment & Plan  Replete potassium prn  Mag WNL      Essential hypertension  Assessment & Plan  Currently on metoprolol and Norvasc therapy  Continue with pain control  Monitor blood pressure closely    CAD (coronary artery disease)  Assessment & Plan   This is a very pleasant 63-year-old gentleman with known history non-STEMI type I prior cardiac catheterization on 12/2017 for non-STEMI type I.  Cardiac catheterization at that time had illustrated a 95% left circumflex and chronic mid right coronary artery stenosis.  Patient successfully underwent  a PCI of left circumflex.  Patient was initially placed on dual antiplatelet therapy and subsequently de-escalated to aspirin daily by cardiolog.  Patient reports that he was walking up to 4 miles a day prior to his initial intervention for his right foot back in February.  He reports that his mobility was limited due to his lower extremity wound and has not been walking as much since that time.  Due to the lower extremity wound.  He denies any history of chest pain or shortness of breath symptoms or exertional dyspnea.  Last echo 24 showing left ventricular cavity size is normal,wall thickness is moderately increased. There is moderate concentric hypertrophy. EF is 60%, systolic function, wall motion and diastolic function are normal.   Patient has been managed on aspirin metoprolol and Lipitor therapy.  Patient also on Norvasc for blood pressure control.                                   VTE Pharmacologic Prophylaxis:    heparin    Mobility:   Basic Mobility Inpatient Raw Score: 23  JH-HLM Goal: 7: Walk 25 feet or more  JH-HLM Achieved: 7: Walk 25 feet or more  JH-HLM Goal achieved. Continue to encourage appropriate mobility.    Patient Centered Rounds: I performed bedside rounds with nursing staff today.   Discussions with Specialists or Other Care Team Provider: no    Education and Discussions with Family / Patient:  per primary.         Current Length of Stay: 6 day(s)  Current Patient Status: Inpatient     Discharge Plan: SUMAN is following this patient on consult. They are medically stable for discharge when deemed appropriate by primary service.    Code Status: Level 1 - Full Code    Subjective:   No acute complaints    Objective:     Vitals:   Temp (24hrs), Av.5 °F (36.9 °C), Min:98.3 °F (36.8 °C), Max:98.6 °F (37 °C)    Temp:  [98.3 °F (36.8 °C)-98.6 °F (37 °C)] 98.6 °F (37 °C)  HR:  [67-80] 80  Resp:  [16-17] 16  BP: (124-141)/(76-78) 124/76  SpO2:  [92 %-99 %] 99 %  There is no height or weight  on file to calculate BMI.     Input and Output Summary (last 24 hours):     Intake/Output Summary (Last 24 hours) at 3/24/2024 1536  Last data filed at 3/24/2024 1100  Gross per 24 hour   Intake 558 ml   Output --   Net 558 ml       Physical Exam:   Physical Exam  HENT:      Head: Normocephalic and atraumatic.      Nose: Nose normal.      Mouth/Throat:      Mouth: Mucous membranes are moist.   Eyes:      Extraocular Movements: Extraocular movements intact.      Conjunctiva/sclera: Conjunctivae normal.   Cardiovascular:      Rate and Rhythm: Normal rate and regular rhythm.   Pulmonary:      Effort: Pulmonary effort is normal.      Breath sounds: Normal breath sounds.   Abdominal:      General: Bowel sounds are normal.      Palpations: Abdomen is soft.   Musculoskeletal:         General: Normal range of motion.      Cervical back: Normal range of motion and neck supple.      Right lower leg: No edema.      Left lower leg: No edema.   Skin:     Comments: Dressing c/d/i   Neurological:      Mental Status: He is alert and oriented to person, place, and time.          Additional Data:     Labs:  Results from last 7 days   Lab Units 03/24/24  0518   WBC Thousand/uL 7.66   HEMOGLOBIN g/dL 10.4*   HEMATOCRIT % 32.5*   PLATELETS Thousands/uL 357   NEUTROS PCT % 64   LYMPHS PCT % 17   MONOS PCT % 14*   EOS PCT % 4     Results from last 7 days   Lab Units 03/24/24  0518 03/19/24  0525 03/18/24  1154   SODIUM mmol/L 138   < > 137   POTASSIUM mmol/L 3.5   < > 4.0   CHLORIDE mmol/L 105   < > 106   CO2 mmol/L 25   < > 22   BUN mg/dL 24   < > 23   CREATININE mg/dL 0.77   < > 0.94   ANION GAP mmol/L 8   < > 9   CALCIUM mg/dL 8.4   < > 8.5   ALBUMIN g/dL  --   --  3.6   TOTAL BILIRUBIN mg/dL  --   --  0.51   ALK PHOS U/L  --   --  66   ALT U/L  --   --  19   AST U/L  --   --  19   GLUCOSE RANDOM mg/dL 125   < > 122    < > = values in this interval not displayed.     Results from last 7 days   Lab Units 03/18/24  1154   INR  1.15      Results from last 7 days   Lab Units 03/24/24  1113 03/24/24  0605 03/23/24  2108 03/23/24  1626 03/23/24  1040 03/23/24  0618 03/22/24  2111 03/22/24  1551 03/22/24  1034 03/22/24  0633 03/21/24  2052 03/21/24  1556   POC GLUCOSE mg/dl 161* 129 127 119 124 115 136 102 131 120 146* 84     Results from last 7 days   Lab Units 03/19/24  1649   HEMOGLOBIN A1C % 8.5*           Lines/Drains:  Invasive Devices       Peripheral Intravenous Line  Duration             Peripheral IV 03/22/24 Left Antecubital 2 days                          Imaging: No pertinent imaging reviewed.    Recent Cultures (last 7 days):   Results from last 7 days   Lab Units 03/18/24  1900 03/18/24  1154 03/18/24  0000   BLOOD CULTURE  No Growth After 5 Days. No Growth After 5 Days.  --    GRAM STAIN RESULT   --   --  1+ Disintegrating polys*  2+ Gram negative rods*   WOUND CULTURE   --   --  2+ Growth of Enterobacter cloacae*       Last 24 Hours Medication List:   Current Facility-Administered Medications   Medication Dose Route Frequency Provider Last Rate    acetaminophen  650 mg Oral Q6H PRN Juancho Jamil DPM      amLODIPine  5 mg Oral Daily Marilu Cortez DPM      aspirin  81 mg Oral Daily Marilu Cortez DPM      atorvastatin  40 mg Oral QPM Marilu Cortez DPM      ciprofloxacin  400 mg Intravenous Q12H SRAVANI [place]M 400 mg (03/24/24 1144)    heparin (porcine)  5,000 Units Subcutaneous Q8H [facility]ABELLE Marilu Cortez DPM      insulin glargine  12 Units Subcutaneous QAM Santos Reynoso DO      insulin lispro  1-5 Units Subcutaneous HS Marilu Cortez DPM      insulin lispro  2-12 Units Subcutaneous TID AC Marilu Cortez DPM      metoprolol succinate  50 mg Oral Daily Marilu Cortez DPM      [START ON 3/25/2024] multi-electrolyte  75 mL/hr Intravenous Continuous Santos Reynoso DO          Today, Patient Was Seen By: Santos Reynoso DO    **Please Note: This note may have been constructed using a voice recognition system.**

## 2024-03-24 NOTE — PLAN OF CARE
Problem: INFECTION - ADULT  Goal: Absence or prevention of progression during hospitalization  Description: INTERVENTIONS:  - Assess and monitor for signs and symptoms of infection  - Monitor lab/diagnostic results  - Monitor all insertion sites, i.e. indwelling lines, tubes, and drains  - Monitor endotracheal if appropriate and nasal secretions for changes in amount and color  - [place] appropriate cooling/warming therapies per order  - Administer medications as ordered  - Instruct and encourage patient and family to use good hand hygiene technique  - Identify and instruct in appropriate isolation precautions for identified infection/condition  Outcome: Progressing

## 2024-03-24 NOTE — ASSESSMENT & PLAN NOTE
Lab Results   Component Value Date    HGBA1C 8.5 (H) 03/19/2024       Recent Labs     03/23/24  1626 03/23/24  2108 03/24/24  0605 03/24/24  1113   POCGLU 119 127 129 161*         Blood Sugar Average: Last 72 hrs:  (P) 122.2616874086331684  Lantus while IP  - decrease dose as BSs below goal  Sliding scale insulin while IP  Fortunately blood sugar appears stable currently.  Metformin on hold while inpatient - Would d/c on Metformin 1000 mg bid

## 2024-03-24 NOTE — PROGRESS NOTES
[facility] Podiatry - Progress Note  Patient: Maciel Gaitan 63 y.o. male   MRN: [id number]  PCP: MITA Choudhury  Unit/Bed#: -01 Encounter: [id number]  Date Of Visit: 24    ASSESSMENT:    Maciel Gaitan is a 63 y.o. male with:    Right partial first ray amputation surgical wound dehiscence (Jacobs 3)  - S/p right partial first ray amputation (DOS: 2024)  - S/p right first metatarsal stump resection (DOS: 3/20/2024)  Cellulitis of right foot  Type 2 diabetes mellitus  - HbA1c 8.5% (3/19/24)      PLAN:    Patient seen at bedside today and dressings changed.  Right first metatarsal stump resection surgical wound is stable consisting of granular tissue to the wound bed with no acute clinical signs of infection  Plan for right foot surgical wound washout with delayed primary closure with podiatry on 3/25/2024  NPO starting at midnight  Please hold heparin starting at midnight  Consent signed and scanned into media  Continue IV Cipro with plans to transition to oral Cipro upon discharge  Continue local wound care consisting of Adaptic Maxorb DSD to right surgical wound, appreciate nursing assistance with dressing changes.  Elevation on green foam wedges or pillows when non-ambulatory.  Appreciate consulting services for recommendations and management.      Antibiotics started: Cipro  Pharmacologic VTE Prophylaxis: Heparin   Mechanical VTE Prophylaxis: sequential compression device   Weightbearing status: Nonweightbearing to right foot    Disposition: Patient will require continued inpatient stay for the above    SUBJECTIVE:     The patient was seen, evaluated, and assessed at bedside today. The patient was awake, alert, and in no acute distress. No acute events overnight. The patient reports no pain to his right foot. Patient denies N/V/F/chills/SOB/CP.      OBJECTIVE:     Vitals:   /77   Pulse 67   Temp 98.5 °F (36.9 °C)   Resp 16   SpO2 97%     Temp (24hrs), Av.5 °F (36.9 °C), Min:98.3  °F (36.8 °C), Max:98.7 °F (37.1 °C)      Physical Exam:     General:  Alert, cooperative, and in no distress.  Lungs: Non labored breathing  Abdomen: Soft, non-tender.  Lower extremity exam:  Cardiovascular status at baseline.  Neurological status at baseline.  Musculoskeletal status at baseline. No calf tenderness noted.     Lower extremity wound(s) as noted below:  Wound #: 1  Location: Right first metatarsal stump resection surgical wound  Length 5.0 cm: Width 2.0 cm: Depth 2.0 cm:   Deepest Tissue Noted in Base: Surgical bone  Probe to Bone: Yes  Peripheral Skin Description: Attached  Granulation: 90% Fibrotic Tissue: 10% Necrotic Tissue: 0%   Drainage Amount: minimal, serosanguinous  Signs of Infection: No    Right lower extremity: Proximal incision is stable with intact sutures, well aligned skin edges, and normal capillary refill to dorsal and plantar flap without signs of active infection: no purulence, no malodor, no ascending erythema, no crepitus, no fluctuance.      Clinical Images 03/24/24:      Additional Data:     Labs:    Results from last 7 days   Lab Units 03/24/24  0518   WBC Thousand/uL 7.66   HEMOGLOBIN g/dL 10.4*   HEMATOCRIT % 32.5*   PLATELETS Thousands/uL 357   NEUTROS PCT % 64   LYMPHS PCT % 17   MONOS PCT % 14*   EOS PCT % 4     Results from last 7 days   Lab Units 03/24/24  0518 03/19/24  0525 03/18/24  1154   POTASSIUM mmol/L 3.5   < > 4.0   CHLORIDE mmol/L 105   < > 106   CO2 mmol/L 25   < > 22   BUN mg/dL 24   < > 23   CREATININE mg/dL 0.77   < > 0.94   CALCIUM mg/dL 8.4   < > 8.5   ALK PHOS U/L  --   --  66   ALT U/L  --   --  19   AST U/L  --   --  19    < > = values in this interval not displayed.     Results from last 7 days   Lab Units 03/18/24  1154   INR  1.15       * I Have Reviewed All Lab Data Listed Above.    Recent Cultures (last 7 days):     Results from last 7 days   Lab Units 03/18/24  1900 03/18/24  1154 03/18/24  0000   BLOOD CULTURE  No Growth After 5 Days. No Growth  "After 5 Days.  --    GRAM STAIN RESULT   --   --  1+ Disintegrating polys*  2+ Gram negative rods*   WOUND CULTURE   --   --  2+ Growth of Enterobacter cloacae*           Imaging: I have personally reviewed pertinent films in PACS  EKG, Pathology, and Other Studies: I have personally reviewed pertinent reports.      ** Please Note: Portions of the record may have been created with voice recognition software. Occasional wrong word or \"sound a like\" substitutions may have occurred due to the inherent limitations of voice recognition software. Read the chart carefully and recognize, using context, where substitutions have occurred. **    "

## 2024-03-25 ENCOUNTER — HOME HEALTH ADMISSION (OUTPATIENT)
Dept: HOME HEALTH SERVICES | Facility: HOME HEALTHCARE | Age: 64
End: 2024-03-25

## 2024-03-25 ENCOUNTER — ANESTHESIA (INPATIENT)
Dept: PERIOP | Facility: HOSPITAL | Age: 64
DRG: 908 | End: 2024-03-25
Payer: COMMERCIAL

## 2024-03-25 ENCOUNTER — ANESTHESIA EVENT (INPATIENT)
Dept: PERIOP | Facility: HOSPITAL | Age: 64
DRG: 908 | End: 2024-03-25
Payer: COMMERCIAL

## 2024-03-25 LAB
ANION GAP SERPL CALCULATED.3IONS-SCNC: 8 MMOL/L (ref 4–13)
BASOPHILS # BLD AUTO: 0.08 THOUSANDS/ÂΜL (ref 0–0.1)
BASOPHILS NFR BLD AUTO: 1 % (ref 0–1)
BUN SERPL-MCNC: 25 MG/DL (ref 5–25)
CALCIUM SERPL-MCNC: 8.3 MG/DL (ref 8.4–10.2)
CHLORIDE SERPL-SCNC: 106 MMOL/L (ref 96–108)
CO2 SERPL-SCNC: 25 MMOL/L (ref 21–32)
CREAT SERPL-MCNC: 0.79 MG/DL (ref 0.6–1.3)
EOSINOPHIL # BLD AUTO: 0.28 THOUSAND/ÂΜL (ref 0–0.61)
EOSINOPHIL NFR BLD AUTO: 5 % (ref 0–6)
ERYTHROCYTE [DISTWIDTH] IN BLOOD BY AUTOMATED COUNT: 14.4 % (ref 11.6–15.1)
GFR SERPL CREATININE-BSD FRML MDRD: 95 ML/MIN/1.73SQ M
GLUCOSE SERPL-MCNC: 114 MG/DL (ref 65–140)
GLUCOSE SERPL-MCNC: 116 MG/DL (ref 65–140)
GLUCOSE SERPL-MCNC: 123 MG/DL (ref 65–140)
GLUCOSE SERPL-MCNC: 124 MG/DL (ref 65–140)
GLUCOSE SERPL-MCNC: 242 MG/DL (ref 65–140)
GLUCOSE SERPL-MCNC: 274 MG/DL (ref 65–140)
HCT VFR BLD AUTO: 32.1 % (ref 36.5–49.3)
HGB BLD-MCNC: 10.1 G/DL (ref 12–17)
IMM GRANULOCYTES # BLD AUTO: 0.01 THOUSAND/UL (ref 0–0.2)
IMM GRANULOCYTES NFR BLD AUTO: 0 % (ref 0–2)
LYMPHOCYTES # BLD AUTO: 1.49 THOUSANDS/ÂΜL (ref 0.6–4.47)
LYMPHOCYTES NFR BLD AUTO: 25 % (ref 14–44)
MCH RBC QN AUTO: 26.4 PG (ref 26.8–34.3)
MCHC RBC AUTO-ENTMCNC: 31.5 G/DL (ref 31.4–37.4)
MCV RBC AUTO: 84 FL (ref 82–98)
MONOCYTES # BLD AUTO: 0.98 THOUSAND/ÂΜL (ref 0.17–1.22)
MONOCYTES NFR BLD AUTO: 17 % (ref 4–12)
NEUTROPHILS # BLD AUTO: 3.07 THOUSANDS/ÂΜL (ref 1.85–7.62)
NEUTS SEG NFR BLD AUTO: 52 % (ref 43–75)
NRBC BLD AUTO-RTO: 0 /100 WBCS
PLATELET # BLD AUTO: 349 THOUSANDS/UL (ref 149–390)
PMV BLD AUTO: 8.7 FL (ref 8.9–12.7)
POTASSIUM SERPL-SCNC: 3.6 MMOL/L (ref 3.5–5.3)
RBC # BLD AUTO: 3.82 MILLION/UL (ref 3.88–5.62)
SODIUM SERPL-SCNC: 139 MMOL/L (ref 135–147)
WBC # BLD AUTO: 5.91 THOUSAND/UL (ref 4.31–10.16)

## 2024-03-25 PROCEDURE — 13160 SEC CLSR SURG WND/DEHSN XTN: CPT | Performed by: PODIATRIST

## 2024-03-25 PROCEDURE — 88311 DECALCIFY TISSUE: CPT | Performed by: PATHOLOGY

## 2024-03-25 PROCEDURE — 82948 REAGENT STRIP/BLOOD GLUCOSE: CPT

## 2024-03-25 PROCEDURE — 0YQM0ZZ REPAIR RIGHT FOOT, OPEN APPROACH: ICD-10-PCS | Performed by: PODIATRIST

## 2024-03-25 PROCEDURE — NC001 PR NO CHARGE: Performed by: PODIATRIST

## 2024-03-25 PROCEDURE — 85025 COMPLETE CBC W/AUTO DIFF WBC: CPT

## 2024-03-25 PROCEDURE — 88305 TISSUE EXAM BY PATHOLOGIST: CPT | Performed by: PATHOLOGY

## 2024-03-25 PROCEDURE — 80048 BASIC METABOLIC PNL TOTAL CA: CPT

## 2024-03-25 PROCEDURE — 99233 SBSQ HOSP IP/OBS HIGH 50: CPT | Performed by: GENERAL PRACTICE

## 2024-03-25 PROCEDURE — 0HCMXZZ EXTIRPATION OF MATTER FROM RIGHT FOOT SKIN, EXTERNAL APPROACH: ICD-10-PCS | Performed by: PODIATRIST

## 2024-03-25 RX ORDER — ALBUTEROL SULFATE 2.5 MG/3ML
2.5 SOLUTION RESPIRATORY (INHALATION) ONCE AS NEEDED
Status: DISCONTINUED | OUTPATIENT
Start: 2024-03-25 | End: 2024-03-25 | Stop reason: HOSPADM

## 2024-03-25 RX ORDER — MIDAZOLAM HYDROCHLORIDE 2 MG/2ML
INJECTION, SOLUTION INTRAMUSCULAR; INTRAVENOUS AS NEEDED
Status: DISCONTINUED | OUTPATIENT
Start: 2024-03-25 | End: 2024-03-25

## 2024-03-25 RX ORDER — ONDANSETRON 2 MG/ML
INJECTION INTRAMUSCULAR; INTRAVENOUS AS NEEDED
Status: DISCONTINUED | OUTPATIENT
Start: 2024-03-25 | End: 2024-03-25

## 2024-03-25 RX ORDER — LIDOCAINE HYDROCHLORIDE 10 MG/ML
INJECTION, SOLUTION EPIDURAL; INFILTRATION; INTRACAUDAL; PERINEURAL AS NEEDED
Status: DISCONTINUED | OUTPATIENT
Start: 2024-03-25 | End: 2024-03-25

## 2024-03-25 RX ORDER — FENTANYL CITRATE/PF 50 MCG/ML
50 SYRINGE (ML) INJECTION
Status: DISCONTINUED | OUTPATIENT
Start: 2024-03-25 | End: 2024-03-25 | Stop reason: HOSPADM

## 2024-03-25 RX ORDER — LIDOCAINE HYDROCHLORIDE 10 MG/ML
INJECTION, SOLUTION EPIDURAL; INFILTRATION; INTRACAUDAL; PERINEURAL AS NEEDED
Status: DISCONTINUED | OUTPATIENT
Start: 2024-03-25 | End: 2024-03-25 | Stop reason: HOSPADM

## 2024-03-25 RX ORDER — PROPOFOL 10 MG/ML
INJECTION, EMULSION INTRAVENOUS CONTINUOUS PRN
Status: DISCONTINUED | OUTPATIENT
Start: 2024-03-25 | End: 2024-03-25

## 2024-03-25 RX ORDER — CIPROFLOXACIN 500 MG/1
500 TABLET, FILM COATED ORAL EVERY 12 HOURS SCHEDULED
Status: DISCONTINUED | OUTPATIENT
Start: 2024-03-25 | End: 2024-03-26 | Stop reason: HOSPADM

## 2024-03-25 RX ORDER — SODIUM CHLORIDE, SODIUM LACTATE, POTASSIUM CHLORIDE, CALCIUM CHLORIDE 600; 310; 30; 20 MG/100ML; MG/100ML; MG/100ML; MG/100ML
INJECTION, SOLUTION INTRAVENOUS CONTINUOUS PRN
Status: DISCONTINUED | OUTPATIENT
Start: 2024-03-25 | End: 2024-03-25

## 2024-03-25 RX ORDER — BUPIVACAINE HYDROCHLORIDE 2.5 MG/ML
INJECTION, SOLUTION EPIDURAL; INFILTRATION; INTRACAUDAL AS NEEDED
Status: DISCONTINUED | OUTPATIENT
Start: 2024-03-25 | End: 2024-03-25 | Stop reason: HOSPADM

## 2024-03-25 RX ORDER — ONDANSETRON 2 MG/ML
4 INJECTION INTRAMUSCULAR; INTRAVENOUS ONCE AS NEEDED
Status: DISCONTINUED | OUTPATIENT
Start: 2024-03-25 | End: 2024-03-25 | Stop reason: HOSPADM

## 2024-03-25 RX ORDER — PROPOFOL 10 MG/ML
INJECTION, EMULSION INTRAVENOUS AS NEEDED
Status: DISCONTINUED | OUTPATIENT
Start: 2024-03-25 | End: 2024-03-25

## 2024-03-25 RX ORDER — HYDROMORPHONE HCL/PF 1 MG/ML
0.5 SYRINGE (ML) INJECTION
Status: DISCONTINUED | OUTPATIENT
Start: 2024-03-25 | End: 2024-03-25 | Stop reason: HOSPADM

## 2024-03-25 RX ORDER — DEXAMETHASONE SODIUM PHOSPHATE 10 MG/ML
INJECTION, SOLUTION INTRAMUSCULAR; INTRAVENOUS AS NEEDED
Status: DISCONTINUED | OUTPATIENT
Start: 2024-03-25 | End: 2024-03-25

## 2024-03-25 RX ADMIN — PROPOFOL 150 MCG/KG/MIN: 10 INJECTION, EMULSION INTRAVENOUS at 15:49

## 2024-03-25 RX ADMIN — METOPROLOL SUCCINATE 50 MG: 50 TABLET, EXTENDED RELEASE ORAL at 10:00

## 2024-03-25 RX ADMIN — ATORVASTATIN CALCIUM 40 MG: 40 TABLET, FILM COATED ORAL at 18:30

## 2024-03-25 RX ADMIN — AMLODIPINE BESYLATE 5 MG: 5 TABLET ORAL at 10:00

## 2024-03-25 RX ADMIN — DEXAMETHASONE SODIUM PHOSPHATE 10 MG: 10 INJECTION, SOLUTION INTRAMUSCULAR; INTRAVENOUS at 15:49

## 2024-03-25 RX ADMIN — ACETAMINOPHEN 650 MG: 325 TABLET, FILM COATED ORAL at 05:29

## 2024-03-25 RX ADMIN — SODIUM CHLORIDE, SODIUM LACTATE, POTASSIUM CHLORIDE, AND CALCIUM CHLORIDE: .6; .31; .03; .02 INJECTION, SOLUTION INTRAVENOUS at 15:45

## 2024-03-25 RX ADMIN — HEPARIN SODIUM 5000 UNITS: 5000 INJECTION INTRAVENOUS; SUBCUTANEOUS at 21:52

## 2024-03-25 RX ADMIN — LIDOCAINE HYDROCHLORIDE 50 MG: 10 INJECTION, SOLUTION EPIDURAL; INFILTRATION; INTRACAUDAL; PERINEURAL at 15:48

## 2024-03-25 RX ADMIN — ASPIRIN 81 MG CHEWABLE TABLET 81 MG: 81 TABLET CHEWABLE at 09:59

## 2024-03-25 RX ADMIN — MIDAZOLAM 2 MG: 1 INJECTION INTRAMUSCULAR; INTRAVENOUS at 15:48

## 2024-03-25 RX ADMIN — ACETAMINOPHEN 650 MG: 325 TABLET, FILM COATED ORAL at 23:07

## 2024-03-25 RX ADMIN — ONDANSETRON 4 MG: 2 INJECTION INTRAMUSCULAR; INTRAVENOUS at 15:49

## 2024-03-25 RX ADMIN — PROPOFOL 100 MG: 10 INJECTION, EMULSION INTRAVENOUS at 15:48

## 2024-03-25 RX ADMIN — INSULIN LISPRO 2 UNITS: 100 INJECTION, SOLUTION INTRAVENOUS; SUBCUTANEOUS at 23:07

## 2024-03-25 RX ADMIN — CIPROFLOXACIN 500 MG: 500 TABLET ORAL at 21:52

## 2024-03-25 RX ADMIN — INSULIN GLARGINE 12 UNITS: 100 INJECTION, SOLUTION SUBCUTANEOUS at 10:00

## 2024-03-25 RX ADMIN — CIPROFLOXACIN 400 MG: 2 INJECTION, SOLUTION INTRAVENOUS at 11:44

## 2024-03-25 NOTE — UTILIZATION REVIEW
Continued Stay Review    Date: 3/23                         Current Patient Class: Inpatient  Current Level of Care: Med Surg    HPI:63 y.o. male initially admitted on 3/18     Assessment/Plan:   POD 3 s/p right first metatarsal stump resection.  Right first metatarsal stump resection surgical wound is stable consisting entirely of granular tissue with no acute clinical signs of infection  Wound VAC discontinued today   Continue Iv antibiotics.   Continue local wound care consisting of Maxorb DSD to right foot surgical wound, appreciate nursing assistance with dressing changes.          Vital Signs:   03/23/24 2131 98.3 °F (36.8 °C) -- 17 134/78 -- -- None (Room air) Lying   03/23/24 2000 -- -- -- -- -- -- None (Room air) --   03/23/24 15:08:28 98.6 °F (37 °C) -- -- 138/77 97 -- -- --     Pertinent Labs/Diagnostic Results:       Results from last 7 days   Lab Units 03/25/24 0527 03/24/24 0518 03/23/24  0551 03/22/24  0502 03/21/24  0545   WBC Thousand/uL 5.91 7.66 7.01 6.52 7.57   HEMOGLOBIN g/dL 10.1* 10.4* 10.3* 10.4* 10.7*   HEMATOCRIT % 32.1* 32.5* 31.7* 32.6* 33.6*   PLATELETS Thousands/uL 349 357 327 330 333   NEUTROS ABS Thousands/µL 3.07 4.90 4.13 3.89 4.86         Results from last 7 days   Lab Units 03/25/24 0527 03/24/24  0518 03/23/24  0551 03/22/24  0502 03/21/24  0545 03/20/24  1144 03/20/24  0634   SODIUM mmol/L 139 138 139 138 141   < > 142   POTASSIUM mmol/L 3.6 3.5 3.5 4.0 3.7   < > 3.7   CHLORIDE mmol/L 106 105 108 106 107   < > 108   CO2 mmol/L 25 25 22 24 25   < > 24   ANION GAP mmol/L 8 8 9 8 9   < > 10   BUN mg/dL 25 24 26* 26* 28*   < > 29*   CREATININE mg/dL 0.79 0.77 0.83 0.87 0.93   < > 0.97   EGFR ml/min/1.73sq m 95 96 93 91 87   < > 82   CALCIUM mg/dL 8.3* 8.4 8.1* 8.2* 8.5   < > 8.4   MAGNESIUM mg/dL  --   --   --   --   --   --  1.9    < > = values in this interval not displayed.         Results from last 7 days   Lab Units 03/25/24  1121 03/25/24  0626 03/24/24  1911 03/24/24  6651  03/24/24  1609 03/24/24  1113 03/24/24  0605 03/23/24  2108 03/23/24  1626 03/23/24  1040 03/23/24  0618 03/22/24  2111   POC GLUCOSE mg/dl 123 124 123 178* 109 161* 129 127 119 124 115 136     Results from last 7 days   Lab Units 03/25/24  0527 03/24/24  0518 03/23/24  0551 03/22/24  0502 03/21/24  0545 03/20/24  1144 03/20/24  0634 03/19/24  0525   GLUCOSE RANDOM mg/dL 116 125 113 116 109 123 127 103         Results from last 7 days   Lab Units 03/19/24  1649   HEMOGLOBIN A1C % 8.5*   EAG mg/dl 197           Results from last 7 days   Lab Units 03/18/24  1900   BLOOD CULTURE  No Growth After 5 Days.       Medications:   Scheduled Medications:  amLODIPine, 5 mg, Oral, Daily  aspirin, 81 mg, Oral, Daily  atorvastatin, 40 mg, Oral, QPM  ciprofloxacin, 400 mg, Intravenous, Q12H  heparin (porcine), 5,000 Units, Subcutaneous, Q8H ISABELLE  insulin glargine, 12 Units, Subcutaneous, QAM  insulin lispro, 1-5 Units, Subcutaneous, HS  insulin lispro, 2-12 Units, Subcutaneous, TID AC  metoprolol succinate, 50 mg, Oral, Daily      Continuous IV Infusions:  multi-electrolyte, 75 mL/hr, Intravenous, Continuous      PRN Meds:  acetaminophen, 650 mg, Oral, Q6H PRN      Discharge Plan:     Network Utilization Review Department  ATTENTION: Please call with any questions or concerns to [phone] and carefully listen to the prompts so that you are directed to the right person. All voicemails are confidential.   For Discharge needs, contact Care Management DC Support Team at [phone] opt. 2  Send all requests for admission clinical reviews, approved or denied determinations and any other requests to dedicated fax number below belonging to the campus where the patient is receiving treatment. List of dedicated fax numbers for the Facilities:  FACILITY NAME UR FAX NUMBER   ADMISSION DENIALS (Administrative/Medical Necessity) [phone]   DISCHARGE SUPPORT TEAM (NETWORK) [phone]   PARENT CHILD HEALTH (Maternity/NICU/Pediatrics)  [phone]   [facility] [phone]   [facility] [phone]   [facility] [phone]   [facility] [phone]   [facility] [phone]   [facility] [phone]   [facility] [phone]   [facility] [phone]   [facility] [phone]   [facility] [phone]   [facility] [phone]   [facility] [phone]

## 2024-03-25 NOTE — ASSESSMENT & PLAN NOTE
Lab Results   Component Value Date    HGBA1C 8.5 (H) 03/19/2024       Recent Labs     03/24/24  2331 03/25/24  0626 03/25/24  1121 03/25/24  1646   POCGLU 123 124 123 114         Blood Sugar Average: Last 72 hrs:  (P) 127.1875  Lantus while IP   Sliding scale insulin while IP  Fortunately blood sugar appears stable currently.  Metformin on hold while inpatient - Would d/c on Metformin 1000 mg bid

## 2024-03-25 NOTE — ANESTHESIA PREPROCEDURE EVALUATION
Procedure:  CLOSURE DELAYED PRIMARY OF RIGHT FOOT (Right: Foot)    Relevant Problems   CARDIO   (+) CAD (coronary artery disease)   (+) Essential hypertension   (+) Hyperlipidemia      Other   (+) Osteomyelitis of great toe of right foot (HCC)   (+) Osteomyelitis of right foot (HCC)      1/2024: normal biventricular systolic function    2017 [facility]: mLCx 90 (THALIA), mRCA 100 (collat)         Anesthesia Plan  ASA Score- 3     Anesthesia Type- IV sedation with anesthesia with ASA Monitors.         Additional Monitors:     Airway Plan:     Comment: IV sedation, GA back up; standard ASA monitors. Risks and benefits discussed with patient; patient consented and agrees to proceed.    I saw and evaluated the patient. If seen with CRNA, we have discussed the anesthetic plan and I am in agreement that the plan is appropriate for the patient.  .       Plan Factors-    Chart reviewed.   Existing labs reviewed.                   Induction- intravenous.    Postoperative Plan-     Informed Consent- Anesthetic plan and risks discussed with patient.  I personally reviewed this patient with the CRNA. Discussed and agreed on the Anesthesia Plan with the CRNA..

## 2024-03-25 NOTE — PROGRESS NOTES
[facility]  Progress Note  Name: Maciel Gaitan I  MRN: [id number]  Unit/Bed#: OR POOL I Date of Admission: 3/18/2024   Date of Service: 3/25/2024 I Hospital Day: 7    Assessment/Plan   * Osteomyelitis of right foot (HCC)  Assessment & Plan  Podiatry for or intervention tentatively in AM.  Patient appears acceptable risk for surgery.    S/p surgery 3/20 involving the right partial first ray resection dehiscence with clinical osteomyelitis   Benefit of surgery outweighs risk.    Advise against Rocephin as does not cover Enterobacter cloacae on most recent wound Cx.  Podiatry switched to Cipro which I agree with.    Today went to OR for closure - IVF for the day  Prior to wound infection patient had been ambulating up to 4 miles a day.  He denies any symptoms of chest pain or shortness of breath        Uncontrolled type 2 diabetes mellitus with hyperglycemia (HCC)  Assessment & Plan  Lab Results   Component Value Date    HGBA1C 8.5 (H) 03/19/2024       Recent Labs     03/24/24  2331 03/25/24  0626 03/25/24  1121 03/25/24  1646   POCGLU 123 124 123 114         Blood Sugar Average: Last 72 hrs:  (P) 127.1875  Lantus while IP   Sliding scale insulin while IP  Fortunately blood sugar appears stable currently.  Metformin on hold while inpatient - Would d/c on Metformin 1000 mg bid    Hypokalemia  Assessment & Plan  Replete potassium prn  Mag WNL      Essential hypertension  Assessment & Plan  Currently on metoprolol and Norvasc therapy  Continue with pain control  Monitor blood pressure closely    CAD (coronary artery disease)  Assessment & Plan   This is a very pleasant 63-year-old gentleman with known history non-STEMI type I prior cardiac catheterization on 12/2017 for non-STEMI type I.  Cardiac catheterization at that time had illustrated a 95% left circumflex and chronic mid right coronary artery stenosis.  Patient successfully underwent a PCI of left circumflex.  Patient was  initially placed on dual antiplatelet therapy and subsequently de-escalated to aspirin daily by cardiolog.  Patient reports that he was walking up to 4 miles a day prior to his initial intervention for his right foot back in February.  He reports that his mobility was limited due to his lower extremity wound and has not been walking as much since that time.  Due to the lower extremity wound.  He denies any history of chest pain or shortness of breath symptoms or exertional dyspnea.  Last echo 24 showing left ventricular cavity size is normal,wall thickness is moderately increased. There is moderate concentric hypertrophy. EF is 60%, systolic function, wall motion and diastolic function are normal.   Patient has been managed on aspirin metoprolol and Lipitor therapy.  Patient also on Norvasc for blood pressure control.                                   VTE Pharmacologic Prophylaxis:    heparin    Mobility:   Basic Mobility Inpatient Raw Score: 23  JH-HLM Goal: 7: Walk 25 feet or more  JH-HLM Achieved: 7: Walk 25 feet or more  JH-HLM Goal achieved. Continue to encourage appropriate mobility.    Patient Centered Rounds: I performed bedside rounds with nursing staff today.   Discussions with Specialists or Other Care Team Provider: no    Education and Discussions with Family / Patient: Updated  (wife) at bedside.    Current Length of Stay: 7 day(s)  Current Patient Status: Inpatient     Discharge Plan: [facility] is following this patient on consult. They are medically stable for discharge when deemed appropriate by primary service.    Code Status: Level 1 - Full Code    Subjective:   No acute complaints    Objective:     Vitals:   Temp (24hrs), Av °F (36.7 °C), Min:97.7 °F (36.5 °C), Max:98.4 °F (36.9 °C)    Temp:  [97.7 °F (36.5 °C)-98.4 °F (36.9 °C)] 98.4 °F (36.9 °C)  HR:  [56-68] 56  Resp:  [16] 16  BP: ()/(57-82) 109/64  SpO2:  [98 %-100 %] 98 %  There is no height or weight on file to  calculate BMI.     Input and Output Summary (last 24 hours):     Intake/Output Summary (Last 24 hours) at 3/25/2024 1703  Last data filed at 3/25/2024 1625  Gross per 24 hour   Intake 100 ml   Output --   Net 100 ml       Physical Exam:   Physical Exam  HENT:      Head: Normocephalic and atraumatic.      Nose: Nose normal.      Mouth/Throat:      Mouth: Mucous membranes are moist.   Eyes:      Extraocular Movements: Extraocular movements intact.      Conjunctiva/sclera: Conjunctivae normal.   Cardiovascular:      Rate and Rhythm: Normal rate and regular rhythm.   Pulmonary:      Effort: Pulmonary effort is normal.      Breath sounds: Normal breath sounds.   Abdominal:      General: Bowel sounds are normal. There is no distension.      Palpations: Abdomen is soft.      Tenderness: There is no abdominal tenderness.   Musculoskeletal:         General: Normal range of motion.      Cervical back: Normal range of motion and neck supple.      Right lower leg: No edema.      Left lower leg: No edema.   Skin:     General: Skin is warm and dry.   Neurological:      Mental Status: He is alert and oriented to person, place, and time.          Additional Data:     Labs:  Results from last 7 days   Lab Units 03/25/24  0527   WBC Thousand/uL 5.91   HEMOGLOBIN g/dL 10.1*   HEMATOCRIT % 32.1*   PLATELETS Thousands/uL 349   NEUTROS PCT % 52   LYMPHS PCT % 25   MONOS PCT % 17*   EOS PCT % 5     Results from last 7 days   Lab Units 03/25/24  0527   SODIUM mmol/L 139   POTASSIUM mmol/L 3.6   CHLORIDE mmol/L 106   CO2 mmol/L 25   BUN mg/dL 25   CREATININE mg/dL 0.79   ANION GAP mmol/L 8   CALCIUM mg/dL 8.3*   GLUCOSE RANDOM mg/dL 116         Results from last 7 days   Lab Units 03/25/24  1646 03/25/24  1121 03/25/24  0626 03/24/24  2331 03/24/24  2108 03/24/24  1609 03/24/24  1113 03/24/24  0605 03/23/24  2108 03/23/24  1626 03/23/24  1040 03/23/24  0618   POC GLUCOSE mg/dl 114 123 124 123 178* 109 161* 129 127 119 124 115     Results  from last 7 days   Lab Units 03/19/24  1649   HEMOGLOBIN A1C % 8.5*           Lines/Drains:  Invasive Devices       Peripheral Intravenous Line  Duration             Peripheral IV 03/22/24 Left Antecubital 3 days              Airway  Duration             Non-Surgical Airway Nasal pharyngeal airway 32 FR <1 day                          Imaging: No pertinent imaging reviewed.    Recent Cultures (last 7 days):   Results from last 7 days   Lab Units 03/18/24  1900   BLOOD CULTURE  No Growth After 5 Days.       Last 24 Hours Medication List:   Current Facility-Administered Medications   Medication Dose Route Frequency Provider Last Rate    [Transfer Hold] acetaminophen  650 mg Oral Q6H PRN Juancho Jamil DPM      albuterol  2.5 mg Nebulization Once PRN Ab Pierre CRNA      [Transfer Hold] amLODIPine  5 mg Oral Daily Marilu Cortez DPM      [Transfer Hold] aspirin  81 mg Oral Daily Marilu Cortez DPM      [Transfer Hold] atorvastatin  40 mg Oral QPM Marilu Cortez DPM      ciprofloxacin  500 mg Oral Q12H [facility] Juancho Jamil DPM      fentaNYL  50 mcg Intravenous Q5 Min PRN Ab Pierre CRNA      [Transfer Hold] heparin (porcine)  5,000 Units Subcutaneous Q8H [facility] Marilu Cortez DPM      HYDROmorphone  0.5 mg Intravenous Q10 Min PRN Ab Pierre CRNA      [Transfer Hold] insulin glargine  12 Units Subcutaneous QAM Santos Reynoso DO      [Transfer Hold] insulin lispro  1-5 Units Subcutaneous HS Marilu Cortez DPM      [Transfer Hold] insulin lispro  2-12 Units Subcutaneous TID AC Marilu Cortez DPM      [Transfer Hold] metoprolol succinate  50 mg Oral Daily Marilu Cortez DPM      multi-electrolyte  75 mL/hr Intravenous Continuous Santos Reynoso DO 75 mL/hr (03/24/24 2331)    ondansetron  4 mg Intravenous Once PRN Ab Pierre CRNA          Today, Patient Was Seen By: Santos Reynoso DO    **Please Note: This note may have been constructed using a voice recognition system.**

## 2024-03-25 NOTE — OP NOTE
OPERATIVE REPORT  PATIENT NAME: Maciel Gaitan    :  1960  MRN: [id number]  Pt Location: BE OR ROOM 08    SURGERY DATE: 3/25/2024    Surgeons and Role:     * Shakeel Mckinney DPM - Primary     * Juancho Jamil DPM - Assisting    Preop Diagnosis:  Encounter for post surgical wound check [Z48.89]  Osteomyelitis of right foot, unspecified type (HCC) [M86.9]    Post-Op Diagnosis Codes:     * Encounter for post surgical wound check [Z48.89]     * Osteomyelitis of right foot, unspecified type (HCC) [M86.9]    Procedure(s):  Right - CLOSURE DELAYED PRIMARY OF RIGHT FOOT    Specimen(s):  * No specimens in log *    Estimated Blood Loss:   Minimal    Drains:  * No LDAs found *    Anesthesia Type:   Choice    Operative Indications:  Encounter for post surgical wound check [Z48.89]  Osteomyelitis of right foot, unspecified type (HCC) [M86.9]      Operative Findings:  1) No active purulence or draining tracts noted  2) small 3cc hematoma noted deeply was evacuated   3) remaining soft tissue appeared healthy, bleeding and granular  4) will plan for continued nonweightbearing to the right lower extremity.  Follow-up PT OT recs  5) transition to oral Cipro for a total of 10 days of therapy.    Complications:   None    Procedure and Technique:  Patient was brought back to the operating room under sedation and remained on the stretcher for the entire procedure.  After anesthesia was administered a preinjection timeout was completed with all parties in agreement.  20 cc of 1% lidocaine and 0.5% Marcaine and one-to-one mix was injected to the right foot.  The right foot was then prepped and draped in the normal sterile manner.  A preincision timeout was then completed with all parties in agreement.    Attention was then drawn to the right foot.  The wound on the right foot was sharply debrided utilizing a rongeur and 15 blade to remove all fibrotic and nonviable tissue.  A small central nidus was noted with a 3 cc hematoma.   This 3cc hematoma was evacuated from the foot using mechanical pressure.  The incision was then copiously flushed utilizing copious amounts of sterile saline.  The remaining soft tissue appeared of healthy bleeding and viable.  No additional tracts or purulence was noted.  Closure of the wound was achieved utilizing 3-0 nylon in a retention and simple suture type fashion.  A postoperative dressing consisted of Betadine soaked Adaptic DSD was applied to the right foot.  Patient tolerated the procedure well with no immediate complications.     Dr. Mckinney was present for the entire procedure.    Patient Disposition:  PACU         SIGNATURE: Juancho Jamil DPM  DATE: March 25, 2024  TIME: 4:30 PM

## 2024-03-25 NOTE — PLAN OF CARE
Problem: PAIN - ADULT  Goal: Verbalizes/displays adequate comfort level or baseline comfort level  Description: Interventions:  - Encourage patient to monitor pain and request assistance  - Assess pain using appropriate pain scale  - Administer analgesics based on type and severity of pain and evaluate response  - Implement non-pharmacological measures as appropriate and evaluate response  - Consider cultural and social influences on pain and pain management  - Notify physician/advanced practitioner if interventions unsuccessful or patient reports new pain  Outcome: Progressing     Problem: INFECTION - ADULT  Goal: Absence or prevention of progression during hospitalization  Description: INTERVENTIONS:  - Assess and monitor for signs and symptoms of infection  - Monitor lab/diagnostic results  - Monitor all insertion sites, i.e. indwelling lines, tubes, and drains  - Monitor endotracheal if appropriate and nasal secretions for changes in amount and color  - [place] appropriate cooling/warming therapies per order  - Administer medications as ordered  - Instruct and encourage patient and family to use good hand hygiene technique  - Identify and instruct in appropriate isolation precautions for identified infection/condition  Outcome: Progressing  Goal: Absence of fever/infection during neutropenic period  Description: INTERVENTIONS:  - Monitor WBC    Outcome: Progressing     Problem: SAFETY ADULT  Goal: Patient will remain free of falls  Description: INTERVENTIONS:  - Educate patient/family on patient safety including physical limitations  - Instruct patient to call for assistance with activity   - Consult OT/PT to assist with strengthening/mobility   - Keep Call bell within reach  - Keep bed low and locked with side rails adjusted as appropriate  - Keep care items and personal belongings within reach  - Initiate and maintain comfort rounds  - Make Fall Risk Sign visible to staff  - Offer Toileting every 2 Hours,  in advance of need  - Apply yellow socks and bracelet for high fall risk patients  - Consider moving patient to room near nurses station  Outcome: Progressing  Goal: Maintain or return to baseline ADL function  Description: INTERVENTIONS:  -  Assess patient's ability to carry out ADLs; assess patient's baseline for ADL function and identify physical deficits which impact ability to perform ADLs (bathing, care of mouth/teeth, toileting, grooming, dressing, etc.)  - Assess/evaluate cause of self-care deficits   - Assess range of motion  - Assess patient's mobility; develop plan if impaired  - Assess patient's need for assistive devices and provide as appropriate  - Encourage maximum independence but intervene and supervise when necessary  - Involve family in performance of ADLs  - Assess for home care needs following discharge   - Consider OT consult to assist with ADL evaluation and planning for discharge  - Provide patient education as appropriate  Outcome: Progressing  Goal: Maintains/Returns to pre admission functional level  Description: INTERVENTIONS:  - Perform AM-PAC 6 Click Basic Mobility/ Daily Activity assessment daily.  - Set and communicate daily mobility goal to care team and patient/family/caregiver.   - Collaborate with rehabilitation services on mobility goals if consulted  - Perform Range of Motion 4 times a day.  - Reposition patient every 2 hours.  - Dangle patient 4 times a day  - Stand patient 3 times a day  - Ambulate patient 3 times a day  - Out of bed to chair 3 times a day   - Out of bed for meals 3 times a day  - Out of bed for toileting  - Record patient progress and toleration of activity level   Outcome: Progressing     Problem: DISCHARGE PLANNING  Goal: Discharge to home or other facility with appropriate resources  Description: INTERVENTIONS:  - Identify barriers to discharge w/patient and caregiver  - Arrange for needed discharge resources and transportation as appropriate  - Identify  discharge learning needs (meds, wound care, etc.)  - Arrange for interpretive services to assist at discharge as needed  - Refer to Case Management Department for coordinating discharge planning if the patient needs post-hospital services based on physician/advanced practitioner order or complex needs related to functional status, cognitive ability, or social support system  Outcome: Progressing     Problem: Knowledge Deficit  Goal: Patient/family/caregiver demonstrates understanding of disease process, treatment plan, medications, and discharge instructions  Description: Complete learning assessment and assess knowledge base.  Interventions:  - Provide teaching at level of understanding  - Provide teaching via preferred learning methods  Outcome: Progressing     Problem: Nutrition/Hydration-ADULT  Goal: Nutrient/Hydration intake appropriate for improving, restoring or maintaining nutritional needs  Description: Monitor and assess patient's nutrition/hydration status for malnutrition. Collaborate with interdisciplinary team and initiate plan and interventions as ordered.  Monitor patient's weight and dietary intake as ordered or per policy. Utilize nutrition screening tool and intervene as necessary. Determine patient's food preferences and provide high-protein, high-caloric foods as appropriate.     INTERVENTIONS:  - Monitor oral intake, urinary output, labs, and treatment plans  - Assess nutrition and hydration status and recommend course of action  - Evaluate amount of meals eaten  - Assist patient with eating if necessary   - Allow adequate time for meals  - Recommend/ encourage appropriate diets, oral nutritional supplements, and vitamin/mineral supplements  - Order, calculate, and assess calorie counts as needed  - Recommend, monitor, and adjust tube feedings and TPN/PPN based on assessed needs  - Assess need for intravenous fluids  - Provide specific nutrition/hydration education as appropriate  - Include  patient/family/caregiver in decisions related to nutrition  Outcome: Progressing     Problem: MOBILITY - ADULT  Goal: Maintain or return to baseline ADL function  Description: INTERVENTIONS:  -  Assess patient's ability to carry out ADLs; assess patient's baseline for ADL function and identify physical deficits which impact ability to perform ADLs (bathing, care of mouth/teeth, toileting, grooming, dressing, etc.)  - Assess/evaluate cause of self-care deficits   - Assess range of motion  - Assess patient's mobility; develop plan if impaired  - Assess patient's need for assistive devices and provide as appropriate  - Encourage maximum independence but intervene and supervise when necessary  - Involve family in performance of ADLs  - Assess for home care needs following discharge   - Consider OT consult to assist with ADL evaluation and planning for discharge  - Provide patient education as appropriate  Outcome: Progressing  Goal: Maintains/Returns to pre admission functional level  Description: INTERVENTIONS:  - Perform AM-PAC 6 Click Basic Mobility/ Daily Activity assessment daily.  - Set and communicate daily mobility goal to care team and patient/family/caregiver.   - Collaborate with rehabilitation services on mobility goals if consulted  - Perform Range of Motion 4 times a day.  - Reposition patient every 2 hours.  - Dangle patient 4 times a day  - Stand patient 3 times a day  - Ambulate patient 3 times a day  - Out of bed to chair 3 times a day   - Out of bed for meals 3 times a day  - Out of bed for toileting  - Record patient progress and toleration of activity level   Outcome: Progressing

## 2024-03-25 NOTE — ASSESSMENT & PLAN NOTE
Podiatry for or intervention tentatively in AM.  Patient appears acceptable risk for surgery.    S/p surgery 3/20 involving the right partial first ray resection dehiscence with clinical osteomyelitis   Benefit of surgery outweighs risk.    Advise against Rocephin as does not cover Enterobacter cloacae on most recent wound Cx.  Podiatry switched to Cipro which I agree with.    Today went to OR for closure - IVF for the day  Prior to wound infection patient had been ambulating up to 4 miles a day.  He denies any symptoms of chest pain or shortness of breath

## 2024-03-25 NOTE — ANESTHESIA POSTPROCEDURE EVALUATION
Post-Op Assessment Note    CV Status:  Stable  Pain Score: 0    Pain management: adequate       Mental Status:  Alert and awake   Hydration Status:  Euvolemic   PONV Controlled:  Controlled   Airway Patency:  Patent     Post Op Vitals Reviewed: Yes    No anethesia notable event occurred.    Staff: Anesthesiologist, CRNA               BP   95/57   Temp   98.4   Pulse  66   Resp   18   SpO2   99      show

## 2024-03-25 NOTE — PROGRESS NOTES
Podiatry - Progress Note  Patient: Maciel Gaitan 63 y.o. male   MRN: [id number]  PCP: MITA Choudhury  Unit/Bed#: -01 Encounter: [id number]  Date Of Visit: 24    ASSESSMENT:    Maciel Gaitan is a 63 y.o. 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 Mckinney.  Consent placed in chart. Signed by patient pre-operatively.   Confirmed NPO status.  H&P, vitals, and current labs reviewed. No acute changes noted.  Alternatives, risks, and complications discussed with patient.  All questions answered. No guarantees given to outcome of procedure.  Appreciate consulting services for recommendations and management.     Antibiotics started: Ciprofloxacin   Pharmacologic VTE Prophylaxis: Heparin   Mechanical VTE Prophylaxis: sequential compression device   Weightbearing status: Nonweightbearing to right foot     Disposition: Patient will require continued inpatient stay for the above    SUBJECTIVE:     The patient was seen, evaluated, and assessed at bedside today. The patient was awake, alert, and in no acute distress. No acute events overnight. The patient reports that he has had nothing to eat since last night, and feeling well with minimal RLE pain. Patient denies N/V/F/chills/SOB/CP.      OBJECTIVE:     Vitals:   /82   Pulse 68   Temp 97.7 °F (36.5 °C)   Resp 16   SpO2 100%     Temp (24hrs), Av.1 °F (36.7 °C), Min:97.7 °F (36.5 °C), Max:98.6 °F (37 °C)      Physical Exam:     Lungs: Non labored breathing  Abdomen: Soft, non-tender.  Lower Extremity:  Cardiovascular status at baseline from admission.  Neurological status at baseline from admission.  Musculoskeletal  "status at baseline from admission. No calf tenderness noted.     - Dressings clean, dry, and intact to operating room.     Additional Data:     Labs:    Results from last 7 days   Lab Units 03/25/24  0527   WBC Thousand/uL 5.91   HEMOGLOBIN g/dL 10.1*   HEMATOCRIT % 32.1*   PLATELETS Thousands/uL 349   NEUTROS PCT % 52   LYMPHS PCT % 25   MONOS PCT % 17*   EOS PCT % 5     Results from last 7 days   Lab Units 03/25/24  0527 03/19/24  0525 03/18/24  1154   POTASSIUM mmol/L 3.6   < > 4.0   CHLORIDE mmol/L 106   < > 106   CO2 mmol/L 25   < > 22   BUN mg/dL 25   < > 23   CREATININE mg/dL 0.79   < > 0.94   CALCIUM mg/dL 8.3*   < > 8.5   ALK PHOS U/L  --   --  66   ALT U/L  --   --  19   AST U/L  --   --  19    < > = values in this interval not displayed.     Results from last 7 days   Lab Units 03/18/24  1154   INR  1.15       * I Have Reviewed All Lab Data Listed Above.    Recent Cultures (last 7 days):     Results from last 7 days   Lab Units 03/18/24  1900 03/18/24  1154   BLOOD CULTURE  No Growth After 5 Days. No Growth After 5 Days.           Imaging: I have personally reviewed pertinent films in PACS  EKG, Pathology, and Other Studies: I have personally reviewed pertinent reports.    ** Please Note: Portions of the record may have been created with voice recognition software. Occasional wrong word or \"sound a like\" substitutions may have occurred due to the inherent limitations of voice recognition software. Read the chart carefully and recognize, using context, where substitutions have occurred. **      "

## 2024-03-26 ENCOUNTER — TRANSITIONAL CARE MANAGEMENT (OUTPATIENT)
Dept: FAMILY MEDICINE CLINIC | Facility: CLINIC | Age: 64
End: 2024-03-26

## 2024-03-26 VITALS
OXYGEN SATURATION: 97 % | RESPIRATION RATE: 18 BRPM | SYSTOLIC BLOOD PRESSURE: 132 MMHG | DIASTOLIC BLOOD PRESSURE: 77 MMHG | TEMPERATURE: 97.4 F | HEART RATE: 72 BPM

## 2024-03-26 PROBLEM — E87.6 HYPOKALEMIA: Status: RESOLVED | Noted: 2024-02-20 | Resolved: 2024-03-26

## 2024-03-26 PROBLEM — M86.9 OSTEOMYELITIS OF RIGHT FOOT (HCC): Status: RESOLVED | Noted: 2024-03-19 | Resolved: 2024-03-26

## 2024-03-26 LAB
ANION GAP SERPL CALCULATED.3IONS-SCNC: 8 MMOL/L (ref 4–13)
BASOPHILS # BLD AUTO: 0 THOUSANDS/ÂΜL (ref 0–0.1)
BASOPHILS NFR BLD AUTO: 0 % (ref 0–1)
BUN SERPL-MCNC: 34 MG/DL (ref 5–25)
CALCIUM SERPL-MCNC: 8.8 MG/DL (ref 8.4–10.2)
CHLORIDE SERPL-SCNC: 104 MMOL/L (ref 96–108)
CO2 SERPL-SCNC: 24 MMOL/L (ref 21–32)
CREAT SERPL-MCNC: 0.91 MG/DL (ref 0.6–1.3)
EOSINOPHIL # BLD AUTO: 0 THOUSAND/ÂΜL (ref 0–0.61)
EOSINOPHIL NFR BLD AUTO: 0 % (ref 0–6)
ERYTHROCYTE [DISTWIDTH] IN BLOOD BY AUTOMATED COUNT: 13.9 % (ref 11.6–15.1)
GFR SERPL CREATININE-BSD FRML MDRD: 89 ML/MIN/1.73SQ M
GLUCOSE SERPL-MCNC: 204 MG/DL (ref 65–140)
GLUCOSE SERPL-MCNC: 220 MG/DL (ref 65–140)
GLUCOSE SERPL-MCNC: 245 MG/DL (ref 65–140)
HCT VFR BLD AUTO: 33.4 % (ref 36.5–49.3)
HGB BLD-MCNC: 10.7 G/DL (ref 12–17)
IMM GRANULOCYTES # BLD AUTO: 0.04 THOUSAND/UL (ref 0–0.2)
IMM GRANULOCYTES NFR BLD AUTO: 1 % (ref 0–2)
LYMPHOCYTES # BLD AUTO: 0.58 THOUSANDS/ÂΜL (ref 0.6–4.47)
LYMPHOCYTES NFR BLD AUTO: 8 % (ref 14–44)
MCH RBC QN AUTO: 27 PG (ref 26.8–34.3)
MCHC RBC AUTO-ENTMCNC: 32 G/DL (ref 31.4–37.4)
MCV RBC AUTO: 84 FL (ref 82–98)
MONOCYTES # BLD AUTO: 0.34 THOUSAND/ÂΜL (ref 0.17–1.22)
MONOCYTES NFR BLD AUTO: 5 % (ref 4–12)
NEUTROPHILS # BLD AUTO: 6.3 THOUSANDS/ÂΜL (ref 1.85–7.62)
NEUTS SEG NFR BLD AUTO: 86 % (ref 43–75)
NRBC BLD AUTO-RTO: 0 /100 WBCS
PLATELET # BLD AUTO: 406 THOUSANDS/UL (ref 149–390)
PMV BLD AUTO: 8.6 FL (ref 8.9–12.7)
POTASSIUM SERPL-SCNC: 4.1 MMOL/L (ref 3.5–5.3)
RBC # BLD AUTO: 3.96 MILLION/UL (ref 3.88–5.62)
SODIUM SERPL-SCNC: 136 MMOL/L (ref 135–147)
WBC # BLD AUTO: 7.26 THOUSAND/UL (ref 4.31–10.16)

## 2024-03-26 PROCEDURE — 99231 SBSQ HOSP IP/OBS SF/LOW 25: CPT | Performed by: INTERNAL MEDICINE

## 2024-03-26 PROCEDURE — 82948 REAGENT STRIP/BLOOD GLUCOSE: CPT

## 2024-03-26 PROCEDURE — NC001 PR NO CHARGE: Performed by: PODIATRIST

## 2024-03-26 PROCEDURE — 85025 COMPLETE CBC W/AUTO DIFF WBC: CPT

## 2024-03-26 PROCEDURE — 80048 BASIC METABOLIC PNL TOTAL CA: CPT

## 2024-03-26 RX ORDER — CIPROFLOXACIN 500 MG/1
500 TABLET, FILM COATED ORAL EVERY 12 HOURS SCHEDULED
Qty: 8 TABLET | Refills: 0 | Status: SHIPPED | OUTPATIENT
Start: 2024-03-26 | End: 2024-03-30

## 2024-03-26 RX ADMIN — ACETAMINOPHEN 650 MG: 325 TABLET, FILM COATED ORAL at 06:18

## 2024-03-26 RX ADMIN — AMLODIPINE BESYLATE 5 MG: 5 TABLET ORAL at 09:11

## 2024-03-26 RX ADMIN — CIPROFLOXACIN 500 MG: 500 TABLET ORAL at 09:10

## 2024-03-26 RX ADMIN — INSULIN GLARGINE 12 UNITS: 100 INJECTION, SOLUTION SUBCUTANEOUS at 09:10

## 2024-03-26 RX ADMIN — METOPROLOL SUCCINATE 50 MG: 50 TABLET, EXTENDED RELEASE ORAL at 09:11

## 2024-03-26 RX ADMIN — ASPIRIN 81 MG CHEWABLE TABLET 81 MG: 81 TABLET CHEWABLE at 09:11

## 2024-03-26 RX ADMIN — INSULIN LISPRO 4 UNITS: 100 INJECTION, SOLUTION INTRAVENOUS; SUBCUTANEOUS at 12:27

## 2024-03-26 RX ADMIN — HEPARIN SODIUM 5000 UNITS: 5000 INJECTION INTRAVENOUS; SUBCUTANEOUS at 06:03

## 2024-03-26 NOTE — PROGRESS NOTES
"Nurse advised patient that IV catheter is due to be changed in am. Patient refused and stated \" Patient stated, \"I'm no longer receiving IV abx why change it? \" Attending made aware.   "

## 2024-03-26 NOTE — PLAN OF CARE
Problem: PAIN - ADULT  Goal: Verbalizes/displays adequate comfort level or baseline comfort level  Description: Interventions:  - Encourage patient to monitor pain and request assistance  - Assess pain using appropriate pain scale  - Administer analgesics based on type and severity of pain and evaluate response  - Implement non-pharmacological measures as appropriate and evaluate response  - Consider cultural and social influences on pain and pain management  - Notify physician/advanced practitioner if interventions unsuccessful or patient reports new pain  Outcome: Progressing     Problem: INFECTION - ADULT  Goal: Absence or prevention of progression during hospitalization  Description: INTERVENTIONS:  - Assess and monitor for signs and symptoms of infection  - Monitor lab/diagnostic results  - Monitor all insertion sites, i.e. indwelling lines, tubes, and drains  - Monitor endotracheal if appropriate and nasal secretions for changes in amount and color  - [place] appropriate cooling/warming therapies per order  - Administer medications as ordered  - Instruct and encourage patient and family to use good hand hygiene technique  - Identify and instruct in appropriate isolation precautions for identified infection/condition  Outcome: Progressing  Goal: Absence of fever/infection during neutropenic period  Description: INTERVENTIONS:  - Monitor WBC    Outcome: Progressing     Problem: SAFETY ADULT  Goal: Patient will remain free of falls  Description: INTERVENTIONS:  - Educate patient/family on patient safety including physical limitations  - Instruct patient to call for assistance with activity   - Consult OT/PT to assist with strengthening/mobility   - Keep Call bell within reach  - Keep bed low and locked with side rails adjusted as appropriate  - Keep care items and personal belongings within reach  - Initiate and maintain comfort rounds  - Make Fall Risk Sign visible to staff  - Apply yellow socks and bracelet  for high fall risk patients  - Consider moving patient to room near nurses station  Outcome: Progressing  Goal: Maintain or return to baseline ADL function  Description: INTERVENTIONS:  -  Assess patient's ability to carry out ADLs; assess patient's baseline for ADL function and identify physical deficits which impact ability to perform ADLs (bathing, care of mouth/teeth, toileting, grooming, dressing, etc.)  - Assess/evaluate cause of self-care deficits   - Assess range of motion  - Assess patient's mobility; develop plan if impaired  - Assess patient's need for assistive devices and provide as appropriate  - Encourage maximum independence but intervene and supervise when necessary  - Involve family in performance of ADLs  - Assess for home care needs following discharge   - Consider OT consult to assist with ADL evaluation and planning for discharge  - Provide patient education as appropriate  Outcome: Progressing  Goal: Maintains/Returns to pre admission functional level  Description: INTERVENTIONS:  - Perform AM-PAC 6 Click Basic Mobility/ Daily Activity assessment daily.  - Set and communicate daily mobility goal to care team and patient/family/caregiver.   - Collaborate with rehabilitation services on mobility goals if consulted  - Perform Range of Motion 4 times a day.  - Reposition patient every 2 hours.  - Dangle patient 4 times a day  - Stand patient 3 times a day  - Ambulate patient 3 times a day  - Out of bed to chair 3 times a day   - Out of bed for meals 3 times a day  - Out of bed for toileting  - Record patient progress and toleration of activity level   Outcome: Progressing     Problem: DISCHARGE PLANNING  Goal: Discharge to home or other facility with appropriate resources  Description: INTERVENTIONS:  - Identify barriers to discharge w/patient and caregiver  - Arrange for needed discharge resources and transportation as appropriate  - Identify discharge learning needs (meds, wound care, etc.)  -  Arrange for interpretive services to assist at discharge as needed  - Refer to Case Management Department for coordinating discharge planning if the patient needs post-hospital services based on physician/advanced practitioner order or complex needs related to functional status, cognitive ability, or social support system  Outcome: Progressing     Problem: Knowledge Deficit  Goal: Patient/family/caregiver demonstrates understanding of disease process, treatment plan, medications, and discharge instructions  Description: Complete learning assessment and assess knowledge base.  Interventions:  - Provide teaching at level of understanding  - Provide teaching via preferred learning methods  Outcome: Progressing     Problem: Nutrition/Hydration-ADULT  Goal: Nutrient/Hydration intake appropriate for improving, restoring or maintaining nutritional needs  Description: Monitor and assess patient's nutrition/hydration status for malnutrition. Collaborate with interdisciplinary team and initiate plan and interventions as ordered.  Monitor patient's weight and dietary intake as ordered or per policy. Utilize nutrition screening tool and intervene as necessary. Determine patient's food preferences and provide high-protein, high-caloric foods as appropriate.     INTERVENTIONS:  - Monitor oral intake, urinary output, labs, and treatment plans  - Assess nutrition and hydration status and recommend course of action  - Evaluate amount of meals eaten  - Assist patient with eating if necessary   - Allow adequate time for meals  - Recommend/ encourage appropriate diets, oral nutritional supplements, and vitamin/mineral supplements  - Order, calculate, and assess calorie counts as needed  - Recommend, monitor, and adjust tube feedings and TPN/PPN based on assessed needs  - Assess need for intravenous fluids  - Provide specific nutrition/hydration education as appropriate  - Include patient/family/caregiver in decisions related to  nutrition  Outcome: Progressing     Problem: MOBILITY - ADULT  Goal: Maintain or return to baseline ADL function  Description: INTERVENTIONS:  -  Assess patient's ability to carry out ADLs; assess patient's baseline for ADL function and identify physical deficits which impact ability to perform ADLs (bathing, care of mouth/teeth, toileting, grooming, dressing, etc.)  - Assess/evaluate cause of self-care deficits   - Assess range of motion  - Assess patient's mobility; develop plan if impaired  - Assess patient's need for assistive devices and provide as appropriate  - Encourage maximum independence but intervene and supervise when necessary  - Involve family in performance of ADLs  - Assess for home care needs following discharge   - Consider OT consult to assist with ADL evaluation and planning for discharge  - Provide patient education as appropriate  Outcome: Progressing  Goal: Maintains/Returns to pre admission functional level  Description: INTERVENTIONS:  - Perform AM-PAC 6 Click Basic Mobility/ Daily Activity assessment daily.  - Set and communicate daily mobility goal to care team and patient/family/caregiver.   - Collaborate with rehabilitation services on mobility goals if consulted  - Perform Range of Motion 4 times a day.  - Reposition patient every 2 hours.  - Dangle patient 4 times a day  - Stand patient 3 times a day  - Ambulate patient 3 times a day  - Out of bed to chair 3 times a day   - Out of bed for meals 3 times a day  - Out of bed for toileting  - Record patient progress and toleration of activity level   Outcome: Progressing

## 2024-03-26 NOTE — DISCHARGE SUMMARY
PODIATRY DISCHARGE SUMMARY     Patient Name: Maciel Gaitan   Age & Sex: 63 y.o. male   MRN: [id number]  Unit/Bed#: -01   Encounter: [id number]  Length of Stay: 8 days    ASSESSMENT:    Maciel Gaitan is a 63 y.o. 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 Dressings changed at bedside. Incision site is stable, no acute clinical signs of infection.  Patient is stable for discharge from a podiatry standpoint. Patient will follow up outpatient for post-operative care, instructions and referral placed with discharge information.  Continue antibiotics through 3/30/24 to complete 10 day course for residual skin and soft tissue infection  Dressings applied consisting of Betadine soaked adaptic, DSD to right foot  Vitals signs stable. Patient afebrile with no leukocytosis. No erythema, edema, or lymphangitis noted either proximal or distal to the incision site.  Elevation and offloading on green foam wedges or pillows when non-ambulatory  Appreciate consulting services for recommendations and management.     Antibiotics: Cipro  Pharmacologic VTE Prophylaxis: Heparin   Mechanical VTE Prophylaxis: sequential compression device   Weightbearing status: Nonweightbearing to right lower extremity    Disposition:  Patient stable for discharge today.    SUBJECTIVE     The patient was seen, evaluated, and assessed at bedside today. The patient was awake, alert, and in no acute distress. No acute events overnight. The patient reports minimal to no pain post operatively. He is looking forward to going home. Patient denies  N/V/F/chills/SOB/CP.    Review of Systems  Constitutional: Negative.    HENT: Negative.    Eyes: Negative.    Respiratory: Negative.    Cardiovascular: Negative.    Gastrointestinal: Negative.    Musculoskeletal: s/p right first metatarsal resection   Skin:surgical incision to right foot   Neurological: neuropathy  Psych: Negative.    OBJECTIVE     Physical Exam:     General: Alert, cooperative and no distress  Lungs: Non labored breathing  Abdomen: Soft, non-tender.  Lower extremity exam:  Cardiovascular status at baseline.  Neurological status at baseline. No calf tenderness noted bilaterally.     Right lower Extremity: S/p delayed primary closure of first metatarsal resection. Incision site stable with sutures intact and skin edges well aligned. Capillary refill to skin edges < 3 seconds. Skin temperature within normal limits. No evidence of dehiscence. No signs of active infection: no purulence, no malodor, no ascending erythema, no crepitus, no fluctuance. Residual erythema and edema consistent with post-operative healing course.    Clinical Images 03/26/24:      DETAILS OF HOSPITAL COURSE     Maciel Gaitan is a 63 y.o. male who was admitted on 3/18/2024 due to surgical site infection of previous right partial first ray amputation performed on 2/16/24.  The patient was evaluated the emergency room and admitted under the podiatry service for further workup.  He was started on IV Ancef with MRI ordered to assess for osteomyelitis of the remaining first metatarsal stump.  Internal medicine was consulted for medical management and preoperative clearance.  MRI results showed osteomyelitis of the remaining first metatarsal shaft, patient underwent first metatarsal stump resection on 3/20/2024 with partial closure.  Antibiotics were adjusted to ciprofloxacin based on wound culture sensitivities from initial admission, blood cultures remained negative.  Patient received a wound VAC application on 3/21/2024 to the  surgical site, vitals remained stable postoperatively.  Patient was followed closely by podiatry, surgical site was deemed acceptable for delayed primary closure procedure.  Patient underwent delayed primary closure on 3/25/24 with small evacuation of underlying hematoma. Patient was evaluated by internal medicine and podiatry on 3/26, incision was stable and patient was deemed medically stable for discharge from all teams.  Patient will continue antibiotic therapy through 3/30/2024, advise close follow-up with PCP and endocrinology    New Medications:  - Ciprofloxacin 500 mg BID    DISCHARGE INFORMATION     PCP at Discharge: MITA Choudhury    Admitting Provider:  Raza  Admission Date: 3/18/2024     Discharge Provider: Nely  Discharge Date: 03/26/24    Discharge Disposition: Home with [facility]NA Services (Reminder: Complete face to face encounter)  Discharge Condition: Stable  Discharge with Lines: No  Activity Restrictions: NWB to RLE  Test Results Pending at Discharge: None  Medications at Discharge: See after visit summary for reconciled discharge medications provided to patient and family.      Discharge Diagnoses:  Active Problems:    Uncontrolled type 2 diabetes mellitus with hyperglycemia (HCC)    CAD (coronary artery disease)    Essential hypertension      Consulting Providers:  - Internal Medicine    Diagnostic & Therapeutic Procedures Performed:  XR foot right 3+ views    Result Date: 3/21/2024  Impression: Normal postoperative foot Workstation performed: [id number]     XR foot 3+ views RIGHT    Result Date: 3/18/2024  Impression: Permeative bony destruction at the margin of the first metatarsal shaft amputation site in keeping with osteomyelitis. This has also been reported on the follow-up MRI. Workstation performed: [id number]     MRI foot/forefoot toest right wo contrast    Result Date: 3/18/2024  Impression: Status post amputation of the great toe to the level of the distal third of the metatarsal  "shaft. Overlying soft tissue ulceration and phlegmonous change with cellulitis. Osteomyelitis of the remaining first metatarsal. Plantar fascial fibromatosis. Workstation performed: [id number]       Code Status: Level 1 - Full Code  Advance Directive and Living Will:      Power of :    POLST:      FOLLOW-UP     PCP Outpatient Follow-up: Dea Loredo, Family Medicine    Consulting Providers Follow-up: None    Active Issues Requiring Follow-Up: Post operative care with podiatry    Discharge Statement:  I spent 25 minutes discharging the patient. This time was spent on the day of discharge. I had direct contact with the patient on the day of discharge. Additional documentation is required if more than 30 minutes were spent on discharge.       Portions of the record may have been created with voice recognition software.  Occasional wrong word or \"sound a like\" substitutions may have occurred due to the inherent limitations of voice recognition software.  Read the chart carefully and recognize, using context, where substitutions have occurred.  ==  Marilu Cortez DPM   [facility]  Podiatric Medicine & Surgery      "

## 2024-03-26 NOTE — ASSESSMENT & PLAN NOTE
Lab Results   Component Value Date    HGBA1C 8.5 (H) 03/19/2024       Recent Labs     03/25/24  2102 03/25/24  2301 03/26/24  0618 03/26/24  1102   POCGLU 242* 274* 220* 204*         Blood Sugar Average: Last 72 hrs:  (P) 155.375  Discussed with patient   His logs show very tightly controlled blood sugars in the OP setting and even in hospital prior to NPO status.   After lengthy discussion with patient we agreed he would DC on 500 mg BID of metformin which is his prior dose and see if his sugars settle down after he returns home to his regular routine.   He will check his sugars before meals and at bedtime at home and if persistently high will call his endocrinologist.

## 2024-03-26 NOTE — PROGRESS NOTES
[facility]  Progress Note  Name: Maciel Gaitan I  MRN: [id number]  Unit/Bed#: -01 I Date of Admission: 3/18/2024   Date of Service: 3/26/2024 I Hospital Day: 8    Assessment/Plan   Osteomyelitis of great toe of right foot (HCC)  Assessment & Plan  S/p surgical treatment by primary team   They are discharging today     Essential hypertension  Assessment & Plan  Currently on metoprolol and Norvasc therapy  BP is 132/77    CAD (coronary artery disease)  Assessment & Plan   This is a very pleasant 63-year-old gentleman with known history non-STEMI type I prior cardiac catheterization on 12/2017 for non-STEMI type I.  Cardiac catheterization at that time had illustrated a 95% left circumflex and chronic mid right coronary artery stenosis.  Patient successfully underwent a PCI of left circumflex.  Patient was initially placed on dual antiplatelet therapy and subsequently de-escalated to aspirin daily by cardiolog.  Patient reports that he was walking up to 4 miles a day prior to his initial intervention for his right foot back in February.  He reports that his mobility was limited due to his lower extremity wound and has not been walking as much since that time.  Due to the lower extremity wound.  He denies any history of chest pain or shortness of breath symptoms or exertional dyspnea.  Last echo 1/23/24 showing left ventricular cavity size is normal,wall thickness is moderately increased. There is moderate concentric hypertrophy. EF is 60%, systolic function, wall motion and diastolic function are normal.   Patient has been managed on aspirin metoprolol and Lipitor therapy.  Patient also on Norvasc for blood pressure control.                        Uncontrolled type 2 diabetes mellitus with hyperglycemia (HCC)  Assessment & Plan  Lab Results   Component Value Date    HGBA1C 8.5 (H) 03/19/2024       Recent Labs     03/25/24  2102 03/25/24  2301 03/26/24  0618 03/26/24  1102  "  POCGLU 242* 274* 220* 204*         Blood Sugar Average: Last 72 hrs:  (P) 155.375  Discussed with patient   His logs show very tightly controlled blood sugars in the OP setting and even in hospital prior to NPO status.   After lengthy discussion with patient we agreed he would DC on 500 mg BID of metformin which is his prior dose and see if his sugars settle down after he returns home to his regular routine.   He will check his sugars before meals and at bedtime at home and if persistently high will call his endocrinologist.                VTE Pharmacologic Prophylaxis:   Moderate Risk (Score 3-4) - Pharmacological DVT Prophylaxis Ordered: heparin.    Mobility:   Basic Mobility Inpatient Raw Score: 23  JH-HLM Goal: 7: Walk 25 feet or more  JH-HLM Achieved: 8: Walk 250 feet ot more  JH-HLM Goal achieved. Continue to encourage appropriate mobility.    Patient Centered Rounds: I performed bedside rounds with nursing staff today.   Discussions with Specialists or Other Care Team Provider: Discussed with podiatry .     Education and Discussions with Family / Patient:  he will update family. .     Total Time Spent on Date of Encounter in care of patient: 30 mins. This time was spent on one or more of the following: performing physical exam; counseling and coordination of care; obtaining or reviewing history; documenting in the medical record; reviewing/ordering tests, medications or procedures; communicating with other healthcare professionals and discussing with patient's family/caregivers.    Current Length of Stay: 8 day(s)  Current Patient Status: Inpatient   Certification Statement: patient is stable for DC   Discharge Plan:  as per primary .    Code Status: Level 1 - Full Code    Subjective:   Patient seen and examined   No new complaints   Feeling \"good\"    Objective:     Vitals:   Temp (24hrs), Av.8 °F (36.6 °C), Min:97.3 °F (36.3 °C), Max:98.7 °F (37.1 °C)    Temp:  [97.3 °F (36.3 °C)-98.7 °F (37.1 °C)] " 97.4 °F (36.3 °C)  HR:  [55-92] 72  Resp:  [16-18] 18  BP: (109-157)/(70-89) 132/77  SpO2:  [92 %-99 %] 97 %  There is no height or weight on file to calculate BMI.     Input and Output Summary (last 24 hours):     Intake/Output Summary (Last 24 hours) at 3/26/2024 1655  Last data filed at 3/26/2024 0800  Gross per 24 hour   Intake 180 ml   Output --   Net 180 ml       Physical Exam:   Physical Exam  Constitutional:       General: He is not in acute distress.     Appearance: He is not ill-appearing, toxic-appearing or diaphoretic.   HENT:      Head: Normocephalic.      Mouth/Throat:      Mouth: Mucous membranes are moist.   Eyes:      General: No scleral icterus.        Right eye: No discharge.         Left eye: No discharge.      Pupils: Pupils are equal, round, and reactive to light.   Cardiovascular:      Rate and Rhythm: Normal rate.      Heart sounds: No murmur heard.     No friction rub. No gallop.   Pulmonary:      Effort: Pulmonary effort is normal. No respiratory distress.      Breath sounds: No stridor. No wheezing, rhonchi or rales.   Chest:      Chest wall: No tenderness.   Abdominal:      General: Abdomen is flat. There is no distension.      Palpations: There is no mass.      Tenderness: There is no abdominal tenderness. There is no right CVA tenderness, left CVA tenderness, guarding or rebound.      Hernia: No hernia is present.   Musculoskeletal:      Right lower leg: No edema.      Left lower leg: No edema.   Skin:     Capillary Refill: Capillary refill takes less than 2 seconds.      Coloration: Skin is not jaundiced or pale.      Findings: No bruising, erythema, lesion or rash.   Neurological:      General: No focal deficit present.      Mental Status: He is alert.      Cranial Nerves: No cranial nerve deficit.      Sensory: No sensory deficit.      Motor: No weakness.      Coordination: Coordination normal.      Gait: Gait normal.      Deep Tendon Reflexes: Reflexes normal.   Psychiatric:          Mood and Affect: Mood normal.          Additional Data:     Labs:  Results from last 7 days   Lab Units 03/26/24  0928   WBC Thousand/uL 7.26   HEMOGLOBIN g/dL 10.7*   HEMATOCRIT % 33.4*   PLATELETS Thousands/uL 406*   NEUTROS PCT % 86*   LYMPHS PCT % 8*   MONOS PCT % 5   EOS PCT % 0     Results from last 7 days   Lab Units 03/26/24  0928   SODIUM mmol/L 136   POTASSIUM mmol/L 4.1   CHLORIDE mmol/L 104   CO2 mmol/L 24   BUN mg/dL 34*   CREATININE mg/dL 0.91   ANION GAP mmol/L 8   CALCIUM mg/dL 8.8   GLUCOSE RANDOM mg/dL 245*         Results from last 7 days   Lab Units 03/26/24  1102 03/26/24  0618 03/25/24  2301 03/25/24  2102 03/25/24  1646 03/25/24  1121 03/25/24  0626 03/24/24  2331 03/24/24  2108 03/24/24  1609 03/24/24  1113 03/24/24  0605   POC GLUCOSE mg/dl 204* 220* 274* 242* 114 123 124 123 178* 109 161* 129               Lines/Drains:  Invasive Devices       Peripheral Intravenous Line  Duration             Peripheral IV 03/22/24 Left Antecubital 4 days              Airway  Duration             Non-Surgical Airway Nasal pharyngeal airway 32 FR 1 day                          Imaging: No pertinent imaging reviewed.    Recent Cultures (last 7 days):         Last 24 Hours Medication List:   Current Facility-Administered Medications   Medication Dose Route Frequency Provider Last Rate    acetaminophen  650 mg Oral Q6H PRN Juancho Jamil DPM      amLODIPine  5 mg Oral Daily Juancho Jamil DPM      aspirin  81 mg Oral Daily Juancho Jamil DPM      atorvastatin  40 mg Oral QPM Juancho Jamil DPM      ciprofloxacin  500 mg Oral Q12H ISABELLE Juancho Jamil DPM      heparin (porcine)  5,000 Units Subcutaneous Q8H ISABELLE Juancho Jamil DPM      insulin glargine  12 Units Subcutaneous QAM Juancho Jamil DPM      insulin lispro  1-5 Units Subcutaneous HS Juancho Jamil DPM      insulin lispro  2-12 Units Subcutaneous TID AC Juancho Jamil DPM      metoprolol succinate  50  mg Oral Daily Juancho Jamil DPM          Today, Patient Was Seen By: Mery Smyth MD    **Please Note: This note may have been constructed using a voice recognition system.**

## 2024-03-26 NOTE — PLAN OF CARE
Problem: PAIN - ADULT  Goal: Verbalizes/displays adequate comfort level or baseline comfort level  Description: Interventions:  - Encourage patient to monitor pain and request assistance  - Assess pain using appropriate pain scale  - Administer analgesics based on type and severity of pain and evaluate response  - Implement non-pharmacological measures as appropriate and evaluate response  - Consider cultural and social influences on pain and pain management  - Notify physician/advanced practitioner if interventions unsuccessful or patient reports new pain  Outcome: Progressing     Problem: INFECTION - ADULT  Goal: Absence or prevention of progression during hospitalization  Description: INTERVENTIONS:  - Assess and monitor for signs and symptoms of infection  - Monitor lab/diagnostic results  - Monitor all insertion sites, i.e. indwelling lines, tubes, and drains  - Monitor endotracheal if appropriate and nasal secretions for changes in amount and color  - [place] appropriate cooling/warming therapies per order  - Administer medications as ordered  - Instruct and encourage patient and family to use good hand hygiene technique  - Identify and instruct in appropriate isolation precautions for identified infection/condition  Outcome: Progressing     Problem: DISCHARGE PLANNING  Goal: Discharge to home or other facility with appropriate resources  Description: INTERVENTIONS:  - Identify barriers to discharge w/patient and caregiver  - Arrange for needed discharge resources and transportation as appropriate  - Identify discharge learning needs (meds, wound care, etc.)  - Arrange for interpretive services to assist at discharge as needed  - Refer to Case Management Department for coordinating discharge planning if the patient needs post-hospital services based on physician/advanced practitioner order or complex needs related to functional status, cognitive ability, or social support system  Outcome: Progressing      Problem: Nutrition/Hydration-ADULT  Goal: Nutrient/Hydration intake appropriate for improving, restoring or maintaining nutritional needs  Description: Monitor and assess patient's nutrition/hydration status for malnutrition. Collaborate with interdisciplinary team and initiate plan and interventions as ordered.  Monitor patient's weight and dietary intake as ordered or per policy. Utilize nutrition screening tool and intervene as necessary. Determine patient's food preferences and provide high-protein, high-caloric foods as appropriate.     INTERVENTIONS:  - Monitor oral intake, urinary output, labs, and treatment plans  - Assess nutrition and hydration status and recommend course of action  - Evaluate amount of meals eaten  - Assist patient with eating if necessary   - Allow adequate time for meals  - Recommend/ encourage appropriate diets, oral nutritional supplements, and vitamin/mineral supplements  - Order, calculate, and assess calorie counts as needed  - Recommend, monitor, and adjust tube feedings and TPN/PPN based on assessed needs  - Assess need for intravenous fluids  - Provide specific nutrition/hydration education as appropriate  - Include patient/family/caregiver in decisions related to nutrition  Outcome: Progressing

## 2024-03-26 NOTE — DISCHARGE INSTR - AVS FIRST PAGE
Discharge Instructions - Podiatry    Weight Bearing Status: Non-weight bearing to right lower extremity                   Pain: Continue analgesics as needed    Follow-up appointment instructions: Please make an appointment within one week of discharge with  [facility] Podiatry[facility]Wound Care. Contact sooner if any increase in pain, or signs of infection occur    Wound Care: Leave dressings clean, dry, and intact between professional dressing changes    Nursing Instructions: Please apply  betadine soaked adaptic to incision site . Then cover with Gauze and secure with Kerlix and tape. Please change dressing 3x a week .

## 2024-03-27 ENCOUNTER — HOME CARE VISIT (OUTPATIENT)
Dept: HOME HEALTH SERVICES | Facility: HOME HEALTHCARE | Age: 64
End: 2024-03-27

## 2024-03-28 ENCOUNTER — TELEPHONE (OUTPATIENT)
Dept: FAMILY MEDICINE CLINIC | Facility: CLINIC | Age: 64
End: 2024-03-28

## 2024-03-28 ENCOUNTER — OFFICE VISIT (OUTPATIENT)
Dept: FAMILY MEDICINE CLINIC | Facility: CLINIC | Age: 64
End: 2024-03-28
Payer: COMMERCIAL

## 2024-03-28 ENCOUNTER — HOME CARE VISIT (OUTPATIENT)
Dept: HOME HEALTH SERVICES | Facility: HOME HEALTHCARE | Age: 64
End: 2024-03-28

## 2024-03-28 VITALS
OXYGEN SATURATION: 99 % | DIASTOLIC BLOOD PRESSURE: 80 MMHG | TEMPERATURE: 98.2 F | HEART RATE: 71 BPM | WEIGHT: 265 LBS | HEIGHT: 72 IN | RESPIRATION RATE: 16 BRPM | BODY MASS INDEX: 35.89 KG/M2 | SYSTOLIC BLOOD PRESSURE: 120 MMHG

## 2024-03-28 DIAGNOSIS — I10 ESSENTIAL HYPERTENSION: ICD-10-CM

## 2024-03-28 DIAGNOSIS — M86.9 OSTEOMYELITIS OF GREAT TOE OF RIGHT FOOT (HCC): Primary | ICD-10-CM

## 2024-03-28 DIAGNOSIS — E11.65 UNCONTROLLED TYPE 2 DIABETES MELLITUS WITH HYPERGLYCEMIA (HCC): ICD-10-CM

## 2024-03-28 LAB
LEFT EYE DIABETIC RETINOPATHY: NORMAL
LEFT EYE IMAGE QUALITY: NORMAL
LEFT EYE MACULAR EDEMA: NORMAL
LEFT EYE OTHER RETINOPATHY: NORMAL
RIGHT EYE DIABETIC RETINOPATHY: NORMAL
RIGHT EYE IMAGE QUALITY: NORMAL
RIGHT EYE MACULAR EDEMA: NORMAL
RIGHT EYE OTHER RETINOPATHY: NORMAL
SEVERITY (EYE EXAM): NORMAL

## 2024-03-28 PROCEDURE — 2025F 7 FLD RTA PHOTO W/O RTNOPTHY: CPT | Performed by: NURSE PRACTITIONER

## 2024-03-28 PROCEDURE — 99495 TRANSJ CARE MGMT MOD F2F 14D: CPT | Performed by: NURSE PRACTITIONER

## 2024-03-28 NOTE — PROGRESS NOTES
FAMILY PRACTICE OFFICE VISIT       NAME: Maciel Gaitan  AGE: 63 y.o. SEX: male       : 1960        MRN: [id number]    Assessment and Plan   1. Osteomyelitis of great toe of right foot (HCC)  Assessment & Plan:  Right first partial ray amputation was completed on  secondary to diabetic foot ulcer with osteomyelitis.   Returned to the hospital 3/18 under the direction of podiatry for persistent infection.   3/20/24 had right first metatarsal stump resection.   3/25/24 right foot delayed primary closure.   He was treated with IV antibiotics.   Discharged home with 4 additional days of PO Cipro.   VNA to start today.   Continues to follow with podiatry.   Non weight bearing right lower extremity.       2. Uncontrolled type 2 diabetes mellitus with hyperglycemia (HCC)  Assessment & Plan:    Lab Results   Component Value Date    HGBA1C 8.5 (H) 2024     A1c improving over the last one month 9.8% down to 8.5%.   Eating diabetic diet, counting carbs and proteins.   Taking Metformin 500 mg twice daily. Home sugars with TID checks running 100's-110's.  Repeat blood work due in May.       Orders:  -     [facility] Diabetic eye exam    3. Essential hypertension  Assessment & Plan:  Stable blood pressure on current medications.              Follow up as scheduled in May with previously ordered blood work.           Chief Complaint     Chief Complaint   Patient presents with    Transition of Care Management       History of Present Illness     Maciel Gaitan is a 63 year old male presenting today for hospital discharge follow up.     He was hospitalized 3/18 through 3/26 for osteomyelitis of right foot.     Right first partial ray amputation was completed on .   Returned to the hospital 3/18 under the direction of podiatry for persistent infection.     3/20/24 had right first metatarsal stump resection.   3/25/24 right foot delayed primary closure.   He was treated with IV antibiotics.   Discharged home with  4 additional days of PO Cipro.     Visiting nurse is coming today.   He is non weight bearing to right lower extremity.   Using surgical shoe as directed.   Propping his foot up at home.     He is watching sugars very closely, counting carbs and protein, eating a diabetic diet. Checking sugars TID.  Sugars are running 100's-110's at home.   Next blood work is due in May.     He met with a dietician in the hospital, she recommended a Collagen base supplement that aids in wound healing, he has ordered this product and will start it when it arrives.         TCM Call     Date and time call was made  3/26/2024  4:38 PM    Hospital care reviewed  Records reviewed    Patient was hospitialized at  [facility]    Date of Admission  03/18/24    Date of discharge  03/26/24    Diagnosis  Osteomyelitis of Right Foot    Disposition  Home    Were the patients medications reviewed and updated  No    Current Symptoms  None      TCM Call     Post hospital issues  None    Should patient be enrolled in anticoag monitoring?  No    Scheduled for follow up?  Yes    Did you obtain your prescribed medications  Yes    Do you need help managing your prescriptions or medications  No    Is transportation to your appointment needed  No    I have advised the patient to call PCP with any new or worsening symptoms    Diane Moreno MA    Comments  TCM scheduled 3/28/24 @ 0930 with MITA Avila      Medications reconciled today.         Review of Systems   Review of Systems   Constitutional: Negative.    HENT: Negative.     Respiratory: Negative.     Cardiovascular: Negative.    Gastrointestinal: Negative.    Genitourinary: Negative.    Musculoskeletal: Negative.    Skin:  Positive for wound (right foot).   Neurological: Negative.    Hematological: Negative.    Psychiatric/Behavioral: Negative.         I have reviewed the patient's medical history in detail; there are no changes to the history as noted in the electronic medical  record.    Objective     Vitals:    03/28/24 0916   BP: 120/80   Pulse: 71   Resp: 16   Temp: 98.2 °F (36.8 °C)   TempSrc: Temporal   SpO2: 99%   Weight: 120 kg (265 lb)   Height: 6' (1.829 m)     Wt Readings from Last 3 Encounters:   03/28/24 120 kg (265 lb)   03/18/24 118 kg (261 lb 3.2 oz)   03/11/24 118 kg (260 lb 6.4 oz)     Physical Exam  Vitals and nursing note reviewed.   Constitutional:       General: He is not in acute distress.     Appearance: Normal appearance. He is not ill-appearing.   HENT:      Head: Atraumatic.      Right Ear: Tympanic membrane normal.      Left Ear: Tympanic membrane normal.      Mouth/Throat:      Mouth: Mucous membranes are moist.      Pharynx: Oropharynx is clear.   Eyes:      Conjunctiva/sclera: Conjunctivae normal.      Pupils: Pupils are equal, round, and reactive to light.   Cardiovascular:      Rate and Rhythm: Normal rate and regular rhythm.      Heart sounds: No murmur heard.  Pulmonary:      Effort: Pulmonary effort is normal. No respiratory distress.      Breath sounds: Normal breath sounds. No wheezing or rales.   Musculoskeletal:      Cervical back: Normal range of motion and neck supple.      Left lower leg: No edema.   Lymphadenopathy:      Cervical: No cervical adenopathy.   Skin:     General: Skin is warm and dry.      Comments: Dressing dry and intact to right foot. Wearing surgical shoe.    Neurological:      Mental Status: He is alert.   Psychiatric:         Mood and Affect: Mood normal.            ALLERGIES:  No Known Allergies    Current Medications     Current Outpatient Medications   Medication Sig Dispense Refill    Alcohol Swabs 70 % PADS May substitute brand based on insurance coverage. Check glucose TID. 100 each 0    amLODIPine (NORVASC) 5 mg tablet Take 1 tablet (5 mg total) by mouth daily 90 tablet 3    aspirin 81 MG tablet Take 1 tablet by mouth daily  0    atorvastatin (LIPITOR) 40 mg tablet Take 1 tablet (40 mg total) by mouth every evening 90  tablet 3    metFORMIN (GLUCOPHAGE) 500 mg tablet Take 1 tablet (500 mg total) by mouth 2 (two) times a day with meals 180 tablet 3    metoprolol succinate (TOPROL-XL) 50 mg 24 hr tablet Take 1 tablet (50 mg total) by mouth daily 90 tablet 3    OneTouch Delica Lancets 33G MISC May substitute brand based on insurance coverage. Check glucose TID. 100 each 3    saccharomyces boulardii (FLORASTOR) 250 mg capsule Take 1 capsule (250 mg total) by mouth 2 (two) times a day 30 capsule 0     No current facility-administered medications for this visit.         Health Maintenance     Health Maintenance   Topic Date Due    Hepatitis C Screening  Never done    Pneumococcal Vaccine: Pediatrics (0 to 5 Years) and At-Risk Patients (6 to 64 Years) (1 of 2 - PCV) Never done    Diabetic Foot Exam  Never done    HIV Screening  Never done    Annual Physical  Never done    DTaP,Tdap,and Td Vaccines (1 - Tdap) Never done    Colorectal Cancer Screening  Never done    Zoster Vaccine (1 of 2) Never done    Kidney Health Evaluation: Albumin/Creatinine Ratio  10/04/2019    COVID-19 Vaccine (2 - 2023-24 season) 09/01/2023    HEMOGLOBIN A1C  09/19/2024    Depression Screening  03/05/2025    Kidney Health Evaluation: GFR  03/26/2025    DM Eye Exam  03/28/2025    Influenza Vaccine  Completed    HIB Vaccine  Aged Out    IPV Vaccine  Aged Out    Hepatitis A Vaccine  Aged Out    Meningococcal ACWY Vaccine  Aged Out    HPV Vaccine  Aged Out     Immunization History   Administered Date(s) Administered    COVID-19 PFIZER VACCINE 0.3 ML IM 11/13/2021    INFLUENZA 11/13/2021, 09/19/2022, 11/06/2023    Influenza Quadrivalent Preservative Free 3 years and older IM 12/28/2017       MITA Choudhury

## 2024-03-28 NOTE — TELEPHONE ENCOUNTER
----- Message from MITA Choudhury sent at 3/28/2024 10:38 AM EDT -----  Please let patient know [facility] diabetic eye exam is normal. There is no diabetic retinopathy.

## 2024-03-31 NOTE — ASSESSMENT & PLAN NOTE
Lab Results   Component Value Date    HGBA1C 8.5 (H) 03/19/2024     A1c improving over the last one month 9.8% down to 8.5%.   Eating diabetic diet, counting carbs and proteins.   Taking Metformin 500 mg twice daily. Home sugars with TID checks running 100's-110's.  Repeat blood work due in May.

## 2024-03-31 NOTE — ASSESSMENT & PLAN NOTE
Right first partial ray amputation was completed on 2/16 secondary to diabetic foot ulcer with osteomyelitis.   Returned to the hospital 3/18 under the direction of podiatry for persistent infection.   3/20/24 had right first metatarsal stump resection.   3/25/24 right foot delayed primary closure.   He was treated with IV antibiotics.   Discharged home with 4 additional days of PO Cipro.   VNA to start today.   Continues to follow with podiatry.   Non weight bearing right lower extremity.

## 2024-04-01 ENCOUNTER — OFFICE VISIT (OUTPATIENT)
Dept: PODIATRY | Facility: CLINIC | Age: 64
End: 2024-04-01

## 2024-04-01 VITALS
HEIGHT: 72 IN | HEART RATE: 67 BPM | DIASTOLIC BLOOD PRESSURE: 78 MMHG | BODY MASS INDEX: 35.94 KG/M2 | SYSTOLIC BLOOD PRESSURE: 143 MMHG

## 2024-04-01 DIAGNOSIS — Z98.890 POST-OPERATIVE STATE: ICD-10-CM

## 2024-04-01 DIAGNOSIS — E11.8 TYPE 2 DIABETES MELLITUS WITH COMPLICATION, WITHOUT LONG-TERM CURRENT USE OF INSULIN (HCC): Primary | ICD-10-CM

## 2024-04-01 DIAGNOSIS — T14.8XXA WOUND INFECTION: ICD-10-CM

## 2024-04-01 DIAGNOSIS — L08.9 WOUND INFECTION: ICD-10-CM

## 2024-04-01 PROCEDURE — 99024 POSTOP FOLLOW-UP VISIT: CPT | Performed by: PODIATRIST
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
## 2024-04-04 NOTE — TELEPHONE ENCOUNTER
As a follow-up, a second attempt has been made for outreach via fax to facility. Please see Contacts section for details.    Thank you  Jatin Noriega

## 2024-04-05 ENCOUNTER — TELEPHONE (OUTPATIENT)
Age: 64
End: 2024-04-05

## 2024-04-05 NOTE — TELEPHONE ENCOUNTER
Caller: Demar Gaitan    Doctor: Raza Mars    Reason for call: Demar needs his [place] paperwork changed to say he can work 4 hours from home.  Thank you    Call back#: [phone]

## 2024-04-10 ENCOUNTER — TELEPHONE (OUTPATIENT)
Dept: WOUND CARE | Facility: HOSPITAL | Age: 64
End: 2024-04-10

## 2024-04-10 NOTE — LETTER
April 10, 2024     Patient: Maciel Gaitan  YOB: 1960  Date of Visit: 4/10/2024      To Whom it May Concern:    Maciel Gaitan is under my professional care. Maciel was seen in my office on 4/10/2024. Maciel may return to work on 4/11/2024 working 4 hour shifts from home for the next 4 weeks .      If you have any questions or concerns, please don't hesitate to call.         Sincerely,          Severo Person DPM        CC: No Recipients

## 2024-04-17 NOTE — TELEPHONE ENCOUNTER
As a final attempt, a third outreach has been made via telephone call to facility. Please see Contacts section for details. This encounter will be closed and completed by end of day. Should we receive the requested information because of previous outreach attempts, the requested patient's chart will be updated appropriately.     Thank you  Jatin Noriega

## 2024-04-18 ENCOUNTER — OFFICE VISIT (OUTPATIENT)
Dept: PODIATRY | Facility: CLINIC | Age: 64
End: 2024-04-18

## 2024-04-18 VITALS
SYSTOLIC BLOOD PRESSURE: 144 MMHG | HEIGHT: 72 IN | BODY MASS INDEX: 34.75 KG/M2 | WEIGHT: 256.6 LBS | DIASTOLIC BLOOD PRESSURE: 85 MMHG | HEART RATE: 75 BPM

## 2024-04-18 DIAGNOSIS — Z98.890 POST-OPERATIVE STATE: ICD-10-CM

## 2024-04-18 DIAGNOSIS — T14.8XXA WOUND INFECTION: Primary | ICD-10-CM

## 2024-04-18 DIAGNOSIS — L08.9 WOUND INFECTION: Primary | ICD-10-CM

## 2024-04-18 PROCEDURE — 99024 POSTOP FOLLOW-UP VISIT: CPT | Performed by: PODIATRIST

## 2024-04-18 NOTE — LETTER
April 18, 2024     Patient: Maciel Gaitan  YOB: 1960  Date of Visit: 4/18/2024      To Whom it May Concern:    Maciel Gaitan is under my professional care. Maciel was seen in my office on 4/18/2024. Maciel may return to work on April 30, 2024 .    If you have any questions or concerns, please don't hesitate to call.         Sincerely,          Severo Person DPM        CC: No Recipients

## 2024-04-19 NOTE — TELEPHONE ENCOUNTER
Upon review of the In Basket request we have found as a result of outreach that patient did not have the requested item(s) completed. Patient was last seen at the location provided 1/13/2018.    Any additional questions or concerns should be emailed to the Practice Liaisons via the appropriate education email address, please do not reply via In Basket.    Thank you  Jatin Noriega

## 2024-04-25 ENCOUNTER — TELEPHONE (OUTPATIENT)
Age: 64
End: 2024-04-25

## 2024-04-25 DIAGNOSIS — E11.65 UNCONTROLLED TYPE 2 DIABETES MELLITUS WITH HYPERGLYCEMIA (HCC): Primary | ICD-10-CM

## 2024-04-25 RX ORDER — BLOOD SUGAR DIAGNOSTIC
STRIP MISCELLANEOUS
Qty: 300 EACH | Refills: 3 | Status: SHIPPED | OUTPATIENT
Start: 2024-04-25

## 2024-04-25 NOTE — TELEPHONE ENCOUNTER
Reason for call:     Patient requesting a refill for test strip, not listed on active med list    [x] Refill   [] Prior Auth  [] Other:     Office:   [x] PCP/Provider - Dea Loredo  [] Specialty/Provider -     Medication:   One touch verio test strips, patient tests 3 x daily    Pharmacy:   [facility] [place]    Does the patient have enough for 3 days?   [] Yes   [x] No - Send as HP to POD

## 2024-05-09 ENCOUNTER — APPOINTMENT (OUTPATIENT)
Dept: LAB | Facility: CLINIC | Age: 64
End: 2024-05-09
Payer: COMMERCIAL

## 2024-05-09 DIAGNOSIS — Z12.5 PROSTATE CANCER SCREENING: ICD-10-CM

## 2024-05-09 DIAGNOSIS — I10 ESSENTIAL HYPERTENSION: ICD-10-CM

## 2024-05-09 DIAGNOSIS — E78.5 HYPERLIPIDEMIA, UNSPECIFIED HYPERLIPIDEMIA TYPE: ICD-10-CM

## 2024-05-09 DIAGNOSIS — E11.65 UNCONTROLLED TYPE 2 DIABETES MELLITUS WITH HYPERGLYCEMIA (HCC): ICD-10-CM

## 2024-05-09 LAB
ALBUMIN SERPL BCP-MCNC: 4.1 G/DL (ref 3.5–5)
ALP SERPL-CCNC: 89 U/L (ref 34–104)
ALT SERPL W P-5'-P-CCNC: 25 U/L (ref 7–52)
ANION GAP SERPL CALCULATED.3IONS-SCNC: 12 MMOL/L (ref 4–13)
AST SERPL W P-5'-P-CCNC: 27 U/L (ref 13–39)
BILIRUB SERPL-MCNC: 0.46 MG/DL (ref 0.2–1)
BUN SERPL-MCNC: 28 MG/DL (ref 5–25)
CALCIUM SERPL-MCNC: 8.8 MG/DL (ref 8.4–10.2)
CHLORIDE SERPL-SCNC: 105 MMOL/L (ref 96–108)
CHOLEST SERPL-MCNC: 107 MG/DL
CO2 SERPL-SCNC: 23 MMOL/L (ref 21–32)
CREAT SERPL-MCNC: 0.94 MG/DL (ref 0.6–1.3)
CREAT UR-MCNC: 170.8 MG/DL
ERYTHROCYTE [DISTWIDTH] IN BLOOD BY AUTOMATED COUNT: 14.7 % (ref 11.6–15.1)
EST. AVERAGE GLUCOSE BLD GHB EST-MCNC: 137 MG/DL
GFR SERPL CREATININE-BSD FRML MDRD: 85 ML/MIN/1.73SQ M
GLUCOSE P FAST SERPL-MCNC: 112 MG/DL (ref 65–99)
HBA1C MFR BLD: 6.4 %
HCT VFR BLD AUTO: 41.3 % (ref 36.5–49.3)
HDLC SERPL-MCNC: 37 MG/DL
HGB BLD-MCNC: 13 G/DL (ref 12–17)
LDLC SERPL CALC-MCNC: 53 MG/DL (ref 0–100)
MCH RBC QN AUTO: 27.1 PG (ref 26.8–34.3)
MCHC RBC AUTO-ENTMCNC: 31.5 G/DL (ref 31.4–37.4)
MCV RBC AUTO: 86 FL (ref 82–98)
MICROALBUMIN UR-MCNC: 9.1 MG/L
MICROALBUMIN/CREAT 24H UR: 5 MG/G CREATININE (ref 0–30)
NONHDLC SERPL-MCNC: 70 MG/DL
PLATELET # BLD AUTO: 327 THOUSANDS/UL (ref 149–390)
PMV BLD AUTO: 9.7 FL (ref 8.9–12.7)
POTASSIUM SERPL-SCNC: 3.7 MMOL/L (ref 3.5–5.3)
PROT SERPL-MCNC: 7.3 G/DL (ref 6.4–8.4)
PSA SERPL-MCNC: 0.47 NG/ML (ref 0–4)
RBC # BLD AUTO: 4.8 MILLION/UL (ref 3.88–5.62)
SODIUM SERPL-SCNC: 140 MMOL/L (ref 135–147)
TRIGL SERPL-MCNC: 83 MG/DL
TSH SERPL DL<=0.05 MIU/L-ACNC: 2.54 UIU/ML (ref 0.45–4.5)
WBC # BLD AUTO: 8.53 THOUSAND/UL (ref 4.31–10.16)

## 2024-05-09 PROCEDURE — 36415 COLL VENOUS BLD VENIPUNCTURE: CPT

## 2024-05-09 PROCEDURE — 85027 COMPLETE CBC AUTOMATED: CPT

## 2024-05-09 PROCEDURE — G0103 PSA SCREENING: HCPCS

## 2024-05-09 PROCEDURE — 80053 COMPREHEN METABOLIC PANEL: CPT

## 2024-05-09 PROCEDURE — 82043 UR ALBUMIN QUANTITATIVE: CPT

## 2024-05-09 PROCEDURE — 80061 LIPID PANEL: CPT

## 2024-05-09 PROCEDURE — 83036 HEMOGLOBIN GLYCOSYLATED A1C: CPT

## 2024-05-09 PROCEDURE — 82570 ASSAY OF URINE CREATININE: CPT

## 2024-05-09 PROCEDURE — 84443 ASSAY THYROID STIM HORMONE: CPT

## 2024-05-14 ENCOUNTER — OFFICE VISIT (OUTPATIENT)
Dept: FAMILY MEDICINE CLINIC | Facility: CLINIC | Age: 64
End: 2024-05-14
Payer: COMMERCIAL

## 2024-05-14 VITALS
OXYGEN SATURATION: 100 % | TEMPERATURE: 97 F | DIASTOLIC BLOOD PRESSURE: 80 MMHG | RESPIRATION RATE: 16 BRPM | WEIGHT: 250 LBS | HEIGHT: 72 IN | HEART RATE: 76 BPM | BODY MASS INDEX: 33.86 KG/M2 | SYSTOLIC BLOOD PRESSURE: 130 MMHG

## 2024-05-14 DIAGNOSIS — I10 ESSENTIAL HYPERTENSION: ICD-10-CM

## 2024-05-14 DIAGNOSIS — E78.5 HYPERLIPIDEMIA, UNSPECIFIED HYPERLIPIDEMIA TYPE: ICD-10-CM

## 2024-05-14 DIAGNOSIS — Z12.11 COLON CANCER SCREENING: ICD-10-CM

## 2024-05-14 DIAGNOSIS — E11.8 TYPE 2 DIABETES MELLITUS WITH COMPLICATION, WITHOUT LONG-TERM CURRENT USE OF INSULIN (HCC): Primary | ICD-10-CM

## 2024-05-14 DIAGNOSIS — I25.10 CORONARY ARTERY DISEASE INVOLVING NATIVE HEART WITHOUT ANGINA PECTORIS, UNSPECIFIED VESSEL OR LESION TYPE: ICD-10-CM

## 2024-05-14 DIAGNOSIS — E66.09 CLASS 1 OBESITY DUE TO EXCESS CALORIES WITH SERIOUS COMORBIDITY AND BODY MASS INDEX (BMI) OF 33.0 TO 33.9 IN ADULT: ICD-10-CM

## 2024-05-14 PROBLEM — E11.65 UNCONTROLLED TYPE 2 DIABETES MELLITUS WITH HYPERGLYCEMIA (HCC): Status: RESOLVED | Noted: 2017-12-26 | Resolved: 2024-05-14

## 2024-05-14 PROBLEM — E66.811 CLASS 1 OBESITY DUE TO EXCESS CALORIES WITH SERIOUS COMORBIDITY AND BODY MASS INDEX (BMI) OF 33.0 TO 33.9 IN ADULT: Status: ACTIVE | Noted: 2024-05-14

## 2024-05-14 PROCEDURE — 99214 OFFICE O/P EST MOD 30 MIN: CPT | Performed by: NURSE PRACTITIONER

## 2024-05-14 NOTE — ASSESSMENT & PLAN NOTE
Lab Results   Component Value Date    HGBA1C 6.4 (H) 05/09/2024     A1c significantly improved.   Continue Metformin and lifestyle modifications.   Foot exam today.   Follows with podiatry.   Eye exam up to date.

## 2024-05-14 NOTE — PROGRESS NOTES
FAMILY PRACTICE OFFICE VISIT       NAME: Maciel Gaitan  AGE: 63 y.o. SEX: male       : 1960        MRN: [id number]    Assessment and Plan   1. Type 2 diabetes mellitus with complication, without long-term current use of insulin (HCC)  Assessment & Plan:    Lab Results   Component Value Date    HGBA1C 6.4 (H) 2024     A1c significantly improved.   Continue Metformin and lifestyle modifications.   Foot exam today.   Follows with podiatry.   Eye exam up to date.         2. Essential hypertension  Assessment & Plan:  Blood pressure is well controlled on current medications.       3. Coronary artery disease involving native heart without angina pectoris, unspecified vessel or lesion type  Assessment & Plan:  Cardiac catheterization with left circumflex PCI for 95% coronary artery stenosis in 2017.   Continues aspirin, statin.   Follows with cardiology.       4. Hyperlipidemia, unspecified hyperlipidemia type  Assessment & Plan:  LDL 53.   At goal on Atorvastatin 40 mg daily.       5. Colon cancer screening  -     Ambulatory Referral to Gastroenterology; Future    6. Class 1 obesity due to excess calories with serious comorbidity and body mass index (BMI) of 33.0 to 33.9 in adult  Assessment & Plan:  Continues to work on weight loss through calorie counting and eating diabetic diet.   Lost 10 more pounds since March.          3 month recheck, will do POCT A1c at next office visit.    Chief Complaint     Chief Complaint   Patient presents with    Follow-up     Patient is here for 2 mos f/u hospital. DM foot. Shoes and socks removed       History of Present Illness     Maciel Gaitan  is a 63 year old male presenting for follow up on diabetes and hypertension.     Diabetic shoes coming Friday.  Not walking for exercise yet, waiting for shoes.   Using recumbent bike 4-5 miles most days.                  Review of Systems   Review of Systems   Constitutional: Negative.    HENT: Negative.     Respiratory:  Negative.     Cardiovascular: Negative.    Gastrointestinal: Negative.    Genitourinary: Negative.    Musculoskeletal: Negative.    Skin: Negative.    Neurological: Negative.    Hematological: Negative.    Psychiatric/Behavioral: Negative.         I have reviewed the patient's medical history in detail; there are no changes to the history as noted in the electronic medical record.    Objective     Vitals:    05/14/24 0734   BP: 130/80   Pulse: 76   Resp: 16   Temp: (!) 97 °F (36.1 °C)   TempSrc: Temporal   SpO2: 100%   Weight: 113 kg (250 lb)   Height: 6' (1.829 m)     Wt Readings from Last 3 Encounters:   05/14/24 113 kg (250 lb)   04/18/24 116 kg (256 lb 9.6 oz)   03/28/24 120 kg (265 lb)     Physical Exam  Vitals and nursing note reviewed.   Constitutional:       General: He is not in acute distress.     Appearance: Normal appearance. He is not ill-appearing.   Cardiovascular:      Rate and Rhythm: Normal rate and regular rhythm.      Pulses: Pulses are weak.           Dorsalis pedis pulses are 1+ on the right side and 1+ on the left side.        Posterior tibial pulses are 1+ on the right side and 1+ on the left side.      Heart sounds: No murmur heard.  Pulmonary:      Effort: Pulmonary effort is normal. No respiratory distress.      Breath sounds: Normal breath sounds. No wheezing or rales.   Musculoskeletal:      Right lower leg: No edema.      Left lower leg: No edema.   Feet:      Right foot:      Skin integrity: Dry skin present. No ulcer, skin breakdown, erythema, warmth or callus.      Left foot:      Skin integrity: Callus (great toe) and dry skin present. No ulcer, skin breakdown, erythema or warmth.   Neurological:      Mental Status: He is alert.   Psychiatric:         Mood and Affect: Mood normal.          Diabetic Foot Exam    Patient's shoes and socks removed.    Right Foot/Ankle   Right Foot Inspection  Skin Exam: skin normal, skin intact and dry skin. No warmth, no callus, no erythema, no  maceration, no abnormal color, no pre-ulcer, no ulcer and no callus.     Toe Exam: ROM and strength within normal limits.     Sensory   Proprioception: intact  Monofilament testing: diminished    Vascular  Capillary refills: < 3 seconds  The right DP pulse is 1+. The right PT pulse is 1+.     Left Foot/Ankle  Left Foot Inspection  Skin Exam: skin normal, skin intact, dry skin and callus (great toe). No warmth, no erythema, no maceration, normal color, no pre-ulcer and no ulcer.     Toe Exam: ROM and strength within normal limits.     Sensory   Proprioception: intact  Monofilament testing: intact    Vascular  Capillary refills: < 3 seconds  The left DP pulse is 1+. The left PT pulse is 1+.     Assign Risk Category  No deformity present  No loss of protective sensation  Weak pulses  Risk: 0    ALLERGIES:  No Known Allergies    Current Medications     Current Outpatient Medications   Medication Sig Dispense Refill    Alcohol Swabs 70 % PADS May substitute brand based on insurance coverage. Check glucose TID. 100 each 0    amLODIPine (NORVASC) 5 mg tablet Take 1 tablet (5 mg total) by mouth daily 90 tablet 3    aspirin 81 MG tablet Take 1 tablet by mouth daily  0    atorvastatin (LIPITOR) 40 mg tablet Take 1 tablet (40 mg total) by mouth every evening 90 tablet 3    glucose blood (OneTouch Verio) test strip Check blood sugars three times daily. Please substitute with appropriate alternative as covered by patient's insurance. Dx: E11.65 300 each 3    metFORMIN (GLUCOPHAGE) 500 mg tablet Take 1 tablet (500 mg total) by mouth 2 (two) times a day with meals 180 tablet 3    metoprolol succinate (TOPROL-XL) 50 mg 24 hr tablet Take 1 tablet (50 mg total) by mouth daily 90 tablet 3    OneTouch Delica Lancets 33G MISC May substitute brand based on insurance coverage. Check glucose TID. 100 each 3    saccharomyces boulardii (FLORASTOR) 250 mg capsule Take 1 capsule (250 mg total) by mouth 2 (two) times a day 30 capsule 0     No  current facility-administered medications for this visit.         Health Maintenance     Health Maintenance   Topic Date Due    Hepatitis C Screening  Never done    Pneumococcal Vaccine: Pediatrics (0 to 5 Years) and At-Risk Patients (6 to 64 Years) (1 of 2 - PCV) Never done    HIV Screening  Never done    Annual Physical  Never done    DTaP,Tdap,and Td Vaccines (1 - Tdap) Never done    Colorectal Cancer Screening  Never done    Zoster Vaccine (1 of 2) Never done    COVID-19 Vaccine (2 - 2023-24 season) 09/01/2023    HEMOGLOBIN A1C  11/09/2024    Depression Screening  03/05/2025    Kidney Health Evaluation: GFR  05/09/2025    Kidney Health Evaluation: Albumin/Creatinine Ratio  05/09/2025    Diabetic Foot Exam  05/14/2025    DM Eye Exam  03/28/2026    Influenza Vaccine  Completed    HIB Vaccine  Aged Out    IPV Vaccine  Aged Out    Hepatitis A Vaccine  Aged Out    Meningococcal ACWY Vaccine  Aged Out    HPV Vaccine  Aged Out     Immunization History   Administered Date(s) Administered    COVID-19 PFIZER VACCINE 0.3 ML IM 11/13/2021    INFLUENZA 11/13/2021, 09/19/2022, 11/06/2023    Influenza Quadrivalent Preservative Free 3 years and older IM 12/28/2017       MITA Choudhury

## 2024-05-15 NOTE — ASSESSMENT & PLAN NOTE
Continues to work on weight loss through calorie counting and eating diabetic diet.   Lost 10 more pounds since March.

## 2024-05-15 NOTE — ASSESSMENT & PLAN NOTE
Cardiac catheterization with left circumflex PCI for 95% coronary artery stenosis in 2017.   Continues aspirin, statin.   Follows with cardiology.

## 2024-06-04 ENCOUNTER — OFFICE VISIT (OUTPATIENT)
Dept: PODIATRY | Facility: CLINIC | Age: 64
End: 2024-06-04
Payer: COMMERCIAL

## 2024-06-04 ENCOUNTER — TELEPHONE (OUTPATIENT)
Age: 64
End: 2024-06-04

## 2024-06-04 VITALS
WEIGHT: 243.2 LBS | SYSTOLIC BLOOD PRESSURE: 128 MMHG | BODY MASS INDEX: 32.94 KG/M2 | HEART RATE: 80 BPM | HEIGHT: 72 IN | DIASTOLIC BLOOD PRESSURE: 79 MMHG

## 2024-06-04 DIAGNOSIS — S90.812A ABRASION, LEFT FOOT, INITIAL ENCOUNTER: ICD-10-CM

## 2024-06-04 DIAGNOSIS — B35.1 ONYCHOMYCOSIS: ICD-10-CM

## 2024-06-04 DIAGNOSIS — E11.8 TYPE 2 DIABETES MELLITUS WITH COMPLICATION, WITHOUT LONG-TERM CURRENT USE OF INSULIN (HCC): Primary | ICD-10-CM

## 2024-06-04 DIAGNOSIS — I87.2 VENOUS STASIS DERMATITIS: ICD-10-CM

## 2024-06-04 PROCEDURE — 99213 OFFICE O/P EST LOW 20 MIN: CPT | Performed by: PODIATRIST

## 2024-06-04 PROCEDURE — 11720 DEBRIDE NAIL 1-5: CPT | Performed by: PODIATRIST

## 2024-06-04 NOTE — TELEPHONE ENCOUNTER
Pt started to schedule, started OA - but then decided to wait till end of summer to schedule in September. Had a couple hospitalizations las few months..

## 2024-06-04 NOTE — PROGRESS NOTES
Anesthesia Pre Eval Note    OB/Gyn Evaluation    OB/Gyn:    (+) prenatal complications,    Pregnancy Associated Diseases:           Anesthesia ROS/Med Hx        Anesthetic Complication History:  Patient does not have a history of anesthetic complications      End/Other Review:    Positive for anemia  Additional Results:     ALLERGIES:   -- Morphine -- RASH       Lab Results       Component                Value               Date                       WBC                      5.1                 01/12/2017                 RBC                      4.60                01/12/2017                 HGB                      12.8                01/12/2017                 HCT                      38.7                01/12/2017                 MCHC                     33.1                01/12/2017                 SODIUM                   141                 01/12/2017                 POTASSIUM                4.4                 01/12/2017                 CHLORIDE                 104                 01/12/2017                 CO2                      30                  01/12/2017                 GLUCOSE                  88                  01/12/2017                 BUN                      13                  01/12/2017                 CREATININE               0.73                01/12/2017                 GFRA                     >90                 01/12/2017                 GFRNA                    >90                 01/12/2017                 CALCIUM                  9.0                 01/12/2017                 PLT                      334                 01/12/2017                 PLT                      182                 08/19/2013                 PLT                      232                 04/22/2010             Past Medical History:  No date: Anemia      Comment:  with pregnancy  No date: Health supervision of other healthy infant or child   receiving care  8/2010: IUD (intrauterine device) in place       Name: Maciel Gaitan      : 1960      MRN: [id number]  Encounter Provider: Severo Person DPM  Encounter Date: 2024   Encounter department: [facility] PODIAT[facility] [place]    Assessment & Plan     1. Type 2 diabetes mellitus with complication, without long-term current use of insulin ([facility])  2. Onychomycosis  3. Abrasion, left foot, initial encounter  4. Venous stasis dermatitis      Patient has small area of rubbing on the posterior aspect the left heel from his new shoes I did discuss with him to speak with Ute regarding this.    His right foot has completely healed at this time.  Denies any new acute issues.    His blood sugars are doing substantially improved from his previous evaluation.    Nail debridement completed today to the lesser digits on the right foot 234 and 5.      On the left side he can apply antibiotic ointment and dry sterile dressing to the area.    If this does not improve notify the office we can bring back in sooner.    Will have him follow-up with Dr. Mckinney for routine at risk footcare.          Return in about 3 months (around 2024).    Subjective     Patient returns for follow up on right first ray resection due to osteomyelitis. Has been doing well since last visit.  Patient received new shoes and tried them for 1 hour at a time daily.  He had new shoes and rubbing on the back of the heel with rubbing.  Patient also has some small areas on the lower legs bilaterally that he states are improving without signs of infection noted currently.      Review of Systems   Constitutional:  Negative for chills and fever.   Respiratory:  Negative for shortness of breath.    Cardiovascular:  Negative for chest pain.   Gastrointestinal:  Negative for nausea and vomiting.       Current Outpatient Medications on File Prior to Visit   Medication Sig   • Alcohol Swabs 70 % PADS May substitute brand based on insurance coverage. Check glucose TID.   • amLODIPine (NORVASC) 5 mg tablet  Comment:  Mirena  4/24/2013: Low lying placenta without hemorrhage, antepartum      Comment:  Found on 17-18 week US    Past Surgical History:  No date: Past surgical history      Comment:  None       Prior to Admission medications :  Medication Prenatal Vit-Fe Fumarate-FA (PRENATAL VITAMIN PO), Sig , Start Date , End Date , Taking? Yes, Authorizing Provider Outside Provider            Relevant Problems   No relevant active problems       Physical Exam     Airway   Mallampati: II  TM Distance: >3 FB  Neck ROM: Full  Neck: Able to place in sniff position    Cardiovascular  Cardiovascular exam normal    Head Assessment  Head assessment: Normocephalic and Atraumatic    General Assessment  General Assessment: Alert and oriented and No acute distress    Dental Exam  Dental exam normal    Pulmonary Exam  Pulmonary exam normal      Anesthesia Plan    Phone call attempted:   Case discussed with Patient or Representative    ASA Status: 2    Anesthesia Type: MAC    Induction: Intravenous  Preferred Airway Type: Mask  Maintenance: TIVA      Risks Discussed: Nausea, Sore Throat, Allergic Reaction, Intra-operative Awareness, Emergence Delirium, Nerve Injury, Dental Injury, Vomiting, Headache, Hypotension and Aspiration    Post-op Pain Management: Per Surgeon      Checklist  Reviewed: Lab Results, Pregnancy Test Results, Patient Summary, Care Everywhere, Allergies, Past Med History, Medications, DNR Status, Beta Blocker Status, Nursing Notes, Consultations, Outside Records, NPO Status and Problem list    Informed Consent  The proposed anesthetic plan, including its risks and benefits, have been discussed with the Patient  - along with the risks and benefits of alternatives.  Questions were encouraged and answered and the patient and/or representative understands and agrees to proceed.     Blood Products: Not Anticipated     Take 1 tablet (5 mg total) by mouth daily   • aspirin 81 MG tablet Take 1 tablet by mouth daily   • atorvastatin (LIPITOR) 40 mg tablet Take 1 tablet (40 mg total) by mouth every evening   • glucose blood (OneTouch Verio) test strip Check blood sugars three times daily. Please substitute with appropriate alternative as covered by patient's insurance. Dx: E11.65   • metFORMIN (GLUCOPHAGE) 500 mg tablet Take 1 tablet (500 mg total) by mouth 2 (two) times a day with meals   • metoprolol succinate (TOPROL-XL) 50 mg 24 hr tablet Take 1 tablet (50 mg total) by mouth daily   • OneTouch Delica Lancets 33G MISC May substitute brand based on insurance coverage. Check glucose TID.   • saccharomyces boulardii (FLORASTOR) 250 mg capsule Take 1 capsule (250 mg total) by mouth 2 (two) times a day       Objective     /79   Pulse 80   Ht 6' (1.829 m)   Wt 110 kg (243 lb 3.2 oz)   BMI 32.98 kg/m²     Physical Exam  Constitutional:       Appearance: Normal appearance.   Feet:      Comments: Vascular: Intact pedal pulses bilateral DP and PT.  Neurological: Gross protective sensation diminished bilateral  Musculoskeletal: Muscle strength bilateral intact with dorsiflexion, inversion, eversion and plantarflexion.  Previous history of right foot first partial ray resection appears healed at this time.  Dermatological: Small area of abrasion noted to posterior aspect of the left heel at the Achilles insertion site this is granular no signs of infection noted at this time.  Small venous stasis ulcerations noted lower extremity anterior no signs of infection noted.  Nails 234 and 5 on the right are elongated dystrophic with subungual debris.  The nails on the left are thickened and dystrophic there is some lysis of the nails noted to the lesser nails.    Neurological:      Mental Status: He is alert.

## 2024-06-12 NOTE — PROGRESS NOTES
"    New Patient Progress Note      Chief complaint: Type 2DM consult     Referring Provider  Husam Choudhury  [address] [place] [address]     History of Present Illness:   Maciel Gaitan is a 63 y.o. male with a history of type 2 diabetes complicated by CAD status post PCI, recent diabetic foot ulcer s/p toe amputation seen in initial consult at the request of PCP for diabetes management.    Patient recalls being diagnosed with type 2 diabetes years ago, previously followed here was last seen in 2018 by Dr. Swift, came off metformin and was discharged back to PCP given well controlled diabetes (A1c less than 6%).   Since then, reports he fell off the wagon and went back to old habits (non adherence with diabetic diet), \"up to 2L of soda daily\". In 02/2024, he noticed a foot blister which progressively worsened for which he was subsequently admitted with foot osteomyelitis status post right first metatarsal resection.   Was discharged home on metformin 500 mg twice a day which he currently remains on.  Since discharge, he has been very diligent with his diabetes management-checks his sugars via fingersticks consistently 3-4 times a day, measuring all his meals and recording carbs in a journal and has completely stopped taking soda.  Has noticed significant improvement in glycemic control with most recent A1c of 6.4%.  Reports at least 20 pound weight loss since hospital discharge.    Home blood glucose readings:   Before breakfast: 90 - 120  Before lunch: 80- 100's  Before dinner: 80's - 110    Hypoglycemic episodes: None    Diabetes education: YES, Date - 2018  Diet: 3 meals per day, 1 snack per day.   Activity: Daily activity: walks at least 2 miles everyday     Opthamology: UTD, denies any hx of retinopathy  Podiatry: Follows with podiatry    Has hypertension: followed by PCP; on metoprolol  Has hyperlipidemia: followed by PCP; on Atorvastatin - tolerating well, no myalgias.    Thyroid disorders: " Denies  History of pancreatitis: Denies    Patient Active Problem List   Diagnosis    CAD (coronary artery disease)    Essential hypertension    Family history of colon cancer    Hyperlipidemia    Nocturia    Osteomyelitis of great toe of right foot ([facility])    History of partial ray amputation of right great toe ([facility])    Type 2 diabetes mellitus with complication, without long-term current use of insulin ([facility])    Class 1 obesity due to excess calories with serious comorbidity and body mass index (BMI) of 33.0 to 33.9 in adult      Past Medical History:   Diagnosis Date    Hyperlipidemia     NSTEMI, initial episode of care ([facility]) 01/02/2018    Uncontrolled type 2 diabetes mellitus with hyperglycemia ([facility]) 12/26/2017      Past Surgical History:   Procedure Laterality Date    CLOSURE DELAYED PRIMARY Right 3/25/2024    Procedure: CLOSURE DELAYED PRIMARY OF RIGHT FOOT;  Surgeon: Shakeel Mckinney DPM;  Location: [facility];  Service: Podiatry    EYE SURGERY      NV AMPUTATION METATARSAL W/TOE SINGLE Right 3/20/2024    Procedure: First ray resection, metatarsal stump resection;  Surgeon: Ryland Domingo DPM;  Location: [facility];  Service: Podiatry    TOE AMPUTATION Right 2/16/2024    Procedure: PARTIAL 1st RAY AMPUTATION, RIGHT FOOT REMOVAL OF ALL NON-VIABLE BONE AND SOFT TISSUE;  Surgeon: Severo Person DPM;  Location: [facility];  Service: Podiatry    WISDOM TOOTH EXTRACTION        Family History   Problem Relation Age of Onset    Diabetes Mother     Heart disease Father     Heart disease Paternal Grandfather      Social History     Tobacco Use    Smoking status: Never    Smokeless tobacco: Never   Substance Use Topics    Alcohol use: Not Currently     Comment: 1-2 drinks a year     No Known Allergies      Current Outpatient Medications:     Alcohol Swabs 70 % PADS, May substitute brand based on insurance coverage. Check glucose TID., Disp: 100 each, Rfl: 0    amLODIPine (NORVASC) 5 mg tablet, Take 1 tablet (5 mg  total) by mouth daily, Disp: 90 tablet, Rfl: 3    aspirin 81 MG tablet, Take 1 tablet by mouth daily, Disp: , Rfl: 0    atorvastatin (LIPITOR) 40 mg tablet, Take 1 tablet (40 mg total) by mouth every evening, Disp: 90 tablet, Rfl: 3    glucose blood (OneTouch Verio) test strip, Check blood sugars three times daily. Please substitute with appropriate alternative as covered by patient's insurance. Dx: E11.65, Disp: 300 each, Rfl: 3    metFORMIN (GLUCOPHAGE) 500 mg tablet, Take 1 tablet (500 mg total) by mouth 2 (two) times a day with meals, Disp: 180 tablet, Rfl: 3    metoprolol succinate (TOPROL-XL) 50 mg 24 hr tablet, Take 1 tablet (50 mg total) by mouth daily, Disp: 90 tablet, Rfl: 3    OneTouch Delica Lancets 33G MISC, May substitute brand based on insurance coverage. Check glucose TID., Disp: 100 each, Rfl: 3    saccharomyces boulardii (FLORASTOR) 250 mg capsule, Take 1 capsule (250 mg total) by mouth 2 (two) times a day, Disp: 30 capsule, Rfl: 0  Review of Systems  Constitutional: Negative for appetite change.   Respiratory: Negative for shortness of breath, wheezing, cough.  Cardiovascular: Negative for chest pain and palpitations.   Gastrointestinal: Negative for abdominal pain, nausea and vomiting.   Musculoskeletal: Negative for arthralgias.   Neurological: Negative for dizziness, light-headedness and headaches.   All other ROS reviewed and negative.    Physical Exam:  Body mass index is 32.93 kg/m².  /78   Pulse 60   Ht 6' (1.829 m)   Wt 110 kg (242 lb 12.8 oz)   BMI 32.93 kg/m²    Wt Readings from Last 3 Encounters:   06/13/24 110 kg (242 lb 12.8 oz)   06/04/24 110 kg (243 lb 3.2 oz)   05/14/24 113 kg (250 lb)       GEN: Well-appearing, not in acute distress.  Eyes: no stare or proptosis, nl lids and conjunctiva, EOMI  Neck: trachea midline, thyroid NT to palpation, nl in size, no nodules or neck masses noted, no cervical LAD  CV; heart reg rate s1s2 nl  Resp: CTAB, good effort  Ab+BS  Neuro: no  tremor, 2+ DTRs in BUE  MS: moves all 4 ext, nl muscle bulk, gait nl  Skin: warm and dry, no palmar erythema  Ext: right big toe amputation incision clean and dry, corn+ left big toe, dry flaky skin at back of left ankle, onychomycosis+,  no edema bilaterally, 2+ DP and PT pulses bilat, sensation intact to monofilament testing on plantar surfaces bilat  Psych: nl mood and affect, no gross lapses in memory    Labs:     Lab Results   Component Value Date    CREATININE 0.94 05/09/2024    CREATININE 0.91 03/26/2024    CREATININE 0.79 03/25/2024    BUN 28 (H) 05/09/2024    K 3.7 05/09/2024     05/09/2024    CO2 23 05/09/2024     eGFR   Date Value Ref Range Status   05/09/2024 85 ml/min/1.73sq m Final       Lab Results   Component Value Date    HDL 37 (L) 05/09/2024    TRIG 83 05/09/2024       Lab Results   Component Value Date    ALT 25 05/09/2024    AST 27 05/09/2024    ALKPHOS 89 05/09/2024       Lab Results   Component Value Date    FREET4 1.16 10/04/2018       Impression:  1. Type 2 diabetes mellitus with complication, without long-term current use of insulin (HCC)    2. New onset type 2 diabetes mellitus (HCC)           Plan:    Diagnoses and all orders for this visit:    Type 2 diabetes mellitus with complication, without long-term current use of insulin (HCC)    New onset type 2 diabetes mellitus (HCC)  -     Ambulatory Referral to Endocrinology      T2DM with significant improvement in glycemic control following lifestyle modifications and  low-dose metformin.  A1c at goal of less than 7%.  Discontinue metformin.  Encouraged to continue lifestyle changes in addition to incorporating strength training exercises.  Recommend to continue home blood sugar monitoring-decrease frequency of Accu-Cheks to daily (at different times).  Given well-controlled diabetes okay to continue to follow-up with PCP for diabetes management with endocrinology follow-up as needed     HTN: Bp stable and at goal, continue care per  PCP  Hyperlipidemia: Taking and tolerating statin well.     Discussed with the patient and all questioned fully answered. He will call me if any problems arise.    Counseled patient on diagnostic results, prognosis, risk and benefit of treatment options, instruction for management, importance of treatment compliance, Risk  factor reduction and impressions      Nancy Alexander MD

## 2024-06-13 ENCOUNTER — CONSULT (OUTPATIENT)
Dept: ENDOCRINOLOGY | Facility: CLINIC | Age: 64
End: 2024-06-13
Payer: COMMERCIAL

## 2024-06-13 VITALS
WEIGHT: 242.8 LBS | BODY MASS INDEX: 32.89 KG/M2 | HEIGHT: 72 IN | SYSTOLIC BLOOD PRESSURE: 124 MMHG | HEART RATE: 60 BPM | DIASTOLIC BLOOD PRESSURE: 78 MMHG

## 2024-06-13 DIAGNOSIS — E11.8 TYPE 2 DIABETES MELLITUS WITH COMPLICATION, WITHOUT LONG-TERM CURRENT USE OF INSULIN (HCC): Primary | ICD-10-CM

## 2024-06-13 DIAGNOSIS — E11.9 NEW ONSET TYPE 2 DIABETES MELLITUS (HCC): ICD-10-CM

## 2024-06-13 PROCEDURE — 99244 OFF/OP CNSLTJ NEW/EST MOD 40: CPT | Performed by: STUDENT IN AN ORGANIZED HEALTH CARE EDUCATION/TRAINING PROGRAM

## 2024-06-13 NOTE — PATIENT INSTRUCTIONS
Add strength training exercises at least 3X/week.   Check blood sugars once daily (at different times)

## 2024-08-01 ENCOUNTER — OFFICE VISIT (OUTPATIENT)
Dept: URGENT CARE | Age: 64
End: 2024-08-01
Payer: COMMERCIAL

## 2024-08-01 VITALS
DIASTOLIC BLOOD PRESSURE: 86 MMHG | OXYGEN SATURATION: 99 % | WEIGHT: 245 LBS | TEMPERATURE: 97.9 F | BODY MASS INDEX: 33.18 KG/M2 | HEIGHT: 72 IN | SYSTOLIC BLOOD PRESSURE: 136 MMHG | HEART RATE: 65 BPM | RESPIRATION RATE: 16 BRPM

## 2024-08-01 PROCEDURE — S9083 URGENT CARE CENTER GLOBAL: HCPCS

## 2024-08-01 PROCEDURE — G0382 LEV 3 HOSP TYPE B ED VISIT: HCPCS

## 2024-08-05 ENCOUNTER — OFFICE VISIT (OUTPATIENT)
Dept: FAMILY MEDICINE CLINIC | Facility: CLINIC | Age: 64
End: 2024-08-05
Payer: COMMERCIAL

## 2024-08-05 VITALS
WEIGHT: 241.2 LBS | DIASTOLIC BLOOD PRESSURE: 80 MMHG | HEART RATE: 73 BPM | SYSTOLIC BLOOD PRESSURE: 128 MMHG | BODY MASS INDEX: 32.67 KG/M2 | OXYGEN SATURATION: 99 % | HEIGHT: 72 IN | TEMPERATURE: 98 F | RESPIRATION RATE: 16 BRPM

## 2024-08-05 DIAGNOSIS — R10.13 EPIGASTRIC PAIN: Primary | ICD-10-CM

## 2024-08-05 DIAGNOSIS — I10 ESSENTIAL HYPERTENSION: ICD-10-CM

## 2024-08-05 DIAGNOSIS — R10.12 LEFT UPPER QUADRANT ABDOMINAL PAIN: ICD-10-CM

## 2024-08-05 DIAGNOSIS — Z89.411 HISTORY OF PARTIAL RAY AMPUTATION OF RIGHT GREAT TOE (HCC): ICD-10-CM

## 2024-08-05 DIAGNOSIS — E66.09 CLASS 1 OBESITY DUE TO EXCESS CALORIES WITH SERIOUS COMORBIDITY AND BODY MASS INDEX (BMI) OF 32.0 TO 32.9 IN ADULT: ICD-10-CM

## 2024-08-05 DIAGNOSIS — E11.8 TYPE 2 DIABETES MELLITUS WITH COMPLICATION, WITHOUT LONG-TERM CURRENT USE OF INSULIN (HCC): ICD-10-CM

## 2024-08-05 DIAGNOSIS — E78.5 HYPERLIPIDEMIA, UNSPECIFIED HYPERLIPIDEMIA TYPE: ICD-10-CM

## 2024-08-05 LAB — SL AMB POCT HEMOGLOBIN AIC: 6 (ref ?–6.5)

## 2024-08-05 PROCEDURE — 83036 HEMOGLOBIN GLYCOSYLATED A1C: CPT | Performed by: NURSE PRACTITIONER

## 2024-08-05 PROCEDURE — 99214 OFFICE O/P EST MOD 30 MIN: CPT | Performed by: NURSE PRACTITIONER

## 2024-08-05 RX ORDER — OMEPRAZOLE 40 MG/1
40 CAPSULE, DELAYED RELEASE ORAL DAILY
Qty: 90 CAPSULE | Refills: 1 | Status: SHIPPED | OUTPATIENT
Start: 2024-08-05

## 2024-08-05 NOTE — PROGRESS NOTES
FAMILY PRACTICE OFFICE VISIT       NAME: Maciel Gaitan  AGE: 63 y.o. SEX: male       : 1960        MRN: [id number]    Assessment and Plan   1. Epigastric pain  Intermittent epigastric pain associated with eating.   Possible GERD, gastritis, or peptic ulcer, will start omeprazole 40 mg daily.   Complete US abdomen to check liver, gallbladder, spleen pancreas, kidneys.  Send me an update in 2 weeks. If pain worsens, go to the ER.   -     POCT hemoglobin A1c  -     omeprazole (PriLOSEC) 40 MG capsule; Take 1 capsule (40 mg total) by mouth daily  -     US abdomen complete; Future; Expected date: 2024  2. Left upper quadrant abdominal pain  -     US abdomen complete; Future; Expected date: 2024  3. Class 1 obesity due to excess calories with serious comorbidity and body mass index (BMI) of 32.0 to 32.9 in adult  Assessment & Plan:  Continuing to slowly work on weight loss.   Walking for exercise, eating a diabetic diet.   4. Essential hypertension  Assessment & Plan:  Blood pressure near goal at 128/80 today.  Continue amlodipine 5 mg daily, metoprolol succinate 50 mg daily.   Previously was on lisinopril--transitioned to Olmesartan, but he did not end up taking this.   Will consider changing amlodipine to an ACE/ARB due to type 2 diabetes.   Orders:  -     CBC; Future  -     Comprehensive metabolic panel; Future; Expected date: 10/20/2024  5. 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   He is now keeping a very close on his feet every day.   6. Hyperlipidemia, unspecified hyperlipidemia type  Assessment & Plan:  LDL 53 at goal on atorvastatin 40 mg daily.  Orders:  -     Lipid panel; Future; Expected date: 2024  7. Type 2 diabetes mellitus with complication, without long-term current use of insulin ([facility])  Assessment & Plan:    Lab Results   Component Value Date    HGBA1C 6.0 2024     A1c is excellent at 6.0%.    Metformin has been discontinued.   He is checking sugars TID, they are maintaining 110's or less.   Foot exam, eye exam, urine for protein creatinine ratio up to date.     Orders:  -     Hemoglobin A1C; Future; Expected date: 11/06/2024         Repeat blood work in 3 months.   Follow up in the office in 6 months or sooner if needed.       Chief Complaint     Chief Complaint   Patient presents with    Abdominal Pain     X3 weeks ago, comes and goes        History of Present Illness     Maciel Gaitan is a 63 year old male presenting today for abdominal pain.     He had similar symptoms in January and was evaluated by GI.     Started Metamucil.   Probitoic started.   Changed diet.   Was doing better.    3 weeks ago pain started again.   Pain is really bad, so bad at times--he can't go anywhere, has to stop what he is doing. Has had to pull over driving from this pain.    Central epigastric pain, sometimes left upper quadrant. Sometimes radiates to back.   Sharp, squeezing pain.   Occurs after eating.   Not every time.   Not  with specific foods.   Keeping food journal.    No nausea or vomiting,   No diarrhea.     Using Joey back and body sometimes for pain at amputation site. Not every day.   No coffee, drinks unsweetened ice tea, about 1 gallon per week.   No alcohol intake.     Has tried dulcolax tabs to see if he cleared bowel, would pain resolve, but did not change anything.     Pain comes and goes. No pain right now.   There is a lot of rumbling, loud bowel sounds.   No abnormal belching.     Colonoscopy has to be 6 months after amputation, planning to complete this.     Has brought sugar log today, Sugars running 's.              Abdominal Pain  This is a recurrent problem. The current episode started 1 to 4 weeks ago. The onset quality is sudden. The problem occurs 2 to 4 times per day. The most recent episode lasted 1 hours. The problem has been waxing and waning. The pain is located in the  epigastric region. The pain is at a severity of 7/10. The quality of the pain is cramping. The abdominal pain radiates to the back. Associated symptoms include constipation. Pertinent negatives include no anorexia, arthralgias, belching, diarrhea, dysuria, fever, flatus, frequency, headaches, hematochezia, hematuria, melena, myalgias, nausea, vomiting or weight loss. The pain is aggravated by certain positions. The pain is relieved by Certain positions.       Review of Systems   Review of Systems   Constitutional:  Negative for fever and weight loss.   Gastrointestinal:  Positive for abdominal pain and constipation. Negative for anorexia, diarrhea, flatus, hematochezia, melena, nausea and vomiting.   Genitourinary:  Negative for dysuria, frequency and hematuria.   Musculoskeletal:  Negative for arthralgias and myalgias.   Neurological:  Negative for headaches.       I have reviewed the patient's medical history in detail; there are no changes to the history as noted in the electronic medical record.    Objective     Vitals:    08/05/24 1021   BP: 128/80   BP Location: Left arm   Patient Position: Sitting   Cuff Size: Large   Pulse: 73   Resp: 16   Temp: 98 °F (36.7 °C)   TempSrc: Temporal   SpO2: 99%   Weight: 109 kg (241 lb 3.2 oz)   Height: 6' (1.829 m)     Wt Readings from Last 3 Encounters:   08/05/24 109 kg (241 lb 3.2 oz)   08/01/24 111 kg (245 lb)   06/13/24 110 kg (242 lb 12.8 oz)     Physical Exam  Vitals and nursing note reviewed.   Constitutional:       General: He is not in acute distress.     Appearance: Normal appearance. He is well-developed. He is not ill-appearing.   Cardiovascular:      Rate and Rhythm: Normal rate and regular rhythm.      Heart sounds: No murmur heard.  Pulmonary:      Effort: Pulmonary effort is normal. No respiratory distress.      Breath sounds: Normal breath sounds.   Abdominal:      General: Bowel sounds are normal. There is no distension.      Palpations: Abdomen is rigid.  There is no mass.      Tenderness: There is no abdominal tenderness. There is no guarding.   Neurological:      Mental Status: He is alert.            ALLERGIES:  No Known Allergies    Current Medications     Current Outpatient Medications   Medication Sig Dispense Refill    Alcohol Swabs 70 % PADS May substitute brand based on insurance coverage. Check glucose TID. 100 each 0    amLODIPine (NORVASC) 5 mg tablet Take 1 tablet (5 mg total) by mouth daily 90 tablet 3    aspirin 81 MG tablet Take 1 tablet by mouth daily  0    atorvastatin (LIPITOR) 40 mg tablet Take 1 tablet (40 mg total) by mouth every evening 90 tablet 3    glucose blood (OneTouch Verio) test strip Check blood sugars three times daily. Please substitute with appropriate alternative as covered by patient's insurance. Dx: E11.65 300 each 3    metoprolol succinate (TOPROL-XL) 50 mg 24 hr tablet Take 1 tablet (50 mg total) by mouth daily 90 tablet 3    omeprazole (PriLOSEC) 40 MG capsule Take 1 capsule (40 mg total) by mouth daily 90 capsule 1    OneTouch Delica Lancets 33G MISC May substitute brand based on insurance coverage. Check glucose TID. 100 each 3    saccharomyces boulardii (FLORASTOR) 250 mg capsule Take 1 capsule (250 mg total) by mouth 2 (two) times a day 30 capsule 0     No current facility-administered medications for this visit.         Health Maintenance     Health Maintenance   Topic Date Due    Hepatitis C Screening  Never done    Pneumococcal Vaccine: Pediatrics (0 to 5 Years) and At-Risk Patients (6 to 64 Years) (1 of 2 - PCV) Never done    HIV Screening  Never done    Annual Physical  Never done    DTaP,Tdap,and Td Vaccines (1 - Tdap) Never done    Colorectal Cancer Screening  Never done    Zoster Vaccine (1 of 2) Never done    RSV Vaccine Age 60+ Years (1 - 1-dose 60+ series) Never done    COVID-19 Vaccine (2 - 2023-24 season) 09/01/2023    Influenza Vaccine (1) 09/01/2024    HEMOGLOBIN A1C  02/05/2025    Depression Screening   03/05/2025    Kidney Health Evaluation: GFR  05/09/2025    Kidney Health Evaluation: Albumin/Creatinine Ratio  05/09/2025    Diabetic Foot Exam  05/14/2025    DM Eye Exam  03/28/2026    RSV Vaccine age 0-20 Months  Aged Out    HIB Vaccine  Aged Out    IPV Vaccine  Aged Out    Hepatitis A Vaccine  Aged Out    Meningococcal ACWY Vaccine  Aged Out    HPV Vaccine  Aged Out     Immunization History   Administered Date(s) Administered    COVID-19 PFIZER VACCINE 0.3 ML IM 11/13/2021    INFLUENZA 11/13/2021, 09/19/2022, 11/06/2023    Influenza Quadrivalent Preservative Free 3 years and older IM 12/28/2017       MITA Choudhury

## 2024-08-06 PROBLEM — M86.9 OSTEOMYELITIS OF GREAT TOE OF RIGHT FOOT (HCC): Status: RESOLVED | Noted: 2024-02-17 | Resolved: 2024-08-06

## 2024-08-07 NOTE — ASSESSMENT & PLAN NOTE
Blood pressure near goal at 128/80 today.  Continue amlodipine 5 mg daily, metoprolol succinate 50 mg daily.   Previously was on lisinopril--transitioned to Olmesartan, but he did not end up taking this.   Will consider changing amlodipine to an ACE/ARB due to type 2 diabetes.

## 2024-08-07 NOTE — ASSESSMENT & PLAN NOTE
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  Amputation site is well healed.   He is now keeping a very close on his feet every day.

## 2024-08-07 NOTE — ASSESSMENT & PLAN NOTE
Lab Results   Component Value Date    HGBA1C 6.0 08/05/2024     A1c is excellent at 6.0%.   Metformin has been discontinued.   He is checking sugars TID, they are maintaining 110's or less.   Foot exam, eye exam, urine for protein creatinine ratio up to date.

## 2024-08-09 ENCOUNTER — HOSPITAL ENCOUNTER (OUTPATIENT)
Dept: ULTRASOUND IMAGING | Facility: HOSPITAL | Age: 64
Discharge: HOME | End: 2024-08-09
Payer: COMMERCIAL

## 2024-08-09 DIAGNOSIS — R10.12 LEFT UPPER QUADRANT ABDOMINAL PAIN: ICD-10-CM

## 2024-08-09 DIAGNOSIS — R10.13 EPIGASTRIC PAIN: ICD-10-CM

## 2024-08-09 PROCEDURE — 76700 US EXAM ABDOM COMPLETE: CPT

## 2024-08-13 ENCOUNTER — TELEPHONE (OUTPATIENT)
Dept: FAMILY MEDICINE CLINIC | Facility: CLINIC | Age: 64
End: 2024-08-13

## 2024-08-13 NOTE — TELEPHONE ENCOUNTER
Patient aware of results. He stated that he has been having trouble with constipation and asked if anymore testing is needed.

## 2024-08-13 NOTE — TELEPHONE ENCOUNTER
Ultrasound shows fatty liver, this is reversible with weight loss.   2 simple left kidney cysts, no need for intervention.   Possible stool in the bowel, please let me know if you are having any problems with constipation.   No cause of pain seen on ultrasound.  Are you feeling any better yet on omeprazole?

## 2024-08-14 NOTE — TELEPHONE ENCOUNTER
Called patient, no answer.     Left detailed message with recommendations.   Informed to call us back with any questions and to let us know he got our message

## 2024-08-14 NOTE — TELEPHONE ENCOUNTER
If constipation is new for you, I would recommend trying a daily stool softener such as colace 1-2 capsules daily, drink a lot of water, get regular exercise as able. Let me know if this doesn't help.   Get your colonoscopy scheduled this year as soon as you are cleared to complete it after your foot surgery.

## 2024-09-05 ENCOUNTER — OFFICE VISIT (OUTPATIENT)
Dept: CARDIOLOGY CLINIC | Facility: CLINIC | Age: 64
End: 2024-09-05
Payer: COMMERCIAL

## 2024-09-05 VITALS
OXYGEN SATURATION: 99 % | HEIGHT: 72 IN | HEART RATE: 56 BPM | WEIGHT: 239 LBS | DIASTOLIC BLOOD PRESSURE: 74 MMHG | BODY MASS INDEX: 32.37 KG/M2 | SYSTOLIC BLOOD PRESSURE: 140 MMHG

## 2024-09-05 DIAGNOSIS — I25.10 CORONARY ARTERY DISEASE INVOLVING NATIVE HEART WITHOUT ANGINA PECTORIS, UNSPECIFIED VESSEL OR LESION TYPE: Primary | ICD-10-CM

## 2024-09-05 DIAGNOSIS — E78.5 HYPERLIPIDEMIA, UNSPECIFIED HYPERLIPIDEMIA TYPE: ICD-10-CM

## 2024-09-05 DIAGNOSIS — I10 ESSENTIAL HYPERTENSION: ICD-10-CM

## 2024-09-05 PROCEDURE — 99214 OFFICE O/P EST MOD 30 MIN: CPT | Performed by: INTERNAL MEDICINE

## 2024-09-05 PROCEDURE — 3078F DIAST BP <80 MM HG: CPT | Performed by: INTERNAL MEDICINE

## 2024-09-05 PROCEDURE — 3077F SYST BP >= 140 MM HG: CPT | Performed by: INTERNAL MEDICINE

## 2024-09-05 NOTE — PROGRESS NOTES
Cardiology   Maciel Gaitan 63 y.o. male MRN: [id number]        Impression:  1. CAD s/p NSTEMI 12/17 - Had PCI of LCx, has residual  of RCA.  2. Hypertension - borderline control.   Will improve with losing weight.   3. Dylipidemia - on statin.  4. Obesity - losing weight.       Recommendations:  1. Continue current medications.  2. Follow up in 6 months.         HPI: Maciel Gaitan is a 63 y.o. year old male with NSTEMI Type I - cath demonstrated 95% LCx and chronic mid RCA stenosis - Underwent PCI of LCx.  Has been feeling well. No chest pain, shortness of breath, or palpitations.  Echo 1/24 - EF 60% with mod LVH, no valvular abnormalities.          Review of Systems   Constitutional: Negative.    HENT: Negative.     Eyes: Negative.    Respiratory:  Negative for chest tightness and shortness of breath.    Cardiovascular:  Negative for chest pain, palpitations and leg swelling.   Gastrointestinal: Negative.    Endocrine: Negative.    Genitourinary: Negative.    Musculoskeletal: Negative.    Skin: Negative.    Allergic/Immunologic: Negative.    Neurological: Negative.    Hematological: Negative.    Psychiatric/Behavioral: Negative.     All other systems reviewed and are negative.        Past Medical History:   Diagnosis Date    Hyperlipidemia     NSTEMI, initial episode of care ([facility]) 01/02/2018    Osteomyelitis of great toe of right foot ([facility]) 02/17/2024    Uncontrolled type 2 diabetes mellitus with hyperglycemia ([facility]) 12/26/2017     Past Surgical History:   Procedure Laterality Date    CLOSURE DELAYED PRIMARY Right 03/25/2024    Procedure: CLOSURE DELAYED PRIMARY OF RIGHT FOOT;  Surgeon: Shakeel Mckinney DPM;  Location: [facility];  Service: Podiatry    ESOPHAGUS SURGERY      in infancy    EYE SURGERY      GA AMPUTATION METATARSAL W/TOE SINGLE Right 03/20/2024    Procedure: First ray resection, metatarsal stump resection;  Surgeon: Ryland Domingo DPM;  Location: [facility];  Service: Podiatry    TOE AMPUTATION  Right 02/16/2024    Procedure: PARTIAL 1st RAY AMPUTATION, RIGHT FOOT REMOVAL OF ALL NON-VIABLE BONE AND SOFT TISSUE;  Surgeon: Severo Person DPM;  Location: [facility];  Service: Podiatry    WISDOM TOOTH EXTRACTION       Social History     Substance and Sexual Activity   Alcohol Use Not Currently    Comment: 1-2 drinks a year     Social History     Substance and Sexual Activity   Drug Use No     Social History     Tobacco Use   Smoking Status Never   Smokeless Tobacco Never     Family History   Problem Relation Age of Onset    Diabetes Mother     Heart disease Father     Heart disease Paternal Grandfather        Allergies:  No Known Allergies    Medications:     Current Outpatient Medications:     amLODIPine (NORVASC) 5 mg tablet, Take 1 tablet (5 mg total) by mouth daily, Disp: 90 tablet, Rfl: 3    aspirin 81 MG tablet, Take 1 tablet by mouth daily, Disp: , Rfl: 0    atorvastatin (LIPITOR) 40 mg tablet, Take 1 tablet (40 mg total) by mouth every evening, Disp: 90 tablet, Rfl: 3    metoprolol succinate (TOPROL-XL) 50 mg 24 hr tablet, Take 1 tablet (50 mg total) by mouth daily, Disp: 90 tablet, Rfl: 3    omeprazole (PriLOSEC) 40 MG capsule, Take 1 capsule (40 mg total) by mouth daily, Disp: 90 capsule, Rfl: 1    saccharomyces boulardii (FLORASTOR) 250 mg capsule, Take 1 capsule (250 mg total) by mouth 2 (two) times a day, Disp: 30 capsule, Rfl: 0    Alcohol Swabs 70 % PADS, May substitute brand based on insurance coverage. Check glucose TID., Disp: 100 each, Rfl: 0    glucose blood (OneTouch Verio) test strip, Check blood sugars three times daily. Please substitute with appropriate alternative as covered by patient's insurance. Dx: E11.65, Disp: 300 each, Rfl: 3    OneTouch Delica Lancets 33G MISC, May substitute brand based on insurance coverage. Check glucose TID., Disp: 100 each, Rfl: 3      Wt Readings from Last 3 Encounters:   09/05/24 108 kg (239 lb)   08/05/24 109 kg (241 lb 3.2 oz)   08/01/24 111 kg  "(245 lb)     Temp Readings from Last 3 Encounters:   08/05/24 98 °F (36.7 °C) (Temporal)   08/01/24 97.9 °F (36.6 °C)   05/14/24 (!) 97 °F (36.1 °C) (Temporal)     BP Readings from Last 3 Encounters:   09/05/24 140/74   08/05/24 128/80   08/01/24 136/86     Pulse Readings from Last 3 Encounters:   09/05/24 56   08/05/24 73   08/01/24 65         Physical Exam  HENT:      Head: Atraumatic.      Mouth/Throat:      Mouth: Mucous membranes are moist.   Eyes:      Extraocular Movements: Extraocular movements intact.   Cardiovascular:      Rate and Rhythm: Normal rate and regular rhythm.      Heart sounds: Normal heart sounds.   Pulmonary:      Effort: Pulmonary effort is normal.      Breath sounds: Normal breath sounds.   Abdominal:      General: Abdomen is flat.   Musculoskeletal:         General: Normal range of motion.      Cervical back: Normal range of motion.   Skin:     General: Skin is warm.   Neurological:      General: No focal deficit present.      Mental Status: He is alert.   Psychiatric:         Mood and Affect: Mood normal.           Laboratory Studies:  CMP:  Lab Results   Component Value Date    K 3.7 05/09/2024     05/09/2024    CO2 23 05/09/2024    BUN 28 (H) 05/09/2024    CREATININE 0.94 05/09/2024    AST 27 05/09/2024    ALT 25 05/09/2024    EGFR 85 05/09/2024       Lipid Profile:   No results found for: \"CHOL\"  Lab Results   Component Value Date    HDL 37 (L) 05/09/2024     Lab Results   Component Value Date    LDLCALC 53 05/09/2024     Lab Results   Component Value Date    TRIG 83 05/09/2024       Cardiac testing:   EKG reviewed personally:   Results for orders placed during the hospital encounter of 12/26/17    Echo complete with contrast if indicated    [facility]  [address]  [place] [address] [phone]    Transthoracic Echocardiogram  2D, M-mode, Doppler, and Color Doppler    Study date:  27-Dec-2017    Patient: EVARISTO HAMMER  MR number: " [id number]  Account number: [id number]  : 1960  Age: 57 years  Gender: Male  Status: Inpatient  Location: Bedside  Height: 71 in  Weight: 333 lb  BP: 119/ 71 mmHg    Indications: MI    Diagnoses: I40.9 - Acute myocarditis, unspecified    Sonographer:  DIVYA Buckner  Referring Physician:  Nadia Birmingham MD  Group:  [facility]  Interpreting Physician:  Miguel Clark DO    SUMMARY    SUMMARY:  Technically difficult study.    LEFT VENTRICLE:  Systolic function was mildly reduced. Ejection fraction was estimated to be 45 %.  There was severe hypokinesis of the basal inferoseptal and basal-mid inferior wall(s).  Doppler parameters were consistent with abnormal left ventricular relaxation (grade 1 diastolic dysfunction).    MITRAL VALVE:  There was mild annular calcification.    TRICUSPID VALVE:  There was trace regurgitation.    HISTORY: PRIOR HISTORY: Diabetes, Obesity    PROCEDURE: The procedure was performed at the bedside. This was a routine study. The transthoracic approach was used. The study included complete 2D imaging, M-mode, complete spectral Doppler, and color Doppler. The heart rate was 73 bpm,  at the start of the study. Images were obtained from the parasternal, apical, subcostal, and suprasternal notch acoustic windows. Intravenous contrast ( 0.8 mL Definity in NSS) was administered to opacify the left ventricle.  Echocardiographic views were limited due to poor acoustic window availability and decreased penetration. This was a technically difficult study.    LEFT VENTRICLE: Size was normal. Systolic function was mildly reduced. Ejection fraction was estimated to be 45 %. There was severe hypokinesis of the basal inferoseptal and basal-mid inferior wall(s). Wall thickness was normal. DOPPLER:  Doppler parameters were consistent with abnormal left ventricular relaxation (grade 1 diastolic dysfunction).    RIGHT VENTRICLE: The size was normal. Systolic function  was normal. Wall thickness was normal.    LEFT ATRIUM: Size was normal.    RIGHT ATRIUM: Size was normal.    MITRAL VALVE: There was mild annular calcification. Valve structure was normal. There was normal leaflet separation. DOPPLER: The transmitral velocity was within the normal range. There was no evidence for stenosis. There was no  regurgitation.    AORTIC VALVE: The valve was probably trileaflet. Leaflets exhibited mildly increased thickness and normal cuspal separation. DOPPLER: Transaortic velocity was within the normal range. There was no evidence for stenosis. There was no  regurgitation.    TRICUSPID VALVE: The valve structure was normal. There was normal leaflet separation. DOPPLER: The transtricuspid velocity was within the normal range. There was no evidence for stenosis. There was trace regurgitation.    PULMONIC VALVE: Leaflets exhibited normal thickness, no calcification, and normal cuspal separation. DOPPLER: The transpulmonic velocity was within the normal range. There was no regurgitation.    PERICARDIUM: There was no pericardial effusion. The pericardium was normal in appearance.    AORTA: The root exhibited normal size.    SYSTEMIC VEINS: IVC: The inferior vena cava was not well visualized.    SYSTEM MEASUREMENT TABLES    2D  %FS: 30.15 %  AV Diam: 3.9 cm  EDV(Teich): 131.24 ml  EF(Teich): 57.05 %  ESV(Teich): 56.37 ml  IVSd: 1.2 cm  LA Area: 14.47 cm2  LA Diam: 4.85 cm  LVEDV MOD A4C: 159.75 ml  LVEF MOD A4C: 35.13 %  LVESV MOD A4C: 103.62 ml  LVIDd: 5.23 cm  LVIDs: 3.65 cm  LVLd A4C: 9.36 cm  LVLs A4C: 8.54 cm  LVPWd: 1.08 cm  RA Area: 14.93 cm2  RVIDd: 3.4 cm  SV MOD A4C: 56.12 ml  SV(Teich): 74.87 ml    MM  TAPSE: 2.48 cm    PW  E': 0.06 m/s  E/E': 16.23  MV A Sulaiman: 0.79 m/s  MV Dec Walton: 5.08 m/s2  MV DecT: 186.23 ms  MV E Sulaiman: 0.95 m/s  MV E/A Ratio: 1.2  MV PHT: 54.01 ms  MVA By PHT: 4.07 cm2    [employer] Accredited Echocardiography Laboratory    Prepared and  "electronically signed by    Miguel Clakr DO  Signed 27-Dec-2017 13:12:45    No results found for this or any previous visit.    Results for orders placed during the hospital encounter of 17    Cardiac catheterization    [address] [place] [address] [phone]    (Blankline)    Invasive Cardiovascular Lab Complete Report    Patient: EVARISTO HAMMER  MR number: [id number]  Account number: [id number]  Study date: 2017  Gender: Male  : 1960  Height: 70.9 in  Weight: 332.2 lb  BSA: 2.62 m squared    Allergies: NO KNOWN ALLERGIES    Diagnostic Cardiologist:  Ivan Scott MD  Interventional Cardiologist:  Ivan Scott MD    SUMMARY    CORONARY CIRCULATION:  Left main: Normal.  LAD: The vessel was medium to large sized. Angiography showed mild atherosclerosis.  Circumflex: The vessel was very large sized.  Mid circumflex: There was a discrete 90 % stenosis. The lesion was irregularly contoured, hazy, and without evidence of thrombus. There was TIMUR grade 3 flow through the vessel (brisk flow). This is a likely culprit for the patient's  clinical presentation. It appears amenable to percutaneous intervention.  RCA: The vessel was medium sized. The mid RCA occlusion appears to be chronic as there is R to R and L to R collaterals seen.  Mid RCA: There was a 100 % stenosis.  Distal RCA: There was a discrete 80 % stenosis.    1ST LESION INTERVENTIONS:  A successful stent procedure was performed on the 90 % lesion in the mid circumflex. Following intervention there was an excellent angiographic appearance with a 0 % residual stenosis.  This was not a bifurcation lesion. This was an ACC/AHA type A \"low risk\" lesion for intervention. There was no evidence of the transient no-reflow phenomenon. The residual lesion was discrete and demonstrated no evidence of thrombus. There  was TIMUR 3 flow before the procedure and TIMUR 3 flow after the " procedure.  A Resolute Integrity Rx 4.0 x 18 drug-eluting stent was placed across the lesion and deployed at a maximum inflation pressure of 12 courtney.    INDICATIONS:  Possible CAD: myocardial infarction without ST elevation (NSTEMI).    RECOMMENDATIONS:  The patient should continue with the present medications including ASA and Plavix.  Consider nuclear stress test if anginal symptoms to evaluation for RCA ischemia. PCI of RCA possible but appears to be a chronic occlusion.    INDICATIONS:  --  Possible CAD: myocardial infarction without ST elevation (NSTEMI).    PROCEDURES PERFORMED    --  Right coronary angiography.  --  Left coronary angiography.  --  Inpatient.  --  Coronary Catheterization (w/o [facility]).  --  Mod Sedation Same Physician Initial 15min.  --  Mod Sedation Same Physician Add 15min.  --  Mod Sedation Same Physician Add 15min.  --  Mod Sedation Same Physician Add 15min.  --  Coronary Drug Eluting Stent w/PTCA.  --  Intervention on mid circumflex: stent.    PROCEDURE: The risks and alternatives of the procedures and conscious sedation were explained to the patient and informed consent was obtained. The patient was brought to the cath lab and placed on the table. The planned puncture sites  were prepped and draped in the usual sterile fashion.    --  Right radial artery access. After performing an Dane's test to verify adequate ulnar artery supply to the hand, the radial site was prepped. The puncture site was infiltrated with local anesthetic. The vessel was accessed using the  modified Seldinger technique, a wire was advanced into the vessel, and a sheath was advanced over the wire into the vessel.    --  Right coronary artery angiography. A catheter was advanced over a guidewire into the aorta and positioned in the right coronary artery ostium under fluoroscopic guidance. Angiography was performed.    --  Left coronary artery angiography. A catheter was advanced over a guidewire into the aorta and  "positioned in the left coronary artery ostium under fluoroscopic guidance. Angiography was performed.    --  Inpatient.    --  Coronary Catheterization (w/o [facility]).    --  Mod Sedation Same Physician Initial 15min.    --  Mod Sedation Same Physician Add 15min.    --  Mod Sedation Same Physician Add 15min.    --  Mod Sedation Same Physician Add 15min.    LESION INTERVENTION: A successful stent procedure was performed on the 90 % lesion in the mid circumflex. Following intervention there was an excellent angiographic appearance with a 0 % residual stenosis. This was not a bifurcation  lesion. This was an ACC/AHA type A \"low risk\" lesion for intervention. There was no evidence of the transient no-reflow phenomenon. The residual lesion was discrete and demonstrated no evidence of thrombus. There was TIMUR 3 flow before the  procedure and TIMUR 3 flow after the procedure.    --  Vessel setup was performed. A Launcher 6Fr Ebu 3.5 guiding catheter was used to cannulate the vessel.    --  Vessel setup was performed. A Runthrough NS 180cm wire was used to cross the lesion.    --  Balloon angioplasty was performed, using a Trek Rx 2.5 x 15mm balloon, with 2 inflations and a maximum inflation pressure of 14 courtney.    --  A Resolute Integrity Rx 4.0 x 18 drug-eluting stent was placed across the lesion and deployed at a maximum inflation pressure of 12 courtney.    --  Balloon angioplasty was performed, with 1 inflations and a maximum inflation pressure of 12 courtney.    INTERVENTIONS:  --  Coronary Drug Eluting Stent w/PTCA.    PROCEDURE COMPLETION: The patient tolerated the procedure well and was discharged from the cath lab. TIMING: Test started at 13:01. Test concluded at 13:45. MEDICATIONS GIVEN: Metoprolol (Lopressor, Toprol), 5 mg, IV, at 13:09. Fentanyl  (1OOmcg/2 ml), 50 mcg, IV, at 13:01. Versed (2mg/2ml), 2 mg, IV, at 13:01. 1% Lidocaine, 1 ml, subcutaneously, at 13:05. Verapamil (5mg/2ml), 2.5 mg, IV, at 13:06. Heparin 1000 " units/ml, 4,000 units, IV, at 13:06. Nitroglycerin  (200mcg/ml), 200 mcg, at 13:06. Heparin 1000 units/ml, 5,000 units, IV, at 13:30. Nitroglycerine (200mcg/ml), 200 mcg, at 13:40. CONTRAST GIVEN: 150 ml Omnipaque (350 mg I /ml). RADIATION EXPOSURE: Fluoroscopy time: 13.22 min.    CORONARY VESSELS:   --  Left main: Normal.  --  LAD: The vessel was medium to large sized. Angiography showed mild atherosclerosis.  --  1st diagonal: The vessel was small sized.  --  2nd diagonal: The vessel was small sized.  --  3rd diagonal: The vessel was small sized.  --  Circumflex: The vessel was very large sized.  --  Mid circumflex: There was a discrete 90 % stenosis. The lesion was irregularly contoured, hazy, and without evidence of thrombus. There was TIMUR grade 3 flow through the vessel (brisk flow). This is a likely culprit for the patient's  clinical presentation. It appears amenable to percutaneous intervention.  --  1st obtuse marginal: The vessel was small sized.  --  RCA: The vessel was medium sized. The mid RCA occlusion appears to be chronic as there is R to R and L to R collaterals seen.  --  Mid RCA: There was a 100 % stenosis.  --  Distal RCA: There was a discrete 80 % stenosis.    RECOMMENDATIONS  The patient should continue with the present medications including ASA and Plavix.  Consider nuclear stress test if anginal symptoms to evaluation for RCA ischemia. PCI of RCA possible but appears to be a chronic occlusion.    Prepared and signed by    Ivan Scott MD  Signed 12/26/2017 14:15:24    Study diagram    Angiographic findings  Native coronary lesions:  ·Mid circumflex: Lesion 1: discrete, 90 % stenosis.  ·Mid RCA: Lesion 1: 100 % stenosis.  ·Distal RCA: Lesion 1: discrete, 80 % stenosis.  Intervention results  Native coronary lesions:  ·Successful stent of the 90 % stenosis in mid circumflex. Appearance excellent with 0 % residual stenosis. Stent: Resolute Integrity Rx 4.0 x 18 drug-eluting.    Hemodynamic  tables    Pressures:  Baseline  Pressures:  - HR: 72  Pressures:  - Rhythm:  Pressures:  -- Aortic Pressure (S/D/M): 149/97/121    Outputs:  Baseline  Outputs:  -- CALCULATIONS[age]  Outputs:  -- CALCULATIONS: Body Surface Area: 2.62  Outputs:  -- CALCULATIONS: Height in cm: 180.00  Outputs:  -- CALCULATIONS: Sex: Male  Outputs:  -- CALCULATIONS: Weight in k.00    No results found for this or any previous visit.

## 2024-09-06 ENCOUNTER — OFFICE VISIT (OUTPATIENT)
Dept: PODIATRY | Facility: CLINIC | Age: 64
End: 2024-09-06
Payer: COMMERCIAL

## 2024-09-06 VITALS
DIASTOLIC BLOOD PRESSURE: 78 MMHG | SYSTOLIC BLOOD PRESSURE: 137 MMHG | WEIGHT: 238 LBS | HEIGHT: 72 IN | BODY MASS INDEX: 32.23 KG/M2 | HEART RATE: 58 BPM

## 2024-09-06 DIAGNOSIS — E11.8 TYPE 2 DIABETES MELLITUS WITH COMPLICATION, WITHOUT LONG-TERM CURRENT USE OF INSULIN (HCC): Primary | ICD-10-CM

## 2024-09-06 DIAGNOSIS — I87.2 VENOUS INSUFFICIENCY OF LOWER EXTREMITY: ICD-10-CM

## 2024-09-06 DIAGNOSIS — Z89.411 STATUS POST AMPUTATION OF RIGHT GREAT TOE (HCC): ICD-10-CM

## 2024-09-06 PROCEDURE — 99213 OFFICE O/P EST LOW 20 MIN: CPT | Performed by: PODIATRIST

## 2024-09-06 NOTE — PROGRESS NOTES
PATIENT:  Maciel Gaitan  1960    ASSESSMENT/PLAN:  1. Type 2 diabetes mellitus with complication, without long-term current use of insulin ([facility])        2. Status post amputation of right great toe ([facility])        3. Venous insufficiency of lower extremity              1. Reviewed medical records.  Reviewed podiatry notes from previous admission and office.  Educated disease prevention and risks related to diabetes.    2. Educated proper daily foot care and exam.  Instructed proper skin care / protection and footwear.  Instructed to identify any signs of infection and related foot problem.  Nails debrided.  3. The recent blood work was reviewed / discussed and the last HbA1c was 6.0.  Discussed proper blood glucose control with diet and exercise.    4. Instructed skin care / local care in his legs.  Instructed to wear compression stockings and elevate to reduce any LE edema.  Pt to call the office in 2-3 weeks if skin does not heal.    5. The patient has high risk diabetic foot and will return in 3 months for diabetic foot exam.      HPI:  Maciel Gaitan is a 63 y.o.year old male seen for diabetic foot exam.  He has high risk feet with history of right 1st ray amputation.  BS is under control.  No foot ulcer.  He has diabetic shoes, but did not present with it today.  The patient denied any acute pedal disorder, redness, acute swelling, or recent injury.          PAST MEDICAL HISTORY:  Past Medical History:   Diagnosis Date    Hyperlipidemia     NSTEMI, initial episode of care ([facility]) 01/02/2018    Osteomyelitis of great toe of right foot ([facility]) 02/17/2024    Uncontrolled type 2 diabetes mellitus with hyperglycemia ([facility]) 12/26/2017       PAST SURGICAL HISTORY:  Past Surgical History:   Procedure Laterality Date    CLOSURE DELAYED PRIMARY Right 03/25/2024    Procedure: CLOSURE DELAYED PRIMARY OF RIGHT FOOT;  Surgeon: Shakeel Mckinney DPM;  Location: [facility];  Service: Podiatry    ESOPHAGUS SURGERY      in infancy     EYE SURGERY      WI AMPUTATION METATARSAL W/TOE SINGLE Right 03/20/2024    Procedure: First ray resection, metatarsal stump resection;  Surgeon: Ryland Domingo DPM;  Location: [facility];  Service: Podiatry    TOE AMPUTATION Right 02/16/2024    Procedure: PARTIAL 1st RAY AMPUTATION, RIGHT FOOT REMOVAL OF ALL NON-VIABLE BONE AND SOFT TISSUE;  Surgeon: Severo Person DPM;  [facility];  Service: Podiatry    WISDOM TOOTH EXTRACTION          ALLERGIES:  Patient has no known allergies.    MEDICATIONS:  Current Outpatient Medications   Medication Sig Dispense Refill    Alcohol Swabs 70 % PADS May substitute brand based on insurance coverage. Check glucose TID. 100 each 0    amLODIPine (NORVASC) 5 mg tablet Take 1 tablet (5 mg total) by mouth daily 90 tablet 3    aspirin 81 MG tablet Take 1 tablet by mouth daily  0    atorvastatin (LIPITOR) 40 mg tablet Take 1 tablet (40 mg total) by mouth every evening 90 tablet 3    glucose blood (OneTouch Verio) test strip Check blood sugars three times daily. Please substitute with appropriate alternative as covered by patient's insurance. Dx: E11.65 300 each 3    metoprolol succinate (TOPROL-XL) 50 mg 24 hr tablet Take 1 tablet (50 mg total) by mouth daily 90 tablet 3    omeprazole (PriLOSEC) 40 MG capsule Take 1 capsule (40 mg total) by mouth daily 90 capsule 1    OneTouch Delica Lancets 33G MISC May substitute brand based on insurance coverage. Check glucose TID. 100 each 3    saccharomyces boulardii (FLORASTOR) 250 mg capsule Take 1 capsule (250 mg total) by mouth 2 (two) times a day 30 capsule 0     No current facility-administered medications for this visit.       SOCIAL HISTORY:  Social History     Socioeconomic History    Marital status: /Civil Union     Spouse name: None    Number of children: None    Years of education: None    Highest education level: None   Occupational History    Occupation: physically demanding job   Tobacco Use    Smoking  status: Never    Smokeless tobacco: Never   Vaping Use    Vaping status: Never Used   Substance and Sexual Activity    Alcohol use: Not Currently     Comment: 1-2 drinks a year    Drug use: No    Sexual activity: Yes     Partners: Female     Birth control/protection: Female Sterilization   Other Topics Concern    None   Social History Narrative    Stress at work     Social Determinants of Health     Financial Resource Strain: Not on file   Food Insecurity: No Food Insecurity (3/26/2024)    Hunger Vital Sign     Worried About Running Out of Food in the Last Year: Never true     Ran Out of Food in the Last Year: Never true   Transportation Needs: No Transportation Needs (3/26/2024)    PRAPARE - Transportation     Lack of Transportation (Medical): No     Lack of Transportation (Non-Medical): No   Physical Activity: Not on file   Stress: Not on file   Social Connections: Not on file   Intimate Partner Violence: Not on file   Housing Stability: Low Risk  (3/26/2024)    Housing Stability Vital Sign     Unable to Pay for Housing in the Last Year: No     Number of Times Moved in the Last Year: 1     Homeless in the Last Year: No        REVIEW OF SYSTEMS:  GENERAL: No fever or chills  HEART: No chest pain, or palpitation  RESPIRATORY:  No acute SOB or cough  GI: No Nausea, vomit or diarrhea  NEUROLOGIC: No syncope or acute weakness    PHYSICAL EXAM:    /78   Pulse 58   Ht 6' (1.829 m)   Wt 108 kg (238 lb)   BMI 32.28 kg/m²     VASCULAR EXAM  Dorsalis pedis  +1, Posterior tibial artery  +1.  The patient has class findings with skin atrophy, lack of digital hair, and nail dystrophy.  There is +1 lower extremity edema bilaterally.  Venous stasis skin changes noted BLE.    NEUROLOGIC EXAM  Sensation is intact to light touch.  Sensation is decreased to toes with 10gm monofilament.  No focal neurologic deficit.          DERMATOLOGIC EXAM:   No ulcer or cellulitis noted in his feet.  The patient has hypertrophic toenails  with discoloration, onycholysis, and subungal debris.  Multiple skin abrasion / excoriation on his legs.      MUSCULOSKELETAL EXAM:   No acute joint pain, edema, or redness.  No acute musculoskeletal problem.  S/P right 1st ray amputation.        Diabetic Foot Exam    Patient's shoes and socks removed.    Right Foot/Ankle   Right Foot Inspection  Skin Exam: skin intact. No erythema, no maceration, no pre-ulcer and no ulcer. Amputation: amputation right foot (Comments: 1st ray)    Toe Exam: No swelling, no tenderness and erythema    Sensory   Proprioception: intact  Monofilament testing: diminished    Vascular  Capillary refills: < 3 seconds  The right DP pulse is 1+. The right PT pulse is 1+.     Left Foot/Ankle  Left Foot Inspection  Skin Exam: skin intact. No erythema, no maceration, no pre-ulcer and no ulcer.     Toe Exam: No swelling, no tenderness and no erythema.     Sensory   Proprioception: intact  Monofilament testing: diminished    Vascular  Capillary refills: < 3 seconds  The left DP pulse is 1+. The left PT pulse is 1+.     Assign Risk Category  No deformity present  Loss of protective sensation  No weak pulses  Risk: 3

## 2024-11-30 DIAGNOSIS — I10 ESSENTIAL HYPERTENSION: ICD-10-CM

## 2024-11-30 DIAGNOSIS — I25.10 CORONARY ARTERY DISEASE INVOLVING NATIVE HEART WITHOUT ANGINA PECTORIS, UNSPECIFIED VESSEL OR LESION TYPE: ICD-10-CM

## 2024-12-02 RX ORDER — METOPROLOL SUCCINATE 50 MG/1
50 TABLET, EXTENDED RELEASE ORAL DAILY
Qty: 90 TABLET | Refills: 1 | Status: SHIPPED | OUTPATIENT
Start: 2024-12-02

## 2024-12-02 RX ORDER — AMLODIPINE BESYLATE 5 MG/1
5 TABLET ORAL DAILY
Qty: 90 TABLET | Refills: 1 | Status: SHIPPED | OUTPATIENT
Start: 2024-12-02

## 2024-12-02 RX ORDER — ATORVASTATIN CALCIUM 40 MG/1
40 TABLET, FILM COATED ORAL EVERY EVENING
Qty: 90 TABLET | Refills: 1 | Status: SHIPPED | OUTPATIENT
Start: 2024-12-02

## 2024-12-06 ENCOUNTER — OFFICE VISIT (OUTPATIENT)
Dept: PODIATRY | Facility: CLINIC | Age: 64
End: 2024-12-06
Payer: COMMERCIAL

## 2024-12-06 VITALS
WEIGHT: 238 LBS | HEART RATE: 48 BPM | SYSTOLIC BLOOD PRESSURE: 148 MMHG | BODY MASS INDEX: 32.23 KG/M2 | DIASTOLIC BLOOD PRESSURE: 62 MMHG | HEIGHT: 72 IN

## 2024-12-06 DIAGNOSIS — E11.8 TYPE 2 DIABETES MELLITUS WITH COMPLICATION, WITHOUT LONG-TERM CURRENT USE OF INSULIN (HCC): Primary | ICD-10-CM

## 2024-12-06 DIAGNOSIS — Z89.411 STATUS POST AMPUTATION OF RIGHT GREAT TOE (HCC): ICD-10-CM

## 2024-12-06 PROCEDURE — 99213 OFFICE O/P EST LOW 20 MIN: CPT | Performed by: PODIATRIST

## 2024-12-06 NOTE — PROGRESS NOTES
PATIENT:  Maciel Gaitan  1960    ASSESSMENT/PLAN:  1. Type 2 diabetes mellitus with complication, without long-term current use of insulin ([facility])        2. Status post amputation of right great toe ([facility])              1. Reviewed medical records. Educated disease prevention and risks related to diabetes.    2. Educated proper daily foot care and exam.  Instructed proper skin care / protection and footwear.  Instructed to identify any signs of infection and related foot problem.  Nails / HPK debrided.  3. The recent blood work was reviewed / discussed and the last HbA1c was 6.0.  Discussed proper blood glucose control.    4. The patient has high risk diabetic foot and will return in 3 months for diabetic foot exam.      HPI:  Maciel Gaitan is a 64 y.o.year old male seen for diabetic foot exam.  He has high risk feet with history of right 1st ray amputation.  BS is under control.  No foot ulcer.  Function is good.  The patient denied any acute pedal disorder, redness, acute swelling, or recent injury.        PAST MEDICAL HISTORY:  Past Medical History:   Diagnosis Date    Abnormal ECG     Coronary artery disease 12-    Diabetes mellitus ([facility]) 12-    Hyperlipidemia     Hypertension 12-    NSTEMI, initial episode of care ([facility]) 01/02/2018    Osteomyelitis of great toe of right foot ([facility]) 02/17/2024    Uncontrolled type 2 diabetes mellitus with hyperglycemia ([facility]) 12/26/2017       PAST SURGICAL HISTORY:  Past Surgical History:   Procedure Laterality Date    AMPUTATION  2-    CLOSURE DELAYED PRIMARY Right 03/25/2024    Procedure: CLOSURE DELAYED PRIMARY OF RIGHT FOOT;  Surgeon: Shakeel Mckinney DPM;  [facility];  Service: Podiatry    ESOPHAGUS SURGERY      in infancy    EYE SURGERY      PA AMPUTATION METATARSAL W/TOE SINGLE Right 03/20/2024    Procedure: First ray resection, metatarsal stump resection;  Surgeon: Ryland Domingo DPM;  [facility];  Service: Podiatry     TOE AMPUTATION Right 02/16/2024    Procedure: PARTIAL 1st RAY AMPUTATION, RIGHT FOOT REMOVAL OF ALL NON-VIABLE BONE AND SOFT TISSUE;  Surgeon: Severo Person DPM;  [facility];  Service: Podiatry    TONSILLECTOMY      No    WISDOM TOOTH EXTRACTION          ALLERGIES:  Patient has no known allergies.    MEDICATIONS:  Current Outpatient Medications   Medication Sig Dispense Refill    Alcohol Swabs 70 % PADS May substitute brand based on insurance coverage. Check glucose TID. 100 each 0    amLODIPine (NORVASC) 5 mg tablet TAKE 1 TABLET (5 MG TOTAL) BY MOUTH DAILY. 90 tablet 1    aspirin 81 MG tablet Take 1 tablet by mouth daily  0    atorvastatin (LIPITOR) 40 mg tablet TAKE 1 TABLET BY MOUTH EVERY DAY IN THE EVENING 90 tablet 1    glucose blood (OneTouch Verio) test strip Check blood sugars three times daily. Please substitute with appropriate alternative as covered by patient's insurance. Dx: E11.65 300 each 3    metoprolol succinate (TOPROL-XL) 50 mg 24 hr tablet TAKE 1 TABLET BY MOUTH EVERY DAY 90 tablet 1    omeprazole (PriLOSEC) 40 MG capsule Take 1 capsule (40 mg total) by mouth daily 90 capsule 1    OneTouch Delica Lancets 33G MISC May substitute brand based on insurance coverage. Check glucose TID. 100 each 3    saccharomyces boulardii (FLORASTOR) 250 mg capsule Take 1 capsule (250 mg total) by mouth 2 (two) times a day 30 capsule 0     No current facility-administered medications for this visit.       SOCIAL HISTORY:  Social History     Socioeconomic History    Marital status: /Civil Union     Spouse name: None    Number of children: None    Years of education: None    Highest education level: None   Occupational History    Occupation: physically demanding job   Tobacco Use    Smoking status: Never    Smokeless tobacco: Never   Vaping Use    Vaping status: Never Used   Substance and Sexual Activity    Alcohol use: Not Currently     Comment: 1-2 drinks a year    Drug use: No    Sexual  activity: Yes     Partners: Female     Birth control/protection: Female Sterilization   Other Topics Concern    None   Social History Narrative    Stress at work     Social Drivers of Health     Financial Resource Strain: Not on file   Food Insecurity: No Food Insecurity (3/26/2024)    Nursing - Inadequate Food Risk Classification     Worried About Running Out of Food in the Last Year: Never true     Ran Out of Food in the Last Year: Never true     Ran Out of Food in the Last Year: Not on file   Transportation Needs: No Transportation Needs (3/26/2024)    PRAPARE - Transportation     Lack of Transportation (Medical): No     Lack of Transportation (Non-Medical): No   Physical Activity: Not on file   Stress: Not on file   Social Connections: Not on file   Intimate Partner Violence: Not on file   Housing Stability: Low Risk  (3/26/2024)    Housing Stability Vital Sign     Unable to Pay for Housing in the Last Year: No     Number of Times Moved in the Last Year: 1     Homeless in the Last Year: No        REVIEW OF SYSTEMS:  GENERAL: No fever or chills  HEART: No chest pain, or palpitation  RESPIRATORY:  No acute SOB or cough  GI: No Nausea, vomit or diarrhea  NEUROLOGIC: No syncope or acute weakness    PHYSICAL EXAM:    /62 (BP Location: Right arm, Patient Position: Sitting, Cuff Size: Adult)   Pulse (!) 48   Ht 6' (1.829 m) Comment: verbal  Wt 108 kg (238 lb) Comment: verbal  BMI 32.28 kg/m²     VASCULAR EXAM  Dorsalis pedis  +1, Posterior tibial artery  +1.  The patient has class findings with skin atrophy, lack of digital hair, and nail dystrophy.  There is mild lower extremity edema bilaterally.  Venous stasis skin changes noted BLE.    NEUROLOGIC EXAM  Sensation is intact to light touch.  Sensation is decreased to toes with 10gm monofilament.  No focal neurologic deficit.          DERMATOLOGIC EXAM:   No ulcer or cellulitis noted in his feet.  The patient has hypertrophic toenails with discoloration,  onycholysis, and subungal debris.  Mild HPK left hallux IPJ.    MUSCULOSKELETAL EXAM:   No acute joint pain, edema, or redness.  No acute musculoskeletal problem.  S/P right 1st ray amputation.

## 2024-12-11 ENCOUNTER — TELEPHONE (OUTPATIENT)
Dept: FAMILY MEDICINE CLINIC | Facility: CLINIC | Age: 64
End: 2024-12-11

## 2024-12-11 NOTE — TELEPHONE ENCOUNTER
Voicemail message left for the patient.   Patient is overdue for necessary Diabetic Labwork.    MITA Choudhury has ordered the overdue test(s) and would like the patient proceed to the laboratory to have them collected before 12/31/24.    If a CMP has not been ordered, the patient can schedule a Nurse Visit in our office to have their A1C and Urine Microalbumin and Creatine collected.

## 2025-01-29 DIAGNOSIS — R10.13 EPIGASTRIC PAIN: ICD-10-CM

## 2025-01-29 RX ORDER — OMEPRAZOLE 40 MG/1
40 CAPSULE, DELAYED RELEASE ORAL DAILY
Qty: 90 CAPSULE | Refills: 1 | Status: SHIPPED | OUTPATIENT
Start: 2025-01-29

## 2025-03-09 DIAGNOSIS — E11.65 UNCONTROLLED TYPE 2 DIABETES MELLITUS WITH HYPERGLYCEMIA (HCC): ICD-10-CM

## 2025-03-10 ENCOUNTER — TELEPHONE (OUTPATIENT)
Dept: FAMILY MEDICINE CLINIC | Facility: CLINIC | Age: 65
End: 2025-03-10

## 2025-03-10 NOTE — TELEPHONE ENCOUNTER
Please contact Demar.   He is due for 6 month check up with annual physical.   Please schedule.   Due for blood work, orders are in epic.

## 2025-03-11 NOTE — TELEPHONE ENCOUNTER
Pt [place] for the next 3 weeks. Pt stated he will call us when he is home to schedule his annual physical/follow up.    Pt aware of blood work placed in epic.  Orders have been mailed to pt's address on file to serve as a reminder.

## 2025-05-28 DIAGNOSIS — I10 ESSENTIAL HYPERTENSION: ICD-10-CM

## 2025-05-28 DIAGNOSIS — I25.10 CORONARY ARTERY DISEASE INVOLVING NATIVE HEART WITHOUT ANGINA PECTORIS, UNSPECIFIED VESSEL OR LESION TYPE: ICD-10-CM

## 2025-05-28 RX ORDER — ATORVASTATIN CALCIUM 40 MG/1
40 TABLET, FILM COATED ORAL EVERY EVENING
Qty: 30 TABLET | Refills: 0 | Status: SHIPPED | OUTPATIENT
Start: 2025-05-28

## 2025-05-28 RX ORDER — METOPROLOL SUCCINATE 50 MG/1
50 TABLET, EXTENDED RELEASE ORAL DAILY
Qty: 90 TABLET | Refills: 1 | Status: SHIPPED | OUTPATIENT
Start: 2025-05-28

## 2025-05-28 RX ORDER — AMLODIPINE BESYLATE 5 MG/1
5 TABLET ORAL DAILY
Qty: 90 TABLET | Refills: 1 | Status: SHIPPED | OUTPATIENT
Start: 2025-05-28

## 2025-06-27 DIAGNOSIS — I25.10 CORONARY ARTERY DISEASE INVOLVING NATIVE HEART WITHOUT ANGINA PECTORIS, UNSPECIFIED VESSEL OR LESION TYPE: ICD-10-CM

## 2025-06-27 RX ORDER — ATORVASTATIN CALCIUM 40 MG/1
40 TABLET, FILM COATED ORAL EVERY EVENING
Qty: 90 TABLET | Refills: 3 | Status: SHIPPED | OUTPATIENT
Start: 2025-06-27

## 2025-08-05 DIAGNOSIS — R10.13 EPIGASTRIC PAIN: ICD-10-CM

## 2025-08-06 ENCOUNTER — TELEPHONE (OUTPATIENT)
Dept: FAMILY MEDICINE CLINIC | Facility: CLINIC | Age: 65
End: 2025-08-06

## 2025-08-06 RX ORDER — OMEPRAZOLE 40 MG/1
40 CAPSULE, DELAYED RELEASE ORAL DAILY
Qty: 90 CAPSULE | Refills: 0 | Status: SHIPPED | OUTPATIENT
Start: 2025-08-06

## (undated) DEVICE — BETHLEHEM UNIV MAJ EXT ,KIT: Brand: CARDINAL HEALTH

## (undated) DEVICE — PENCIL ELECTROSURG E-Z CLEAN -0035H

## (undated) DEVICE — OCCLUSIVE GAUZE STRIP,3% BISMUTH TRIBROMOPHENATE IN PETROLATUM BLEND: Brand: XEROFORM

## (undated) DEVICE — PLUMEPEN PRO 10FT

## (undated) DEVICE — CURITY IDOFORM PACKING STRIP: Brand: CURITY

## (undated) DEVICE — ACE WRAP 4 IN UNSTERILE

## (undated) DEVICE — KERLIX BANDAGE ROLL: Brand: KERLIX

## (undated) DEVICE — CURITY NON-ADHERENT STRIPS: Brand: CURITY

## (undated) DEVICE — HANDPIECE SET WITH RETRACTABLE COAXIAL FAN SPRAY TIP AND SUCTION TUBE: Brand: INTERPULSE

## (undated) DEVICE — ACE WRAP 2 IN UNSTERILE

## (undated) DEVICE — INTENDED FOR TISSUE SEPARATION, AND OTHER PROCEDURES THAT REQUIRE A SHARP SURGICAL BLADE TO PUNCTURE OR CUT.: Brand: BARD-PARKER ® CARBON RIB-BACK BLADES

## (undated) DEVICE — SKN PRP WNG SPNGE PVP SCRB STR: Brand: MEDLINE INDUSTRIES, INC.

## (undated) DEVICE — INTENDED FOR TISSUE SEPARATION, AND OTHER PROCEDURES THAT REQUIRE A SHARP SURGICAL BLADE TO PUNCTURE OR CUT.: Brand: BARD-PARKER SAFETY BLADES SIZE 15, STERILE

## (undated) DEVICE — BETHLEHEM UNIVERSAL  MIONR EXT: Brand: CARDINAL HEALTH

## (undated) DEVICE — SPONGE STICK WITH PVP-I: Brand: KENDALL

## (undated) DEVICE — GLOVE INDICATOR PI UNDERGLOVE SZ 7.5 BLUE

## (undated) DEVICE — NEEDLE 25G X 1 1/2

## (undated) DEVICE — SYRINGE 10ML LL

## (undated) DEVICE — CYSTO TUBING SINGLE IRRIGATION

## (undated) DEVICE — ABDOMINAL PAD: Brand: DERMACEA

## (undated) DEVICE — SUT ETHILON 3-0 FS-1 18 IN 663G

## (undated) DEVICE — PAD GROUNDING DUAL ADULT

## (undated) DEVICE — GLOVE SRG BIOGEL 7.5

## (undated) DEVICE — GAUZE SPONGES,16 PLY: Brand: CURITY

## (undated) DEVICE — BLADE ACL FINE 9.5X25.5X0.4MM